# Patient Record
Sex: FEMALE | Race: WHITE | NOT HISPANIC OR LATINO | URBAN - METROPOLITAN AREA
[De-identification: names, ages, dates, MRNs, and addresses within clinical notes are randomized per-mention and may not be internally consistent; named-entity substitution may affect disease eponyms.]

---

## 2017-07-03 ENCOUNTER — OUTPATIENT (OUTPATIENT)
Dept: OUTPATIENT SERVICES | Facility: HOSPITAL | Age: 82
LOS: 1 days | End: 2017-07-03
Payer: COMMERCIAL

## 2017-07-03 DIAGNOSIS — Z98.89 OTHER SPECIFIED POSTPROCEDURAL STATES: Chronic | ICD-10-CM

## 2017-07-03 DIAGNOSIS — S91.309A UNSPECIFIED OPEN WOUND, UNSPECIFIED FOOT, INITIAL ENCOUNTER: ICD-10-CM

## 2017-07-03 PROCEDURE — G0463: CPT

## 2017-07-06 DIAGNOSIS — S91.001D UNSPECIFIED OPEN WOUND, RIGHT ANKLE, SUBSEQUENT ENCOUNTER: ICD-10-CM

## 2017-07-06 DIAGNOSIS — Z87.442 PERSONAL HISTORY OF URINARY CALCULI: ICD-10-CM

## 2017-07-06 DIAGNOSIS — Z82.49 FAMILY HISTORY OF ISCHEMIC HEART DISEASE AND OTHER DISEASES OF THE CIRCULATORY SYSTEM: ICD-10-CM

## 2017-07-06 DIAGNOSIS — H40.9 UNSPECIFIED GLAUCOMA: ICD-10-CM

## 2017-07-06 DIAGNOSIS — Z87.81 PERSONAL HISTORY OF (HEALED) TRAUMATIC FRACTURE: ICD-10-CM

## 2017-07-06 DIAGNOSIS — Z88.7 ALLERGY STATUS TO SERUM AND VACCINE: ICD-10-CM

## 2017-07-06 DIAGNOSIS — H91.90 UNSPECIFIED HEARING LOSS, UNSPECIFIED EAR: ICD-10-CM

## 2017-07-06 DIAGNOSIS — M19.90 UNSPECIFIED OSTEOARTHRITIS, UNSPECIFIED SITE: ICD-10-CM

## 2017-07-06 DIAGNOSIS — Z90.710 ACQUIRED ABSENCE OF BOTH CERVIX AND UTERUS: ICD-10-CM
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
## 2017-07-06 DIAGNOSIS — Z98.41 CATARACT EXTRACTION STATUS, RIGHT EYE: ICD-10-CM

## 2017-07-06 DIAGNOSIS — E66.9 OBESITY, UNSPECIFIED: ICD-10-CM

## 2017-07-06 DIAGNOSIS — Z79.899 OTHER LONG TERM (CURRENT) DRUG THERAPY: ICD-10-CM

## 2017-07-06 DIAGNOSIS — S91.301D UNSPECIFIED OPEN WOUND, RIGHT FOOT, SUBSEQUENT ENCOUNTER: ICD-10-CM

## 2017-07-06 DIAGNOSIS — Z98.42 CATARACT EXTRACTION STATUS, LEFT EYE: ICD-10-CM

## 2017-07-06 DIAGNOSIS — Z85.3 PERSONAL HISTORY OF MALIGNANT NEOPLASM OF BREAST: ICD-10-CM

## 2017-07-06 DIAGNOSIS — Z90.49 ACQUIRED ABSENCE OF OTHER SPECIFIED PARTS OF DIGESTIVE TRACT: ICD-10-CM
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
## 2017-07-06 DIAGNOSIS — Z87.891 PERSONAL HISTORY OF NICOTINE DEPENDENCE: ICD-10-CM

## 2017-07-06 DIAGNOSIS — I48.91 UNSPECIFIED ATRIAL FIBRILLATION: ICD-10-CM
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
## 2017-07-06 DIAGNOSIS — K21.9 GASTRO-ESOPHAGEAL REFLUX DISEASE WITHOUT ESOPHAGITIS: ICD-10-CM

## 2017-07-06 DIAGNOSIS — Z92.3 PERSONAL HISTORY OF IRRADIATION: ICD-10-CM

## 2017-07-06 DIAGNOSIS — Z90.11 ACQUIRED ABSENCE OF RIGHT BREAST AND NIPPLE: ICD-10-CM

## 2017-07-08 ENCOUNTER — INPATIENT (INPATIENT)
Facility: HOSPITAL | Age: 82
LOS: 3 days | Discharge: HOME HEALTH | DRG: 603 | End: 2017-07-12
Attending: FAMILY MEDICINE | Admitting: STUDENT IN AN ORGANIZED HEALTH CARE EDUCATION/TRAINING PROGRAM
Payer: COMMERCIAL

## 2017-07-08 VITALS
DIASTOLIC BLOOD PRESSURE: 91 MMHG | OXYGEN SATURATION: 96 % | SYSTOLIC BLOOD PRESSURE: 134 MMHG | RESPIRATION RATE: 16 BRPM | HEART RATE: 108 BPM | WEIGHT: 199.96 LBS | TEMPERATURE: 98 F

## 2017-07-08 DIAGNOSIS — Z96.7 PRESENCE OF OTHER BONE AND TENDON IMPLANTS: Chronic | ICD-10-CM

## 2017-07-08 DIAGNOSIS — Z98.89 OTHER SPECIFIED POSTPROCEDURAL STATES: Chronic | ICD-10-CM

## 2017-07-08 DIAGNOSIS — Z98.890 OTHER SPECIFIED POSTPROCEDURAL STATES: Chronic | ICD-10-CM

## 2017-07-08 DIAGNOSIS — Z71.89 OTHER SPECIFIED COUNSELING: ICD-10-CM

## 2017-07-08 DIAGNOSIS — Z29.9 ENCOUNTER FOR PROPHYLACTIC MEASURES, UNSPECIFIED: ICD-10-CM

## 2017-07-08 DIAGNOSIS — E66.9 OBESITY, UNSPECIFIED: ICD-10-CM

## 2017-07-08 DIAGNOSIS — L97.519 NON-PRESSURE CHRONIC ULCER OF OTHER PART OF RIGHT FOOT WITH UNSPECIFIED SEVERITY: ICD-10-CM

## 2017-07-08 DIAGNOSIS — Z90.710 ACQUIRED ABSENCE OF BOTH CERVIX AND UTERUS: Chronic | ICD-10-CM

## 2017-07-08 DIAGNOSIS — A04.7 ENTEROCOLITIS DUE TO CLOSTRIDIUM DIFFICILE: ICD-10-CM

## 2017-07-08 DIAGNOSIS — L03.115 CELLULITIS OF RIGHT LOWER LIMB: ICD-10-CM

## 2017-07-08 DIAGNOSIS — T14.90 INJURY, UNSPECIFIED: ICD-10-CM

## 2017-07-08 DIAGNOSIS — Z90.13 ACQUIRED ABSENCE OF BILATERAL BREASTS AND NIPPLES: Chronic | ICD-10-CM

## 2017-07-08 DIAGNOSIS — H40.9 UNSPECIFIED GLAUCOMA: ICD-10-CM

## 2017-07-08 DIAGNOSIS — I48.91 UNSPECIFIED ATRIAL FIBRILLATION: ICD-10-CM

## 2017-07-08 LAB
ALBUMIN SERPL ELPH-MCNC: 3.5 G/DL — SIGNIFICANT CHANGE UP (ref 3.3–5)
ALP SERPL-CCNC: 62 U/L — SIGNIFICANT CHANGE UP (ref 40–120)
ALT FLD-CCNC: 19 U/L — SIGNIFICANT CHANGE UP (ref 12–78)
ANION GAP SERPL CALC-SCNC: 6 MMOL/L — SIGNIFICANT CHANGE UP (ref 5–17)
APTT BLD: 34.1 SEC — SIGNIFICANT CHANGE UP (ref 27.5–37.4)
AST SERPL-CCNC: 17 U/L — SIGNIFICANT CHANGE UP (ref 15–37)
BASOPHILS # BLD AUTO: 0.1 K/UL — SIGNIFICANT CHANGE UP (ref 0–0.2)
BASOPHILS NFR BLD AUTO: 0.6 % — SIGNIFICANT CHANGE UP (ref 0–2)
BILIRUB SERPL-MCNC: 0.4 MG/DL — SIGNIFICANT CHANGE UP (ref 0.2–1.2)
BUN SERPL-MCNC: 21 MG/DL — SIGNIFICANT CHANGE UP (ref 7–23)
CALCIUM SERPL-MCNC: 8.9 MG/DL — SIGNIFICANT CHANGE UP (ref 8.5–10.1)
CHLORIDE SERPL-SCNC: 105 MMOL/L — SIGNIFICANT CHANGE UP (ref 96–108)
CO2 SERPL-SCNC: 29 MMOL/L — SIGNIFICANT CHANGE UP (ref 22–31)
CREAT SERPL-MCNC: 0.98 MG/DL — SIGNIFICANT CHANGE UP (ref 0.5–1.3)
EOSINOPHIL # BLD AUTO: 0.3 K/UL — SIGNIFICANT CHANGE UP (ref 0–0.5)
EOSINOPHIL NFR BLD AUTO: 3.1 % — SIGNIFICANT CHANGE UP (ref 0–6)
GLUCOSE SERPL-MCNC: 90 MG/DL — SIGNIFICANT CHANGE UP (ref 70–99)
HCT VFR BLD CALC: 36.3 % — SIGNIFICANT CHANGE UP (ref 34.5–45)
HGB BLD-MCNC: 11.9 G/DL — SIGNIFICANT CHANGE UP (ref 11.5–15.5)
INR BLD: 1.14 RATIO — SIGNIFICANT CHANGE UP (ref 0.88–1.16)
LACTATE SERPL-SCNC: 1.7 MMOL/L — SIGNIFICANT CHANGE UP (ref 0.7–2)
LYMPHOCYTES # BLD AUTO: 1.5 K/UL — SIGNIFICANT CHANGE UP (ref 1–3.3)
LYMPHOCYTES # BLD AUTO: 17.4 % — SIGNIFICANT CHANGE UP (ref 13–44)
MCHC RBC-ENTMCNC: 32.8 GM/DL — SIGNIFICANT CHANGE UP (ref 32–36)
MCHC RBC-ENTMCNC: 33.4 PG — SIGNIFICANT CHANGE UP (ref 27–34)
MCV RBC AUTO: 101.9 FL — HIGH (ref 80–100)
MONOCYTES # BLD AUTO: 0.8 K/UL — SIGNIFICANT CHANGE UP (ref 0–0.9)
MONOCYTES NFR BLD AUTO: 8.7 % — SIGNIFICANT CHANGE UP (ref 1–9)
NEUTROPHILS # BLD AUTO: 6.2 K/UL — SIGNIFICANT CHANGE UP (ref 1.8–7.4)
NEUTROPHILS NFR BLD AUTO: 70.2 % — SIGNIFICANT CHANGE UP (ref 43–77)
PLATELET # BLD AUTO: 327 K/UL — SIGNIFICANT CHANGE UP (ref 150–400)
POTASSIUM SERPL-MCNC: 4.3 MMOL/L — SIGNIFICANT CHANGE UP (ref 3.5–5.3)
POTASSIUM SERPL-SCNC: 4.3 MMOL/L — SIGNIFICANT CHANGE UP (ref 3.5–5.3)
PROT SERPL-MCNC: 7.3 G/DL — SIGNIFICANT CHANGE UP (ref 6–8.3)
PROTHROM AB SERPL-ACNC: 12.5 SEC — SIGNIFICANT CHANGE UP (ref 9.8–12.7)
RBC # BLD: 3.56 M/UL — LOW (ref 3.8–5.2)
RBC # FLD: 12.4 % — SIGNIFICANT CHANGE UP (ref 10.3–14.5)
SODIUM SERPL-SCNC: 140 MMOL/L — SIGNIFICANT CHANGE UP (ref 135–145)
WBC # BLD: 8.9 K/UL — SIGNIFICANT CHANGE UP (ref 3.8–10.5)
WBC # FLD AUTO: 8.9 K/UL — SIGNIFICANT CHANGE UP (ref 3.8–10.5)

## 2017-07-08 PROCEDURE — 73630 X-RAY EXAM OF FOOT: CPT | Mod: 26,RT

## 2017-07-08 PROCEDURE — 99285 EMERGENCY DEPT VISIT HI MDM: CPT

## 2017-07-08 PROCEDURE — 99223 1ST HOSP IP/OBS HIGH 75: CPT | Mod: AI,GC

## 2017-07-08 PROCEDURE — 73610 X-RAY EXAM OF ANKLE: CPT | Mod: 26,RT

## 2017-07-08 PROCEDURE — 93010 ELECTROCARDIOGRAM REPORT: CPT

## 2017-07-08 PROCEDURE — 71010: CPT | Mod: 26

## 2017-07-08 RX ORDER — LACTOBACILLUS ACIDOPHILUS 100MM CELL
1 CAPSULE ORAL
Qty: 0 | Refills: 0 | Status: DISCONTINUED | OUTPATIENT
Start: 2017-07-08 | End: 2017-07-12

## 2017-07-08 RX ORDER — ACETAMINOPHEN 500 MG
650 TABLET ORAL EVERY 6 HOURS
Qty: 0 | Refills: 0 | Status: DISCONTINUED | OUTPATIENT
Start: 2017-07-08 | End: 2017-07-12

## 2017-07-08 RX ORDER — LATANOPROST 0.05 MG/ML
1 SOLUTION/ DROPS OPHTHALMIC; TOPICAL AT BEDTIME
Qty: 0 | Refills: 0 | Status: DISCONTINUED | OUTPATIENT
Start: 2017-07-08 | End: 2017-07-12

## 2017-07-08 RX ORDER — ENOXAPARIN SODIUM 100 MG/ML
40 INJECTION SUBCUTANEOUS EVERY 24 HOURS
Qty: 0 | Refills: 0 | Status: DISCONTINUED | OUTPATIENT
Start: 2017-07-08 | End: 2017-07-12

## 2017-07-08 RX ORDER — VANCOMYCIN HCL 1 G
1000 VIAL (EA) INTRAVENOUS EVERY 12 HOURS
Qty: 0 | Refills: 0 | Status: DISCONTINUED | OUTPATIENT
Start: 2017-07-09 | End: 2017-07-11

## 2017-07-08 RX ORDER — VANCOMYCIN HCL 1 G
1000 VIAL (EA) INTRAVENOUS EVERY 24 HOURS
Qty: 0 | Refills: 0 | Status: DISCONTINUED | OUTPATIENT
Start: 2017-07-08 | End: 2017-07-08

## 2017-07-08 RX ORDER — BRIMONIDINE TARTRATE 2 MG/MG
1 SOLUTION/ DROPS OPHTHALMIC
Qty: 0 | Refills: 0 | Status: DISCONTINUED | OUTPATIENT
Start: 2017-07-08 | End: 2017-07-12

## 2017-07-08 RX ORDER — VANCOMYCIN HCL 1 G
1000 VIAL (EA) INTRAVENOUS EVERY 12 HOURS
Qty: 0 | Refills: 0 | Status: DISCONTINUED | OUTPATIENT
Start: 2017-07-09 | End: 2017-07-08

## 2017-07-08 RX ORDER — OXYCODONE AND ACETAMINOPHEN 5; 325 MG/1; MG/1
1 TABLET ORAL ONCE
Qty: 0 | Refills: 0 | Status: DISCONTINUED | OUTPATIENT
Start: 2017-07-08 | End: 2017-07-08

## 2017-07-08 RX ORDER — DORZOLAMIDE HYDROCHLORIDE TIMOLOL MALEATE 20; 5 MG/ML; MG/ML
1 SOLUTION/ DROPS OPHTHALMIC
Qty: 0 | Refills: 0 | Status: DISCONTINUED | OUTPATIENT
Start: 2017-07-08 | End: 2017-07-12

## 2017-07-08 RX ORDER — VANCOMYCIN HCL 1 G
1000 VIAL (EA) INTRAVENOUS ONCE
Qty: 0 | Refills: 0 | Status: COMPLETED | OUTPATIENT
Start: 2017-07-08 | End: 2017-07-08

## 2017-07-08 RX ORDER — OXYCODONE AND ACETAMINOPHEN 5; 325 MG/1; MG/1
1 TABLET ORAL EVERY 4 HOURS
Qty: 0 | Refills: 0 | Status: DISCONTINUED | OUTPATIENT
Start: 2017-07-08 | End: 2017-07-12

## 2017-07-08 RX ADMIN — OXYCODONE AND ACETAMINOPHEN 1 TABLET(S): 5; 325 TABLET ORAL at 23:00

## 2017-07-08 RX ADMIN — OXYCODONE AND ACETAMINOPHEN 1 TABLET(S): 5; 325 TABLET ORAL at 15:26

## 2017-07-08 RX ADMIN — DORZOLAMIDE HYDROCHLORIDE TIMOLOL MALEATE 1 DROP(S): 20; 5 SOLUTION/ DROPS OPHTHALMIC at 18:23

## 2017-07-08 RX ADMIN — OXYCODONE AND ACETAMINOPHEN 1 TABLET(S): 5; 325 TABLET ORAL at 22:32

## 2017-07-08 RX ADMIN — OXYCODONE AND ACETAMINOPHEN 1 TABLET(S): 5; 325 TABLET ORAL at 19:46

## 2017-07-08 RX ADMIN — ENOXAPARIN SODIUM 40 MILLIGRAM(S): 100 INJECTION SUBCUTANEOUS at 18:23

## 2017-07-08 RX ADMIN — OXYCODONE AND ACETAMINOPHEN 1 TABLET(S): 5; 325 TABLET ORAL at 21:00

## 2017-07-08 RX ADMIN — LATANOPROST 1 DROP(S): 0.05 SOLUTION/ DROPS OPHTHALMIC; TOPICAL at 21:19

## 2017-07-08 RX ADMIN — Medication 1 TABLET(S): at 18:23

## 2017-07-08 RX ADMIN — BRIMONIDINE TARTRATE 1 DROP(S): 2 SOLUTION/ DROPS OPHTHALMIC at 18:23

## 2017-07-08 RX ADMIN — Medication 250 MILLIGRAM(S): at 13:06
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
## 2017-07-08 NOTE — H&P ADULT - NSHPPHYSICALEXAM_GEN_ALL_CORE
Physical Exam:  General: Well developed, well nourished, No Acute Distress  HEENT: Normocephallic Atraumatic, PERRLA, EOMI bl, moist mucous membranes   Neck: Supple, nontender, no mass  Neurology: AA&Ox3, CN II-XII grossly intact, sensation intact  Respiratory: Clear To Auscultation B/L, No Wheezes, rhonchi or rales  CV: Regular Rate and Rhythm, +S1/S2, no murmurs, rubs or gallops  Abdominal: Soft, Non-Tender, Non-Distended +Bowel Soundsx4  Extremities: No Clubbing, cyanosis or edema, + peripheral pulses  Musculoskeletal: Normal Range of motion, no joint erythema or warmth, no joint swelling   Skin: warm, dry, normal color, no rash or abnormal lesions Physical Exam:  General: Well developed, obese, No Acute Distress  HEENT: Normocephallic Atraumatic, PERRLA, EOMI bl, moist mucous membranes   Neck: Supple, nontender, no mass  Neurology: AA&Ox3, CN II-XII grossly intact, sensation intact  Respiratory: Clear To Auscultation B/L, No Wheezes, rhonchi or rales  CV: Regular Rate and Rhythm, +S1/S2, no murmurs, rubs or gallops  Abdominal: + Anne's sign on deep palpation Soft, Non-Distended +Bowel Soundsx4  Extremities: No Clubbing, cyanosis or edema, + peripheral pulses  Musculoskeletal: limited ROM in right ankle, redness, edema in right leg,    Skin: warm, dry, red R leg, purulent, wet, red wound on plantar surface of right foot with granulation tissue present Physical Exam:  General: Well developed, obese, No Acute Distress  HEENT: Normocephallic Atraumatic, PERRLA, EOMI bl, moist mucous membranes   Neck: Supple, nontender, no mass  Neurology: AA&Ox3, CN II-XII grossly intact, sensation intact  Respiratory: Clear To Auscultation B/L, No Wheezes, rhonchi or rales  CV: Regular Rate and Rhythm, +S1/S2, no murmurs, rubs or gallops  Abdominal: + Anne's sign on deep palpation Soft, Non-Distended +Bowel Soundsx4  Extremities: No Clubbing, cyanosis or edema, + peripheral pulses  Musculoskeletal: limited ROM in right ankle, redness, edema in right leg,  RLE tender to palpation, cap refill less than 3 seconds, dorsalis pedis pulses present bilaterally, full range of motion of toes bilaterally  Skin: warm, dry, red R leg, purulent, wet, red wound on plantar surface of right foot with granulation tissue present

## 2017-07-08 NOTE — H&P ADULT - PSH
S/P colon resection  march 11 2014  Status post appendectomy H/O hernia repair    H/O mastectomy, bilateral    S/P colon resection  march 11 2014  S/P ORIF (open reduction internal fixation) fracture  L lower extremity  Status post appendectomy H/O hernia repair    H/O mastectomy, bilateral    H/O: hysterectomy    S/P colon resection  march 11 2014  S/P ORIF (open reduction internal fixation) fracture  L lower extremity  Status post appendectomy

## 2017-07-08 NOTE — H&P ADULT - PMH
Diverticulitis    Glaucoma    PSVT (paroxysmal supraventricular tachycardia)    S/P colon resection Atrial fibrillation    Breast cancer    C. difficile diarrhea    Diverticulitis    Gall stones    Glaucoma    PSVT (paroxysmal supraventricular tachycardia)    S/P colon resection

## 2017-07-08 NOTE — ED ADULT NURSE NOTE - AREA
dorsal/large wound with granulation tissue, some eschar surrounded by erythema wound dressed by Dr. Vanegas

## 2017-07-08 NOTE — H&P ADULT - FAMILY HISTORY
No pertinent family history in first degree relatives Mother  Still living? No  Family history of heart valve abnormality, Age at diagnosis: Age Unknown

## 2017-07-08 NOTE — H&P ADULT - NSHPREVIEWOFSYSTEMS_GEN_ALL_CORE
Constitutional: Denies fever, chills, general malaise, weight loss, weight gain, diaphoresis   HEENT: Denies sore throat, runny nose, photophobia, blurry vision, double vision, eye pain, difficulty hearing, dizziness, dysphagia, epistaxis  Respiratory: Denies shortness of breath, dyspnea on exertion, cough, sputum production, wheezing, hemoptysis  Cardiovascular: Denies chest pain, palpitations, edema  Gastrointestinal: Denies nausea, vomiting, diarrhea, constipation, abdominal pain, melena, hematochezia   Genitourinary: Denies dysuria, hematuria, frequency, urgency, incontinence  Skin/Breast: Denies rash, hives, itching  Musculoskeletal: Denies muscle pains, muscle weakness, joint pain or swelling  Neurologic: Denies syncope, loss of consciousness, headache, weakness, dizziness, paresthesias, numbness, tingling, confusion, dementia   Psychiatric: Denies feeling anxious, depressed, suicidal, or homicidal thoughts  Endocrine: Denies cold or heat intolerance, polydipsia, polyphagia   Hematology/Oncology: Denies abnormal bruising, tender or enlarged lymph nodes   ROS negative except as noted above Constitutional: Denies fever, chills, general malaise, weight loss, weight gain, diaphoresis   HEENT: Denies sore throat, runny nose, photophobia, blurry vision, double vision, eye pain, difficulty hearing, dizziness, dysphagia, epistaxis  Respiratory: Denies shortness of breath, dyspnea on exertion, cough, sputum production, wheezing, hemoptysis  Cardiovascular: Denies chest pain, palpitations, edema  Gastrointestinal: Admits nausea. Denies vomiting, diarrhea, constipation, abdominal pain, melena, hematochezia   Genitourinary: Denies dysuria, hematuria, frequency, urgency, incontinence  Skin/Breast: Denies rash, hives, itching  Musculoskeletal: Admits right foot, ankle pain and swelling.   Neurologic: Denies syncope, loss of consciousness, headache, weakness, dizziness, paresthesias, numbness, tingling, confusion, dementia   Psychiatric: Denies feeling anxious, depressed, suicidal, or homicidal thoughts  Endocrine: Denies cold or heat intolerance, polydipsia, polyphagia   Hematology/Oncology: Denies abnormal bruising, tender or enlarged lymph nodes   ROS negative except as noted above Constitutional: Denies fever, chills, general malaise, weight loss, weight gain, diaphoresis   HEENT: Denies photophobia, double vision, eye pain, difficulty hearing, dizziness, dysphagia, epistaxis  Respiratory: Denies shortness of breath, dyspnea on exertion, cough, sputum production, wheezing, hemoptysis  Cardiovascular: Denies chest pain, palpitations but admits chronic edema in RLE  Gastrointestinal: Admits nausea intermittently. Denies vomiting, diarrhea, constipation, abdominal pain, melena, hematochezia   Genitourinary: Denies dysuria, hematuria, frequency, urgency, incontinence  Skin/Breast: Denies rash, hives, itching but admits lesion on RLE as described above  Musculoskeletal: Admits right foot, ankle pain and swelling  Neurologic: Denies syncope, loss of consciousness, headache, weakness, dizziness, paresthesias, numbness, tingling, confusion, dementia   Psychiatric: Denies feeling anxious, depressed  Endocrine: Denies cold or heat intolerance, polydipsia, polyphagia   Hematology/Oncology: Denies abnormal bruising, tender or enlarged lymph nodes   ROS negative except as noted above

## 2017-07-08 NOTE — H&P ADULT - PROBLEM SELECTOR PLAN 2
Patient non compliant with A/C or BB.    patient on importance of rate control and A/C in the setting of acute infection  Monitor HR Patient has been prescribed metoprolol but does not take it, and refuses anticoagulation.   Counseled patient on importance of rate control and A/C in the setting of acute infection including risk of death, patient refusing anticoagulation.  Monitor HR Patient has been prescribed metoprolol but does not take it, and refuses anticoagulation.   Counseled patient on importance of rate control and A/C in the setting of acute infection including risk of death, patient refusing anticoagulation.  Patient is currently at a normal rate (HR95 bpm) and is not a candidate for rate control at this time.  Monitor HR, if patient becomes tachycardic, consider metoprolol Patient has been prescribed metoprolol but does not take it, and refuses anticoagulation.   Counseled patient on importance of rate control and A/C in the setting of acute infection including risk of CVA, MI, death, patient refusing anticoagulation at this time and demonstrated adequate understanding of the counseling provided.  Patient is currently at a normal rate (HR90 bpm) and is not a candidate for rate control at this time.  Monitor HR, if patient becomes tachycardic, consider metoprolol
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
## 2017-07-08 NOTE — CONSULT NOTE ADULT - ASSESSMENT
Assessment:    Grade 2 ulceration, right dorsal-lateral foot  Cellulitis of right lower extremity    Plan:    Pt seen and eval  Chart reviewed  Right foot xrays ordered and reviewed  Right foot wound culture taken and submitted, f/u results  Dry sterile dressing applied to right foot    Pt to be admitted to the hospital, podiatry/wound care will cont to follow Pt while in house

## 2017-07-08 NOTE — H&P ADULT - ATTENDING COMMENTS
I personally conducted a history and physical examination and agree with the above listed history and physical which was documented by the resident. I personally discussed the plan of care with the patient, residents and family members at the bedside. All of the patient's questions were answered to her satisfaction and she is currently in agreement w/ the treatment plan. She demonstarted adequate understanding of the plan and the counseling which was provided.
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
## 2017-07-09 LAB
ANION GAP SERPL CALC-SCNC: 6 MMOL/L — SIGNIFICANT CHANGE UP (ref 5–17)
BASOPHILS # BLD AUTO: 0.1 K/UL — SIGNIFICANT CHANGE UP (ref 0–0.2)
BASOPHILS NFR BLD AUTO: 1.1 % — SIGNIFICANT CHANGE UP (ref 0–2)
BUN SERPL-MCNC: 19 MG/DL — SIGNIFICANT CHANGE UP (ref 7–23)
CALCIUM SERPL-MCNC: 8.9 MG/DL — SIGNIFICANT CHANGE UP (ref 8.5–10.1)
CHLORIDE SERPL-SCNC: 107 MMOL/L — SIGNIFICANT CHANGE UP (ref 96–108)
CO2 SERPL-SCNC: 28 MMOL/L — SIGNIFICANT CHANGE UP (ref 22–31)
CREAT SERPL-MCNC: 1 MG/DL — SIGNIFICANT CHANGE UP (ref 0.5–1.3)
EOSINOPHIL # BLD AUTO: 0.3 K/UL — SIGNIFICANT CHANGE UP (ref 0–0.5)
EOSINOPHIL NFR BLD AUTO: 5.2 % — SIGNIFICANT CHANGE UP (ref 0–6)
GLUCOSE SERPL-MCNC: 84 MG/DL — SIGNIFICANT CHANGE UP (ref 70–99)
HBA1C BLD-MCNC: 5.5 % — SIGNIFICANT CHANGE UP (ref 4–5.6)
HCT VFR BLD CALC: 35.1 % — SIGNIFICANT CHANGE UP (ref 34.5–45)
HGB BLD-MCNC: 11.3 G/DL — LOW (ref 11.5–15.5)
LYMPHOCYTES # BLD AUTO: 1.7 K/UL — SIGNIFICANT CHANGE UP (ref 1–3.3)
LYMPHOCYTES # BLD AUTO: 28 % — SIGNIFICANT CHANGE UP (ref 13–44)
MCHC RBC-ENTMCNC: 32 GM/DL — SIGNIFICANT CHANGE UP (ref 32–36)
MCHC RBC-ENTMCNC: 32.8 PG — SIGNIFICANT CHANGE UP (ref 27–34)
MCV RBC AUTO: 102.2 FL — HIGH (ref 80–100)
MONOCYTES # BLD AUTO: 0.7 K/UL — SIGNIFICANT CHANGE UP (ref 0–0.9)
MONOCYTES NFR BLD AUTO: 11.7 % — HIGH (ref 1–9)
NEUTROPHILS # BLD AUTO: 3.3 K/UL — SIGNIFICANT CHANGE UP (ref 1.8–7.4)
NEUTROPHILS NFR BLD AUTO: 54.1 % — SIGNIFICANT CHANGE UP (ref 43–77)
PLATELET # BLD AUTO: 305 K/UL — SIGNIFICANT CHANGE UP (ref 150–400)
POTASSIUM SERPL-MCNC: 4.1 MMOL/L — SIGNIFICANT CHANGE UP (ref 3.5–5.3)
POTASSIUM SERPL-SCNC: 4.1 MMOL/L — SIGNIFICANT CHANGE UP (ref 3.5–5.3)
RBC # BLD: 3.44 M/UL — LOW (ref 3.8–5.2)
RBC # FLD: 12.3 % — SIGNIFICANT CHANGE UP (ref 10.3–14.5)
SODIUM SERPL-SCNC: 141 MMOL/L — SIGNIFICANT CHANGE UP (ref 135–145)
WBC # BLD: 6.1 K/UL — SIGNIFICANT CHANGE UP (ref 3.8–10.5)
WBC # FLD AUTO: 6.1 K/UL — SIGNIFICANT CHANGE UP (ref 3.8–10.5)

## 2017-07-09 PROCEDURE — 99233 SBSQ HOSP IP/OBS HIGH 50: CPT

## 2017-07-09 RX ORDER — ONDANSETRON 8 MG/1
4 TABLET, FILM COATED ORAL ONCE
Qty: 0 | Refills: 0 | Status: COMPLETED | OUTPATIENT
Start: 2017-07-09 | End: 2017-07-09

## 2017-07-09 RX ORDER — HYDROMORPHONE HYDROCHLORIDE 2 MG/ML
0.5 INJECTION INTRAMUSCULAR; INTRAVENOUS; SUBCUTANEOUS ONCE
Qty: 0 | Refills: 0 | Status: DISCONTINUED | OUTPATIENT
Start: 2017-07-09 | End: 2017-07-09

## 2017-07-09 RX ADMIN — Medication 1 TABLET(S): at 17:06

## 2017-07-09 RX ADMIN — OXYCODONE AND ACETAMINOPHEN 1 TABLET(S): 5; 325 TABLET ORAL at 12:05

## 2017-07-09 RX ADMIN — Medication 1 TABLET(S): at 12:07

## 2017-07-09 RX ADMIN — ONDANSETRON 4 MILLIGRAM(S): 8 TABLET, FILM COATED ORAL at 15:01

## 2017-07-09 RX ADMIN — OXYCODONE AND ACETAMINOPHEN 1 TABLET(S): 5; 325 TABLET ORAL at 04:19

## 2017-07-09 RX ADMIN — BRIMONIDINE TARTRATE 1 DROP(S): 2 SOLUTION/ DROPS OPHTHALMIC at 17:06

## 2017-07-09 RX ADMIN — HYDROMORPHONE HYDROCHLORIDE 0.5 MILLIGRAM(S): 2 INJECTION INTRAMUSCULAR; INTRAVENOUS; SUBCUTANEOUS at 09:00

## 2017-07-09 RX ADMIN — OXYCODONE AND ACETAMINOPHEN 1 TABLET(S): 5; 325 TABLET ORAL at 16:31

## 2017-07-09 RX ADMIN — Medication 250 MILLIGRAM(S): at 12:13

## 2017-07-09 RX ADMIN — Medication 250 MILLIGRAM(S): at 00:18

## 2017-07-09 RX ADMIN — ENOXAPARIN SODIUM 40 MILLIGRAM(S): 100 INJECTION SUBCUTANEOUS at 17:26

## 2017-07-09 RX ADMIN — OXYCODONE AND ACETAMINOPHEN 1 TABLET(S): 5; 325 TABLET ORAL at 12:20

## 2017-07-09 RX ADMIN — OXYCODONE AND ACETAMINOPHEN 1 TABLET(S): 5; 325 TABLET ORAL at 17:30

## 2017-07-09 RX ADMIN — DORZOLAMIDE HYDROCHLORIDE TIMOLOL MALEATE 1 DROP(S): 20; 5 SOLUTION/ DROPS OPHTHALMIC at 05:22

## 2017-07-09 RX ADMIN — OXYCODONE AND ACETAMINOPHEN 1 TABLET(S): 5; 325 TABLET ORAL at 05:00

## 2017-07-09 RX ADMIN — DORZOLAMIDE HYDROCHLORIDE TIMOLOL MALEATE 1 DROP(S): 20; 5 SOLUTION/ DROPS OPHTHALMIC at 17:07

## 2017-07-09 RX ADMIN — Medication 1 TABLET(S): at 08:45

## 2017-07-09 RX ADMIN — BRIMONIDINE TARTRATE 1 DROP(S): 2 SOLUTION/ DROPS OPHTHALMIC at 05:22

## 2017-07-09 RX ADMIN — OXYCODONE AND ACETAMINOPHEN 1 TABLET(S): 5; 325 TABLET ORAL at 21:51

## 2017-07-09 RX ADMIN — LATANOPROST 1 DROP(S): 0.05 SOLUTION/ DROPS OPHTHALMIC; TOPICAL at 21:53

## 2017-07-09 RX ADMIN — OXYCODONE AND ACETAMINOPHEN 1 TABLET(S): 5; 325 TABLET ORAL at 23:00

## 2017-07-09 RX ADMIN — HYDROMORPHONE HYDROCHLORIDE 0.5 MILLIGRAM(S): 2 INJECTION INTRAMUSCULAR; INTRAVENOUS; SUBCUTANEOUS at 08:45
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
## 2017-07-10 LAB
ANION GAP SERPL CALC-SCNC: 5 MMOL/L — SIGNIFICANT CHANGE UP (ref 5–17)
BASOPHILS # BLD AUTO: 0.1 K/UL — SIGNIFICANT CHANGE UP (ref 0–0.2)
BASOPHILS NFR BLD AUTO: 1.1 % — SIGNIFICANT CHANGE UP (ref 0–2)
BUN SERPL-MCNC: 20 MG/DL — SIGNIFICANT CHANGE UP (ref 7–23)
CALCIUM SERPL-MCNC: 8.9 MG/DL — SIGNIFICANT CHANGE UP (ref 8.5–10.1)
CHLORIDE SERPL-SCNC: 105 MMOL/L — SIGNIFICANT CHANGE UP (ref 96–108)
CO2 SERPL-SCNC: 31 MMOL/L — SIGNIFICANT CHANGE UP (ref 22–31)
CREAT SERPL-MCNC: 1.1 MG/DL — SIGNIFICANT CHANGE UP (ref 0.5–1.3)
CULTURE RESULTS: SIGNIFICANT CHANGE UP
EOSINOPHIL # BLD AUTO: 0.3 K/UL — SIGNIFICANT CHANGE UP (ref 0–0.5)
EOSINOPHIL NFR BLD AUTO: 5.5 % — SIGNIFICANT CHANGE UP (ref 0–6)
GLUCOSE SERPL-MCNC: 85 MG/DL — SIGNIFICANT CHANGE UP (ref 70–99)
HCT VFR BLD CALC: 34.9 % — SIGNIFICANT CHANGE UP (ref 34.5–45)
HGB BLD-MCNC: 11.1 G/DL — LOW (ref 11.5–15.5)
LYMPHOCYTES # BLD AUTO: 1.9 K/UL — SIGNIFICANT CHANGE UP (ref 1–3.3)
LYMPHOCYTES # BLD AUTO: 34.8 % — SIGNIFICANT CHANGE UP (ref 13–44)
MCHC RBC-ENTMCNC: 32 GM/DL — SIGNIFICANT CHANGE UP (ref 32–36)
MCHC RBC-ENTMCNC: 32.9 PG — SIGNIFICANT CHANGE UP (ref 27–34)
MCV RBC AUTO: 103 FL — HIGH (ref 80–100)
MONOCYTES # BLD AUTO: 0.5 K/UL — SIGNIFICANT CHANGE UP (ref 0–0.9)
MONOCYTES NFR BLD AUTO: 9.6 % — HIGH (ref 1–9)
NEUTROPHILS # BLD AUTO: 2.7 K/UL — SIGNIFICANT CHANGE UP (ref 1.8–7.4)
NEUTROPHILS NFR BLD AUTO: 49 % — SIGNIFICANT CHANGE UP (ref 43–77)
PLATELET # BLD AUTO: 303 K/UL — SIGNIFICANT CHANGE UP (ref 150–400)
POTASSIUM SERPL-MCNC: 4.9 MMOL/L — SIGNIFICANT CHANGE UP (ref 3.5–5.3)
POTASSIUM SERPL-SCNC: 4.9 MMOL/L — SIGNIFICANT CHANGE UP (ref 3.5–5.3)
RBC # BLD: 3.38 M/UL — LOW (ref 3.8–5.2)
RBC # FLD: 12.4 % — SIGNIFICANT CHANGE UP (ref 10.3–14.5)
SODIUM SERPL-SCNC: 141 MMOL/L — SIGNIFICANT CHANGE UP (ref 135–145)
SPECIMEN SOURCE: SIGNIFICANT CHANGE UP
VANCOMYCIN TROUGH SERPL-MCNC: 11.9 UG/ML — SIGNIFICANT CHANGE UP (ref 10–20)
WBC # BLD: 5.4 K/UL — SIGNIFICANT CHANGE UP (ref 3.8–10.5)
WBC # FLD AUTO: 5.4 K/UL — SIGNIFICANT CHANGE UP (ref 3.8–10.5)

## 2017-07-10 PROCEDURE — 93970 EXTREMITY STUDY: CPT | Mod: 26

## 2017-07-10 PROCEDURE — 99233 SBSQ HOSP IP/OBS HIGH 50: CPT | Mod: GC

## 2017-07-10 RX ORDER — DIPHENHYDRAMINE HCL 50 MG
25 CAPSULE ORAL EVERY 4 HOURS
Qty: 0 | Refills: 0 | Status: DISCONTINUED | OUTPATIENT
Start: 2017-07-10 | End: 2017-07-12

## 2017-07-10 RX ORDER — COLLAGENASE CLOSTRIDIUM HIST. 250 UNIT/G
1 OINTMENT (GRAM) TOPICAL DAILY
Qty: 0 | Refills: 0 | Status: DISCONTINUED | OUTPATIENT
Start: 2017-07-10 | End: 2017-07-12

## 2017-07-10 RX ADMIN — Medication 1 TABLET(S): at 17:52

## 2017-07-10 RX ADMIN — Medication 25 MILLIGRAM(S): at 18:28

## 2017-07-10 RX ADMIN — OXYCODONE AND ACETAMINOPHEN 1 TABLET(S): 5; 325 TABLET ORAL at 04:15

## 2017-07-10 RX ADMIN — Medication 250 MILLIGRAM(S): at 15:59

## 2017-07-10 RX ADMIN — OXYCODONE AND ACETAMINOPHEN 1 TABLET(S): 5; 325 TABLET ORAL at 22:30

## 2017-07-10 RX ADMIN — OXYCODONE AND ACETAMINOPHEN 1 TABLET(S): 5; 325 TABLET ORAL at 07:31

## 2017-07-10 RX ADMIN — Medication 250 MILLIGRAM(S): at 00:47

## 2017-07-10 RX ADMIN — DORZOLAMIDE HYDROCHLORIDE TIMOLOL MALEATE 1 DROP(S): 20; 5 SOLUTION/ DROPS OPHTHALMIC at 06:51

## 2017-07-10 RX ADMIN — ENOXAPARIN SODIUM 40 MILLIGRAM(S): 100 INJECTION SUBCUTANEOUS at 17:52

## 2017-07-10 RX ADMIN — OXYCODONE AND ACETAMINOPHEN 1 TABLET(S): 5; 325 TABLET ORAL at 21:49

## 2017-07-10 RX ADMIN — DORZOLAMIDE HYDROCHLORIDE TIMOLOL MALEATE 1 DROP(S): 20; 5 SOLUTION/ DROPS OPHTHALMIC at 21:46

## 2017-07-10 RX ADMIN — BRIMONIDINE TARTRATE 1 DROP(S): 2 SOLUTION/ DROPS OPHTHALMIC at 06:50

## 2017-07-10 RX ADMIN — OXYCODONE AND ACETAMINOPHEN 1 TABLET(S): 5; 325 TABLET ORAL at 12:39

## 2017-07-10 RX ADMIN — OXYCODONE AND ACETAMINOPHEN 1 TABLET(S): 5; 325 TABLET ORAL at 03:08

## 2017-07-10 RX ADMIN — Medication 1 TABLET(S): at 12:41

## 2017-07-10 RX ADMIN — BRIMONIDINE TARTRATE 1 DROP(S): 2 SOLUTION/ DROPS OPHTHALMIC at 21:45

## 2017-07-10 RX ADMIN — Medication 1 APPLICATION(S): at 12:42

## 2017-07-10 RX ADMIN — LATANOPROST 1 DROP(S): 0.05 SOLUTION/ DROPS OPHTHALMIC; TOPICAL at 21:46

## 2017-07-10 RX ADMIN — Medication 1 TABLET(S): at 09:08

## 2017-07-10 RX ADMIN — OXYCODONE AND ACETAMINOPHEN 1 TABLET(S): 5; 325 TABLET ORAL at 13:00

## 2017-07-10 NOTE — PROGRESS NOTE ADULT - SUBJECTIVE AND OBJECTIVE BOX
Patient is a 81y old  Female who presents with a chief complaint of right leg wound (08 Jul 2017 13:58)      INTERVAL HPI/OVERNIGHT EVENTS: Pt seen and examined at bedside. No acute complaints at this time. Denies fever/chills, CP, SOB, N/V/D. States her pain in R foot improving.    MEDICATIONS  (STANDING):  brimonidine 0.2% Ophthalmic Solution 1 Drop(s) Both EYES two times a day  dorzolamide 2%/timolol 0.5% Ophthalmic Solution 1 Drop(s) Both EYES two times a day  latanoprost 0.005% Ophthalmic Solution 1 Drop(s) Both EYES at bedtime  enoxaparin Injectable 40 milliGRAM(s) SubCutaneous every 24 hours  lactobacillus acidophilus 1 Tablet(s) Oral three times a day with meals  vancomycin  IVPB 1000 milliGRAM(s) IV Intermittent every 12 hours  collagenase Ointment 1 Application(s) Topical daily    MEDICATIONS  (PRN):  acetaminophen    Suspension. 650 milliGRAM(s) Oral every 6 hours PRN Mild Pain (1 - 3)  oxyCODONE    5 mG/acetaminophen 325 mG 1 Tablet(s) Oral every 4 hours PRN Moderate Pain (4 - 6)      Allergies    No Known Allergies    Intolerances        REVIEW OF SYSTEMS:  CONSTITUTIONAL: No fever or chills  HEENT:  No headache, no sore throat  RESPIRATORY: No cough, wheezing, or shortness of breath  CARDIOVASCULAR: No chest pain, palpitations, or leg swelling  GASTROINTESTINAL: No nausea, vomiting, or diarrhea  GENITOURINARY: No dysuria, frequency, or hematuria  NEUROLOGICAL: no focal weakness or dizziness  SKIN:  No rashes or lesions   MUSCULOSKELETAL: no myalgias   PSYCHIATRIC: No depression or anxiety  ROS Unable to obtain due to - [ ] Dementia  [ ] Lethargy  REST OF REVIEW Of SYSTEM - [ ] Normal     Vital Signs Last 24 Hrs  T(C): 36.8 (10 Jul 2017 05:04), Max: 37.1 (09 Jul 2017 19:33)  T(F): 98.3 (10 Jul 2017 05:04), Max: 98.7 (09 Jul 2017 19:33)  HR: 78 (10 Jul 2017 05:04) (78 - 105)  BP: 95/68 (10 Jul 2017 05:04) (95/68 - 105/64)  BP(mean): --  RR: 16 (10 Jul 2017 05:04) (16 - 17)  SpO2: 96% (10 Jul 2017 05:04) (91% - 96%)    PHYSICAL EXAM:  GENERAL: No Acute Distress  HEENT:  EOMI, mucous membranes moist  CHEST/LUNG:  Clear To Auscultation b/l, no rales, wheezes, or rhonchi  HEART:  Regular Rate Rhythm, S1, S2, []murmur  ABDOMEN:  Bowel Sounds+, soft, Non-Tender, Non-Distended  EXTREMITIES: no edema or calf tenderness  SKIN:  no rash  NERVOUS SYSTEM: AA&Ox3    DIET:     LABS:                        11.1   5.4   )-----------( 303      ( 10 Jul 2017 07:25 )             34.9     CBC Full  -  ( 10 Jul 2017 07:25 )  WBC Count : 5.4 K/uL  Hemoglobin : 11.1 g/dL  Hematocrit : 34.9 %  Platelet Count - Automated : 303 K/uL  Mean Cell Volume : 103.0 fl  Mean Cell Hemoglobin : 32.9 pg  Mean Cell Hemoglobin Concentration : 32.0 gm/dL  Auto Neutrophil # : 2.7 K/uL  Auto Lymphocyte # : 1.9 K/uL  Auto Monocyte # : 0.5 K/uL  Auto Eosinophil # : 0.3 K/uL  Auto Basophil # : 0.1 K/uL  Auto Neutrophil % : 49.0 %  Auto Lymphocyte % : 34.8 %  Auto Monocyte % : 9.6 %  Auto Eosinophil % : 5.5 %  Auto Basophil % : 1.1 %    10 Jul 2017 07:25    141    |  105    |  20     ----------------------------<  85     4.9     |  31     |  1.10     Ca    8.9        10 Jul 2017 07:25      PT/INR - ( 08 Jul 2017 13:10 )   PT: 12.5 sec;   INR: 1.14 ratio         PTT - ( 08 Jul 2017 13:10 )  PTT:34.1 sec    CAPILLARY BLOOD GLUCOSE          RECENT CULTURES:    Foot wound Cx: Coag. neg staph    Blood Cx: NGTD          RADIOLOGY & ADDITIONAL TESTS:    XR R Foot/Ankle: No evidence of fracture/dislocation    Personally reviewed.     Consultant(s) Notes Reviewed:  [x] YES  [ ] NO    Care Discussed with [ ] Consultants  [x] Patient  [ ] Family  [x]      [ ] Other; RN  DVT ppx  Advanced directive Patient is a 81y old  Female who presents with a chief complaint of right leg wound (08 Jul 2017 13:58)      INTERVAL HPI/OVERNIGHT EVENTS: Pt seen and examined at bedside. No acute complaints at this time. Denies fever/chills, CP, SOB, N/V/D. States her pain in R foot improving.    MEDICATIONS  (STANDING):  brimonidine 0.2% Ophthalmic Solution 1 Drop(s) Both EYES two times a day  dorzolamide 2%/timolol 0.5% Ophthalmic Solution 1 Drop(s) Both EYES two times a day  latanoprost 0.005% Ophthalmic Solution 1 Drop(s) Both EYES at bedtime  enoxaparin Injectable 40 milliGRAM(s) SubCutaneous every 24 hours  lactobacillus acidophilus 1 Tablet(s) Oral three times a day with meals  vancomycin  IVPB 1000 milliGRAM(s) IV Intermittent every 12 hours  collagenase Ointment 1 Application(s) Topical daily    MEDICATIONS  (PRN):  acetaminophen    Suspension. 650 milliGRAM(s) Oral every 6 hours PRN Mild Pain (1 - 3)  oxyCODONE    5 mG/acetaminophen 325 mG 1 Tablet(s) Oral every 4 hours PRN Moderate Pain (4 - 6)      Allergies    No Known Allergies    Intolerances        REVIEW OF SYSTEMS:  CONSTITUTIONAL: No fever or chills  HEENT:  No headache, no sore throat  RESPIRATORY: No cough, wheezing, or shortness of breath  CARDIOVASCULAR: No chest pain, palpitations, or leg swelling  GASTROINTESTINAL: No nausea, vomiting, or diarrhea  GENITOURINARY: No dysuria, frequency, or hematuria  NEUROLOGICAL: no focal weakness or dizziness  SKIN:  No rashes or lesions   MUSCULOSKELETAL: no myalgias   PSYCHIATRIC: No depression or anxiety  ROS Unable to obtain due to - [ ] Dementia  [ ] Lethargy  REST OF REVIEW Of SYSTEM - [ ] Normal     Vital Signs Last 24 Hrs  T(C): 36.8 (10 Jul 2017 05:04), Max: 37.1 (09 Jul 2017 19:33)  T(F): 98.3 (10 Jul 2017 05:04), Max: 98.7 (09 Jul 2017 19:33)  HR: 78 (10 Jul 2017 05:04) (78 - 105)  BP: 95/68 (10 Jul 2017 05:04) (95/68 - 105/64)  BP(mean): --  RR: 16 (10 Jul 2017 05:04) (16 - 17)  SpO2: 96% (10 Jul 2017 05:04) (91% - 96%)    PHYSICAL EXAM:  GENERAL: No Acute Distress  HEENT:  EOMI, mucous membranes moist  CHEST/LUNG:  Clear To Auscultation b/l, no rales, wheezes, or rhonchi  HEART:  Regular Rate Rhythm, S1, S2, [-]murmur  ABDOMEN:  Bowel Sounds+, soft, Non-Tender, Non-Distended  EXTREMITIES: no edema or calf tenderness  SKIN:  grade 2 ulceration on R dorsal foot  NERVOUS SYSTEM: AA&Ox3    DIET:     LABS:                        11.1   5.4   )-----------( 303      ( 10 Jul 2017 07:25 )             34.9     CBC Full  -  ( 10 Jul 2017 07:25 )  WBC Count : 5.4 K/uL  Hemoglobin : 11.1 g/dL  Hematocrit : 34.9 %  Platelet Count - Automated : 303 K/uL  Mean Cell Volume : 103.0 fl  Mean Cell Hemoglobin : 32.9 pg  Mean Cell Hemoglobin Concentration : 32.0 gm/dL  Auto Neutrophil # : 2.7 K/uL  Auto Lymphocyte # : 1.9 K/uL  Auto Monocyte # : 0.5 K/uL  Auto Eosinophil # : 0.3 K/uL  Auto Basophil # : 0.1 K/uL  Auto Neutrophil % : 49.0 %  Auto Lymphocyte % : 34.8 %  Auto Monocyte % : 9.6 %  Auto Eosinophil % : 5.5 %  Auto Basophil % : 1.1 %    10 Jul 2017 07:25    141    |  105    |  20     ----------------------------<  85     4.9     |  31     |  1.10     Ca    8.9        10 Jul 2017 07:25      PT/INR - ( 08 Jul 2017 13:10 )   PT: 12.5 sec;   INR: 1.14 ratio         PTT - ( 08 Jul 2017 13:10 )  PTT:34.1 sec    CAPILLARY BLOOD GLUCOSE          RECENT CULTURES:    Foot wound Cx: Coag. neg staph    Blood Cx: NGTD          RADIOLOGY & ADDITIONAL TESTS:    XR R Foot/Ankle: No evidence of fracture/dislocation    Personally reviewed.     Consultant(s) Notes Reviewed:  [x] YES  [ ] NO    Care Discussed with [ ] Consultants  [x] Patient  [ ] Family  [x]      [ ] Other; RN  DVT ppx  Advanced directive Patient is a 81y old  Female who presents with a chief complaint of right leg wound (08 Jul 2017 13:58)      INTERVAL HPI/OVERNIGHT EVENTS: Pt seen and examined at bedside. No acute complaints at this time. Denies fever/chills, CP, SOB, N/V/D. States her pain in R foot improving, as well as swelling has improved however still eryhtematous    MEDICATIONS  (STANDING):  brimonidine 0.2% Ophthalmic Solution 1 Drop(s) Both EYES two times a day  dorzolamide 2%/timolol 0.5% Ophthalmic Solution 1 Drop(s) Both EYES two times a day  latanoprost 0.005% Ophthalmic Solution 1 Drop(s) Both EYES at bedtime  enoxaparin Injectable 40 milliGRAM(s) SubCutaneous every 24 hours  lactobacillus acidophilus 1 Tablet(s) Oral three times a day with meals  vancomycin  IVPB 1000 milliGRAM(s) IV Intermittent every 12 hours  collagenase Ointment 1 Application(s) Topical daily    MEDICATIONS  (PRN):  acetaminophen    Suspension. 650 milliGRAM(s) Oral every 6 hours PRN Mild Pain (1 - 3)  oxyCODONE    5 mG/acetaminophen 325 mG 1 Tablet(s) Oral every 4 hours PRN Moderate Pain (4 - 6)      Allergies    No Known Allergies    Intolerances        REVIEW OF SYSTEMS:  CONSTITUTIONAL: No fever or chills  HEENT:  No headache, no sore throat  RESPIRATORY: No cough, wheezing, or shortness of breath  CARDIOVASCULAR: No chest pain, palpitations, or leg swelling  GASTROINTESTINAL: No nausea, vomiting, or diarrhea  GENITOURINARY: No dysuria, frequency, or hematuria  NEUROLOGICAL: no focal weakness or dizziness  SKIN:  No rashes or lesions   MUSCULOSKELETAL: no myalgias   PSYCHIATRIC: No depression or anxiety  ROS Unable to obtain due to - [ ] Dementia  [ ] Lethargy  REST OF REVIEW Of SYSTEM - [ ] Normal     Vital Signs Last 24 Hrs  T(C): 36.8 (10 Jul 2017 05:04), Max: 37.1 (09 Jul 2017 19:33)  T(F): 98.3 (10 Jul 2017 05:04), Max: 98.7 (09 Jul 2017 19:33)  HR: 78 (10 Jul 2017 05:04) (78 - 105)  BP: 95/68 (10 Jul 2017 05:04) (95/68 - 105/64)  BP(mean): --  RR: 16 (10 Jul 2017 05:04) (16 - 17)  SpO2: 96% (10 Jul 2017 05:04) (91% - 96%)    PHYSICAL EXAM:  GENERAL: No Acute Distress  HEENT:  EOMI, mucous membranes moist  CHEST/LUNG:  Clear To Auscultation b/l, no rales, wheezes, or rhonchi  HEART:  Regular Rate Rhythm, S1, S2, [-]murmur  ABDOMEN:  Bowel Sounds+, soft, Non-Tender, Non-Distended  EXTREMITIES: no edema or calf tenderness  SKIN:  grade 2 ulceration on R dorsal foot, erythematous, swollen, edematous but nonpitting  NERVOUS SYSTEM: AA&Ox3    DIET:     LABS:                        11.1   5.4   )-----------( 303      ( 10 Jul 2017 07:25 )             34.9     CBC Full  -  ( 10 Jul 2017 07:25 )  WBC Count : 5.4 K/uL  Hemoglobin : 11.1 g/dL  Hematocrit : 34.9 %  Platelet Count - Automated : 303 K/uL  Mean Cell Volume : 103.0 fl  Mean Cell Hemoglobin : 32.9 pg  Mean Cell Hemoglobin Concentration : 32.0 gm/dL  Auto Neutrophil # : 2.7 K/uL  Auto Lymphocyte # : 1.9 K/uL  Auto Monocyte # : 0.5 K/uL  Auto Eosinophil # : 0.3 K/uL  Auto Basophil # : 0.1 K/uL  Auto Neutrophil % : 49.0 %  Auto Lymphocyte % : 34.8 %  Auto Monocyte % : 9.6 %  Auto Eosinophil % : 5.5 %  Auto Basophil % : 1.1 %    10 Jul 2017 07:25    141    |  105    |  20     ----------------------------<  85     4.9     |  31     |  1.10     Ca    8.9        10 Jul 2017 07:25      PT/INR - ( 08 Jul 2017 13:10 )   PT: 12.5 sec;   INR: 1.14 ratio         PTT - ( 08 Jul 2017 13:10 )  PTT:34.1 sec    CAPILLARY BLOOD GLUCOSE          RECENT CULTURES:    Foot wound Cx: Coag. neg staph    Blood Cx: NGTD          RADIOLOGY & ADDITIONAL TESTS:    XR R Foot/Ankle: No evidence of fracture/dislocation    Personally reviewed.     Consultant(s) Notes Reviewed:  [x] YES  [ ] NO    Care Discussed with [ ] Consultants  [x] Patient  [ ] Family  [x]      [ ] Other; RN  DVT ppx  Advanced directive

## 2017-07-10 NOTE — PROGRESS NOTE ADULT - ASSESSMENT
Assessment:    Grade 2 ulceration, right dorsal-lateral foot  Cellulitis of right lower extremity    Plan:    Pt seen and eval  Chart reviewed  Right foot dressings removed and wound further examined  Santyl.dry sterile dressing applied to right foot  f/u right foot wound culture results    Podiatry to cont following Pt while in-house Assessment:    Grade 2 ulceration, right dorsal-lateral foot  Cellulitis of right lower extremity    Plan:    Pt seen and eval  Chart reviewed  Right foot dressings removed and wound further examined  Santyl.dry sterile dressing applied to right foot  f/u right foot wound culture results    Podiatry to cont following Pt while in-house    WOUND CARE INSTRUCTIONS:    1. Flush right foot wound with normal saline, as tolerated.  2. Dab dry with sterile 4x4 gauze, as tolerated.  3. Apply Santyl with nickel-thickness, directly to wound bed (do not apply to periwound area).  4. Apply 4x4 gauze and then ABD pad, over Santyl ointment.  5. Wrap dressings with 4" emily and secure with medical tape.  6. Monitor for infections.  7. Repeat steps 1-6 daily. Assessment:    Grade 2 ulceration, right dorsal-lateral foot  Cellulitis of right lower extremity    Plan:    Pt seen and eval  Chart reviewed  Right foot dressings removed and wound further examined  Santyl.dry sterile dressing applied to right foot  f/u right foot wound culture results    Full-weightbearing as tolerated to the right lower extremity    Podiatry to cont following Pt while in-house    WOUND CARE INSTRUCTIONS:    1. Flush right foot wound with normal saline, as tolerated.  2. Dab dry with sterile 4x4 gauze, as tolerated.  3. Apply Santyl with nickel-thickness, directly to wound bed (do not apply to periwound area).  4. Apply 4x4 gauze and then ABD pad, over Santyl ointment.  5. Wrap dressings with 4" emily and secure with medical tape.  6. Monitor for infections.  7. Repeat steps 1-6 daily.
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
## 2017-07-11 DIAGNOSIS — T14.8 OTHER INJURY OF UNSPECIFIED BODY REGION: ICD-10-CM

## 2017-07-11 PROCEDURE — 99233 SBSQ HOSP IP/OBS HIGH 50: CPT

## 2017-07-11 RX ORDER — SENNA PLUS 8.6 MG/1
1 TABLET ORAL AT BEDTIME
Qty: 0 | Refills: 0 | Status: DISCONTINUED | OUTPATIENT
Start: 2017-07-11 | End: 2017-07-12

## 2017-07-11 RX ORDER — CEPHALEXIN 500 MG
2 CAPSULE ORAL
Qty: 28 | Refills: 0 | OUTPATIENT
Start: 2017-07-11 | End: 2017-07-18

## 2017-07-11 RX ORDER — LACTOBACILLUS ACIDOPHILUS 100MM CELL
1 CAPSULE ORAL
Qty: 7 | Refills: 0 | OUTPATIENT
Start: 2017-07-11 | End: 2017-07-18

## 2017-07-11 RX ORDER — DOCUSATE SODIUM 100 MG
100 CAPSULE ORAL DAILY
Qty: 0 | Refills: 0 | Status: DISCONTINUED | OUTPATIENT
Start: 2017-07-11 | End: 2017-07-12

## 2017-07-11 RX ORDER — CEPHALEXIN 500 MG
1000 CAPSULE ORAL EVERY 12 HOURS
Qty: 0 | Refills: 0 | Status: DISCONTINUED | OUTPATIENT
Start: 2017-07-11 | End: 2017-07-11

## 2017-07-11 RX ORDER — LACTOBACILLUS ACIDOPHILUS 100MM CELL
1 CAPSULE ORAL
Qty: 7 | Refills: 0
Start: 2017-07-11 | End: 2017-07-18

## 2017-07-11 RX ADMIN — BRIMONIDINE TARTRATE 1 DROP(S): 2 SOLUTION/ DROPS OPHTHALMIC at 06:23

## 2017-07-11 RX ADMIN — Medication 1 TABLET(S): at 12:16

## 2017-07-11 RX ADMIN — OXYCODONE AND ACETAMINOPHEN 1 TABLET(S): 5; 325 TABLET ORAL at 11:00

## 2017-07-11 RX ADMIN — OXYCODONE AND ACETAMINOPHEN 1 TABLET(S): 5; 325 TABLET ORAL at 22:17

## 2017-07-11 RX ADMIN — OXYCODONE AND ACETAMINOPHEN 1 TABLET(S): 5; 325 TABLET ORAL at 03:30

## 2017-07-11 RX ADMIN — ENOXAPARIN SODIUM 40 MILLIGRAM(S): 100 INJECTION SUBCUTANEOUS at 17:04

## 2017-07-11 RX ADMIN — Medication 1 APPLICATION(S): at 10:08

## 2017-07-11 RX ADMIN — DORZOLAMIDE HYDROCHLORIDE TIMOLOL MALEATE 1 DROP(S): 20; 5 SOLUTION/ DROPS OPHTHALMIC at 06:23

## 2017-07-11 RX ADMIN — Medication 1 TABLET(S): at 17:04

## 2017-07-11 RX ADMIN — SENNA PLUS 1 TABLET(S): 8.6 TABLET ORAL at 22:17

## 2017-07-11 RX ADMIN — LATANOPROST 1 DROP(S): 0.05 SOLUTION/ DROPS OPHTHALMIC; TOPICAL at 21:31

## 2017-07-11 RX ADMIN — OXYCODONE AND ACETAMINOPHEN 1 TABLET(S): 5; 325 TABLET ORAL at 22:18

## 2017-07-11 RX ADMIN — OXYCODONE AND ACETAMINOPHEN 1 TABLET(S): 5; 325 TABLET ORAL at 17:55

## 2017-07-11 RX ADMIN — Medication 1 TABLET(S): at 08:36

## 2017-07-11 RX ADMIN — OXYCODONE AND ACETAMINOPHEN 1 TABLET(S): 5; 325 TABLET ORAL at 12:18

## 2017-07-11 RX ADMIN — DORZOLAMIDE HYDROCHLORIDE TIMOLOL MALEATE 1 DROP(S): 20; 5 SOLUTION/ DROPS OPHTHALMIC at 21:31

## 2017-07-11 RX ADMIN — OXYCODONE AND ACETAMINOPHEN 1 TABLET(S): 5; 325 TABLET ORAL at 17:08

## 2017-07-11 RX ADMIN — Medication 100 MILLIGRAM(S): at 22:17

## 2017-07-11 RX ADMIN — OXYCODONE AND ACETAMINOPHEN 1 TABLET(S): 5; 325 TABLET ORAL at 02:46

## 2017-07-11 RX ADMIN — BRIMONIDINE TARTRATE 1 DROP(S): 2 SOLUTION/ DROPS OPHTHALMIC at 21:31
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
## 2017-07-11 NOTE — DISCHARGE NOTE ADULT - CARE PROVIDER_API CALL
Moe Vanegas (DPM), Podiatric Medicine and Surgery  [address] [place] [address]  Phone: [phone]  Fax: [phone]    Josiane Noel (DO), Family Medicine  [address] [place] [address] [address] [place] [address]  Phone: [phone]  Fax: [phone] Moe Vanegas (DPM), Podiatric Medicine and Surgery  [address] [place] [address]  Phone: [phone]  Fax: [phone]    Josiane Noel (DO), Family Medicine  [address] [place] [address] [address] [place] [address]  Phone: [phone]  Fax: [phone]

## 2017-07-11 NOTE — PROGRESS NOTE ADULT - PROBLEM SELECTOR PLAN 6
Advanced care planning was discussed with patient.  Patient is full code
Controlled  Pt has hx of C. diff diarrhea, start bacid while on antibx
Controlled  Pt has hx of C. diff diarrhea, start bacid while on antibx

## 2017-07-11 NOTE — PROGRESS NOTE ADULT - ASSESSMENT
Assessment:    Grade 2 ulceration, right dorsal-lateral foot  Cellulitis of right lower extremity    Plan:    -Pt seen and eval  -Chart reviewed  -Right foot dressings removed and wound further examined, flushed with NS  -Santyl,dry sterile dressing applied to right foot  -f/u right foot wound culture results    Full-weightbearing as tolerated to the right lower extremity    Podiatry to cont following Pt while in-house    WOUND CARE INSTRUCTIONS:    1. Flush right foot wound with normal saline, as tolerated.  2. Dab dry with sterile 4x4 gauze, as tolerated.  3. Apply Santyl with nickel-thickness, directly to wound bed (do not apply to periwound area).  4. Apply 4x4 gauze and then ABD pad, over Santyl ointment.  5. Wrap dressings with 4" emily and secure with medical tape.  6. Monitor for infections.  7. Repeat steps 1-6 daily.
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
## 2017-07-11 NOTE — DISCHARGE NOTE ADULT - ADDITIONAL INSTRUCTIONS
WOUND CARE INSTRUCTIONS:    1. Flush right foot wound with normal saline, as tolerated.  2. Dab dry with sterile 4x4 gauze, as tolerated.  3. Apply Santyl with nickel-thickness, directly to wound bed (do not apply to periwound area).  4. Apply 4x4 gauze and then ABD pad, over Santyl ointment.  5. Wrap dressings with 4" emily and secure with medical tape.  6. Monitor for infections.  7. Repeat steps 1-6 daily. WOUND CARE INSTRUCTIONS:    1. Flush right foot wound with normal saline, as tolerated.  2. Dab dry with sterile 4x4 gauze, as tolerated.  3. Apply Santyl with nickel-thickness, directly to wound bed (do not apply to periwound area).  4. Apply 4x4 gauze and then ABD pad, over Santyl ointment.  5. Wrap dressings with 4" emily and secure with medical tape.  6. Monitor for infections.  7. Repeat steps 1-6 daily.    Continue keflex for 7 days after discharge from hospital  Follow up with wound care in 7-10 days for outpatient management of wound WOUND CARE INSTRUCTIONS:    1. Flush right foot wound with normal saline, as tolerated.  2. Dab dry with sterile 4x4 gauze, as tolerated.  3. Apply Santyl with nickel-thickness, directly to wound bed (do not apply to periwound area).  4. Apply 4x4 gauze and then ABD pad, over Santyl ointment.  5. Wrap dressings with 4" emliy and secure with medical tape.  6. Monitor for infections.  7. Repeat steps 1-6 daily.    Continue antibiotics for 7 days after discharge from hospital  Follow up with wound care in 7-10 days for outpatient management of wound follow up with your PMD within 1 week of discharge, pt to setup alll follow up appts  WOUND CARE INSTRUCTIONS:    1. Flush right foot wound with normal saline, as tolerated.  2. Dab dry with sterile 4x4 gauze, as tolerated.  3. Apply Santyl with nickel-thickness, directly to wound bed (do not apply to periwound area).  4. Apply 4x4 gauze and then ABD pad, over Santyl ointment.  5. Wrap dressings with 4" emily and secure with medical tape.  6. Monitor for infections.  7. Repeat steps 1-6 daily.    Continue antibiotics for 7 days after discharge from hospital, follow up with wound care and PMD  Follow up with wound care in 7-10 days for outpatient management of wound  DO NOT drive

## 2017-07-11 NOTE — PROGRESS NOTE ADULT - PROBLEM SELECTOR PLAN 4
Cont. brimonidine and latanoprost
controlled  Patient has history of C. diff diarrhea, start Bacid while on antibiotics
Cont. brimonidine and latanoprost

## 2017-07-11 NOTE — PROGRESS NOTE ADULT - PROBLEM SELECTOR PROBLEM 1
Cellulitis of right lower extremity
Ulcer of right foot, with unspecified severity
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
## 2017-07-11 NOTE — PROGRESS NOTE ADULT - PROBLEM SELECTOR PLAN 5
BMI 36  Counseled Pt on importance of diet and exercise  HbA1C: 5.5
bmi 36  Counseled patient on importance of diet and exercise.  F/u HbA1c
BMI 36  Counseled Pt on importance of diet and exercise  HbA1C: 5.5

## 2017-07-11 NOTE — DISCHARGE NOTE ADULT - MEDICATION SUMMARY - MEDICATIONS TO TAKE
I will START or STAY ON the medications listed below when I get home from the hospital:    red yeast rice 600 mg oral capsule  -- 2 cap(s) by mouth once a day  -- Indication: For per pmd    Coenzyme Q10 60 mg oral tablet  -- 1 tab(s) by mouth 2 times a day  -- Indication: For per pmd    brimonidine 0.2% ophthalmic solution  -- 1 drop(s) to each affected eye 2 times a day  -- Indication: For per pmd    dorzolamide-timolol 2%-0.5% preservative-free ophthalmic solution  -- 1 drop(s) to each affected eye 2 times a day  -- Indication: For per pmd    latanoprost 0.005% ophthalmic solution  -- 1 drop(s) to each affected eye once a day (at bedtime)  -- Indication: For per pmd I will START or STAY ON the medications listed below when I get home from the hospital:    doxycycline hyclate 100 mg oral delayed release tablet  -- 1 tab(s) by mouth 2 times a day  -- Avoid prolonged or excessive exposure to direct and/or artificial sunlight while taking this medication.  Do not chew, break, or crush.  Do not take dairy products, antacids, or iron preparations within one hour of this medication.  Do not take this drug if you are pregnant.  Finish all this medication unless otherwise directed by prescriber.  Medication should be taken with plenty of water.    -- Indication: For Cellulitis of right lower extremity    red yeast rice 600 mg oral capsule  -- 2 cap(s) by mouth once a day  -- Indication: For Obese    Coenzyme Q10 60 mg oral tablet  -- 1 tab(s) by mouth 2 times a day  -- Indication: For Obese    brimonidine 0.2% ophthalmic solution  -- 1 drop(s) to each affected eye 2 times a day  -- Indication: For Glaucoma    dorzolamide-timolol 2%-0.5% preservative-free ophthalmic solution  -- 1 drop(s) to each affected eye 2 times a day  -- Indication: For Glaucoma    latanoprost 0.005% ophthalmic solution  -- 1 drop(s) to each affected eye once a day (at bedtime)  -- Indication: For Glaucoma    lactobacillus acidophilus oral tablet  -- 1 tab(s) by mouth once a day  -- Indication: For Cellulitis of right lower extremity I will START or STAY ON the medications listed below when I get home from the hospital:    acetaminophen-oxycodone 325 mg-5 mg oral tablet  -- 1 tab(s) by mouth every 6 hours, As Needed -for severe pain MDD:4 tabs  -- Caution federal law prohibits the transfer of this drug to any person other  than the person for whom it was prescribed.  May cause drowsiness.  Alcohol may intensify this effect.  Use care when operating dangerous machinery.  This prescription cannot be refilled.  This product contains acetaminophen.  Do not use  with any other product containing acetaminophen to prevent possible liver damage.  Using more of this medication than prescribed may cause serious breathing problems.    -- Indication: For Cellulitis of right lower extremity    doxycycline hyclate 100 mg oral delayed release tablet  -- 1 tab(s) by mouth 2 times a day  -- Avoid prolonged or excessive exposure to direct and/or artificial sunlight while taking this medication.  Do not chew, break, or crush.  Do not take dairy products, antacids, or iron preparations within one hour of this medication.  Do not take this drug if you are pregnant.  Finish all this medication unless otherwise directed by prescriber.  Medication should be taken with plenty of water.    -- Indication: For Cellulitis of right lower extremity    red yeast rice 600 mg oral capsule  -- 2 cap(s) by mouth once a day  -- Indication: For Obese    Coenzyme Q10 60 mg oral tablet  -- 1 tab(s) by mouth 2 times a day  -- Indication: For Obese    brimonidine 0.2% ophthalmic solution  -- 1 drop(s) to each affected eye 2 times a day  -- Indication: For Glaucoma    dorzolamide-timolol 2%-0.5% preservative-free ophthalmic solution  -- 1 drop(s) to each affected eye 2 times a day  -- Indication: For Glaucoma    latanoprost 0.005% ophthalmic solution  -- 1 drop(s) to each affected eye once a day (at bedtime)  -- Indication: For Glaucoma    lactobacillus acidophilus oral tablet  -- 1 tab(s) by mouth once a day  -- Indication: For Cellulitis of right lower extremity

## 2017-07-11 NOTE — DISCHARGE NOTE ADULT - NS AS ACTIVITY OBS
Driving allowed/Showering allowed/Walking-Outdoors allowed/Walking-Indoors allowed/No Heavy lifting/straining/Stairs allowed Stairs allowed/Do not drive or operate machinery/No Heavy lifting/straining/Walking-Outdoors allowed/Showering allowed/Walking-Indoors allowed

## 2017-07-11 NOTE — DISCHARGE NOTE ADULT - PLAN OF CARE
Return to Baseline ADLs Full weight bearing as tolerated on Right lower extremity    WOUND CARE INSTRUCTIONS:    1. Flush right foot wound with normal saline, as tolerated.  2. Dab dry with sterile 4x4 gauze, as tolerated.  3. Apply Santyl with nickel-thickness, directly to wound bed (do not apply to periwound area).  4. Apply 4x4 gauze and then ABD pad, over Santyl ointment.  5. Wrap dressings with 4" emily and secure with medical tape.  6. Monitor for infections.  7. Repeat steps 1-6 daily.    Follow up with PMD in one week after discharge Chronic, stable during this admission  Patient was counseled on importance of anticoagulation and taking her prescribed metoprolol, however she refuses anticoagulation and does not take metoprolol at home Continue wound care treatment as mentioned above Continue brimonidine and latanoprost Patient refuses home anticoagulation as mentioned above History of C. diff diarrhea.  Currently controlled Full weight bearing as tolerated on Right lower extremity    WOUND CARE INSTRUCTIONS:    1. Flush right foot wound with normal saline, as tolerated.  2. Dab dry with sterile 4x4 gauze, as tolerated.  3. Apply Santyl with nickel-thickness, directly to wound bed (do not apply to periwound area).  4. Apply 4x4 gauze and then ABD pad, over Santyl ointment.  5. Wrap dressings with 4" emily and secure with medical tape.  6. Monitor for infections.  7. Repeat steps 1-6 daily.    Cont, Keflex for 7 days after discharge  Follow up with PMD and wound care in one week after discharge Continue wound care treatment as mentioned above  Follow up with wound care as outpatient in one week Full weight bearing as tolerated on Right lower extremity    WOUND CARE INSTRUCTIONS:    1. Flush right foot wound with normal saline, as tolerated.  2. Dab dry with sterile 4x4 gauze, as tolerated.  3. Apply Santyl with nickel-thickness, directly to wound bed (do not apply to periwound area).  4. Apply 4x4 gauze and then ABD pad, over Santyl ointment.  5. Wrap dressings with 4" emily and secure with medical tape.  6. Monitor for infections.  7. Repeat steps 1-6 daily.    Continue antibiotics for 7 days after discharge  Follow up with wound care in one week after discharge, call office for appointment  Follow up with PMD in one week after discharge, call office for appointment Full weight bearing as tolerated on Right lower extremity  cont oral antibiotics as prescribed and recommended by ID    WOUND CARE INSTRUCTIONS:    1. Flush right foot wound with normal saline, as tolerated.  2. Dab dry with sterile 4x4 gauze, as tolerated.  3. Apply Santyl with nickel-thickness, directly to wound bed (do not apply to periwound area).  4. Apply 4x4 gauze and then ABD pad, over Santyl ointment.  5. Wrap dressings with 4" emily and secure with medical tape.  6. Monitor for infections.  7. Repeat steps 1-6 daily.    Continue antibiotics for 7 days after discharge  Follow up with wound care in one week after discharge, call office for appointment  Follow up with PMD in one week after discharge, call office for appointment

## 2017-07-11 NOTE — PROGRESS NOTE ADULT - PROBLEM SELECTOR PLAN 2
chronic  Patient has been prescribed metoprolol but does not take it, and refuses anticoagulation.   Counseled patient on importance of rate control and A/C in the setting of acute infection including risk of CVA, MI, death, patient refusing anticoagulation at this time and demonstrated adequate understanding of the counseling provided.  Patient is currently at a normal rate (HR90 bpm) and is not a candidate for rate control at this time.  Monitor HR, if patient becomes tachycardic, consider metoprolol if ammendable by patient
chronic  Patient has been prescribed metoprolol but does not take it, and refuses anticoagulation.   Counseled patient on importance of rate control and A/C in the setting of acute infection including risk of CVA, MI, death, patient refusing anticoagulation at this time and demonstrated adequate understanding of the counseling provided.  Patient is currently at a normal rate (HR90 bpm) and is not a candidate for rate control at this time.  Monitor HR, if patient becomes tachycardic, consider metoprolol if ammendable by patient.
chronic  Patient has been prescribed metoprolol but does not take it, and refuses anticoagulation.   Counseled patient on importance of rate control and A/C in the setting of acute infection including risk of CVA, MI, death, patient refusing anticoagulation at this time and demonstrated adequate understanding of the counseling provided.  Patient is currently at a normal rate (HR90 bpm) and is not a candidate for rate control at this time.  Monitor HR, if patient becomes tachycardic, consider metoprolol if ammendable by patient.
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
## 2017-07-12 VITALS
TEMPERATURE: 98 F | OXYGEN SATURATION: 95 % | DIASTOLIC BLOOD PRESSURE: 82 MMHG | SYSTOLIC BLOOD PRESSURE: 137 MMHG | RESPIRATION RATE: 17 BRPM | HEART RATE: 104 BPM

## 2017-07-12 PROCEDURE — 80202 ASSAY OF VANCOMYCIN: CPT

## 2017-07-12 PROCEDURE — 96365 THER/PROPH/DIAG IV INF INIT: CPT

## 2017-07-12 PROCEDURE — 93970 EXTREMITY STUDY: CPT

## 2017-07-12 PROCEDURE — 83036 HEMOGLOBIN GLYCOSYLATED A1C: CPT

## 2017-07-12 PROCEDURE — 87070 CULTURE OTHR SPECIMN AEROBIC: CPT

## 2017-07-12 PROCEDURE — 73610 X-RAY EXAM OF ANKLE: CPT

## 2017-07-12 PROCEDURE — 85027 COMPLETE CBC AUTOMATED: CPT

## 2017-07-12 PROCEDURE — 96372 THER/PROPH/DIAG INJ SC/IM: CPT | Mod: XU

## 2017-07-12 PROCEDURE — 85610 PROTHROMBIN TIME: CPT

## 2017-07-12 PROCEDURE — 73630 X-RAY EXAM OF FOOT: CPT

## 2017-07-12 PROCEDURE — 93005 ELECTROCARDIOGRAM TRACING: CPT

## 2017-07-12 PROCEDURE — 99285 EMERGENCY DEPT VISIT HI MDM: CPT | Mod: 25

## 2017-07-12 PROCEDURE — 71045 X-RAY EXAM CHEST 1 VIEW: CPT

## 2017-07-12 PROCEDURE — 80048 BASIC METABOLIC PNL TOTAL CA: CPT

## 2017-07-12 PROCEDURE — 80053 COMPREHEN METABOLIC PANEL: CPT

## 2017-07-12 PROCEDURE — 99239 HOSP IP/OBS DSCHRG MGMT >30: CPT

## 2017-07-12 PROCEDURE — 85730 THROMBOPLASTIN TIME PARTIAL: CPT

## 2017-07-12 PROCEDURE — 87040 BLOOD CULTURE FOR BACTERIA: CPT

## 2017-07-12 PROCEDURE — 83605 ASSAY OF LACTIC ACID: CPT

## 2017-07-12 RX ORDER — OXYCODONE HYDROCHLORIDE 5 MG/1
1 TABLET ORAL
Qty: 16 | Refills: 0 | OUTPATIENT
Start: 2017-07-12 | End: 2017-07-16

## 2017-07-12 RX ADMIN — BRIMONIDINE TARTRATE 1 DROP(S): 2 SOLUTION/ DROPS OPHTHALMIC at 05:31

## 2017-07-12 RX ADMIN — OXYCODONE AND ACETAMINOPHEN 1 TABLET(S): 5; 325 TABLET ORAL at 12:10

## 2017-07-12 RX ADMIN — Medication 1 TABLET(S): at 08:41

## 2017-07-12 RX ADMIN — DORZOLAMIDE HYDROCHLORIDE TIMOLOL MALEATE 1 DROP(S): 20; 5 SOLUTION/ DROPS OPHTHALMIC at 05:30

## 2017-07-12 RX ADMIN — OXYCODONE AND ACETAMINOPHEN 1 TABLET(S): 5; 325 TABLET ORAL at 05:38

## 2017-07-12 RX ADMIN — Medication 650 MILLIGRAM(S): at 02:04

## 2017-07-12 RX ADMIN — Medication 1 APPLICATION(S): at 11:27

## 2017-07-12 RX ADMIN — Medication 650 MILLIGRAM(S): at 02:06

## 2017-07-12 RX ADMIN — OXYCODONE AND ACETAMINOPHEN 1 TABLET(S): 5; 325 TABLET ORAL at 11:22

## 2017-07-12 RX ADMIN — Medication 100 MILLIGRAM(S): at 11:26

## 2017-07-12 RX ADMIN — Medication 1 TABLET(S): at 11:28
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
## 2017-07-12 NOTE — PROGRESS NOTE ADULT - ASSESSMENT
Assessment:    Grade 2 ulceration, right dorsal-lateral foot  Cellulitis of right lower extremity    Plan:    -Pt seen and eval  -Chart reviewed  -Right foot dressings removed and wound further examined  -Xeroform, dry sterile dressing applied to right foot    Full-weightbearing as tolerated to the right lower extremity    Podiatry to cont following Pt while in-house    WOUND CARE INSTRUCTIONS:    1. Flush right foot wound with normal saline, as tolerated.  2. Dab dry with sterile 4x4 gauze, as tolerated.  3. Xeroform directly to wound bed.  4. Apply 4x4 gauze and then ABD pad, over Xeroform.  5. Wrap dressings with 4" emily and secure with medical tape.  6. Monitor for infections.  7. Repeat steps 1-6 daily.

## 2017-07-13 ENCOUNTER — FORM ENCOUNTER (OUTPATIENT)
Age: 82
End: 2017-07-13

## 2017-07-13 LAB
CULTURE RESULTS: SIGNIFICANT CHANGE UP
CULTURE RESULTS: SIGNIFICANT CHANGE UP
SPECIMEN SOURCE: SIGNIFICANT CHANGE UP
SPECIMEN SOURCE: SIGNIFICANT CHANGE UP

## 2017-07-14 ENCOUNTER — OUTPATIENT (OUTPATIENT)
Dept: OUTPATIENT SERVICES | Facility: HOSPITAL | Age: 82
LOS: 1 days | End: 2017-07-14
Payer: COMMERCIAL

## 2017-07-14 DIAGNOSIS — T14.90 INJURY, UNSPECIFIED: ICD-10-CM

## 2017-07-14 DIAGNOSIS — Z90.710 ACQUIRED ABSENCE OF BOTH CERVIX AND UTERUS: ICD-10-CM

## 2017-07-14 DIAGNOSIS — Z90.13 ACQUIRED ABSENCE OF BILATERAL BREASTS AND NIPPLES: Chronic | ICD-10-CM

## 2017-07-14 DIAGNOSIS — Z98.89 OTHER SPECIFIED POSTPROCEDURAL STATES: Chronic | ICD-10-CM

## 2017-07-14 DIAGNOSIS — Z90.710 ACQUIRED ABSENCE OF BOTH CERVIX AND UTERUS: Chronic | ICD-10-CM

## 2017-07-14 DIAGNOSIS — Z85.3 PERSONAL HISTORY OF MALIGNANT NEOPLASM OF BREAST: ICD-10-CM

## 2017-07-14 DIAGNOSIS — A04.7 ENTEROCOLITIS DUE TO CLOSTRIDIUM DIFFICILE: ICD-10-CM

## 2017-07-14 DIAGNOSIS — Z96.7 PRESENCE OF OTHER BONE AND TENDON IMPLANTS: Chronic | ICD-10-CM

## 2017-07-14 DIAGNOSIS — Z90.13 ACQUIRED ABSENCE OF BILATERAL BREASTS AND NIPPLES: ICD-10-CM
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
## 2017-07-14 DIAGNOSIS — S91.001D UNSPECIFIED OPEN WOUND, RIGHT ANKLE, SUBSEQUENT ENCOUNTER: ICD-10-CM

## 2017-07-14 DIAGNOSIS — I48.91 UNSPECIFIED ATRIAL FIBRILLATION: ICD-10-CM

## 2017-07-14 DIAGNOSIS — L03.115 CELLULITIS OF RIGHT LOWER LIMB: ICD-10-CM

## 2017-07-14 DIAGNOSIS — Z91.128 PATIENT'S INTENTIONAL UNDERDOSING OF MEDICATION REGIMEN FOR OTHER REASON: ICD-10-CM

## 2017-07-14 DIAGNOSIS — Z79.01 LONG TERM (CURRENT) USE OF ANTICOAGULANTS: ICD-10-CM

## 2017-07-14 DIAGNOSIS — Z98.890 OTHER SPECIFIED POSTPROCEDURAL STATES: Chronic | ICD-10-CM
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
## 2017-07-14 DIAGNOSIS — E66.9 OBESITY, UNSPECIFIED: ICD-10-CM

## 2017-07-14 PROCEDURE — 71020: CPT | Mod: 26

## 2017-07-15 ENCOUNTER — OUTPATIENT (OUTPATIENT)
Dept: OUTPATIENT SERVICES | Facility: HOSPITAL | Age: 82
LOS: 1 days | End: 2017-07-15
Payer: COMMERCIAL

## 2017-07-15 DIAGNOSIS — Z90.13 ACQUIRED ABSENCE OF BILATERAL BREASTS AND NIPPLES: Chronic | ICD-10-CM

## 2017-07-15 DIAGNOSIS — Z90.710 ACQUIRED ABSENCE OF BOTH CERVIX AND UTERUS: Chronic | ICD-10-CM
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
## 2017-07-15 DIAGNOSIS — S97.01XD CRUSHING INJURY OF RIGHT ANKLE, SUBSEQUENT ENCOUNTER: ICD-10-CM
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
## 2017-07-15 DIAGNOSIS — Z96.7 PRESENCE OF OTHER BONE AND TENDON IMPLANTS: Chronic | ICD-10-CM

## 2017-07-15 DIAGNOSIS — Z98.89 OTHER SPECIFIED POSTPROCEDURAL STATES: Chronic | ICD-10-CM

## 2017-07-15 DIAGNOSIS — S91.001D UNSPECIFIED OPEN WOUND, RIGHT ANKLE, SUBSEQUENT ENCOUNTER: ICD-10-CM
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
## 2017-07-15 DIAGNOSIS — Z98.890 OTHER SPECIFIED POSTPROCEDURAL STATES: Chronic | ICD-10-CM
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
## 2017-07-15 DIAGNOSIS — S97.81XD CRUSHING INJURY OF RIGHT FOOT, SUBSEQUENT ENCOUNTER: ICD-10-CM

## 2017-07-15 PROCEDURE — G0277: CPT

## 2017-07-17 ENCOUNTER — OUTPATIENT (OUTPATIENT)
Dept: OUTPATIENT SERVICES | Facility: HOSPITAL | Age: 82
LOS: 1 days | End: 2017-07-17
Payer: COMMERCIAL

## 2017-07-17 DIAGNOSIS — Z98.89 OTHER SPECIFIED POSTPROCEDURAL STATES: Chronic | ICD-10-CM

## 2017-07-17 DIAGNOSIS — S91.001D UNSPECIFIED OPEN WOUND, RIGHT ANKLE, SUBSEQUENT ENCOUNTER: ICD-10-CM

## 2017-07-17 DIAGNOSIS — Z90.710 ACQUIRED ABSENCE OF BOTH CERVIX AND UTERUS: Chronic | ICD-10-CM

## 2017-07-17 DIAGNOSIS — Z98.890 OTHER SPECIFIED POSTPROCEDURAL STATES: Chronic | ICD-10-CM

## 2017-07-17 DIAGNOSIS — Z96.7 PRESENCE OF OTHER BONE AND TENDON IMPLANTS: Chronic | ICD-10-CM

## 2017-07-17 DIAGNOSIS — Z90.13 ACQUIRED ABSENCE OF BILATERAL BREASTS AND NIPPLES: Chronic | ICD-10-CM

## 2017-07-17 PROCEDURE — G0463: CPT

## 2017-07-18 ENCOUNTER — OUTPATIENT (OUTPATIENT)
Dept: OUTPATIENT SERVICES | Facility: HOSPITAL | Age: 82
LOS: 1 days | End: 2017-07-18
Payer: COMMERCIAL

## 2017-07-18 DIAGNOSIS — Z96.7 PRESENCE OF OTHER BONE AND TENDON IMPLANTS: Chronic | ICD-10-CM

## 2017-07-18 DIAGNOSIS — Z90.13 ACQUIRED ABSENCE OF BILATERAL BREASTS AND NIPPLES: Chronic | ICD-10-CM

## 2017-07-18 DIAGNOSIS — Z98.89 OTHER SPECIFIED POSTPROCEDURAL STATES: Chronic | ICD-10-CM

## 2017-07-18 DIAGNOSIS — Z90.710 ACQUIRED ABSENCE OF BOTH CERVIX AND UTERUS: Chronic | ICD-10-CM

## 2017-07-18 DIAGNOSIS — Z98.890 OTHER SPECIFIED POSTPROCEDURAL STATES: Chronic | ICD-10-CM

## 2017-07-18 DIAGNOSIS — S91.001D UNSPECIFIED OPEN WOUND, RIGHT ANKLE, SUBSEQUENT ENCOUNTER: ICD-10-CM

## 2017-07-18 PROCEDURE — G0277: CPT

## 2017-07-19 ENCOUNTER — OUTPATIENT (OUTPATIENT)
Dept: OUTPATIENT SERVICES | Facility: HOSPITAL | Age: 82
LOS: 1 days | End: 2017-07-19
Payer: COMMERCIAL

## 2017-07-19 DIAGNOSIS — Z98.42 CATARACT EXTRACTION STATUS, LEFT EYE: ICD-10-CM

## 2017-07-19 DIAGNOSIS — Z85.3 PERSONAL HISTORY OF MALIGNANT NEOPLASM OF BREAST: ICD-10-CM

## 2017-07-19 DIAGNOSIS — Z90.710 ACQUIRED ABSENCE OF BOTH CERVIX AND UTERUS: ICD-10-CM

## 2017-07-19 DIAGNOSIS — S91.001D UNSPECIFIED OPEN WOUND, RIGHT ANKLE, SUBSEQUENT ENCOUNTER: ICD-10-CM

## 2017-07-19 DIAGNOSIS — Z96.7 PRESENCE OF OTHER BONE AND TENDON IMPLANTS: Chronic | ICD-10-CM

## 2017-07-19 DIAGNOSIS — Z82.49 FAMILY HISTORY OF ISCHEMIC HEART DISEASE AND OTHER DISEASES OF THE CIRCULATORY SYSTEM: ICD-10-CM

## 2017-07-19 DIAGNOSIS — Z90.710 ACQUIRED ABSENCE OF BOTH CERVIX AND UTERUS: Chronic | ICD-10-CM

## 2017-07-19 DIAGNOSIS — Z79.899 OTHER LONG TERM (CURRENT) DRUG THERAPY: ICD-10-CM

## 2017-07-19 DIAGNOSIS — K21.9 GASTRO-ESOPHAGEAL REFLUX DISEASE WITHOUT ESOPHAGITIS: ICD-10-CM

## 2017-07-19 DIAGNOSIS — Z98.41 CATARACT EXTRACTION STATUS, RIGHT EYE: ICD-10-CM
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
## 2017-07-19 DIAGNOSIS — Z87.891 PERSONAL HISTORY OF NICOTINE DEPENDENCE: ICD-10-CM

## 2017-07-19 DIAGNOSIS — E66.9 OBESITY, UNSPECIFIED: ICD-10-CM

## 2017-07-19 DIAGNOSIS — Z98.890 OTHER SPECIFIED POSTPROCEDURAL STATES: Chronic | ICD-10-CM
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
## 2017-07-19 DIAGNOSIS — Z87.442 PERSONAL HISTORY OF URINARY CALCULI: ICD-10-CM

## 2017-07-19 DIAGNOSIS — Z92.3 PERSONAL HISTORY OF IRRADIATION: ICD-10-CM

## 2017-07-19 DIAGNOSIS — Z90.11 ACQUIRED ABSENCE OF RIGHT BREAST AND NIPPLE: ICD-10-CM

## 2017-07-19 DIAGNOSIS — S97.01XD CRUSHING INJURY OF RIGHT ANKLE, SUBSEQUENT ENCOUNTER: ICD-10-CM

## 2017-07-19 DIAGNOSIS — H40.9 UNSPECIFIED GLAUCOMA: ICD-10-CM

## 2017-07-19 DIAGNOSIS — Z87.81 PERSONAL HISTORY OF (HEALED) TRAUMATIC FRACTURE: ICD-10-CM

## 2017-07-19 DIAGNOSIS — S97.81XD CRUSHING INJURY OF RIGHT FOOT, SUBSEQUENT ENCOUNTER: ICD-10-CM

## 2017-07-19 DIAGNOSIS — M19.90 UNSPECIFIED OSTEOARTHRITIS, UNSPECIFIED SITE: ICD-10-CM

## 2017-07-19 DIAGNOSIS — Z88.7 ALLERGY STATUS TO SERUM AND VACCINE: ICD-10-CM
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
## 2017-07-19 DIAGNOSIS — Z98.89 OTHER SPECIFIED POSTPROCEDURAL STATES: Chronic | ICD-10-CM

## 2017-07-19 DIAGNOSIS — I48.91 UNSPECIFIED ATRIAL FIBRILLATION: ICD-10-CM

## 2017-07-19 DIAGNOSIS — Z90.13 ACQUIRED ABSENCE OF BILATERAL BREASTS AND NIPPLES: Chronic | ICD-10-CM

## 2017-07-19 DIAGNOSIS — Z90.49 ACQUIRED ABSENCE OF OTHER SPECIFIED PARTS OF DIGESTIVE TRACT: ICD-10-CM

## 2017-07-19 DIAGNOSIS — H91.90 UNSPECIFIED HEARING LOSS, UNSPECIFIED EAR: ICD-10-CM
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
## 2017-07-19 PROCEDURE — G0463: CPT

## 2017-07-19 PROCEDURE — G0277: CPT

## 2017-07-20 ENCOUNTER — OUTPATIENT (OUTPATIENT)
Dept: OUTPATIENT SERVICES | Facility: HOSPITAL | Age: 82
LOS: 1 days | End: 2017-07-20
Payer: COMMERCIAL

## 2017-07-20 DIAGNOSIS — Z96.7 PRESENCE OF OTHER BONE AND TENDON IMPLANTS: Chronic | ICD-10-CM
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
## 2017-07-20 DIAGNOSIS — S97.81XD CRUSHING INJURY OF RIGHT FOOT, SUBSEQUENT ENCOUNTER: ICD-10-CM

## 2017-07-20 DIAGNOSIS — M25.571 PAIN IN RIGHT ANKLE AND JOINTS OF RIGHT FOOT: ICD-10-CM

## 2017-07-20 DIAGNOSIS — S91.001D UNSPECIFIED OPEN WOUND, RIGHT ANKLE, SUBSEQUENT ENCOUNTER: ICD-10-CM

## 2017-07-20 DIAGNOSIS — S97.01XD CRUSHING INJURY OF RIGHT ANKLE, SUBSEQUENT ENCOUNTER: ICD-10-CM
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
## 2017-07-20 DIAGNOSIS — Z98.89 OTHER SPECIFIED POSTPROCEDURAL STATES: Chronic | ICD-10-CM

## 2017-07-20 DIAGNOSIS — M25.572 PAIN IN LEFT ANKLE AND JOINTS OF LEFT FOOT: ICD-10-CM

## 2017-07-20 DIAGNOSIS — Z90.13 ACQUIRED ABSENCE OF BILATERAL BREASTS AND NIPPLES: Chronic | ICD-10-CM

## 2017-07-20 DIAGNOSIS — Z90.710 ACQUIRED ABSENCE OF BOTH CERVIX AND UTERUS: Chronic | ICD-10-CM

## 2017-07-20 DIAGNOSIS — Z98.890 OTHER SPECIFIED POSTPROCEDURAL STATES: Chronic | ICD-10-CM

## 2017-07-20 PROCEDURE — G0277: CPT

## 2017-07-21 ENCOUNTER — OUTPATIENT (OUTPATIENT)
Dept: OUTPATIENT SERVICES | Facility: HOSPITAL | Age: 82
LOS: 1 days | End: 2017-07-21
Payer: COMMERCIAL

## 2017-07-21 DIAGNOSIS — S97.81XD CRUSHING INJURY OF RIGHT FOOT, SUBSEQUENT ENCOUNTER: ICD-10-CM

## 2017-07-21 DIAGNOSIS — Z96.7 PRESENCE OF OTHER BONE AND TENDON IMPLANTS: Chronic | ICD-10-CM
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
## 2017-07-21 DIAGNOSIS — Z98.890 OTHER SPECIFIED POSTPROCEDURAL STATES: Chronic | ICD-10-CM

## 2017-07-21 DIAGNOSIS — S97.01XD CRUSHING INJURY OF RIGHT ANKLE, SUBSEQUENT ENCOUNTER: ICD-10-CM
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
## 2017-07-21 DIAGNOSIS — S91.001D UNSPECIFIED OPEN WOUND, RIGHT ANKLE, SUBSEQUENT ENCOUNTER: ICD-10-CM
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
## 2017-07-21 DIAGNOSIS — Z90.13 ACQUIRED ABSENCE OF BILATERAL BREASTS AND NIPPLES: Chronic | ICD-10-CM

## 2017-07-21 DIAGNOSIS — Z98.89 OTHER SPECIFIED POSTPROCEDURAL STATES: Chronic | ICD-10-CM

## 2017-07-21 DIAGNOSIS — Z90.710 ACQUIRED ABSENCE OF BOTH CERVIX AND UTERUS: Chronic | ICD-10-CM
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
## 2017-07-21 PROCEDURE — G0277: CPT

## 2017-07-22 ENCOUNTER — OUTPATIENT (OUTPATIENT)
Dept: OUTPATIENT SERVICES | Facility: HOSPITAL | Age: 82
LOS: 1 days | End: 2017-07-22
Payer: COMMERCIAL
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
## 2017-07-22 DIAGNOSIS — Z98.890 OTHER SPECIFIED POSTPROCEDURAL STATES: Chronic | ICD-10-CM

## 2017-07-22 DIAGNOSIS — S97.81XD CRUSHING INJURY OF RIGHT FOOT, SUBSEQUENT ENCOUNTER: ICD-10-CM
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
## 2017-07-22 DIAGNOSIS — S97.01XD CRUSHING INJURY OF RIGHT ANKLE, SUBSEQUENT ENCOUNTER: ICD-10-CM

## 2017-07-22 DIAGNOSIS — Z98.89 OTHER SPECIFIED POSTPROCEDURAL STATES: Chronic | ICD-10-CM

## 2017-07-22 DIAGNOSIS — Z90.710 ACQUIRED ABSENCE OF BOTH CERVIX AND UTERUS: Chronic | ICD-10-CM
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
## 2017-07-22 DIAGNOSIS — Z90.13 ACQUIRED ABSENCE OF BILATERAL BREASTS AND NIPPLES: Chronic | ICD-10-CM
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
## 2017-07-22 DIAGNOSIS — Z96.7 PRESENCE OF OTHER BONE AND TENDON IMPLANTS: Chronic | ICD-10-CM

## 2017-07-22 DIAGNOSIS — S91.001D UNSPECIFIED OPEN WOUND, RIGHT ANKLE, SUBSEQUENT ENCOUNTER: ICD-10-CM

## 2017-07-22 PROCEDURE — G0277: CPT

## 2017-07-24 ENCOUNTER — OUTPATIENT (OUTPATIENT)
Dept: OUTPATIENT SERVICES | Facility: HOSPITAL | Age: 82
LOS: 1 days | End: 2017-07-24
Payer: COMMERCIAL

## 2017-07-24 DIAGNOSIS — Z96.7 PRESENCE OF OTHER BONE AND TENDON IMPLANTS: Chronic | ICD-10-CM

## 2017-07-24 DIAGNOSIS — Z98.890 OTHER SPECIFIED POSTPROCEDURAL STATES: Chronic | ICD-10-CM

## 2017-07-24 DIAGNOSIS — S91.001D UNSPECIFIED OPEN WOUND, RIGHT ANKLE, SUBSEQUENT ENCOUNTER: ICD-10-CM

## 2017-07-24 DIAGNOSIS — Z98.89 OTHER SPECIFIED POSTPROCEDURAL STATES: Chronic | ICD-10-CM

## 2017-07-24 DIAGNOSIS — Z90.13 ACQUIRED ABSENCE OF BILATERAL BREASTS AND NIPPLES: Chronic | ICD-10-CM

## 2017-07-24 DIAGNOSIS — Z90.710 ACQUIRED ABSENCE OF BOTH CERVIX AND UTERUS: Chronic | ICD-10-CM

## 2017-07-24 PROCEDURE — G0277: CPT

## 2017-07-25 ENCOUNTER — OUTPATIENT (OUTPATIENT)
Dept: OUTPATIENT SERVICES | Facility: HOSPITAL | Age: 82
LOS: 1 days | End: 2017-07-25
Payer: COMMERCIAL

## 2017-07-25 DIAGNOSIS — S97.81XD CRUSHING INJURY OF RIGHT FOOT, SUBSEQUENT ENCOUNTER: ICD-10-CM

## 2017-07-25 DIAGNOSIS — Z96.7 PRESENCE OF OTHER BONE AND TENDON IMPLANTS: Chronic | ICD-10-CM

## 2017-07-25 DIAGNOSIS — Z98.89 OTHER SPECIFIED POSTPROCEDURAL STATES: Chronic | ICD-10-CM
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
## 2017-07-25 DIAGNOSIS — S97.01XD CRUSHING INJURY OF RIGHT ANKLE, SUBSEQUENT ENCOUNTER: ICD-10-CM
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
## 2017-07-25 DIAGNOSIS — Z98.890 OTHER SPECIFIED POSTPROCEDURAL STATES: Chronic | ICD-10-CM

## 2017-07-25 DIAGNOSIS — S91.001D UNSPECIFIED OPEN WOUND, RIGHT ANKLE, SUBSEQUENT ENCOUNTER: ICD-10-CM

## 2017-07-25 DIAGNOSIS — Z90.710 ACQUIRED ABSENCE OF BOTH CERVIX AND UTERUS: Chronic | ICD-10-CM

## 2017-07-25 DIAGNOSIS — Z90.13 ACQUIRED ABSENCE OF BILATERAL BREASTS AND NIPPLES: Chronic | ICD-10-CM

## 2017-07-25 PROCEDURE — G0277: CPT

## 2017-07-26 ENCOUNTER — OUTPATIENT (OUTPATIENT)
Dept: OUTPATIENT SERVICES | Facility: HOSPITAL | Age: 82
LOS: 1 days | End: 2017-07-26
Payer: COMMERCIAL

## 2017-07-26 ENCOUNTER — INPATIENT (INPATIENT)
Facility: HOSPITAL | Age: 82
LOS: 13 days | Discharge: NURSING, REHAB OR LONG-TERM CARE | DRG: 571 | End: 2017-08-09
Attending: INTERNAL MEDICINE | Admitting: FAMILY MEDICINE
Payer: COMMERCIAL

## 2017-07-26 VITALS
DIASTOLIC BLOOD PRESSURE: 56 MMHG | OXYGEN SATURATION: 100 % | TEMPERATURE: 98 F | HEART RATE: 92 BPM | RESPIRATION RATE: 16 BRPM | SYSTOLIC BLOOD PRESSURE: 94 MMHG

## 2017-07-26 DIAGNOSIS — E78.5 HYPERLIPIDEMIA, UNSPECIFIED: ICD-10-CM

## 2017-07-26 DIAGNOSIS — Z98.890 OTHER SPECIFIED POSTPROCEDURAL STATES: Chronic | ICD-10-CM

## 2017-07-26 DIAGNOSIS — R11.0 NAUSEA: ICD-10-CM

## 2017-07-26 DIAGNOSIS — L03.115 CELLULITIS OF RIGHT LOWER LIMB: ICD-10-CM
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
## 2017-07-26 DIAGNOSIS — H40.9 UNSPECIFIED GLAUCOMA: ICD-10-CM

## 2017-07-26 DIAGNOSIS — Z98.89 OTHER SPECIFIED POSTPROCEDURAL STATES: Chronic | ICD-10-CM

## 2017-07-26 DIAGNOSIS — S97.01XD CRUSHING INJURY OF RIGHT ANKLE, SUBSEQUENT ENCOUNTER: ICD-10-CM

## 2017-07-26 DIAGNOSIS — Z29.9 ENCOUNTER FOR PROPHYLACTIC MEASURES, UNSPECIFIED: ICD-10-CM

## 2017-07-26 DIAGNOSIS — I48.2 CHRONIC ATRIAL FIBRILLATION: ICD-10-CM

## 2017-07-26 DIAGNOSIS — Z90.13 ACQUIRED ABSENCE OF BILATERAL BREASTS AND NIPPLES: Chronic | ICD-10-CM

## 2017-07-26 DIAGNOSIS — N18.2 CHRONIC KIDNEY DISEASE, STAGE 2 (MILD): ICD-10-CM

## 2017-07-26 DIAGNOSIS — Z96.7 PRESENCE OF OTHER BONE AND TENDON IMPLANTS: Chronic | ICD-10-CM

## 2017-07-26 DIAGNOSIS — S91.011D LACERATION WITHOUT FOREIGN BODY, RIGHT ANKLE, SUBSEQUENT ENCOUNTER: ICD-10-CM
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
## 2017-07-26 DIAGNOSIS — L97.311 NON-PRESSURE CHRONIC ULCER OF RIGHT ANKLE LIMITED TO BREAKDOWN OF SKIN: ICD-10-CM

## 2017-07-26 DIAGNOSIS — S97.81XD CRUSHING INJURY OF RIGHT FOOT, SUBSEQUENT ENCOUNTER: ICD-10-CM
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
## 2017-07-26 DIAGNOSIS — Z90.710 ACQUIRED ABSENCE OF BOTH CERVIX AND UTERUS: Chronic | ICD-10-CM

## 2017-07-26 LAB
ALBUMIN SERPL ELPH-MCNC: 3.2 G/DL — LOW (ref 3.3–5)
ALP SERPL-CCNC: 54 U/L — SIGNIFICANT CHANGE UP (ref 40–120)
ALT FLD-CCNC: 16 U/L — SIGNIFICANT CHANGE UP (ref 12–78)
ANION GAP SERPL CALC-SCNC: 5 MMOL/L — SIGNIFICANT CHANGE UP (ref 5–17)
APPEARANCE UR: CLEAR — SIGNIFICANT CHANGE UP
AST SERPL-CCNC: 17 U/L — SIGNIFICANT CHANGE UP (ref 15–37)
BASOPHILS # BLD AUTO: 0.1 K/UL — SIGNIFICANT CHANGE UP (ref 0–0.2)
BASOPHILS NFR BLD AUTO: 0.9 % — SIGNIFICANT CHANGE UP (ref 0–2)
BILIRUB SERPL-MCNC: 0.4 MG/DL — SIGNIFICANT CHANGE UP (ref 0.2–1.2)
BILIRUB UR-MCNC: NEGATIVE — SIGNIFICANT CHANGE UP
BUN SERPL-MCNC: 28 MG/DL — HIGH (ref 7–23)
CALCIUM SERPL-MCNC: 8.8 MG/DL — SIGNIFICANT CHANGE UP (ref 8.5–10.1)
CHLORIDE SERPL-SCNC: 103 MMOL/L — SIGNIFICANT CHANGE UP (ref 96–108)
CO2 SERPL-SCNC: 31 MMOL/L — SIGNIFICANT CHANGE UP (ref 22–31)
COLOR SPEC: YELLOW — SIGNIFICANT CHANGE UP
CREAT SERPL-MCNC: 1.1 MG/DL — SIGNIFICANT CHANGE UP (ref 0.5–1.3)
CRP SERPL-MCNC: 0.3 MG/DL — SIGNIFICANT CHANGE UP (ref 0–0.4)
DIFF PNL FLD: NEGATIVE — SIGNIFICANT CHANGE UP
EOSINOPHIL # BLD AUTO: 0.3 K/UL — SIGNIFICANT CHANGE UP (ref 0–0.5)
EOSINOPHIL NFR BLD AUTO: 3.8 % — SIGNIFICANT CHANGE UP (ref 0–6)
GLUCOSE SERPL-MCNC: 97 MG/DL — SIGNIFICANT CHANGE UP (ref 70–99)
GLUCOSE UR QL: NEGATIVE — SIGNIFICANT CHANGE UP
HCT VFR BLD CALC: 36 % — SIGNIFICANT CHANGE UP (ref 34.5–45)
HGB BLD-MCNC: 11.9 G/DL — SIGNIFICANT CHANGE UP (ref 11.5–15.5)
KETONES UR-MCNC: NEGATIVE — SIGNIFICANT CHANGE UP
LACTATE SERPL-SCNC: 1.6 MMOL/L — SIGNIFICANT CHANGE UP (ref 0.7–2)
LEUKOCYTE ESTERASE UR-ACNC: NEGATIVE — SIGNIFICANT CHANGE UP
LYMPHOCYTES # BLD AUTO: 1.7 K/UL — SIGNIFICANT CHANGE UP (ref 1–3.3)
LYMPHOCYTES # BLD AUTO: 23.2 % — SIGNIFICANT CHANGE UP (ref 13–44)
MCHC RBC-ENTMCNC: 33 GM/DL — SIGNIFICANT CHANGE UP (ref 32–36)
MCHC RBC-ENTMCNC: 33.8 PG — SIGNIFICANT CHANGE UP (ref 27–34)
MCV RBC AUTO: 102.6 FL — HIGH (ref 80–100)
MONOCYTES # BLD AUTO: 0.6 K/UL — SIGNIFICANT CHANGE UP (ref 0–0.9)
MONOCYTES NFR BLD AUTO: 8.4 % — SIGNIFICANT CHANGE UP (ref 1–9)
NEUTROPHILS # BLD AUTO: 4.7 K/UL — SIGNIFICANT CHANGE UP (ref 1.8–7.4)
NEUTROPHILS NFR BLD AUTO: 63.8 % — SIGNIFICANT CHANGE UP (ref 43–77)
NITRITE UR-MCNC: NEGATIVE — SIGNIFICANT CHANGE UP
PH UR: 5 — SIGNIFICANT CHANGE UP (ref 5–8)
PLATELET # BLD AUTO: 293 K/UL — SIGNIFICANT CHANGE UP (ref 150–400)
POTASSIUM SERPL-MCNC: 4.6 MMOL/L — SIGNIFICANT CHANGE UP (ref 3.5–5.3)
POTASSIUM SERPL-SCNC: 4.6 MMOL/L — SIGNIFICANT CHANGE UP (ref 3.5–5.3)
PROT SERPL-MCNC: 6.8 G/DL — SIGNIFICANT CHANGE UP (ref 6–8.3)
PROT UR-MCNC: NEGATIVE — SIGNIFICANT CHANGE UP
RBC # BLD: 3.51 M/UL — LOW (ref 3.8–5.2)
RBC # FLD: 12.4 % — SIGNIFICANT CHANGE UP (ref 10.3–14.5)
SODIUM SERPL-SCNC: 139 MMOL/L — SIGNIFICANT CHANGE UP (ref 135–145)
SP GR SPEC: 1.01 — SIGNIFICANT CHANGE UP (ref 1.01–1.02)
UROBILINOGEN FLD QL: NEGATIVE — SIGNIFICANT CHANGE UP
WBC # BLD: 7.4 K/UL — SIGNIFICANT CHANGE UP (ref 3.8–10.5)
WBC # FLD AUTO: 7.4 K/UL — SIGNIFICANT CHANGE UP (ref 3.8–10.5)

## 2017-07-26 PROCEDURE — 93010 ELECTROCARDIOGRAM REPORT: CPT

## 2017-07-26 PROCEDURE — 71046 X-RAY EXAM CHEST 2 VIEWS: CPT

## 2017-07-26 PROCEDURE — 71010: CPT | Mod: 26

## 2017-07-26 PROCEDURE — 93971 EXTREMITY STUDY: CPT | Mod: 26,RT

## 2017-07-26 PROCEDURE — 87070 CULTURE OTHR SPECIMN AEROBIC: CPT

## 2017-07-26 PROCEDURE — 99285 EMERGENCY DEPT VISIT HI MDM: CPT

## 2017-07-26 PROCEDURE — 99223 1ST HOSP IP/OBS HIGH 75: CPT | Mod: AI,GC

## 2017-07-26 PROCEDURE — G0277: CPT

## 2017-07-26 PROCEDURE — 73630 X-RAY EXAM OF FOOT: CPT | Mod: 26,RT

## 2017-07-26 PROCEDURE — 73610 X-RAY EXAM OF ANKLE: CPT | Mod: 26,RT

## 2017-07-26 PROCEDURE — G0463: CPT

## 2017-07-26 RX ORDER — DORZOLAMIDE HYDROCHLORIDE TIMOLOL MALEATE 20; 5 MG/ML; MG/ML
1 SOLUTION/ DROPS OPHTHALMIC
Qty: 0 | Refills: 0 | Status: DISCONTINUED | OUTPATIENT
Start: 2017-07-26 | End: 2017-08-09

## 2017-07-26 RX ORDER — BRIMONIDINE TARTRATE 2 MG/MG
1 SOLUTION/ DROPS OPHTHALMIC
Qty: 0 | Refills: 0 | Status: DISCONTINUED | OUTPATIENT
Start: 2017-07-26 | End: 2017-08-09

## 2017-07-26 RX ORDER — PIPERACILLIN AND TAZOBACTAM 4; .5 G/20ML; G/20ML
3.38 INJECTION, POWDER, LYOPHILIZED, FOR SOLUTION INTRAVENOUS EVERY 8 HOURS
Qty: 0 | Refills: 0 | Status: DISCONTINUED | OUTPATIENT
Start: 2017-07-26 | End: 2017-07-26

## 2017-07-26 RX ORDER — SODIUM CHLORIDE 9 MG/ML
1000 INJECTION INTRAMUSCULAR; INTRAVENOUS; SUBCUTANEOUS ONCE
Qty: 0 | Refills: 0 | Status: COMPLETED | OUTPATIENT
Start: 2017-07-26 | End: 2017-07-26

## 2017-07-26 RX ORDER — ONDANSETRON 8 MG/1
4 TABLET, FILM COATED ORAL ONCE
Qty: 0 | Refills: 0 | Status: COMPLETED | OUTPATIENT
Start: 2017-07-26 | End: 2017-07-27

## 2017-07-26 RX ORDER — OXYCODONE AND ACETAMINOPHEN 5; 325 MG/1; MG/1
1 TABLET ORAL ONCE
Qty: 0 | Refills: 0 | Status: DISCONTINUED | OUTPATIENT
Start: 2017-07-26 | End: 2017-07-26

## 2017-07-26 RX ORDER — VANCOMYCIN HCL 1 G
1000 VIAL (EA) INTRAVENOUS ONCE
Qty: 0 | Refills: 0 | Status: COMPLETED | OUTPATIENT
Start: 2017-07-26 | End: 2017-07-26

## 2017-07-26 RX ORDER — OXYCODONE AND ACETAMINOPHEN 5; 325 MG/1; MG/1
1 TABLET ORAL EVERY 6 HOURS
Qty: 0 | Refills: 0 | Status: DISCONTINUED | OUTPATIENT
Start: 2017-07-26 | End: 2017-07-29

## 2017-07-26 RX ORDER — AMPICILLIN SODIUM AND SULBACTAM SODIUM 250; 125 MG/ML; MG/ML
INJECTION, POWDER, FOR SUSPENSION INTRAMUSCULAR; INTRAVENOUS
Qty: 0 | Refills: 0 | Status: DISCONTINUED | OUTPATIENT
Start: 2017-07-26 | End: 2017-07-26

## 2017-07-26 RX ORDER — AMPICILLIN SODIUM AND SULBACTAM SODIUM 250; 125 MG/ML; MG/ML
3 INJECTION, POWDER, FOR SUSPENSION INTRAMUSCULAR; INTRAVENOUS EVERY 6 HOURS
Qty: 0 | Refills: 0 | Status: DISCONTINUED | OUTPATIENT
Start: 2017-07-26 | End: 2017-07-30

## 2017-07-26 RX ORDER — LACTOBACILLUS ACIDOPHILUS 100MM CELL
1 CAPSULE ORAL DAILY
Qty: 0 | Refills: 0 | Status: DISCONTINUED | OUTPATIENT
Start: 2017-07-26 | End: 2017-08-09

## 2017-07-26 RX ORDER — VANCOMYCIN HCL 1 G
1000 VIAL (EA) INTRAVENOUS EVERY 12 HOURS
Qty: 0 | Refills: 0 | Status: DISCONTINUED | OUTPATIENT
Start: 2017-07-26 | End: 2017-07-26

## 2017-07-26 RX ORDER — PIPERACILLIN AND TAZOBACTAM 4; .5 G/20ML; G/20ML
3.38 INJECTION, POWDER, LYOPHILIZED, FOR SOLUTION INTRAVENOUS ONCE
Qty: 0 | Refills: 0 | Status: COMPLETED | OUTPATIENT
Start: 2017-07-26 | End: 2017-07-26

## 2017-07-26 RX ORDER — SODIUM CHLORIDE 9 MG/ML
1000 INJECTION INTRAMUSCULAR; INTRAVENOUS; SUBCUTANEOUS
Qty: 0 | Refills: 0 | Status: DISCONTINUED | OUTPATIENT
Start: 2017-07-26 | End: 2017-07-27

## 2017-07-26 RX ORDER — SODIUM CHLORIDE 9 MG/ML
3 INJECTION INTRAMUSCULAR; INTRAVENOUS; SUBCUTANEOUS ONCE
Qty: 0 | Refills: 0 | Status: COMPLETED | OUTPATIENT
Start: 2017-07-26 | End: 2017-07-26

## 2017-07-26 RX ORDER — LATANOPROST 0.05 MG/ML
1 SOLUTION/ DROPS OPHTHALMIC; TOPICAL AT BEDTIME
Qty: 0 | Refills: 0 | Status: DISCONTINUED | OUTPATIENT
Start: 2017-07-26 | End: 2017-08-09

## 2017-07-26 RX ADMIN — OXYCODONE AND ACETAMINOPHEN 1 TABLET(S): 5; 325 TABLET ORAL at 15:33

## 2017-07-26 RX ADMIN — SODIUM CHLORIDE 3 MILLILITER(S): 9 INJECTION INTRAMUSCULAR; INTRAVENOUS; SUBCUTANEOUS at 13:05

## 2017-07-26 RX ADMIN — BRIMONIDINE TARTRATE 1 DROP(S): 2 SOLUTION/ DROPS OPHTHALMIC at 19:26

## 2017-07-26 RX ADMIN — SODIUM CHLORIDE 50 MILLILITER(S): 9 INJECTION INTRAMUSCULAR; INTRAVENOUS; SUBCUTANEOUS at 19:25

## 2017-07-26 RX ADMIN — PIPERACILLIN AND TAZOBACTAM 200 GRAM(S): 4; .5 INJECTION, POWDER, LYOPHILIZED, FOR SOLUTION INTRAVENOUS at 13:06

## 2017-07-26 RX ADMIN — OXYCODONE AND ACETAMINOPHEN 1 TABLET(S): 5; 325 TABLET ORAL at 19:41

## 2017-07-26 RX ADMIN — SODIUM CHLORIDE 2000 MILLILITER(S): 9 INJECTION INTRAMUSCULAR; INTRAVENOUS; SUBCUTANEOUS at 13:06

## 2017-07-26 RX ADMIN — OXYCODONE AND ACETAMINOPHEN 1 TABLET(S): 5; 325 TABLET ORAL at 15:15

## 2017-07-26 RX ADMIN — DORZOLAMIDE HYDROCHLORIDE TIMOLOL MALEATE 1 DROP(S): 20; 5 SOLUTION/ DROPS OPHTHALMIC at 19:27

## 2017-07-26 RX ADMIN — LATANOPROST 1 DROP(S): 0.05 SOLUTION/ DROPS OPHTHALMIC; TOPICAL at 21:59

## 2017-07-26 RX ADMIN — Medication 250 MILLIGRAM(S): at 13:29

## 2017-07-26 RX ADMIN — AMPICILLIN SODIUM AND SULBACTAM SODIUM 200 GRAM(S): 250; 125 INJECTION, POWDER, FOR SUSPENSION INTRAMUSCULAR; INTRAVENOUS at 19:25

## 2017-07-26 RX ADMIN — OXYCODONE AND ACETAMINOPHEN 1 TABLET(S): 5; 325 TABLET ORAL at 20:10

## 2017-07-26 NOTE — H&P ADULT - PROBLEM SELECTOR PLAN 3
2 episodes of vomitting prior to admission  C/w Zofran  C/w Bronwyn 2 episodes of vomiting prior to admission  C/w Zofran  C/w Acidophillus 2 episodes of vomiting prior to admission  C/w Zofran

## 2017-07-26 NOTE — H&P ADULT - ASSESSMENT
81 yo F PMHx A-fib not on A/C due to pt refusal, diverticulosis s/p colon resection, history of C diff, breast Ca s/p B/L masectomy, admitted with RLE cellulitis 2/2 failed outpatient treatment with doxycycline. 83 yo F PMHx A-fib not on A/C due to pt refusal, diverticulosis s/p colon resection, history of C diff, breast CA s/p B/L masectomy, admitted with RLE cellulitis 2/2 failed treatment with doxycycline.

## 2017-07-26 NOTE — H&P ADULT - PSH
H/O hernia repair    H/O mastectomy, bilateral    H/O: hysterectomy    S/P colon resection  march 11 2014  S/P ORIF (open reduction internal fixation) fracture  L lower extremity  Status post appendectomy

## 2017-07-26 NOTE — H&P ADULT - ATTENDING COMMENTS
81 yo F PMHx A-fib not on A/C due to pt refusal, diverticulosis s/p colon resection, history of C diff, breast CA s/p B/L masectomy, sent from wound care center with worsening cellulitis despite treatment with Doxy. Seen by ID, now on Unasyn. Cultures ordered. Podiatry to follow. Repeat xrays of ankle/foot ordered and CRP, ESR. Has CKD, and BUN slightly increased from baseline, likely 2/2 poor po intake because of nausea. Will give gentle IV hydration and monitor. Dopplers ordered to r/o DVT
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
## 2017-07-26 NOTE — PATIENT PROFILE ADULT. - NS TRANSFER PATIENT BELONGINGS
Clothing/two yellow metal finger rings with pt/Jewelry/Money (specify)/Cell Phone/PDA (specify)/Wrist Watch
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
## 2017-07-26 NOTE — H&P ADULT - NSHPPHYSICALEXAM_GEN_ALL_CORE
Physical Exam:  General: Well developed, well nourished, NAD  HEENT: NCAT, PERRL, EOMI bl, moist mucous membranes   Neck: Supple, nontender, no mass  Neurology: A&Ox3, nonfocal, CN II-XII grossly intact  Respiratory: CTA B/L, No W/R/R  CV: RRR, +S1/S2, no murmurs, rubs or gallops  Abdominal: Soft, NT, ND +BSx4  Extremities: No C/C/E, + peripheral pulses  MSK: Normal ROM, no joint erythema or warmth, no joint swelling   Skin: warm, dry, normal color, no rash or abnormal lesions Physical Exam:  General: Well developed, well nourished, NAD  HEENT: NCAT, PERRL, EOMI bl, moist mucous membranes   Neck: Supple, nontender, no mass  Neurology: A&Ox3, nonfocal, CN II-XII grossly intact  Respiratory: CTA B/L, No W/R/R  CV: RRR, +S1/S2, no murmurs, rubs or gallops  Abdominal: Soft, NT, ND +BSx4  Extremities:  +peripheral pulses, no C/C/E  MSK: Normal ROM, no joint erythema or warmth, no joint swelling   Skin: right ankle, lateral border ulcer with overlying eschar and slough, no drainage. +erythema, warmth, and tenderness to touch in surrounding tissues.

## 2017-07-26 NOTE — ED ADULT NURSE NOTE - PMH
Atrial fibrillation    Breast cancer    C. difficile diarrhea    Diverticulitis    Gall stones    Glaucoma    PSVT (paroxysmal supraventricular tachycardia)    S/P colon resection

## 2017-07-26 NOTE — H&P ADULT - PROBLEM SELECTOR PLAN 2
Likely 2/2 dehydration, GFR 47, BUN elevated 28 (19-20 baseline), Cr 1.1  C/w gentle hydration NS 50cc/hr BUN elevated 28 (19-20 baseline), possible mild dehydration in setting of poor po intake 2/2 nausea.   Cr 1.1  C/w gentle hydration NS 50cc/hr

## 2017-07-26 NOTE — H&P ADULT - NSHPSOCIALHISTORY_GEN_ALL_CORE
Social History:    Marital Status:  (   )    (   ) Single    (X   )    (  )   Occupation: Retired Salesperson  Lives with: (  ) alone  (  ) children   (  ) spouse   (  ) parents  (X  ) other (grandson)    Substance Use (street drugs): (  X) never used  (  ) other:  Tobacco Usage:  (  X ) never smoked   (   ) former smoker   (   ) current smoker  (     ) pack year  (        ) last cigarette date  Alcohol Usage: Denies      Health Management        Immunization Hx:   ( X ) flu shot                               (  2016   ) date   (  ) pneumonia shot               (     ) date  (  ) tetanus                               (     ) date     (     ) Advanced Directives: (     ) None    (      ) DNR    (     ) DNI    (     ) Health Care Proxy: Social History:    Marital Status:  (   )    (   ) Single    (X   )    (  )   Occupation: Retired Salesperson  Lives with: (  ) alone  (  ) children   (  ) spouse   (  ) parents  (X  ) other (grandson)    Substance Use (street drugs): (  X) never used  (  ) other:  Tobacco Usage:  (  X ) never smoked   (   ) former smoker   (   ) current smoker  (     ) pack year  (        ) last cigarette date  Alcohol Usage: Denies    Immunization Hx:   ( X ) flu shot                               (  2016   ) date       (     ) Advanced Directives: (     ) None    (      ) DNR    (     ) DNI    (     ) Health Care Proxy:
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
## 2017-07-26 NOTE — ED ADULT NURSE NOTE - OBJECTIVE STATEMENT
C/O  pain in rt foot vomiting x 1mos.  crush wound rt foot being treated at [facility] C/O  pain in rt foot vomiting x 1mos.  crush wound rt foot being treated at [facility]   rt lateral foot black/red area no drainage present

## 2017-07-26 NOTE — ED PROVIDER NOTE - CONSTITUTIONAL, MLM
normal... Well appearing, white female, well nourished, awake, alert, oriented to person, place, time/situation and in no apparent distress.

## 2017-07-27 DIAGNOSIS — L97.511 NON-PRESSURE CHRONIC ULCER OF OTHER PART OF RIGHT FOOT LIMITED TO BREAKDOWN OF SKIN: ICD-10-CM

## 2017-07-27 LAB
ANION GAP SERPL CALC-SCNC: 6 MMOL/L — SIGNIFICANT CHANGE UP (ref 5–17)
BASOPHILS # BLD AUTO: 0.1 K/UL — SIGNIFICANT CHANGE UP (ref 0–0.2)
BASOPHILS NFR BLD AUTO: 1.3 % — SIGNIFICANT CHANGE UP (ref 0–2)
BUN SERPL-MCNC: 19 MG/DL — SIGNIFICANT CHANGE UP (ref 7–23)
CALCIUM SERPL-MCNC: 8.4 MG/DL — LOW (ref 8.5–10.1)
CHLORIDE SERPL-SCNC: 107 MMOL/L — SIGNIFICANT CHANGE UP (ref 96–108)
CO2 SERPL-SCNC: 29 MMOL/L — SIGNIFICANT CHANGE UP (ref 22–31)
CREAT SERPL-MCNC: 0.83 MG/DL — SIGNIFICANT CHANGE UP (ref 0.5–1.3)
EOSINOPHIL # BLD AUTO: 0.3 K/UL — SIGNIFICANT CHANGE UP (ref 0–0.5)
EOSINOPHIL NFR BLD AUTO: 5.1 % — SIGNIFICANT CHANGE UP (ref 0–6)
GLUCOSE SERPL-MCNC: 88 MG/DL — SIGNIFICANT CHANGE UP (ref 70–99)
HCT VFR BLD CALC: 34.2 % — LOW (ref 34.5–45)
HGB BLD-MCNC: 11.1 G/DL — LOW (ref 11.5–15.5)
LYMPHOCYTES # BLD AUTO: 1.6 K/UL — SIGNIFICANT CHANGE UP (ref 1–3.3)
LYMPHOCYTES # BLD AUTO: 28.2 % — SIGNIFICANT CHANGE UP (ref 13–44)
MCHC RBC-ENTMCNC: 32.3 GM/DL — SIGNIFICANT CHANGE UP (ref 32–36)
MCHC RBC-ENTMCNC: 33.2 PG — SIGNIFICANT CHANGE UP (ref 27–34)
MCV RBC AUTO: 102.7 FL — HIGH (ref 80–100)
MONOCYTES # BLD AUTO: 0.6 K/UL — SIGNIFICANT CHANGE UP (ref 0–0.9)
MONOCYTES NFR BLD AUTO: 10.2 % — HIGH (ref 1–9)
MRSA PCR RESULT.: SIGNIFICANT CHANGE UP
NEUTROPHILS # BLD AUTO: 3.1 K/UL — SIGNIFICANT CHANGE UP (ref 1.8–7.4)
NEUTROPHILS NFR BLD AUTO: 55.2 % — SIGNIFICANT CHANGE UP (ref 43–77)
PLATELET # BLD AUTO: 240 K/UL — SIGNIFICANT CHANGE UP (ref 150–400)
POTASSIUM SERPL-MCNC: 4.2 MMOL/L — SIGNIFICANT CHANGE UP (ref 3.5–5.3)
POTASSIUM SERPL-SCNC: 4.2 MMOL/L — SIGNIFICANT CHANGE UP (ref 3.5–5.3)
RBC # BLD: 3.33 M/UL — LOW (ref 3.8–5.2)
RBC # FLD: 12.3 % — SIGNIFICANT CHANGE UP (ref 10.3–14.5)
S AUREUS DNA NOSE QL NAA+PROBE: SIGNIFICANT CHANGE UP
SODIUM SERPL-SCNC: 142 MMOL/L — SIGNIFICANT CHANGE UP (ref 135–145)
WBC # BLD: 5.6 K/UL — SIGNIFICANT CHANGE UP (ref 3.8–10.5)
WBC # FLD AUTO: 5.6 K/UL — SIGNIFICANT CHANGE UP (ref 3.8–10.5)

## 2017-07-27 PROCEDURE — 73720 MRI LWR EXTREMITY W/O&W/DYE: CPT | Mod: 26,RT

## 2017-07-27 PROCEDURE — 93926 LOWER EXTREMITY STUDY: CPT | Mod: 26,RT

## 2017-07-27 PROCEDURE — 99255 IP/OBS CONSLTJ NEW/EST HI 80: CPT

## 2017-07-27 PROCEDURE — 93010 ELECTROCARDIOGRAM REPORT: CPT

## 2017-07-27 PROCEDURE — 99233 SBSQ HOSP IP/OBS HIGH 50: CPT | Mod: GC

## 2017-07-27 RX ORDER — VANCOMYCIN HCL 1 G
1000 VIAL (EA) INTRAVENOUS ONCE
Qty: 0 | Refills: 0 | Status: COMPLETED | OUTPATIENT
Start: 2017-07-27 | End: 2017-07-27

## 2017-07-27 RX ORDER — DOCUSATE SODIUM 100 MG
100 CAPSULE ORAL DAILY
Qty: 0 | Refills: 0 | Status: DISCONTINUED | OUTPATIENT
Start: 2017-07-27 | End: 2017-07-29

## 2017-07-27 RX ORDER — VANCOMYCIN HCL 1 G
1000 VIAL (EA) INTRAVENOUS EVERY 12 HOURS
Qty: 0 | Refills: 0 | Status: DISCONTINUED | OUTPATIENT
Start: 2017-07-27 | End: 2017-08-04

## 2017-07-27 RX ORDER — VANCOMYCIN HCL 1 G
VIAL (EA) INTRAVENOUS
Qty: 0 | Refills: 0 | Status: DISCONTINUED | OUTPATIENT
Start: 2017-07-27 | End: 2017-08-04

## 2017-07-27 RX ORDER — ACETAMINOPHEN 500 MG
650 TABLET ORAL EVERY 6 HOURS
Qty: 0 | Refills: 0 | Status: DISCONTINUED | OUTPATIENT
Start: 2017-07-27 | End: 2017-08-09

## 2017-07-27 RX ORDER — ENOXAPARIN SODIUM 100 MG/ML
40 INJECTION SUBCUTANEOUS EVERY 12 HOURS
Qty: 0 | Refills: 0 | Status: DISCONTINUED | OUTPATIENT
Start: 2017-07-27 | End: 2017-07-27

## 2017-07-27 RX ORDER — ENOXAPARIN SODIUM 100 MG/ML
40 INJECTION SUBCUTANEOUS EVERY 24 HOURS
Qty: 0 | Refills: 0 | Status: DISCONTINUED | OUTPATIENT
Start: 2017-07-27 | End: 2017-07-30

## 2017-07-27 RX ORDER — ONDANSETRON 8 MG/1
4 TABLET, FILM COATED ORAL EVERY 6 HOURS
Qty: 0 | Refills: 0 | Status: DISCONTINUED | OUTPATIENT
Start: 2017-07-27 | End: 2017-08-09

## 2017-07-27 RX ADMIN — Medication 100 MILLIGRAM(S): at 18:17

## 2017-07-27 RX ADMIN — OXYCODONE AND ACETAMINOPHEN 1 TABLET(S): 5; 325 TABLET ORAL at 01:00

## 2017-07-27 RX ADMIN — OXYCODONE AND ACETAMINOPHEN 1 TABLET(S): 5; 325 TABLET ORAL at 11:41

## 2017-07-27 RX ADMIN — OXYCODONE AND ACETAMINOPHEN 1 TABLET(S): 5; 325 TABLET ORAL at 00:28

## 2017-07-27 RX ADMIN — Medication 1 TABLET(S): at 11:45

## 2017-07-27 RX ADMIN — OXYCODONE AND ACETAMINOPHEN 1 TABLET(S): 5; 325 TABLET ORAL at 11:35

## 2017-07-27 RX ADMIN — BRIMONIDINE TARTRATE 1 DROP(S): 2 SOLUTION/ DROPS OPHTHALMIC at 06:36

## 2017-07-27 RX ADMIN — AMPICILLIN SODIUM AND SULBACTAM SODIUM 200 GRAM(S): 250; 125 INJECTION, POWDER, FOR SUSPENSION INTRAMUSCULAR; INTRAVENOUS at 06:35

## 2017-07-27 RX ADMIN — AMPICILLIN SODIUM AND SULBACTAM SODIUM 200 GRAM(S): 250; 125 INJECTION, POWDER, FOR SUSPENSION INTRAMUSCULAR; INTRAVENOUS at 18:18

## 2017-07-27 RX ADMIN — LATANOPROST 1 DROP(S): 0.05 SOLUTION/ DROPS OPHTHALMIC; TOPICAL at 22:00

## 2017-07-27 RX ADMIN — DORZOLAMIDE HYDROCHLORIDE TIMOLOL MALEATE 1 DROP(S): 20; 5 SOLUTION/ DROPS OPHTHALMIC at 22:01

## 2017-07-27 RX ADMIN — OXYCODONE AND ACETAMINOPHEN 1 TABLET(S): 5; 325 TABLET ORAL at 20:45

## 2017-07-27 RX ADMIN — BRIMONIDINE TARTRATE 1 DROP(S): 2 SOLUTION/ DROPS OPHTHALMIC at 22:00

## 2017-07-27 RX ADMIN — AMPICILLIN SODIUM AND SULBACTAM SODIUM 200 GRAM(S): 250; 125 INJECTION, POWDER, FOR SUSPENSION INTRAMUSCULAR; INTRAVENOUS at 00:28

## 2017-07-27 RX ADMIN — OXYCODONE AND ACETAMINOPHEN 1 TABLET(S): 5; 325 TABLET ORAL at 19:37

## 2017-07-27 RX ADMIN — OXYCODONE AND ACETAMINOPHEN 1 TABLET(S): 5; 325 TABLET ORAL at 04:33

## 2017-07-27 RX ADMIN — ENOXAPARIN SODIUM 40 MILLIGRAM(S): 100 INJECTION SUBCUTANEOUS at 17:15

## 2017-07-27 RX ADMIN — AMPICILLIN SODIUM AND SULBACTAM SODIUM 200 GRAM(S): 250; 125 INJECTION, POWDER, FOR SUSPENSION INTRAMUSCULAR; INTRAVENOUS at 11:45

## 2017-07-27 RX ADMIN — Medication 250 MILLIGRAM(S): at 17:14

## 2017-07-27 RX ADMIN — Medication 250 MILLIGRAM(S): at 08:34

## 2017-07-27 RX ADMIN — ONDANSETRON 4 MILLIGRAM(S): 8 TABLET, FILM COATED ORAL at 12:34

## 2017-07-27 RX ADMIN — OXYCODONE AND ACETAMINOPHEN 1 TABLET(S): 5; 325 TABLET ORAL at 05:00

## 2017-07-27 RX ADMIN — ONDANSETRON 4 MILLIGRAM(S): 8 TABLET, FILM COATED ORAL at 08:52

## 2017-07-27 RX ADMIN — DORZOLAMIDE HYDROCHLORIDE TIMOLOL MALEATE 1 DROP(S): 20; 5 SOLUTION/ DROPS OPHTHALMIC at 06:35

## 2017-07-27 NOTE — PROGRESS NOTE ADULT - PROBLEM SELECTOR PLAN 1
-Dr. Sanon started Unasyn yesterday, Dr. Esparza today added Vancomycin for MRSA coverage  -Xrays foot negative for fractures  -F/u MRI foot for r/o osteomyelitis per Dr. Esparza  -F/u arterial doppler RLE per Dr. Esparza  -F/u wound care recs, may need debridement per Dr. Esparza  -F/u BCx, wound cultures  -F/u ESR, MRSA/MSSA pcr  -RLE venous doppler negative for DVT
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
## 2017-07-27 NOTE — CONSULT NOTE ADULT - ASSESSMENT
Assessment:    Grade 2 ulcer, dorsal right foot  Cellulitis, dorsal right foot    Plan:    -Pt seen and eval  -Chart reviewed  -Dressings removed and wound further examined, cleansed with normal saline  -Xeroform, dry sterile dressings applied today  -MRI ordered, f/u results  -Xrays demonstrate no gas gangrene or osteomyelitis  -Follow infectious disease recommendations  -Planning for debridement of wound, likely bedside procedure  -Pt Tx plan discussed w/Dr. Vanegas, agreed upon    Podiatry to cont following Pt while in-house
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
## 2017-07-27 NOTE — PROGRESS NOTE ADULT - SUBJECTIVE AND OBJECTIVE BOX
Patient is a 82y old  Female who presents with a chief complaint of right foot infection (2017 20:43)      HPI:  83 yo F PMHx A-fib not on A/C due to pt refusal, diverticulosis s/p colon resection, history of C diff, breast Ca s/p B/L masectomy, presenting with RLE wound a/w RLE cellulitis.     INTERVAL HPI/OVERNIGHT EVENTS: No acute events overnight. Pt seen and examined at bedside.     T(C): 36.8 (17 @ 04:55), Max: 36.8 (17 @ 12:09)  HR: 97 (17 @ 06:49) (82 - 102)  BP: 124/79 (17 @ 06:49) (94/56 - 124/79)  RR: 16 (17 @ 04:55) (15 - 16)  SpO2: 95% (17 @ 04:55) (95% - 100%)  Wt(kg): --  I&O's Summary    2017 07:01  -  2017 07:00  --------------------------------------------------------  IN: 1290 mL / OUT: 0 mL / NET: 1290 mL        REVIEW OF SYSTEMS:  CONSTITUTIONAL: No fever, weight loss, or fatigue  EYES: No eye pain, visual disturbances, or discharge  ENMT:  No difficulty hearing, tinnitus, vertigo; No sinus or throat pain  NECK: No pain or stiffness  BREASTS: No pain, no masses,   RESPIRATORY: No cough, wheezing, chills or hemoptysis; No shortness of breath  CARDIOVASCULAR: No chest pain, palpitations, dizziness, or leg swelling  GASTROINTESTINAL: No abdominal or epigastric pain. No nausea, vomiting, or hematemesis; No diarrhea or constipation. No melena or hematochezia.  GENITOURINARY: No dysuria, frequency, hematuria, or incontinence  NEUROLOGICAL: No headaches, memory loss, loss of strength, numbness, or tremors  SKIN: No itching, burning, rashes, or lesions   LYMPH NODES: No enlarged glands  MUSCULOSKELETAL: No joint pain or swelling; No muscle, back, or extremity pain  PSYCHIATRIC: No depression, anxiety, mood swings, or difficulty sleeping  ALLERY No hives or eczema    PHYSICAL EXAM:  GENERAL: NAD, well-groomed, well-developed  HEAD:  Atraumatic, Normocephalic  EYES: EOMI, PERRLA, conjunctiva and sclera clear  ENMT: No tonsillar erythema, exudates, or enlargement; Moist mucous membranes, Good dentition, No lesions  NECK: Supple, No JVD, Normal thyroid  NERVOUS SYSTEM:  Alert & Oriented X3, Good concentration; Motor Strength 5/5 B/L upper and lower extremities; DTRs 2+ intact and symmetric  CHEST/LUNG: Clear to percussion bilaterally; No rales, rhonchi, wheezing, or rubs  HEART: Regular rate and rhythm; No murmurs, rubs, or gallops  ABDOMEN: Soft, Nontender, Nondistended; Bowel sounds present  EXTREMITIES:  2+ Peripheral Pulses, No clubbing, cyanosis, or edema  LYMPH: No lymphadenopathy noted  SKIN: No rashes or lesions    MEDICATIONS  (STANDING):  brimonidine 0.2% Ophthalmic Solution 1 Drop(s) Both EYES two times a day  dorzolamide 2%/timolol 0.5% Ophthalmic Solution 1 Drop(s) Both EYES two times a day  latanoprost 0.005% Ophthalmic Solution 1 Drop(s) Both EYES at bedtime  lactobacillus acidophilus 1 Tablet(s) Oral daily  sodium chloride 0.9%. 1000 milliLiter(s) (50 mL/Hr) IV Continuous <Continuous>  ampicillin/sulbactam  IVPB 3 Gram(s) IV Intermittent every 6 hours  vancomycin  IVPB   IV Intermittent   vancomycin  IVPB 1000 milliGRAM(s) IV Intermittent once  vancomycin  IVPB 1000 milliGRAM(s) IV Intermittent every 12 hours    MEDICATIONS  (PRN):  oxyCODONE    5 mG/acetaminophen 325 mG 1 Tablet(s) Oral every 6 hours PRN Severe Pain (7 - 10)  ondansetron Injectable 4 milliGRAM(s) IV Push once PRN Nausea and/or Vomiting      LABS:                        11.1   5.6   )-----------( 240      ( 2017 06:45 )             34.2     07-27    142  |  107  |  19  ----------------------------<  88  4.2   |  29  |  0.83    Ca    8.4<L>      2017 06:45    TPro  6.8  /  Alb  3.2<L>  /  TBili  0.4  /  DBili  x   /  AST  17  /  ALT  16  /  AlkPhos  54  07-26      Urinalysis Basic - ( 2017 16:45 )    Color: Yellow / Appearance: Clear / S.015 / pH: x  Gluc: x / Ketone: Negative  / Bili: Negative / Urobili: Negative   Blood: x / Protein: Negative / Nitrite: Negative   Leuk Esterase: Negative / RBC: x / WBC x   Sq Epi: x / Non Sq Epi: x / Bacteria: x      CAPILLARY BLOOD GLUCOSE                  RADIOLOGY & ADDITIONAL TESTS:    Imaging Personally Reviewed:       Advance Directives:      Palliative Care: Patient is a 82y old  Female who presents with a chief complaint of right foot infection (2017 20:43)      HPI:  83 yo F PMHx A-fib not on A/C due to pt refusal, diverticulosis s/p colon resection, history of C diff, breast Ca s/p B/L masectomy, presenting with RLE wound a/w RLE cellulitis.     INTERVAL HPI/OVERNIGHT EVENTS: No acute events overnight. Pt seen and examined at bedside. Pt reports that pain is well controlled at this time. Pt c/o nausea, fatigue and anorexia that she has had x6 months. Was seen by Dr. Yi GARCIA and diagnosed with hiatal hernia. Pt not on antacids. Intermittent episodes of vomiting, last episode 2d ago. Denies fever, chills, night sweats, weight loss.    T(C): 36.8 (17 @ 04:55), Max: 36.8 (17 @ 12:09)  HR: 97 (17 @ 06:49) (82 - 102)  BP: 124/79 (17 @ 06:49) (94/56 - 124/79)  RR: 16 (17 @ 04:55) (15 - 16)  SpO2: 95% (17 @ 04:55) (95% - 100%)  Wt(kg): --  I&O's Summary    2017 07:01  -  2017 07:00  --------------------------------------------------------  IN: 1290 mL / OUT: 0 mL / NET: 1290 mL      REVIEW OF SYSTEMS:  CONSTITUTIONAL: No fever, weight loss, +fatigue  RESPIRATORY: No cough, wheezing, chills or hemoptysis; No shortness of breath  CARDIOVASCULAR: No chest pain, palpitations, dizziness  GASTROINTESTINAL: No abdominal or epigastric pain. No nausea, vomiting, or hematemesis; No diarrhea or constipation.  GENITOURINARY: No dysuria, frequency, hematuria  NEUROLOGICAL: No headaches, memory loss, loss of strength, numbness, or tremors  SKIN: +pain, redness and itching RLE  MUSCULOSKELETAL: No joint pain or swelling; No muscle, back, or extremity pain      PHYSICAL EXAM:  GENERAL: NAD, well-groomed, well-developed  HEAD:  Atraumatic, Normocephalic  EYES: EOMI, PERRLA, conjunctiva and sclera clear  NERVOUS SYSTEM:  Alert & Oriented X3, Good concentration  CHEST/LUNG: Clear to percussion bilaterally; No rales, rhonchi, wheezing, or rubs  HEART: Regular rate and rhythm; No murmurs, rubs, or gallops  ABDOMEN: Soft, Nontender, Nondistended; Bowel sounds present  EXTREMITIES:  2+ Peripheral Pulses, No clubbing, cyanosis, or edema  SKIN: clearly demarcated erythema and swelling lateral ankle, 4cm diameter wound with dark, dry center and surrounding granulation tissue    MEDICATIONS  (STANDING):  brimonidine 0.2% Ophthalmic Solution 1 Drop(s) Both EYES two times a day  dorzolamide 2%/timolol 0.5% Ophthalmic Solution 1 Drop(s) Both EYES two times a day  latanoprost 0.005% Ophthalmic Solution 1 Drop(s) Both EYES at bedtime  lactobacillus acidophilus 1 Tablet(s) Oral daily  sodium chloride 0.9%. 1000 milliLiter(s) (50 mL/Hr) IV Continuous <Continuous>  ampicillin/sulbactam  IVPB 3 Gram(s) IV Intermittent every 6 hours  vancomycin  IVPB   IV Intermittent   vancomycin  IVPB 1000 milliGRAM(s) IV Intermittent once  vancomycin  IVPB 1000 milliGRAM(s) IV Intermittent every 12 hours    MEDICATIONS  (PRN):  oxyCODONE    5 mG/acetaminophen 325 mG 1 Tablet(s) Oral every 6 hours PRN Severe Pain (7 - 10)  ondansetron Injectable 4 milliGRAM(s) IV Push once PRN Nausea and/or Vomiting      LABS:                        11.1   5.6   )-----------( 240      ( 2017 06:45 )             34.2     07-    142  |  107  |  19  ----------------------------<  88  4.2   |  29  |  0.83    Ca    8.4<L>      2017 06:45    TPro  6.8  /  Alb  3.2<L>  /  TBili  0.4  /  DBili  x   /  AST  17  /  ALT  16  /  AlkPhos  54  07-      Urinalysis Basic - ( 2017 16:45 )    Color: Yellow / Appearance: Clear / S.015 / pH: x  Gluc: x / Ketone: Negative  / Bili: Negative / Urobili: Negative   Blood: x / Protein: Negative / Nitrite: Negative   Leuk Esterase: Negative / RBC: x / WBC x   Sq Epi: x / Non Sq Epi: x / Bacteria: x      CAPILLARY BLOOD GLUCOSE      RADIOLOGY & ADDITIONAL TESTS:    Imaging Personally Reviewed:       Advance Directives:      Palliative Care: Patient is a 82y old  Female who presents with a chief complaint of right foot infection (2017 20:43)      HPI:  81 yo F PMHx A-fib not on A/C due to pt refusal, diverticulosis s/p colon resection, history of C diff, breast Ca s/p B/L masectomy, presenting with RLE wound a/w RLE cellulitis.     INTERVAL HPI/OVERNIGHT EVENTS: No acute events overnight. Pt seen and examined at bedside. Pt reports that pain is well controlled at this time. Pt c/o nausea, fatigue and anorexia that she has had x6 months. Was seen by Dr. Yi GARCIA and diagnosed with hiatal hernia. Pt not on antacids. Intermittent episodes of vomiting, last episode 2d ago. Denies fever, chills, night sweats, weight loss.    T(C): 36.8 (17 @ 04:55), Max: 36.8 (17 @ 12:09)  HR: 97 (17 @ 06:49) (82 - 102)  BP: 124/79 (17 @ 06:49) (94/56 - 124/79)  RR: 16 (17 @ 04:55) (15 - 16)  SpO2: 95% (17 @ 04:55) (95% - 100%)  Wt(kg): --  I&O's Summary    2017 07:01  -  2017 07:00  --------------------------------------------------------  IN: 1290 mL / OUT: 0 mL / NET: 1290 mL      REVIEW OF SYSTEMS:  CONSTITUTIONAL: No fever, weight loss, +fatigue  RESPIRATORY: No cough, wheezing, chills or hemoptysis; No shortness of breath  CARDIOVASCULAR: No chest pain, palpitations, dizziness  GASTROINTESTINAL: No abdominal or epigastric pain. No nausea, vomiting, or hematemesis; No diarrhea or constipation.  GENITOURINARY: No dysuria, frequency, hematuria  NEUROLOGICAL: No headaches, memory loss, loss of strength, numbness, or tremors  SKIN: +pain, redness and itching RLE ankle wound   MUSCULOSKELETAL: No joint pain or swelling; No muscle, back, or extremity pain      PHYSICAL EXAM:  GENERAL: NAD, well-groomed, well-developed  HEAD:  Atraumatic, Normocephalic  EYES: EOMI, PERRLA, conjunctiva and sclera clear  NERVOUS SYSTEM:  Alert & Oriented X3, Good concentration  CHEST/LUNG: Clear to percussion bilaterally; No rales, rhonchi, wheezing, or rubs  HEART: iregular rate and rhythm no tachy ; No murmurs, rubs, or gallops  ABDOMEN: Soft, Nontender, Nondistended; Bowel sounds present  EXTREMITIES:  2+ Peripheral Pulses, No clubbing, cyanosis, or edema  SKIN: clearly demarcated erythema and swelling lateral ankle, 4cm diameter wound with dark, dry center and surrounding granulation tissue    MEDICATIONS  (STANDING):  brimonidine 0.2% Ophthalmic Solution 1 Drop(s) Both EYES two times a day  dorzolamide 2%/timolol 0.5% Ophthalmic Solution 1 Drop(s) Both EYES two times a day  latanoprost 0.005% Ophthalmic Solution 1 Drop(s) Both EYES at bedtime  lactobacillus acidophilus 1 Tablet(s) Oral daily  sodium chloride 0.9%. 1000 milliLiter(s) (50 mL/Hr) IV Continuous <Continuous>  ampicillin/sulbactam  IVPB 3 Gram(s) IV Intermittent every 6 hours  vancomycin  IVPB   IV Intermittent   vancomycin  IVPB 1000 milliGRAM(s) IV Intermittent once  vancomycin  IVPB 1000 milliGRAM(s) IV Intermittent every 12 hours    MEDICATIONS  (PRN):  oxyCODONE    5 mG/acetaminophen 325 mG 1 Tablet(s) Oral every 6 hours PRN Severe Pain (7 - 10)  ondansetron Injectable 4 milliGRAM(s) IV Push once PRN Nausea and/or Vomiting      LABS:                        11.1   5.6   )-----------( 240      ( 2017 06:45 )             34.2     07-    142  |  107  |  19  ----------------------------<  88  4.2   |  29  |  0.83    Ca    8.4<L>      2017 06:45    TPro  6.8  /  Alb  3.2<L>  /  TBili  0.4  /  DBili  x   /  AST  17  /  ALT  16  /  AlkPhos  54  07-26      Urinalysis Basic - ( 2017 16:45 )    Color: Yellow / Appearance: Clear / S.015 / pH: x  Gluc: x / Ketone: Negative  / Bili: Negative / Urobili: Negative   Blood: x / Protein: Negative / Nitrite: Negative   Leuk Esterase: Negative / RBC: x / WBC x   Sq Epi: x / Non Sq Epi: x / Bacteria: x      CAPILLARY BLOOD GLUCOSE      RADIOLOGY & ADDITIONAL TESTS:    Imaging Personally Reviewed:   no new test     Advance Directives:    full code

## 2017-07-27 NOTE — PROGRESS NOTE ADULT - ATTENDING COMMENTS
pt seen and examine agree with above plan and management - rt leg cellulitis  post traumatic wound with eschar - need debridement  mri today r/o om , on iv vancomycin , fu trough , blood cult , wound cult neg. hx chr afib not on ac not on rate controlled meds / ekg today , fu electrolyte. pt seen and examine agree with above plan and management - rt leg cellulitis  post traumatic wound with eschar - need debridement  mri today r/o om , on iv vancomycin , fu trough , blood cult , wound cult neg. hx chr afib not on ac not on rate controlled meds / ekg today , fu electrolyte. obestity diet exercise.

## 2017-07-27 NOTE — PROGRESS NOTE ADULT - ASSESSMENT
83 yo F PMHx A-fib not on A/C due to pt refusal, diverticulosis s/p colon resection, history of C diff, breast CA s/p B/L masectomy, admitted with RLE cellulitis 2/2 failed treatment with doxycycline. 81 yo F PMHx A-fib not on A/C due to pt refusal, diverticulosis s/p colon resection, history of C diff, breast CA s/p B/L masectomy, admitted with RLE cellulitis 2/2 failed treatment with  po doxycycline.

## 2017-07-27 NOTE — CONSULT NOTE ADULT - ASSESSMENT
Ms. Hdz has permanent atrial fibrillation with no evidence of cardiac decompensation such as ischemia, heart failure, or additional dysrhythmias. She is clinically stable from cardiovascular standpoint. She has no contraindications to any planned procedure if needed. She is in optimal cardiovascular condition and I would proceed using routine hemodynamic monitoring.    Recommend    Proceed with any procedure using routine hemodynamic monitoring  Telemetry  Antibiotics per medicine  DVT prophylaxis  Echocardiography to evaluate for any underlying structural heart disease  Further management can be dependent on her clinical course

## 2017-07-28 ENCOUNTER — TRANSCRIPTION ENCOUNTER (OUTPATIENT)
Age: 82
End: 2017-07-28

## 2017-07-28 DIAGNOSIS — Z98.42 CATARACT EXTRACTION STATUS, LEFT EYE: ICD-10-CM

## 2017-07-28 DIAGNOSIS — Z90.710 ACQUIRED ABSENCE OF BOTH CERVIX AND UTERUS: ICD-10-CM

## 2017-07-28 DIAGNOSIS — Z87.891 PERSONAL HISTORY OF NICOTINE DEPENDENCE: ICD-10-CM

## 2017-07-28 DIAGNOSIS — L03.115 CELLULITIS OF RIGHT LOWER LIMB: ICD-10-CM

## 2017-07-28 DIAGNOSIS — H91.90 UNSPECIFIED HEARING LOSS, UNSPECIFIED EAR: ICD-10-CM

## 2017-07-28 DIAGNOSIS — Z98.41 CATARACT EXTRACTION STATUS, RIGHT EYE: ICD-10-CM

## 2017-07-28 DIAGNOSIS — S97.81XD CRUSHING INJURY OF RIGHT FOOT, SUBSEQUENT ENCOUNTER: ICD-10-CM

## 2017-07-28 DIAGNOSIS — I48.91 UNSPECIFIED ATRIAL FIBRILLATION: ICD-10-CM
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
## 2017-07-28 DIAGNOSIS — Z88.7 ALLERGY STATUS TO SERUM AND VACCINE: ICD-10-CM

## 2017-07-28 DIAGNOSIS — Z79.899 OTHER LONG TERM (CURRENT) DRUG THERAPY: ICD-10-CM

## 2017-07-28 DIAGNOSIS — K21.9 GASTRO-ESOPHAGEAL REFLUX DISEASE WITHOUT ESOPHAGITIS: ICD-10-CM

## 2017-07-28 DIAGNOSIS — Z92.3 PERSONAL HISTORY OF IRRADIATION: ICD-10-CM

## 2017-07-28 DIAGNOSIS — Z82.49 FAMILY HISTORY OF ISCHEMIC HEART DISEASE AND OTHER DISEASES OF THE CIRCULATORY SYSTEM: ICD-10-CM

## 2017-07-28 DIAGNOSIS — Z87.81 PERSONAL HISTORY OF (HEALED) TRAUMATIC FRACTURE: ICD-10-CM

## 2017-07-28 DIAGNOSIS — Z90.11 ACQUIRED ABSENCE OF RIGHT BREAST AND NIPPLE: ICD-10-CM

## 2017-07-28 DIAGNOSIS — M19.90 UNSPECIFIED OSTEOARTHRITIS, UNSPECIFIED SITE: ICD-10-CM

## 2017-07-28 DIAGNOSIS — S97.01XD CRUSHING INJURY OF RIGHT ANKLE, SUBSEQUENT ENCOUNTER: ICD-10-CM
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
## 2017-07-28 DIAGNOSIS — L02.619 CUTANEOUS ABSCESS OF UNSPECIFIED FOOT: ICD-10-CM

## 2017-07-28 DIAGNOSIS — H40.9 UNSPECIFIED GLAUCOMA: ICD-10-CM

## 2017-07-28 DIAGNOSIS — Z90.49 ACQUIRED ABSENCE OF OTHER SPECIFIED PARTS OF DIGESTIVE TRACT: ICD-10-CM
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
## 2017-07-28 DIAGNOSIS — Z87.442 PERSONAL HISTORY OF URINARY CALCULI: ICD-10-CM

## 2017-07-28 DIAGNOSIS — E66.9 OBESITY, UNSPECIFIED: ICD-10-CM

## 2017-07-28 DIAGNOSIS — Z85.3 PERSONAL HISTORY OF MALIGNANT NEOPLASM OF BREAST: ICD-10-CM
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
## 2017-07-28 LAB
ANION GAP SERPL CALC-SCNC: 5 MMOL/L — SIGNIFICANT CHANGE UP (ref 5–17)
BASOPHILS # BLD AUTO: 0.1 K/UL — SIGNIFICANT CHANGE UP (ref 0–0.2)
BASOPHILS NFR BLD AUTO: 1.8 % — SIGNIFICANT CHANGE UP (ref 0–2)
BUN SERPL-MCNC: 17 MG/DL — SIGNIFICANT CHANGE UP (ref 7–23)
CALCIUM SERPL-MCNC: 8.5 MG/DL — SIGNIFICANT CHANGE UP (ref 8.5–10.1)
CHLORIDE SERPL-SCNC: 109 MMOL/L — HIGH (ref 96–108)
CO2 SERPL-SCNC: 29 MMOL/L — SIGNIFICANT CHANGE UP (ref 22–31)
CREAT SERPL-MCNC: 0.96 MG/DL — SIGNIFICANT CHANGE UP (ref 0.5–1.3)
EOSINOPHIL # BLD AUTO: 0.2 K/UL — SIGNIFICANT CHANGE UP (ref 0–0.5)
EOSINOPHIL NFR BLD AUTO: 5.4 % — SIGNIFICANT CHANGE UP (ref 0–6)
GLUCOSE SERPL-MCNC: 84 MG/DL — SIGNIFICANT CHANGE UP (ref 70–99)
HCT VFR BLD CALC: 33.6 % — LOW (ref 34.5–45)
HGB BLD-MCNC: 11 G/DL — LOW (ref 11.5–15.5)
LYMPHOCYTES # BLD AUTO: 1.7 K/UL — SIGNIFICANT CHANGE UP (ref 1–3.3)
LYMPHOCYTES # BLD AUTO: 37.7 % — SIGNIFICANT CHANGE UP (ref 13–44)
MCHC RBC-ENTMCNC: 32.7 GM/DL — SIGNIFICANT CHANGE UP (ref 32–36)
MCHC RBC-ENTMCNC: 33.6 PG — SIGNIFICANT CHANGE UP (ref 27–34)
MCV RBC AUTO: 102.5 FL — HIGH (ref 80–100)
MONOCYTES # BLD AUTO: 0.5 K/UL — SIGNIFICANT CHANGE UP (ref 0–0.9)
MONOCYTES NFR BLD AUTO: 10.7 % — HIGH (ref 1–9)
NEUTROPHILS # BLD AUTO: 2 K/UL — SIGNIFICANT CHANGE UP (ref 1.8–7.4)
NEUTROPHILS NFR BLD AUTO: 44.4 % — SIGNIFICANT CHANGE UP (ref 43–77)
PLATELET # BLD AUTO: 244 K/UL — SIGNIFICANT CHANGE UP (ref 150–400)
POTASSIUM SERPL-MCNC: 4.3 MMOL/L — SIGNIFICANT CHANGE UP (ref 3.5–5.3)
POTASSIUM SERPL-SCNC: 4.3 MMOL/L — SIGNIFICANT CHANGE UP (ref 3.5–5.3)
RBC # BLD: 3.28 M/UL — LOW (ref 3.8–5.2)
RBC # FLD: 12.6 % — SIGNIFICANT CHANGE UP (ref 10.3–14.5)
SODIUM SERPL-SCNC: 143 MMOL/L — SIGNIFICANT CHANGE UP (ref 135–145)
VANCOMYCIN TROUGH SERPL-MCNC: 8.7 UG/ML — LOW (ref 10–20)
WBC # BLD: 4.4 K/UL — SIGNIFICANT CHANGE UP (ref 3.8–10.5)
WBC # FLD AUTO: 4.4 K/UL — SIGNIFICANT CHANGE UP (ref 3.8–10.5)

## 2017-07-28 PROCEDURE — 93010 ELECTROCARDIOGRAM REPORT: CPT

## 2017-07-28 PROCEDURE — 93306 TTE W/DOPPLER COMPLETE: CPT | Mod: 26

## 2017-07-28 PROCEDURE — 99233 SBSQ HOSP IP/OBS HIGH 50: CPT | Mod: 25

## 2017-07-28 PROCEDURE — 99233 SBSQ HOSP IP/OBS HIGH 50: CPT | Mod: GC

## 2017-07-28 RX ORDER — METOPROLOL TARTRATE 50 MG
25 TABLET ORAL DAILY
Qty: 0 | Refills: 0 | Status: DISCONTINUED | OUTPATIENT
Start: 2017-07-28 | End: 2017-07-29

## 2017-07-28 RX ORDER — POLYETHYLENE GLYCOL 3350 17 G/17G
17 POWDER, FOR SOLUTION ORAL ONCE
Qty: 0 | Refills: 0 | Status: COMPLETED | OUTPATIENT
Start: 2017-07-28 | End: 2017-07-28

## 2017-07-28 RX ORDER — MORPHINE SULFATE 50 MG/1
2 CAPSULE, EXTENDED RELEASE ORAL ONCE
Qty: 0 | Refills: 0 | Status: DISCONTINUED | OUTPATIENT
Start: 2017-07-28 | End: 2017-07-31

## 2017-07-28 RX ORDER — OXYCODONE AND ACETAMINOPHEN 5; 325 MG/1; MG/1
1 TABLET ORAL ONCE
Qty: 0 | Refills: 0 | Status: DISCONTINUED | OUTPATIENT
Start: 2017-07-28 | End: 2017-07-28

## 2017-07-28 RX ORDER — ASPIRIN/CALCIUM CARB/MAGNESIUM 324 MG
81 TABLET ORAL DAILY
Qty: 0 | Refills: 0 | Status: DISCONTINUED | OUTPATIENT
Start: 2017-07-28 | End: 2017-07-30

## 2017-07-28 RX ADMIN — ENOXAPARIN SODIUM 40 MILLIGRAM(S): 100 INJECTION SUBCUTANEOUS at 16:46

## 2017-07-28 RX ADMIN — OXYCODONE AND ACETAMINOPHEN 1 TABLET(S): 5; 325 TABLET ORAL at 16:51

## 2017-07-28 RX ADMIN — OXYCODONE AND ACETAMINOPHEN 1 TABLET(S): 5; 325 TABLET ORAL at 23:50

## 2017-07-28 RX ADMIN — AMPICILLIN SODIUM AND SULBACTAM SODIUM 200 GRAM(S): 250; 125 INJECTION, POWDER, FOR SUSPENSION INTRAMUSCULAR; INTRAVENOUS at 00:09

## 2017-07-28 RX ADMIN — OXYCODONE AND ACETAMINOPHEN 1 TABLET(S): 5; 325 TABLET ORAL at 22:50

## 2017-07-28 RX ADMIN — DORZOLAMIDE HYDROCHLORIDE TIMOLOL MALEATE 1 DROP(S): 20; 5 SOLUTION/ DROPS OPHTHALMIC at 21:17

## 2017-07-28 RX ADMIN — OXYCODONE AND ACETAMINOPHEN 1 TABLET(S): 5; 325 TABLET ORAL at 10:14

## 2017-07-28 RX ADMIN — OXYCODONE AND ACETAMINOPHEN 1 TABLET(S): 5; 325 TABLET ORAL at 10:44

## 2017-07-28 RX ADMIN — BRIMONIDINE TARTRATE 1 DROP(S): 2 SOLUTION/ DROPS OPHTHALMIC at 21:17

## 2017-07-28 RX ADMIN — LATANOPROST 1 DROP(S): 0.05 SOLUTION/ DROPS OPHTHALMIC; TOPICAL at 21:16

## 2017-07-28 RX ADMIN — Medication 650 MILLIGRAM(S): at 01:07

## 2017-07-28 RX ADMIN — OXYCODONE AND ACETAMINOPHEN 1 TABLET(S): 5; 325 TABLET ORAL at 02:27

## 2017-07-28 RX ADMIN — Medication 250 MILLIGRAM(S): at 06:21

## 2017-07-28 RX ADMIN — Medication 650 MILLIGRAM(S): at 01:45

## 2017-07-28 RX ADMIN — Medication 81 MILLIGRAM(S): at 12:52

## 2017-07-28 RX ADMIN — Medication 100 MILLIGRAM(S): at 01:42

## 2017-07-28 RX ADMIN — AMPICILLIN SODIUM AND SULBACTAM SODIUM 200 GRAM(S): 250; 125 INJECTION, POWDER, FOR SUSPENSION INTRAMUSCULAR; INTRAVENOUS at 11:41

## 2017-07-28 RX ADMIN — BRIMONIDINE TARTRATE 1 DROP(S): 2 SOLUTION/ DROPS OPHTHALMIC at 06:21

## 2017-07-28 RX ADMIN — DORZOLAMIDE HYDROCHLORIDE TIMOLOL MALEATE 1 DROP(S): 20; 5 SOLUTION/ DROPS OPHTHALMIC at 06:21

## 2017-07-28 RX ADMIN — AMPICILLIN SODIUM AND SULBACTAM SODIUM 200 GRAM(S): 250; 125 INJECTION, POWDER, FOR SUSPENSION INTRAMUSCULAR; INTRAVENOUS at 06:37

## 2017-07-28 RX ADMIN — Medication 1 TABLET(S): at 11:40

## 2017-07-28 RX ADMIN — OXYCODONE AND ACETAMINOPHEN 1 TABLET(S): 5; 325 TABLET ORAL at 13:14

## 2017-07-28 RX ADMIN — OXYCODONE AND ACETAMINOPHEN 1 TABLET(S): 5; 325 TABLET ORAL at 01:43

## 2017-07-28 RX ADMIN — OXYCODONE AND ACETAMINOPHEN 1 TABLET(S): 5; 325 TABLET ORAL at 13:44

## 2017-07-28 RX ADMIN — AMPICILLIN SODIUM AND SULBACTAM SODIUM 200 GRAM(S): 250; 125 INJECTION, POWDER, FOR SUSPENSION INTRAMUSCULAR; INTRAVENOUS at 18:31

## 2017-07-28 RX ADMIN — Medication 250 MILLIGRAM(S): at 16:45

## 2017-07-28 RX ADMIN — Medication 25 MILLIGRAM(S): at 12:52

## 2017-07-28 RX ADMIN — POLYETHYLENE GLYCOL 3350 17 GRAM(S): 17 POWDER, FOR SOLUTION ORAL at 01:58

## 2017-07-28 NOTE — PROGRESS NOTE ADULT - ATTENDING COMMENTS
pt seen and examine agree with above plan and management - rt leg cellulitis  post traumatic wound with eschar -  debridement today / Monday or as  mri  with acute  om  osteomyelitis of the   imaged distal fibula with mild subcortical osteitis along the lateral   tibial plafond at the level of the syndesmosis.  , on iv vancomycin , fu trough , blood cult  are wound cult neg. hx chr afib not on ac not on rate controlled  no evidence of cardiac decompensation such as ischemia, heart failure, or additional dysrhythmias. She is clinically stable from cardiovascular standpoint. She has no contraindications to any planned procedure if needed as per cardio dr Howell .   meds obesity diet exercise.

## 2017-07-28 NOTE — PROGRESS NOTE ADULT - PROBLEM SELECTOR PLAN 1
-MRI showed osteomyelitis, will need debridement per Dr. Vanegas- appreciate further recs  -Continue IV antibiotics  -MRSA/MSSA negative  -Arterial doppler negative for PVD  -BCx, wound cultures NGTD

## 2017-07-28 NOTE — DISCHARGE NOTE ADULT - INSTRUCTIONS
Please follow low salt, low fat diet. WOUND CARE ORDER AND INSTRUCTIONS:    Wound dressings to be re-applied every day, after discharge from [facility]    Patient to return to [facility], for continued evaluation, within 2-3 days upon discharge from [facility]    1. Remove dressing to right foot and cleanse right foot wound with sterile saline.  2. Pad dry with sterile 4x4 gauze.  3. Apply silver alginate directly to wound and cover with sterile 4x4 gauze, ABD pads, secure with 4" emily and medical tape.  4. Monitor for infections. WOUND CARE ORDER AND INSTRUCTIONS:    Wound dressings to be re-applied every day, after discharge from [facility]    Patient to return to [facility], for continued evaluation, within 2-3 days upon discharge from [facility]    1. Remove dressing to right foot and cleanse right foot wound with sterile saline.  2. Pad dry with sterile 4x4 gauze.  3. Apply hydrogel directly to wound and cover with sterile 4x4 gauze, ABD pads, secure with 4" emily and medical tape.  4. Monitor for infections.

## 2017-07-28 NOTE — DISCHARGE NOTE ADULT - MEDICATION SUMMARY - MEDICATIONS TO TAKE
I will START or STAY ON the medications listed below when I get home from the hospital:    aspirin 81 mg oral tablet, chewable  -- 1 tab(s) by mouth once a day  -- Indication: For Chronic atrial fibrillation    acetaminophen-oxycodone 325 mg-5 mg oral tablet  -- 1 tab(s) by mouth every 8 hours, As needed, Moderate Pain (4 - 6)  -- Indication: For Osteomyelitis of foot    acetaminophen 325 mg oral tablet  -- 2 tab(s) by mouth every 6 hours, As needed, Moderate Pain (4 - 6)  -- Indication: For Pain    fluconazole 200 mg oral tablet  -- 1 tab(s) by mouth once a day  -- Indication: For Fungal infection    metoprolol succinate 50 mg oral tablet, extended release  -- 1 tab(s) by mouth once a day  -- Indication: For HTN    collagenase 250 units/g topical ointment  -- 1 application on skin once a day  -- Indication: For Other osteomyelitis of right foot    vancomycin 750 mg intravenous injection  -- 1500 milligram(s) intravenous every 24 hours  -- Indication: For Osteomyelitis of foot    docusate sodium 100 mg oral capsule  -- 1 cap(s) by mouth 3 times a day  -- Indication: For Constipation    polyethylene glycol 3350 oral powder for reconstitution  -- 17 gram(s) by mouth once a day, As needed, Constipation  -- Indication: For Constipation    fluticasone 50 mcg/inh nasal spray  -- 1 spray(s) into nose 2 times a day  -- Indication: For Allergies    brimonidine 0.2% ophthalmic solution  -- 1 drop(s) to each affected eye 2 times a day  -- Indication: For Glaucoma    dorzolamide-timolol 2%-0.5% preservative-free ophthalmic solution  -- 1 drop(s) to each affected eye 2 times a day  -- Indication: For Glaucoma    latanoprost 0.005% ophthalmic solution  -- 1 drop(s) to each affected eye once a day (at bedtime)  -- Indication: For Glaucoma    lactobacillus acidophilus oral tablet  -- 1 tab(s) by mouth once a day  -- Indication: For Need for prophylactic measure

## 2017-07-28 NOTE — PROGRESS NOTE ADULT - ASSESSMENT
Assessment:    Grade 2 ulcer, dorsal right foot  Cellulitis, dorsal right foot    Plan:    -Pt seen and eval  -Chart reviewed  -Dressings removed and wound further examined, cleansed with normal saline  -Xeroform, dry sterile dressings applied today  -MRI results: abscess in area of EBD muscle, and osteomyelitis of distal fibula and tibial plafond  -Follow infectious disease recommendations  -Planning for debridement of wound, and incision/drainage of abscess on Monday 7/28/18 in Operating Room @ 9AM  -Requires cardiac and medical clearances before surgery on Monday  -Pt Tx plan discussed w/Dr. Vanegas, agreed upon    Podiatry to cont following Pt while in-house
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
## 2017-07-28 NOTE — DISCHARGE NOTE ADULT - MEDICATION SUMMARY - MEDICATIONS TO STOP TAKING
I will STOP taking the medications listed below when I get home from the hospital:    red yeast rice 600 mg oral capsule  -- 2 cap(s) by mouth once a day    Coenzyme Q10 60 mg oral tablet  -- 1 tab(s) by mouth 2 times a day    Metamucil 3.4 g/5.2 g oral powder for reconstitution  -- 2 tab(s) by mouth once a day

## 2017-07-28 NOTE — DISCHARGE NOTE ADULT - PATIENT PORTAL LINK FT
“You can access the [facility] Patient Portal, offered by [facility], by registering with the following website: [email or web address]”

## 2017-07-28 NOTE — DISCHARGE NOTE ADULT - CARE PLAN
Principal Discharge DX:	Osteomyelitis of foot  Goal:	Resolution  Instructions for follow-up, activity and diet:	Please continue meds as above. Please f/u with woundcare within 1-2 days.  Secondary Diagnosis:	Atrial fibrillation  Goal:	Stable  Instructions for follow-up, activity and diet:	Please f/u with cardiologist within 1 week.  Secondary Diagnosis:	Hyperlipidemia  Goal:	Stable  Instructions for follow-up, activity and diet:	Please continue meds as above.  Secondary Diagnosis:	CKD (chronic kidney disease), stage 2 (mild)  Goal:	Stable  Instructions for follow-up, activity and diet:	Please f/u with PMD within 1-2 weeks. Principal Discharge DX:	Osteomyelitis of foot  Goal:	Resolution  Instructions for follow-up, activity and diet:	-Continue antibiotics vancomycin (6 weeks until 9/6/17) and diflucan for next 2 weeks  -please follow-up with wound care within 1-2 days  -Please return to intervention radiology to remove PermCath once the antibiotics are done  -you will be discharged to subacute rehab facility   -please walk with assistance, weight bearing as tolerated  Secondary Diagnosis:	Atrial fibrillation  Goal:	Stable  Instructions for follow-up, activity and diet:	-Please f/u with cardiologist within 1 week.  -you will need to be on anticoagulation (blood thinner) medication for this abnormal hearth rhythm and please continue you aspirin  -also continue your Toprol XL to decrease you heart rate to normal level  - you will have to follow up with cardiology for procedures cardiac catheterization and trans-esophageal echocardiography  Secondary Diagnosis:	Hyperlipidemia  Goal:	Stable  Instructions for follow-up, activity and diet:	Please continue meds as above.  Secondary Diagnosis:	CKD (chronic kidney disease), stage 2 (mild)  Goal:	Stable  Instructions for follow-up, activity and diet:	Please f/u with PMD within 1-2 weeks. Principal Discharge DX:	Osteomyelitis of foot  Goal:	Resolution  Instructions for follow-up, activity and diet:	-Continue antibiotics vancomycin (6 weeks until 9/6/17) and diflucan for next 2 weeks  -please follow-up with wound care within 1-2 days  -Please return to intervention radiology to remove PermCath once the antibiotics are done  -you will be discharged to subacute rehab facility   -please walk with assistance, weight bearing as tolerated  Foot care:  1. Remove dressing to right foot and cleanse right foot wound with sterile saline.  2. Pad dry with sterile 4x4 gauze.  3. Apply hydrogel directly to wound and cover with sterile 4x4 gauze, ABD pads, secure with 4" emily and medical tape.  4. Monitor for infections.  Secondary Diagnosis:	Atrial fibrillation  Goal:	Stable  Instructions for follow-up, activity and diet:	-Please f/u with cardiologist within 1 week.  -you will need to be on anticoagulation (blood thinner) medication for this abnormal hearth rhythm and please continue you aspirin  -also continue your Toprol XL to decrease you heart rate to normal level  - you will have to follow up with cardiology for procedures cardiac catheterization and trans-esophageal echocardiography  Secondary Diagnosis:	Hyperlipidemia  Goal:	Stable  Instructions for follow-up, activity and diet:	Please continue meds as above.  Secondary Diagnosis:	CKD (chronic kidney disease), stage 2 (mild)  Goal:	Stable  Instructions for follow-up, activity and diet:	Please f/u with PMD within 1-2 weeks. Principal Discharge DX:	Osteomyelitis of foot  Goal:	Resolution  Instructions for follow-up, activity and diet:	-Continue antibiotics 1500mg vancomycin (6 weeks until 9/6/17) and diflucan for next 2 weeks  -please follow-up with wound care within 1-2 days  -Please return to intervention radiology to remove PermCath once the antibiotics are done  -you will be discharged to subacute rehab facility   -please walk with assistance, weight bearing as tolerated  Foot care:  1. Remove dressing to right foot and cleanse right foot wound with sterile saline.  2. Pad dry with sterile 4x4 gauze.  3. Apply hydrogel directly to wound and cover with sterile 4x4 gauze, ABD pads, secure with 4" emily and medical tape.  4. Monitor for infections.  Secondary Diagnosis:	Atrial fibrillation  Goal:	Stable  Instructions for follow-up, activity and diet:	-Please f/u with cardiologist within 1 week.  -you will need to be on anticoagulation (blood thinner) medication for this abnormal hearth rhythm and please continue you aspirin  -also continue your Toprol XL to decrease you heart rate to normal level  - you will have to follow up with cardiology for procedures cardiac catheterization and trans-esophageal echocardiography and your anti-coagulation medication  Secondary Diagnosis:	Hyperlipidemia  Goal:	Stable  Instructions for follow-up, activity and diet:	Please continue meds as above.  Please f/u with PMD within 1-2 weeks.  Secondary Diagnosis:	CKD (chronic kidney disease), stage 2 (mild)  Goal:	Stable  Instructions for follow-up, activity and diet:	Stable, Please f/u with PMD within 1-2 weeks. Principal Discharge DX:	Osteomyelitis of foot  Goal:	Resolution  Instructions for follow-up, activity and diet:	-Continue antibiotics 1500mg vancomycin (6 weeks until 9/6/17) and diflucan for  2 weeks  -please follow-up with wound care within 1-2 days  -Please return to intervention radiology to remove PermCath once the antibiotics are done  -you will be discharged to subacute rehab facility   -please walk with assistance, weight bearing as tolerated  Foot care:  1. Remove dressing to right foot and cleanse right foot wound with sterile saline.  2. Pad dry with sterile 4x4 gauze.  3. Apply hydrogel directly to wound and cover with sterile 4x4 gauze, ABD pads, secure with 4" emily and medical tape.  4. Monitor for infections.  Secondary Diagnosis:	Atrial fibrillation  Goal:	Stable  Instructions for follow-up, activity and diet:	You have not been on anticoagulation for A-fib as you refused on prev occasions.   -you will need to be on anticoagulation (blood thinner) medication for this abnormal hearth rhythm and please continue you aspirin  -also continue your Toprol XL to decrease you heart rate to normal level  - you will have to follow up with cardiology for procedures cardiac catheterization and trans-esophageal echocardiography and your anti-coagulation medication  Secondary Diagnosis:	Hyperlipidemia  Goal:	Stable  Instructions for follow-up, activity and diet:	Please continue meds as above.  Please f/u with PMD within 1-2 weeks.  Secondary Diagnosis:	CKD (chronic kidney disease), stage 2 (mild)  Goal:	Stable  Instructions for follow-up, activity and diet:	Stable, Please f/u with PMD within 1-2 weeks.

## 2017-07-28 NOTE — PROGRESS NOTE ADULT - ASSESSMENT
Ms. Hdz has permanent atrial fibrillation with no evidence of cardiac decompensation such as ischemia, heart failure, or additional dysrhythmias. She is clinically stable from cardiovascular standpoint. She has no contraindications to any planned procedure if needed. She is in optimal cardiovascular condition and I would proceed using routine hemodynamic monitoring.    Proceed with any procedure using routine hemodynamic monitoring. No sign of decompensated CHF, significant valvular disease, arrhthymias or active ischemia.  Telemetry  If AF rates are elevated, recommend starting BB Keep HR <110.  She aware of her risk of CVA off A/C; I would restart ASA 81 after her procedure.  Antibiotics per medicine  DVT prophylaxis  Echocardiography to evaluate for any underlying structural heart disease; I have reviewed her TTE, there appears to be moderate segmental LV dysfunction; the inferolateral, inferoseptal walls appear hypokinetic, there is also at least Moderate MR and AI; will need to clarify prior cardiac evaluation; the LV dysfunction may be present in the setting of chronic uncontrolled AF. I would start her on a BB (preferably Toprol XL 25 daily) for now.  Further management can be dependent on her clinical course Ms. Hdz has permanent atrial fibrillation with no evidence of cardiac decompensation such as ischemia, heart failure, or additional dysrhythmias. She is clinically stable from cardiovascular standpoint. She has no contraindications to any planned OR procedure if needed. She is in optimal cardiovascular condition and I would proceed using routine hemodynamic monitoring.    Proceed with any OR procedure using routine hemodynamic monitoring. No sign of decompensated CHF, significant valvular disease, arrhthymias or active ischemia. Watch volume status given LV dysfunction.  Telemetry  If AF rates are elevated, recommend starting BB Keep HR <110.  She aware of her risk of CVA off A/C; I would restart ASA 81 after her procedure.  Antibiotics per medicine  DVT prophylaxis  Echocardiography to evaluate for any underlying structural heart disease; I have reviewed her TTE, there appears to be moderate segmental LV dysfunction; the inferolateral, inferoseptal walls appear hypokinetic, there is also at least Moderate MR and AI; will need to clarify prior cardiac evaluation; the LV dysfunction may be present in the setting of chronic uncontrolled AF. I would start her on a BB (preferably Toprol XL 25 daily) for now.  Further management can be dependent on her clinical course

## 2017-07-28 NOTE — PROGRESS NOTE ADULT - SUBJECTIVE AND OBJECTIVE BOX
JACEK HERNDON is a 82yFemale , patient examined and chart reviewed. Patient seen and examined today being followed for infected ulcer rught ankles with increased pain       INTERVAL HPI/ OVERNIGHT EVENTS: Events noted, she feels better, still with significant pain and swelling of the ankle. MRI results noted. She is scheduled for I & D on Monday by Podiatry .      PAST MEDICAL & SURGICAL HISTORY:  C. difficile diarrhea  Gall stones  Breast cancer  Atrial fibrillation  PSVT (paroxysmal supraventricular tachycardia)  Glaucoma  Diverticulitis  S/P colon resection  H/O: hysterectomy  S/P ORIF (open reduction internal fixation) fracture: L lower extremity  H/O hernia repair  H/O mastectomy, bilateral  Status post appendectomy  S/P colon resection: march 11 2014      For details regarding the patient's social history, family history, and other miscellaneous elements, please refer the initial infectious diseases consultation and/or the admitting history and physical examination for this admission.    ROS:  CONSTITUTIONAL:  Negative fever or chills, feels well, good appetite  EYES:  Negative  blurry vision or double vision  CARDIOVASCULAR:  Negative for chest pain or palpitations  RESPIRATORY:  Negative for cough, wheezing, or SOB   GASTROINTESTINAL:  Negative for nausea, vomiting, diarrhea, constipation, or abdominal pain  GENITOURINARY:  Negative frequency, urgency or dysuria  NEUROLOGIC:  No headache, confusion, dizziness, lightheadedness  All other systems were reviewed and are negative         Current inpatient medications :    ANTIBIOTICS/RELEVANT:  lactobacillus acidophilus 1 Tablet(s) Oral daily  ampicillin/sulbactam  IVPB 3 Gram(s) IV Intermittent every 6 hours  vancomycin  IVPB   IV Intermittent   vancomycin  IVPB 1000 milliGRAM(s) IV Intermittent every 12 hours      oxyCODONE    5 mG/acetaminophen 325 mG 1 Tablet(s) Oral every 6 hours PRN  brimonidine 0.2% Ophthalmic Solution 1 Drop(s) Both EYES two times a day  dorzolamide 2%/timolol 0.5% Ophthalmic Solution 1 Drop(s) Both EYES two times a day  latanoprost 0.005% Ophthalmic Solution 1 Drop(s) Both EYES at bedtime  ondansetron    Tablet 4 milliGRAM(s) Oral every 6 hours PRN  enoxaparin Injectable 40 milliGRAM(s) SubCutaneous every 24 hours  docusate sodium 100 milliGRAM(s) Oral daily PRN  acetaminophen   Tablet. 650 milliGRAM(s) Oral every 6 hours PRN  metoprolol succinate ER 25 milliGRAM(s) Oral daily  aspirin  chewable 81 milliGRAM(s) Oral daily      Objective:    07-27 @ 07:01  -  07-28 @ 07:00  --------------------------------------------------------  IN: 1970 mL / OUT: 2 mL / NET: 1968 mL    07-28 @ 07:01  -  07-28 @ 17:13  --------------------------------------------------------  IN: 520 mL / OUT: 0 mL / NET: 520 mL      T(C): 37.2 (07-28-17 @ 15:37), Max: 37.2 (07-28-17 @ 12:29)  HR: 90 (07-28-17 @ 15:37) (79 - 111)  BP: 127/76 (07-28-17 @ 15:37) (100/64 - 132/88)  RR: 18 (07-28-17 @ 15:37) (18 - 20)  SpO2: 95% (07-28-17 @ 15:37) (94% - 95%)  Wt(kg): --      Physical Exam:  General: well developed well nourished, in no acute distress  Eyes: sclera anicteric, pupils equal and reactive to light  ENMT: buccal mucosa moist, pharynx not injected  Neck: supple, trachea midline  Lungs: clear, no wheeze/rhonchi  Cardiovascular: regular rate and rhythm, S1 S2  Abdomen: soft, nontender, no organomegaly present, bowel sounds normal  Neurological: alert and oriented x3, Cranial Nerves II-XII grossly intact  Skin: no increased ecchymosis/petechiae/purpura  Lymph Nodes: no palpable cervical/supraclavicular lymph nodes enlargements  Extremities: no cyanosis/clubbing/edema        LABS:                          11.0   4.4   )-----------( 244      ( 28 Jul 2017 07:23 )             33.6       07-28    143  |  109<H>  |  17  ----------------------------<  84  4.3   |  29  |  0.96    Ca    8.5      28 Jul 2017 07:06          RECENT CULTURES:  Culture - Other (07.27.17 @ 01:34)    Specimen Source: .Other right foot wound - swab    Culture Results:   No growth to date    Culture - Blood (07.26.17 @ 18:20)    Specimen Source: .Blood Blood-Peripheral    Culture Results:   No growth to date.    RADIOLOGY & ADDITIONAL STUDIES:  MRI Foot w/wo Cont, Right (07.27.17 @ 13:34) >  Impression:    Broad wound along the lateral malleolus. Acute osteomyelitis of the   imaged distal fibula with mild subcortical osteitis along the lateral   tibial plafond at the level of the syndesmosis. Fluid tracks from the   level of the wound at the lateral fibula along the dorsal subcutaneous   fat overlying the extensor digitorum brevis muscle consistent with a   small abscess. Prominent surrounding cellulitis is noted along the   lateral aspect of the ankle.    Nonspecific subcortical edema and enhancement along the lateral wall the   calcaneus just posterior to the lateral malleolus and along the anterior   lip of the tibial plafond with grossly preserved T1 marrow signal. This   may be related to reactive osteitis.    Small tibiotalar joint effusion with synovial enhancement consistent with   synovitis.     US Duplex Arterial Lower Ext Ltd, Right (07.27.17 @ 12:00) >    Impression:   1. Limited exam as discussed above.  2. No hemodynamically significant stenotic changes seen in the visualized   right leg arteries.        Assessment and Plan:   Assessment:  · Assessment		  81 yo F PMHx A-fib not on A/C due to pt refusal, diverticulosis s/p colon resection, history of C diff, breast CA s/p B/L masectomy, admitted with RLE cellulitis 2/2 failed treatment with doxycycline. She had a traumatic injury with skin necrosis on right ankle , due tot the proximity of the wound to lateral malleolus, possibility of OM cannot be ruled out .    MRI shows acute OM of lateral malleolus with small abscess adjacent to it      Plan:  - change to Zosyn and  Vancomycin due to OM and abscess and she has been on IV  and PO antibiotics as out patient and recent hospital stay at [facility]   - For I & D on monday   - monitor Vancomycin level        I have discussed the above plan of care with patient and  family in detail. They expressed understanding of the  treatment plan . Risks, benefits and alternatives discussed in detail. I have asked if they have any questions or concerns and appropriately addressed them to the best of my ability .    > 35 minutes were spent in direct patient care reviewing notes, medications ,labs data/ imaging , discussion with multidisciplinary team.    I will be away from 7/29 thru 7/30/17  , Dr. Grigsby and his group covering me .      Thank you for allowing me to participate in care of your patient .    Neftali Esparza MD  [phone]

## 2017-07-28 NOTE — PROGRESS NOTE ADULT - SUBJECTIVE AND OBJECTIVE BOX
[facility] - Steffany Bowen, Lynda, [place], Debra, Tana Parra  Office Number:  [phone]    Patient resting comfortably in bed in NAD.  Laying flat with no respiratory distress.  No complaints of chest pain, dyspnea, palpitations, PND, or orthopnea. + right foot pain    ROS: negative unless otherwise mentioned.    Telemetry:  AF up to 130 bpm, no ectopy, now in 90's    MEDICATIONS  (STANDING):  brimonidine 0.2% Ophthalmic Solution 1 Drop(s) Both EYES two times a day  dorzolamide 2%/timolol 0.5% Ophthalmic Solution 1 Drop(s) Both EYES two times a day  latanoprost 0.005% Ophthalmic Solution 1 Drop(s) Both EYES at bedtime  lactobacillus acidophilus 1 Tablet(s) Oral daily  ampicillin/sulbactam  IVPB 3 Gram(s) IV Intermittent every 6 hours  vancomycin  IVPB   IV Intermittent   vancomycin  IVPB 1000 milliGRAM(s) IV Intermittent every 12 hours  enoxaparin Injectable 40 milliGRAM(s) SubCutaneous every 24 hours    MEDICATIONS  (PRN):  oxyCODONE    5 mG/acetaminophen 325 mG 1 Tablet(s) Oral every 6 hours PRN Severe Pain (7 - 10)  ondansetron    Tablet 4 milliGRAM(s) Oral every 6 hours PRN Nausea and/or Vomiting  docusate sodium 100 milliGRAM(s) Oral daily PRN Constipation  acetaminophen   Tablet. 650 milliGRAM(s) Oral every 6 hours PRN Mild Pain (1 - 3)      Allergies    No Known Allergies    Intolerances        Vital Signs Last 24 Hrs  T(C): 36.8 (28 Jul 2017 07:39), Max: 37.1 (28 Jul 2017 05:10)  T(F): 98.2 (28 Jul 2017 07:39), Max: 98.8 (28 Jul 2017 05:10)  HR: 79 (28 Jul 2017 07:39) (79 - 105)  BP: 118/80 (28 Jul 2017 07:39) (100/64 - 133/90)  BP(mean): --  RR: 20 (28 Jul 2017 07:39) (16 - 20)  SpO2: 95% (28 Jul 2017 07:39) (94% - 95%)    I&O's Summary    27 Jul 2017 07:01  -  28 Jul 2017 07:00  --------------------------------------------------------  IN: 1970 mL / OUT: 2 mL / NET: 1968 mL    28 Jul 2017 07:01  -  28 Jul 2017 09:56  --------------------------------------------------------  IN: 240 mL / OUT: 0 mL / NET: 240 mL        ON EXAM:    General: NAD, awake and alert, oriented x 3  HEENT: Mucous membranes are moist, anicteric  Lungs: Non-labored, breath sounds are clear bilaterally, No wheezing, rales or rhonchi  Cardiovascular: Regular, S1 and S2, no murmurs, rubs, or gallops  Gastrointestinal: Bowel Sounds present, soft, nontender.   Lymph: right foot wrapped, pain, trace edema. No lymphadenopathy.  Skin: No rashes or ulcers  Psych:  Mood & affect appropriate    LABS: All Labs Reviewed:                        11.0   4.4   )-----------( 244      ( 28 Jul 2017 07:23 )             33.6                         11.1   5.6   )-----------( 240      ( 27 Jul 2017 06:45 )             34.2                         11.9   7.4   )-----------( 293      ( 26 Jul 2017 13:09 )             36.0     28 Jul 2017 07:06    143    |  109    |  17     ----------------------------<  84     4.3     |  29     |  0.96   27 Jul 2017 06:45    142    |  107    |  19     ----------------------------<  88     4.2     |  29     |  0.83   26 Jul 2017 13:09    139    |  103    |  28     ----------------------------<  97     4.6     |  31     |  1.10     Ca    8.5        28 Jul 2017 07:06  Ca    8.4        27 Jul 2017 06:45  Ca    8.8        26 Jul 2017 13:09    TPro  6.8    /  Alb  3.2    /  TBili  0.4    /  DBili  x      /  AST  17     /  ALT  16     /  AlkPhos  54     26 Jul 2017 13:09          Blood Culture:

## 2017-07-28 NOTE — DISCHARGE NOTE ADULT - CARE PROVIDER_API CALL
Lamin Noel (DO), Family Medicine  [address] [facility] [address]nd floor  [place], [place] [id number]  Phone: [phone]  Fax: [phone] Lamin Noel (DO), Family Medicine  [address] [facility]  2nd floor  [place], [place] [id number]  Phone: [phone]  Fax: [phone]    Malik King (DO), Internal Medicine  [address] [place] [address]  Phone: [phone]  Fax: [phone]

## 2017-07-28 NOTE — PROGRESS NOTE ADULT - ASSESSMENT
81 yo F PMHx A-fib not on A/C due to pt refusal, diverticulosis s/p colon resection, history of C diff, breast CA s/p B/L masectomy, admitted with RLE cellulitis 2/2 failed treatment with  po doxycycline.

## 2017-07-28 NOTE — DISCHARGE NOTE ADULT - PLAN OF CARE
Resolution Please continue meds as above. Please f/u with woundcare within 1-2 days. Stable Please f/u with cardiologist within 1 week. Please continue meds as above. Please f/u with PMD within 1-2 weeks. -Continue antibiotics vancomycin (6 weeks until 9/6/17) and diflucan for next 2 weeks  -please follow-up with wound care within 1-2 days  -Please return to intervention radiology to remove PermCath once the antibiotics are done  -you will be discharged to subacute rehab facility   -please walk with assistance, weight bearing as tolerated -Please f/u with cardiologist within 1 week.  -you will need to be on anticoagulation (blood thinner) medication for this abnormal hearth rhythm and please continue you aspirin  -also continue your Toprol XL to decrease you heart rate to normal level  - you will have to follow up with cardiology for procedures cardiac catheterization and trans-esophageal echocardiography -Continue antibiotics vancomycin (6 weeks until 9/6/17) and diflucan for next 2 weeks  -please follow-up with wound care within 1-2 days  -Please return to intervention radiology to remove PermCath once the antibiotics are done  -you will be discharged to subacute rehab facility   -please walk with assistance, weight bearing as tolerated  Foot care:  1. Remove dressing to right foot and cleanse right foot wound with sterile saline.  2. Pad dry with sterile 4x4 gauze.  3. Apply hydrogel directly to wound and cover with sterile 4x4 gauze, ABD pads, secure with 4" emily and medical tape.  4. Monitor for infections. Please continue meds as above.  Please f/u with PMD within 1-2 weeks. Stable, Please f/u with PMD within 1-2 weeks. -Please f/u with cardiologist within 1 week.  -you will need to be on anticoagulation (blood thinner) medication for this abnormal hearth rhythm and please continue you aspirin  -also continue your Toprol XL to decrease you heart rate to normal level  - you will have to follow up with cardiology for procedures cardiac catheterization and trans-esophageal echocardiography and your anti-coagulation medication -Continue antibiotics 1500mg vancomycin (6 weeks until 9/6/17) and diflucan for next 2 weeks  -please follow-up with wound care within 1-2 days  -Please return to intervention radiology to remove PermCath once the antibiotics are done  -you will be discharged to subacute rehab facility   -please walk with assistance, weight bearing as tolerated  Foot care:  1. Remove dressing to right foot and cleanse right foot wound with sterile saline.  2. Pad dry with sterile 4x4 gauze.  3. Apply hydrogel directly to wound and cover with sterile 4x4 gauze, ABD pads, secure with 4" emily and medical tape.  4. Monitor for infections. You have not been on anticoagulation for A-fib as you refused on prev occasions.   -you will need to be on anticoagulation (blood thinner) medication for this abnormal hearth rhythm and please continue you aspirin  -also continue your Toprol XL to decrease you heart rate to normal level  - you will have to follow up with cardiology for procedures cardiac catheterization and trans-esophageal echocardiography and your anti-coagulation medication -Continue antibiotics 1500mg vancomycin (6 weeks until 9/6/17) and diflucan for  2 weeks  -please follow-up with wound care within 1-2 days  -Please return to intervention radiology to remove PermCath once the antibiotics are done  -you will be discharged to subacute rehab facility   -please walk with assistance, weight bearing as tolerated  Foot care:  1. Remove dressing to right foot and cleanse right foot wound with sterile saline.  2. Pad dry with sterile 4x4 gauze.  3. Apply hydrogel directly to wound and cover with sterile 4x4 gauze, ABD pads, secure with 4" emily and medical tape.  4. Monitor for infections.

## 2017-07-28 NOTE — PROGRESS NOTE ADULT - SUBJECTIVE AND OBJECTIVE BOX
Patient is a 82y old  Female who presents with a chief complaint of right foot infection (2017 20:43)      HPI:  81 yo F PMHx A-fib not on A/C due to pt refusal, diverticulosis s/p colon resection, history of C diff, breast Ca s/p B/L masectomy, presenting with RLE wound a/w cellulitis.    INTERVAL HPI/OVERNIGHT EVENTS: No acute events overnight. Pt seen and examined at bedside. Pt c/o R. eye irritation yesterday that is now improved after warm compresses. Pt reports RLE pain 6/10 which she states is well-controlled. She states she is nervous to have debridement of foot. Pt denies CP, SOB, palpitations.    T(C): 36.8 (17 @ 07:39), Max: 37.1 (17 @ 05:10)  HR: 79 (17 @ 07:39) (79 - 105)  BP: 118/80 (17 @ 07:39) (100/64 - 133/90)  RR: 20 (17 @ 07:39) (16 - 20)  SpO2: 95% (17 @ 07:39) (94% - 95%)  Wt(kg): --  I&O's Summary    2017 07:01  -  2017 07:00  --------------------------------------------------------  IN: 1970 mL / OUT: 2 mL / NET: 1968 mL      REVIEW OF SYSTEMS:  CONSTITUTIONAL: No fever, weight loss, fatigue  HEENT: eye irritation improved  RESPIRATORY: No cough, wheezing, chills or hemoptysis; No shortness of breath  CARDIOVASCULAR: No chest pain, palpitations, dizziness  GASTROINTESTINAL: No abdominal or epigastric pain. No nausea, vomiting, or hematemesis; No diarrhea or constipation.  GENITOURINARY: No dysuria, frequency, hematuria  NEUROLOGICAL: No headaches, memory loss, loss of strength, numbness, or tremors  SKIN: +pain, redness and itching RLE ankle wound   MUSCULOSKELETAL: No joint pain or swelling; No muscle, back, or extremity pain      PHYSICAL EXAM:  GENERAL: NAD, well-groomed, well-developed  HEAD:  Atraumatic, Normocephalic  EYES: EOMI, PERRLA, conjunctiva and sclera clear  NERVOUS SYSTEM:  Alert & Oriented X3, Good concentration  CHEST/LUNG: Clear to percussion bilaterally; No rales, rhonchi, wheezing, or rubs  HEART: iregular rate and rhythm no tachy ; No murmurs, rubs, or gallops  ABDOMEN: Soft, Nontender, Nondistended; Bowel sounds present  EXTREMITIES:  2+ Peripheral Pulses, No clubbing, cyanosis, or edema  SKIN: clearly demarcated erythema and swelling lateral ankle, 4cm diameter wound with dark, dry center and surrounding granulation tissue      MEDICATIONS  (STANDING):  brimonidine 0.2% Ophthalmic Solution 1 Drop(s) Both EYES two times a day  dorzolamide 2%/timolol 0.5% Ophthalmic Solution 1 Drop(s) Both EYES two times a day  latanoprost 0.005% Ophthalmic Solution 1 Drop(s) Both EYES at bedtime  lactobacillus acidophilus 1 Tablet(s) Oral daily  ampicillin/sulbactam  IVPB 3 Gram(s) IV Intermittent every 6 hours  vancomycin  IVPB   IV Intermittent   vancomycin  IVPB 1000 milliGRAM(s) IV Intermittent every 12 hours  enoxaparin Injectable 40 milliGRAM(s) SubCutaneous every 24 hours    MEDICATIONS  (PRN):  oxyCODONE    5 mG/acetaminophen 325 mG 1 Tablet(s) Oral every 6 hours PRN Severe Pain (7 - 10)  ondansetron    Tablet 4 milliGRAM(s) Oral every 6 hours PRN Nausea and/or Vomiting  docusate sodium 100 milliGRAM(s) Oral daily PRN Constipation  acetaminophen   Tablet. 650 milliGRAM(s) Oral every 6 hours PRN Mild Pain (1 - 3)      LABS:                        11.0   4.4   )-----------( 244      ( 2017 07:23 )             33.6     07-28    143  |  109<H>  |  17  ----------------------------<  84  4.3   |  29  |  0.96    Ca    8.5      2017 07:06    TPro  6.8  /  Alb  3.2<L>  /  TBili  0.4  /  DBili  x   /  AST  17  /  ALT  16  /  AlkPhos  54  07-26      Urinalysis Basic - ( 2017 16:45 )    Color: Yellow / Appearance: Clear / S.015 / pH: x  Gluc: x / Ketone: Negative  / Bili: Negative / Urobili: Negative   Blood: x / Protein: Negative / Nitrite: Negative   Leuk Esterase: Negative / RBC: x / WBC x   Sq Epi: x / Non Sq Epi: x / Bacteria: x      CAPILLARY BLOOD GLUCOSE      RADIOLOGY & ADDITIONAL TESTS:    Imaging Personally Reviewed: yes      Advance Directives:      Palliative Care: Patient is a 82y old  Female who presents with a chief complaint of right foot infection (2017 20:43)      HPI:  81 yo F PMHx A-fib not on A/C due to pt refusal, diverticulosis s/p colon resection, history of C diff, breast Ca s/p B/L masectomy, presenting with RLE wound a/w cellulitis.    INTERVAL HPI/OVERNIGHT EVENTS: No acute events overnight. Pt seen and examined at bedside. Pt c/o R. eye irritation yesterday that is now improved after warm compresses. Pt reports RLE pain 6/10 which she states is well-controlled. She states she is nervous to have debridement of foot. Pt denies CP, SOB, palpitations.    T(C): 36.8 (17 @ 07:39), Max: 37.1 (17 @ 05:10)  HR: 79 (17 @ 07:39) (79 - 105)  BP: 118/80 (17 @ 07:39) (100/64 - 133/90)  RR: 20 (17 @ 07:39) (16 - 20)  SpO2: 95% (17 @ 07:39) (94% - 95%)  Wt(kg): --  I&O's Summary    2017 07:01  -  2017 07:00  --------------------------------------------------------  IN: 1970 mL / OUT: 2 mL / NET: 1968 mL      REVIEW OF SYSTEMS:  CONSTITUTIONAL: No fever, weight loss, fatigue  HEENT: eye irritation improved  RESPIRATORY: No cough, wheezing, chills or hemoptysis; No shortness of breath  CARDIOVASCULAR: No chest pain, palpitations, dizziness  GASTROINTESTINAL: No abdominal or epigastric pain. No nausea, vomiting, or hematemesis; No diarrhea or constipation.  GENITOURINARY: No dysuria, frequency, hematuria  NEUROLOGICAL: No headaches, memory loss, loss of strength, numbness, or tremors  SKIN: +pain, redness and itching RLE ankle wound   MUSCULOSKELETAL: No joint pain or swelling; No muscle, back, or extremity pain      PHYSICAL EXAM:  GENERAL: NAD, well-groomed, well-developed  HEAD:  Atraumatic, Normocephalic  EYES: EOMI, PERRLA, conjunctiva and sclera clear  NERVOUS SYSTEM:  Alert & Oriented X3, Good concentration  CHEST/LUNG: Clear to percussion bilaterally; No rales, rhonchi, wheezing,   HEART: iregular rate and rhythm no tachy ; No murmurs, rubs, or gallops  ABDOMEN: Soft, Nontender, Nondistended; Bowel sounds present  EXTREMITIES:  2+ Peripheral Pulses, No clubbing, cyanosis, or edema  SKIN: clearly demarcated erythema and swelling lateral ankle, 4cm diameter wound with dark, dry center and surrounding granulation tissue      MEDICATIONS  (STANDING):  brimonidine 0.2% Ophthalmic Solution 1 Drop(s) Both EYES two times a day  dorzolamide 2%/timolol 0.5% Ophthalmic Solution 1 Drop(s) Both EYES two times a day  latanoprost 0.005% Ophthalmic Solution 1 Drop(s) Both EYES at bedtime  lactobacillus acidophilus 1 Tablet(s) Oral daily  ampicillin/sulbactam  IVPB 3 Gram(s) IV Intermittent every 6 hours  vancomycin  IVPB   IV Intermittent   vancomycin  IVPB 1000 milliGRAM(s) IV Intermittent every 12 hours  enoxaparin Injectable 40 milliGRAM(s) SubCutaneous every 24 hours    MEDICATIONS  (PRN):  oxyCODONE    5 mG/acetaminophen 325 mG 1 Tablet(s) Oral every 6 hours PRN Severe Pain (7 - 10)  ondansetron    Tablet 4 milliGRAM(s) Oral every 6 hours PRN Nausea and/or Vomiting  docusate sodium 100 milliGRAM(s) Oral daily PRN Constipation  acetaminophen   Tablet. 650 milliGRAM(s) Oral every 6 hours PRN Mild Pain (1 - 3)      LABS:                        11.0   4.4   )-----------( 244      ( 2017 07:23 )             33.6     07-28    143  |  109<H>  |  17  ----------------------------<  84  4.3   |  29  |  0.96    Ca    8.5      2017 07:06    TPro  6.8  /  Alb  3.2<L>  /  TBili  0.4  /  DBili  x   /  AST  17  /  ALT  16  /  AlkPhos  54  -      Urinalysis Basic - ( 2017 16:45 )    Color: Yellow / Appearance: Clear / S.015 / pH: x  Gluc: x / Ketone: Negative  / Bili: Negative / Urobili: Negative   Blood: x / Protein: Negative / Nitrite: Negative   Leuk Esterase: Negative / RBC: x / WBC x   Sq Epi: x / Non Sq Epi: x / Bacteria: x      CAPILLARY BLOOD GLUCOSE      RADIOLOGY & ADDITIONAL TESTS:    Imaging Personally Reviewed: yes  Broad wound along the lateral malleolus. Acute osteomyelitis of the   imaged distal fibula with mild subcortical osteitis along the lateral   tibial plafond at the level of the syndesmosis. Fluid tracks from the   level of the wound at the lateral fibula along the dorsal subcutaneous   fat overlying the extensor digitorum brevis muscle consistent with a   small abscess. Prominent surrounding cellulitis is noted along the   lateral aspect of the ankle.    Nonspecific subcortical edema and enhancement along the lateral wall the   calcaneus just posterior to the lateral malleolus and along the anterior   lip of the tibial plafond with grossly preserved T1 marrow signal. This   may be related to reactive osteitis.    Small tibiotalar joint effusion with synovial enhancement consistent with   synovitis.      Advance Directives:  full code

## 2017-07-29 LAB
ANION GAP SERPL CALC-SCNC: 8 MMOL/L — SIGNIFICANT CHANGE UP (ref 5–17)
BASOPHILS # BLD AUTO: 0.1 K/UL — SIGNIFICANT CHANGE UP (ref 0–0.2)
BASOPHILS NFR BLD AUTO: 1.3 % — SIGNIFICANT CHANGE UP (ref 0–2)
BUN SERPL-MCNC: 15 MG/DL — SIGNIFICANT CHANGE UP (ref 7–23)
CALCIUM SERPL-MCNC: 8.1 MG/DL — LOW (ref 8.5–10.1)
CHLORIDE SERPL-SCNC: 108 MMOL/L — SIGNIFICANT CHANGE UP (ref 96–108)
CO2 SERPL-SCNC: 26 MMOL/L — SIGNIFICANT CHANGE UP (ref 22–31)
CREAT SERPL-MCNC: 0.98 MG/DL — SIGNIFICANT CHANGE UP (ref 0.5–1.3)
CULTURE RESULTS: SIGNIFICANT CHANGE UP
EOSINOPHIL # BLD AUTO: 0.2 K/UL — SIGNIFICANT CHANGE UP (ref 0–0.5)
EOSINOPHIL NFR BLD AUTO: 4.4 % — SIGNIFICANT CHANGE UP (ref 0–6)
GLUCOSE SERPL-MCNC: 91 MG/DL — SIGNIFICANT CHANGE UP (ref 70–99)
GRAM STN FLD: SIGNIFICANT CHANGE UP
HCT VFR BLD CALC: 32.9 % — LOW (ref 34.5–45)
HGB BLD-MCNC: 10.7 G/DL — LOW (ref 11.5–15.5)
LYMPHOCYTES # BLD AUTO: 1.8 K/UL — SIGNIFICANT CHANGE UP (ref 1–3.3)
LYMPHOCYTES # BLD AUTO: 32.6 % — SIGNIFICANT CHANGE UP (ref 13–44)
MCHC RBC-ENTMCNC: 32.6 GM/DL — SIGNIFICANT CHANGE UP (ref 32–36)
MCHC RBC-ENTMCNC: 33.5 PG — SIGNIFICANT CHANGE UP (ref 27–34)
MCV RBC AUTO: 102.8 FL — HIGH (ref 80–100)
MONOCYTES # BLD AUTO: 0.7 K/UL — SIGNIFICANT CHANGE UP (ref 0–0.9)
MONOCYTES NFR BLD AUTO: 13.5 % — HIGH (ref 1–9)
NEUTROPHILS # BLD AUTO: 2.7 K/UL — SIGNIFICANT CHANGE UP (ref 1.8–7.4)
NEUTROPHILS NFR BLD AUTO: 48.2 % — SIGNIFICANT CHANGE UP (ref 43–77)
PLATELET # BLD AUTO: 220 K/UL — SIGNIFICANT CHANGE UP (ref 150–400)
POTASSIUM SERPL-MCNC: 4.5 MMOL/L — SIGNIFICANT CHANGE UP (ref 3.5–5.3)
POTASSIUM SERPL-SCNC: 4.5 MMOL/L — SIGNIFICANT CHANGE UP (ref 3.5–5.3)
RBC # BLD: 3.2 M/UL — LOW (ref 3.8–5.2)
RBC # FLD: 12.4 % — SIGNIFICANT CHANGE UP (ref 10.3–14.5)
SODIUM SERPL-SCNC: 142 MMOL/L — SIGNIFICANT CHANGE UP (ref 135–145)
SPECIMEN SOURCE: SIGNIFICANT CHANGE UP
VANCOMYCIN TROUGH SERPL-MCNC: 13.6 UG/ML — SIGNIFICANT CHANGE UP (ref 10–20)
WBC # BLD: 5.5 K/UL — SIGNIFICANT CHANGE UP (ref 3.8–10.5)
WBC # FLD AUTO: 5.5 K/UL — SIGNIFICANT CHANGE UP (ref 3.8–10.5)

## 2017-07-29 PROCEDURE — 99233 SBSQ HOSP IP/OBS HIGH 50: CPT

## 2017-07-29 RX ORDER — OXYCODONE AND ACETAMINOPHEN 5; 325 MG/1; MG/1
1 TABLET ORAL ONCE
Qty: 0 | Refills: 0 | Status: DISCONTINUED | OUTPATIENT
Start: 2017-07-29 | End: 2017-07-29

## 2017-07-29 RX ORDER — METOPROLOL TARTRATE 50 MG
25 TABLET ORAL ONCE
Qty: 0 | Refills: 0 | Status: COMPLETED | OUTPATIENT
Start: 2017-07-29 | End: 2017-07-29

## 2017-07-29 RX ORDER — METOPROLOL TARTRATE 50 MG
50 TABLET ORAL DAILY
Qty: 0 | Refills: 0 | Status: DISCONTINUED | OUTPATIENT
Start: 2017-07-29 | End: 2017-08-02

## 2017-07-29 RX ORDER — DOCUSATE SODIUM 100 MG
100 CAPSULE ORAL THREE TIMES A DAY
Qty: 0 | Refills: 0 | Status: DISCONTINUED | OUTPATIENT
Start: 2017-07-29 | End: 2017-08-09

## 2017-07-29 RX ORDER — OXYCODONE AND ACETAMINOPHEN 5; 325 MG/1; MG/1
1 TABLET ORAL EVERY 4 HOURS
Qty: 0 | Refills: 0 | Status: DISCONTINUED | OUTPATIENT
Start: 2017-07-29 | End: 2017-08-05

## 2017-07-29 RX ORDER — FLUTICASONE PROPIONATE 50 MCG
1 SPRAY, SUSPENSION NASAL
Qty: 0 | Refills: 0 | Status: DISCONTINUED | OUTPATIENT
Start: 2017-07-29 | End: 2017-08-09

## 2017-07-29 RX ADMIN — ONDANSETRON 4 MILLIGRAM(S): 8 TABLET, FILM COATED ORAL at 20:11

## 2017-07-29 RX ADMIN — OXYCODONE AND ACETAMINOPHEN 1 TABLET(S): 5; 325 TABLET ORAL at 21:25

## 2017-07-29 RX ADMIN — Medication 1 TABLET(S): at 11:14

## 2017-07-29 RX ADMIN — Medication 250 MILLIGRAM(S): at 06:06

## 2017-07-29 RX ADMIN — AMPICILLIN SODIUM AND SULBACTAM SODIUM 200 GRAM(S): 250; 125 INJECTION, POWDER, FOR SUSPENSION INTRAMUSCULAR; INTRAVENOUS at 18:29

## 2017-07-29 RX ADMIN — OXYCODONE AND ACETAMINOPHEN 1 TABLET(S): 5; 325 TABLET ORAL at 11:46

## 2017-07-29 RX ADMIN — Medication 100 MILLIGRAM(S): at 21:38

## 2017-07-29 RX ADMIN — DORZOLAMIDE HYDROCHLORIDE TIMOLOL MALEATE 1 DROP(S): 20; 5 SOLUTION/ DROPS OPHTHALMIC at 06:07

## 2017-07-29 RX ADMIN — Medication 81 MILLIGRAM(S): at 11:15

## 2017-07-29 RX ADMIN — OXYCODONE AND ACETAMINOPHEN 1 TABLET(S): 5; 325 TABLET ORAL at 15:50

## 2017-07-29 RX ADMIN — Medication 25 MILLIGRAM(S): at 06:12

## 2017-07-29 RX ADMIN — OXYCODONE AND ACETAMINOPHEN 1 TABLET(S): 5; 325 TABLET ORAL at 20:12

## 2017-07-29 RX ADMIN — OXYCODONE AND ACETAMINOPHEN 1 TABLET(S): 5; 325 TABLET ORAL at 04:15

## 2017-07-29 RX ADMIN — AMPICILLIN SODIUM AND SULBACTAM SODIUM 200 GRAM(S): 250; 125 INJECTION, POWDER, FOR SUSPENSION INTRAMUSCULAR; INTRAVENOUS at 00:01

## 2017-07-29 RX ADMIN — BRIMONIDINE TARTRATE 1 DROP(S): 2 SOLUTION/ DROPS OPHTHALMIC at 06:07

## 2017-07-29 RX ADMIN — BRIMONIDINE TARTRATE 1 DROP(S): 2 SOLUTION/ DROPS OPHTHALMIC at 18:30

## 2017-07-29 RX ADMIN — Medication 250 MILLIGRAM(S): at 16:36

## 2017-07-29 RX ADMIN — ENOXAPARIN SODIUM 40 MILLIGRAM(S): 100 INJECTION SUBCUTANEOUS at 15:55

## 2017-07-29 RX ADMIN — AMPICILLIN SODIUM AND SULBACTAM SODIUM 200 GRAM(S): 250; 125 INJECTION, POWDER, FOR SUSPENSION INTRAMUSCULAR; INTRAVENOUS at 06:06

## 2017-07-29 RX ADMIN — DORZOLAMIDE HYDROCHLORIDE TIMOLOL MALEATE 1 DROP(S): 20; 5 SOLUTION/ DROPS OPHTHALMIC at 18:30

## 2017-07-29 RX ADMIN — OXYCODONE AND ACETAMINOPHEN 1 TABLET(S): 5; 325 TABLET ORAL at 16:20

## 2017-07-29 RX ADMIN — OXYCODONE AND ACETAMINOPHEN 1 TABLET(S): 5; 325 TABLET ORAL at 11:15

## 2017-07-29 RX ADMIN — Medication 25 MILLIGRAM(S): at 13:40

## 2017-07-29 RX ADMIN — AMPICILLIN SODIUM AND SULBACTAM SODIUM 200 GRAM(S): 250; 125 INJECTION, POWDER, FOR SUSPENSION INTRAMUSCULAR; INTRAVENOUS at 11:18

## 2017-07-29 RX ADMIN — LATANOPROST 1 DROP(S): 0.05 SOLUTION/ DROPS OPHTHALMIC; TOPICAL at 21:38

## 2017-07-29 RX ADMIN — AMPICILLIN SODIUM AND SULBACTAM SODIUM 200 GRAM(S): 250; 125 INJECTION, POWDER, FOR SUSPENSION INTRAMUSCULAR; INTRAVENOUS at 23:37

## 2017-07-29 RX ADMIN — Medication 1 SPRAY(S): at 18:29

## 2017-07-29 RX ADMIN — OXYCODONE AND ACETAMINOPHEN 1 TABLET(S): 5; 325 TABLET ORAL at 03:29

## 2017-07-29 NOTE — PROGRESS NOTE ADULT - SUBJECTIVE AND OBJECTIVE BOX
81 yo F PMHx A-fib not on A/C due to pt refusal, diverticulosis s/p colon resection, history of C diff, breast Ca s/p B/L mastectomy, presenting with RLE wound and cellulitis. Patient seen and examined at bed. Complains of feeling tired as she did not sleep well overnight. Complains of some pain in her leg, but pain medications have been helping with this. No fevers or chills.     REVIEW OF SYSTEMS:    CONSTITUTIONAL: No weakness, fevers or chills  EYES/ENT: No visual changes, no throat pain   RESPIRATORY: No cough, wheezing, hemoptysis; No shortness of breath  CARDIOVASCULAR: No chest pain or palpitations  GASTROINTESTINAL: No abdominal pain, nausea, vomiting, or hematemesis; No diarrhea or constipation. No melena or hematochezia.  GENITOURINARY: No dysuria, frequency or hematuria  NEUROLOGICAL: No dizziness, numbness, or weakness  SKIN: No itching, burning, rashes, or lesions   All other review of systems is negative unless indicated above.    VITAL SIGNS:  Vital Signs Last 24 Hrs  T(C): 36.9 (29 Jul 2017 15:48), Max: 37.1 (29 Jul 2017 07:34)  T(F): 98.4 (29 Jul 2017 15:48), Max: 98.7 (29 Jul 2017 07:34)  HR: 87 (29 Jul 2017 15:48) (85 - 104)  BP: 126/86 (29 Jul 2017 15:48) (98/60 - 137/85)  BP(mean): --  RR: 18 (29 Jul 2017 15:48) (17 - 20)  SpO2: 93% (29 Jul 2017 15:48) (92% - 96%)      PHYSICAL EXAM:     GENERAL: no acute distress  HEENT: NC/AT, EOMI, neck supple, MMM  RESPIRATORY: LCTAB/L, no rhonchi, rales, or wheezing  CARDIOVASCULAR: RRR, no murmurs, gallops, rubs  ABDOMINAL: soft, non-tender, non-distended, positive bowel sounds   EXTREMITIES: right ankle dressing C/D/I. no edema  NEUROLOGICAL: alert and oriented x 3, non-focal  SKIN: no rashes or lesions   MUSCULOSKELETAL: no gross joint deformity                          10.7   5.5   )-----------( 220      ( 29 Jul 2017 06:38 )             32.9     07-29    142  |  108  |  15  ----------------------------<  91  4.5   |  26  |  0.98    Ca    8.1<L>      29 Jul 2017 06:38        MEDICATIONS  (STANDING):  brimonidine 0.2% Ophthalmic Solution 1 Drop(s) Both EYES two times a day  dorzolamide 2%/timolol 0.5% Ophthalmic Solution 1 Drop(s) Both EYES two times a day  latanoprost 0.005% Ophthalmic Solution 1 Drop(s) Both EYES at bedtime  lactobacillus acidophilus 1 Tablet(s) Oral daily  ampicillin/sulbactam  IVPB 3 Gram(s) IV Intermittent every 6 hours  vancomycin  IVPB   IV Intermittent   vancomycin  IVPB 1000 milliGRAM(s) IV Intermittent every 12 hours  enoxaparin Injectable 40 milliGRAM(s) SubCutaneous every 24 hours  aspirin  chewable 81 milliGRAM(s) Oral daily  metoprolol succinate ER 50 milliGRAM(s) Oral daily  docusate sodium 100 milliGRAM(s) Oral three times a day

## 2017-07-29 NOTE — PROGRESS NOTE ADULT - SUBJECTIVE AND OBJECTIVE BOX
[facility] - Steffany Bowen, Lynda, [place], Debra, Tana Parra  Office Number:  [phone]    Patient resting comfortably in bed in NAD.  Laying flat with no respiratory distress.  No complaints of chest pain, dyspnea, palpitations, PND, or orthopnea. c/o leg pain    ROS: negative unless otherwise mentioned.    Telemetry: AF 90s, to 120     MEDICATIONS  (STANDING):  brimonidine 0.2% Ophthalmic Solution 1 Drop(s) Both EYES two times a day  dorzolamide 2%/timolol 0.5% Ophthalmic Solution 1 Drop(s) Both EYES two times a day  latanoprost 0.005% Ophthalmic Solution 1 Drop(s) Both EYES at bedtime  lactobacillus acidophilus 1 Tablet(s) Oral daily  ampicillin/sulbactam  IVPB 3 Gram(s) IV Intermittent every 6 hours  vancomycin  IVPB   IV Intermittent   vancomycin  IVPB 1000 milliGRAM(s) IV Intermittent every 12 hours  enoxaparin Injectable 40 milliGRAM(s) SubCutaneous every 24 hours  metoprolol succinate ER 25 milliGRAM(s) Oral daily  aspirin  chewable 81 milliGRAM(s) Oral daily    MEDICATIONS  (PRN):  oxyCODONE    5 mG/acetaminophen 325 mG 1 Tablet(s) Oral every 6 hours PRN Severe Pain (7 - 10)  ondansetron    Tablet 4 milliGRAM(s) Oral every 6 hours PRN Nausea and/or Vomiting  docusate sodium 100 milliGRAM(s) Oral daily PRN Constipation  acetaminophen   Tablet. 650 milliGRAM(s) Oral every 6 hours PRN Mild Pain (1 - 3)  morphine  - Injectable 2 milliGRAM(s) IV Push once PRN Severe Pain (7 - 10)      Allergies    No Known Allergies    Intolerances        Vital Signs Last 24 Hrs  T(C): 37.1 (29 Jul 2017 07:34), Max: 37.2 (28 Jul 2017 12:29)  T(F): 98.7 (29 Jul 2017 07:34), Max: 99 (28 Jul 2017 12:29)  HR: 91 (29 Jul 2017 07:34) (85 - 111)  BP: 114/78 (29 Jul 2017 07:34) (98/60 - 137/85)  BP(mean): --  RR: 19 (29 Jul 2017 07:34) (18 - 20)  SpO2: 96% (29 Jul 2017 07:34) (92% - 96%)    I&O's Summary    28 Jul 2017 07:01  -  29 Jul 2017 07:00  --------------------------------------------------------  IN: 1070 mL / OUT: 0 mL / NET: 1070 mL        ON EXAM:    General: NAD, awake and alert, oriented x 3  HEENT: Mucous membranes are moist, anicteric  Lungs: Non-labored, breath sounds are clear bilaterally, No wheezing, rales or rhonchi  Cardiovascular: Regular, S1 and S2, + SM at LUSB, rubs, or gallops  Gastrointestinal: Bowel Sounds present, soft, nontender.   Lymph: Trace peripheral edema. L foot is wrapped. Pain to palpation of right lower extremity. No lymphadenopathy.  Skin: No rashes or ulcers  Psych:  Mood & affect appropriate    LABS: All Labs Reviewed:                        10.7   5.5   )-----------( 220      ( 29 Jul 2017 06:38 )             32.9                         11.0   4.4   )-----------( 244      ( 28 Jul 2017 07:23 )             33.6                         11.1   5.6   )-----------( 240      ( 27 Jul 2017 06:45 )             34.2     29 Jul 2017 06:38    142    |  108    |  15     ----------------------------<  91     4.5     |  26     |  0.98   28 Jul 2017 07:06    143    |  109    |  17     ----------------------------<  84     4.3     |  29     |  0.96   27 Jul 2017 06:45    142    |  107    |  19     ----------------------------<  88     4.2     |  29     |  0.83     Ca    8.1        29 Jul 2017 06:38  Ca    8.5        28 Jul 2017 07:06  Ca    8.4        27 Jul 2017 06:45    TPro  6.8    /  Alb  3.2    /  TBili  0.4    /  DBili  x      /  AST  17     /  ALT  16     /  AlkPhos  54     26 Jul 2017 13:09          Blood Culture:

## 2017-07-29 NOTE — PROGRESS NOTE ADULT - PROBLEM SELECTOR PLAN 1
-MRI showed osteomyelitis, will need debridement per Dr. Vanegas- appreciate further recs  -Continue IV antibiotics  -MRSA/MSSA negative  -Arterial doppler negative for PVD  -To OR Monday for surgical debridement.  -BCx, wound cultures NGTD  Patient is medically optimized for procedure, has been evaluated by cardiology as well.

## 2017-07-29 NOTE — PROGRESS NOTE ADULT - ASSESSMENT
83 yo F PMHx A-fib not on A/C due to pt refusal, diverticulosis s/p colon resection, history of C diff, breast CA s/p B/L masectomy, admitted with RLE cellulitis 2/2 failed treatment with po doxycycline.

## 2017-07-29 NOTE — PROGRESS NOTE ADULT - ASSESSMENT
Assessment:    Grade 2 ulcer, dorsal right foot  Abscess formation, dorsal right foot  Cellulitis, dorsal right foot    Plan:    -Pt seen and eval  -Chart reviewed  -Dressings removed and wound further examined, cleansed with normal saline  -Xeroform, dry sterile dressings applied today  -MRI results: abscess in area of EBD muscle, and osteomyelitis of distal fibula and tibial plafond  -Follow infectious disease recommendations  -Planning for debridement of wound, and incision/drainage of abscess on Monday 7/28/18 in Operating Room @ 9AM  -Requires medical clearance before surgery on Monday  -Pt Tx plan discussed w/Dr. Vanegas, agreed upon    Podiatry to cont following Pt while in-house

## 2017-07-29 NOTE — PROGRESS NOTE ADULT - ASSESSMENT
Ms. Hdz has permanent atrial fibrillation with no evidence of cardiac decompensation such as ischemia, heart failure, or additional dysrhythmias. EKG AF with no sign of ischemia.    Proceed with OR procedure using routine hemodynamic monitoring. No sign of decompensated CHF, significant valvular disease, arrhthymias or active ischemia. Watch volume status given "new" LV dysfunction.  Will need to obtain old reports; patient states she had a normal TTE and stress at some point recently, but they are not available  Telemetry  Rates seem better controlled, continue with Toprol XL 25  She aware of her risk of CVA off A/C; I would restart ASA 81 after her procedure and reconsider anticoagulation  Antibiotics per medicine  DVT prophylaxis  I have reviewed her TTE, there appears to be moderate LV dysfunction; the inferolateral, inferoseptal walls appear hypokinetic, there is also at least Moderate MR and AI; the LV dysfunction may be present in the setting of chronic uncontrolled AF. I would start her on a BB (preferably Toprol XL 25 daily) for now. She may need a OLEKSANDR as an outpatient, to evaluate her MV.  Even with her LV dysfunction, she is currently clinically stable from cardiovascular standpoint. She has no contraindications to any planned OR procedure if needed.   Further management can be dependent on her clinical course
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
## 2017-07-30 DIAGNOSIS — H92.09 OTALGIA, UNSPECIFIED EAR: ICD-10-CM

## 2017-07-30 LAB
-  CANDIDA ALBICANS: SIGNIFICANT CHANGE UP
-  CANDIDA GLABRATA: SIGNIFICANT CHANGE UP
-  CANDIDA KRUSEI: SIGNIFICANT CHANGE UP
-  CANDIDA PARAPSILOSIS: SIGNIFICANT CHANGE UP
-  CANDIDA TROPICALIS: SIGNIFICANT CHANGE UP
-  COAGULASE NEGATIVE STAPHYLOCOCCUS: SIGNIFICANT CHANGE UP
-  K. PNEUMONIAE GROUP: SIGNIFICANT CHANGE UP
-  KPC RESISTANCE GENE: SIGNIFICANT CHANGE UP
-  STREPTOCOCCUS SP. (NOT GRP A, B OR S PNEUMONIAE): SIGNIFICANT CHANGE UP
A BAUMANNII DNA SPEC QL NAA+PROBE: SIGNIFICANT CHANGE UP
ANION GAP SERPL CALC-SCNC: 9 MMOL/L — SIGNIFICANT CHANGE UP (ref 5–17)
BUN SERPL-MCNC: 13 MG/DL — SIGNIFICANT CHANGE UP (ref 7–23)
CALCIUM SERPL-MCNC: 8.1 MG/DL — LOW (ref 8.5–10.1)
CHLORIDE SERPL-SCNC: 106 MMOL/L — SIGNIFICANT CHANGE UP (ref 96–108)
CO2 SERPL-SCNC: 25 MMOL/L — SIGNIFICANT CHANGE UP (ref 22–31)
CREAT SERPL-MCNC: 0.95 MG/DL — SIGNIFICANT CHANGE UP (ref 0.5–1.3)
E CLOAC COMP DNA BLD POS QL NAA+PROBE: SIGNIFICANT CHANGE UP
E COLI DNA BLD POS QL NAA+NON-PROBE: SIGNIFICANT CHANGE UP
ENTEROCOC DNA BLD POS QL NAA+NON-PROBE: SIGNIFICANT CHANGE UP
ENTEROCOC DNA BLD POS QL NAA+NON-PROBE: SIGNIFICANT CHANGE UP
GLUCOSE SERPL-MCNC: 119 MG/DL — HIGH (ref 70–99)
GP B STREP DNA BLD POS QL NAA+NON-PROBE: SIGNIFICANT CHANGE UP
HAEM INFLU DNA BLD POS QL NAA+NON-PROBE: SIGNIFICANT CHANGE UP
HCT VFR BLD CALC: 31.9 % — LOW (ref 34.5–45)
HGB BLD-MCNC: 10.5 G/DL — LOW (ref 11.5–15.5)
K OXYTOCA DNA BLD POS QL NAA+NON-PROBE: SIGNIFICANT CHANGE UP
L MONOCYTOG DNA BLD POS QL NAA+NON-PROBE: SIGNIFICANT CHANGE UP
MCHC RBC-ENTMCNC: 33 GM/DL — SIGNIFICANT CHANGE UP (ref 32–36)
MCHC RBC-ENTMCNC: 33.7 PG — SIGNIFICANT CHANGE UP (ref 27–34)
MCV RBC AUTO: 102.2 FL — HIGH (ref 80–100)
METHOD TYPE: SIGNIFICANT CHANGE UP
MRSA SPEC QL CULT: SIGNIFICANT CHANGE UP
MSSA DNA SPEC QL NAA+PROBE: SIGNIFICANT CHANGE UP
N MEN ISLT CULT: SIGNIFICANT CHANGE UP
P AERUGINOSA DNA BLD POS NAA+NON-PROBE: SIGNIFICANT CHANGE UP
PLATELET # BLD AUTO: 207 K/UL — SIGNIFICANT CHANGE UP (ref 150–400)
POTASSIUM SERPL-MCNC: 4.1 MMOL/L — SIGNIFICANT CHANGE UP (ref 3.5–5.3)
POTASSIUM SERPL-SCNC: 4.1 MMOL/L — SIGNIFICANT CHANGE UP (ref 3.5–5.3)
PROTEUS SP DNA BLD POS QL NAA+NON-PROBE: SIGNIFICANT CHANGE UP
RBC # BLD: 3.12 M/UL — LOW (ref 3.8–5.2)
RBC # FLD: 12.5 % — SIGNIFICANT CHANGE UP (ref 10.3–14.5)
S MARCESCENS DNA BLD POS NAA+NON-PROBE: SIGNIFICANT CHANGE UP
S PNEUM DNA BLD POS QL NAA+NON-PROBE: SIGNIFICANT CHANGE UP
S PYO DNA BLD POS QL NAA+NON-PROBE: SIGNIFICANT CHANGE UP
SODIUM SERPL-SCNC: 140 MMOL/L — SIGNIFICANT CHANGE UP (ref 135–145)
WBC # BLD: 5.3 K/UL — SIGNIFICANT CHANGE UP (ref 3.8–10.5)
WBC # FLD AUTO: 5.3 K/UL — SIGNIFICANT CHANGE UP (ref 3.8–10.5)

## 2017-07-30 PROCEDURE — 99233 SBSQ HOSP IP/OBS HIGH 50: CPT

## 2017-07-30 RX ORDER — PIPERACILLIN AND TAZOBACTAM 4; .5 G/20ML; G/20ML
3.38 INJECTION, POWDER, LYOPHILIZED, FOR SOLUTION INTRAVENOUS EVERY 8 HOURS
Qty: 0 | Refills: 0 | Status: DISCONTINUED | OUTPATIENT
Start: 2017-07-30 | End: 2017-08-02

## 2017-07-30 RX ADMIN — Medication 100 MILLIGRAM(S): at 05:37

## 2017-07-30 RX ADMIN — OXYCODONE AND ACETAMINOPHEN 1 TABLET(S): 5; 325 TABLET ORAL at 18:56

## 2017-07-30 RX ADMIN — PIPERACILLIN AND TAZOBACTAM 25 GRAM(S): 4; .5 INJECTION, POWDER, LYOPHILIZED, FOR SOLUTION INTRAVENOUS at 21:19

## 2017-07-30 RX ADMIN — Medication 50 MILLIGRAM(S): at 05:37

## 2017-07-30 RX ADMIN — Medication 100 MILLIGRAM(S): at 14:24

## 2017-07-30 RX ADMIN — AMPICILLIN SODIUM AND SULBACTAM SODIUM 200 GRAM(S): 250; 125 INJECTION, POWDER, FOR SUSPENSION INTRAMUSCULAR; INTRAVENOUS at 05:37

## 2017-07-30 RX ADMIN — DORZOLAMIDE HYDROCHLORIDE TIMOLOL MALEATE 1 DROP(S): 20; 5 SOLUTION/ DROPS OPHTHALMIC at 05:38

## 2017-07-30 RX ADMIN — OXYCODONE AND ACETAMINOPHEN 1 TABLET(S): 5; 325 TABLET ORAL at 10:20

## 2017-07-30 RX ADMIN — ENOXAPARIN SODIUM 40 MILLIGRAM(S): 100 INJECTION SUBCUTANEOUS at 16:27

## 2017-07-30 RX ADMIN — OXYCODONE AND ACETAMINOPHEN 1 TABLET(S): 5; 325 TABLET ORAL at 22:01

## 2017-07-30 RX ADMIN — Medication 250 MILLIGRAM(S): at 16:27

## 2017-07-30 RX ADMIN — AMPICILLIN SODIUM AND SULBACTAM SODIUM 200 GRAM(S): 250; 125 INJECTION, POWDER, FOR SUSPENSION INTRAMUSCULAR; INTRAVENOUS at 11:52

## 2017-07-30 RX ADMIN — OXYCODONE AND ACETAMINOPHEN 1 TABLET(S): 5; 325 TABLET ORAL at 21:18

## 2017-07-30 RX ADMIN — OXYCODONE AND ACETAMINOPHEN 1 TABLET(S): 5; 325 TABLET ORAL at 04:42

## 2017-07-30 RX ADMIN — Medication 1 SPRAY(S): at 16:36

## 2017-07-30 RX ADMIN — Medication 650 MILLIGRAM(S): at 12:52

## 2017-07-30 RX ADMIN — Medication 100 MILLIGRAM(S): at 21:19

## 2017-07-30 RX ADMIN — Medication 1 SPRAY(S): at 05:37

## 2017-07-30 RX ADMIN — OXYCODONE AND ACETAMINOPHEN 1 TABLET(S): 5; 325 TABLET ORAL at 17:56

## 2017-07-30 RX ADMIN — OXYCODONE AND ACETAMINOPHEN 1 TABLET(S): 5; 325 TABLET ORAL at 11:21

## 2017-07-30 RX ADMIN — DORZOLAMIDE HYDROCHLORIDE TIMOLOL MALEATE 1 DROP(S): 20; 5 SOLUTION/ DROPS OPHTHALMIC at 16:36

## 2017-07-30 RX ADMIN — Medication 250 MILLIGRAM(S): at 04:45

## 2017-07-30 RX ADMIN — Medication 650 MILLIGRAM(S): at 11:52

## 2017-07-30 RX ADMIN — Medication 81 MILLIGRAM(S): at 11:54

## 2017-07-30 RX ADMIN — BRIMONIDINE TARTRATE 1 DROP(S): 2 SOLUTION/ DROPS OPHTHALMIC at 05:38

## 2017-07-30 RX ADMIN — Medication 1 TABLET(S): at 11:52

## 2017-07-30 RX ADMIN — OXYCODONE AND ACETAMINOPHEN 1 TABLET(S): 5; 325 TABLET ORAL at 05:42

## 2017-07-30 RX ADMIN — Medication 650 MILLIGRAM(S): at 21:28

## 2017-07-30 RX ADMIN — BRIMONIDINE TARTRATE 1 DROP(S): 2 SOLUTION/ DROPS OPHTHALMIC at 16:36

## 2017-07-30 RX ADMIN — LATANOPROST 1 DROP(S): 0.05 SOLUTION/ DROPS OPHTHALMIC; TOPICAL at 21:19

## 2017-07-30 RX ADMIN — Medication 650 MILLIGRAM(S): at 22:00

## 2017-07-30 NOTE — PROGRESS NOTE ADULT - ASSESSMENT
Ms. Hdz has permanent atrial fibrillation with no evidence of cardiac decompensation such as ischemia, heart failure, or additional dysrhythmias. EKG AF with no sign of ischemia.    Proceed with OR procedure using routine hemodynamic monitoring. No sign of decompensated CHF, significant valvular disease, arrhthymias or active ischemia. Watch volume status given new LV dysfunction.  Will need to obtain old reports; patient states she had a normal TTE and stress at some point recently, but they are not available.  Continue to watch on telemetry  Rates seem better controlled, given her NSVT, I have increased her Toprol XL to 50  She aware of her risk of CVA off A/C; I would restart ASA 81 after her procedure and she will reconsider anticoagulation  Antibiotics per medicine  DVT prophylaxis  I have reviewed her TTE, there appears to be moderate LV dysfunction; the inferolateral, inferoseptal walls appear hypokinetic, there is also at least Moderate MR and AI; the LV dysfunction may be present in the setting of chronic uncontrolled AF. She may need a OLEKSANDR as an outpatient, to evaluate her MV.  Even with her LV dysfunction, she is currently clinically stable from cardiovascular standpoint. She has no contraindications to any planned OR procedure if needed.   Further management can be dependent on her clinical course. Watch volume status closely.

## 2017-07-30 NOTE — PROGRESS NOTE ADULT - PROBLEM SELECTOR PLAN 1
-MRI showed osteomyelitis, will need debridement per Dr. Vanegas- appreciate further recs  -Continue IV antibiotics  -MRSA/MSSA negative  -Arterial doppler negative for PVD  -To OR Monday for surgical debridement.  -BCx, wound cultures NGTD  Patient is medically optimized for procedure, has been evaluated by cardiology as well. -MRI showed osteomyelitis, will need debridement per Dr. Vanegas  -Continue IV antibiotics  -MRSA/MSSA negative  -Arterial doppler negative for PVD  -To OR Monday for surgical debridement.  -BCx, wound cultures NGTD  Patient is medically optimized for procedure, has been evaluated by cardiology as well.  NPO after midnight  Anticoagulation held.

## 2017-07-30 NOTE — PROGRESS NOTE ADULT - SUBJECTIVE AND OBJECTIVE BOX
81 yo F PMHx A-fib not on A/C due to pt refusal, diverticulosis s/p colon resection, history of C diff, breast Ca s/p B/L mastectomy, presenting with RLE wound and cellulitis. Patient seen and examined at bed. Complains of ear pain again today. Reports that she took tylenol and placed a hot pack over her ear and it feels better now. She also complains of leg pain, but pain meds are helping.     REVIEW OF SYSTEMS:    CONSTITUTIONAL: No weakness, fevers or chills  EYES/ENT: No visual changes, no throat pain, +ear pain  RESPIRATORY: No cough, wheezing, hemoptysis; No shortness of breath  CARDIOVASCULAR: No chest pain or palpitations  GASTROINTESTINAL: No abdominal pain, nausea, vomiting, or hematemesis; No diarrhea or constipation. No melena or hematochezia.  GENITOURINARY: No dysuria, frequency or hematuria  NEUROLOGICAL: No dizziness, numbness, or weakness  SKIN: No itching, burning, rashes, or lesions   MSK: bilateral leg pain. Right>Left  All other review of systems is negative unless indicated above.    VITAL SIGNS:  Vital Signs Last 24 Hrs  T(C): 37.2 (30 Jul 2017 15:45), Max: 37.2 (30 Jul 2017 15:45)  T(F): 99 (30 Jul 2017 15:45), Max: 99 (30 Jul 2017 15:45)  HR: 94 (30 Jul 2017 15:45) (78 - 96)  BP: 105/74 (30 Jul 2017 15:45) (103/60 - 128/77)  BP(mean): --  RR: 18 (30 Jul 2017 15:45) (18 - 20)  SpO2: 92% (30 Jul 2017 15:45) (91% - 93%)    PHYSICAL EXAM:     GENERAL: no acute distress  HEENT: NC/AT, EOMI, neck supple, MMM  RESPIRATORY: LCTAB/L, no rhonchi, rales, or wheezing  CARDIOVASCULAR: RRR, no murmurs, gallops, rubs  ABDOMINAL: soft, non-tender, non-distended, positive bowel sounds   EXTREMITIES: right ankle dressing C/D/I. no edema  NEUROLOGICAL: alert and oriented x 3, non-focal  SKIN: no rashes or lesions   MUSCULOSKELETAL: no gross joint deformity      MEDICATIONS  (STANDING):  brimonidine 0.2% Ophthalmic Solution 1 Drop(s) Both EYES two times a day  dorzolamide 2%/timolol 0.5% Ophthalmic Solution 1 Drop(s) Both EYES two times a day  latanoprost 0.005% Ophthalmic Solution 1 Drop(s) Both EYES at bedtime  lactobacillus acidophilus 1 Tablet(s) Oral daily  ampicillin/sulbactam  IVPB 3 Gram(s) IV Intermittent every 6 hours  vancomycin  IVPB   IV Intermittent   vancomycin  IVPB 1000 milliGRAM(s) IV Intermittent every 12 hours  metoprolol succinate ER 50 milliGRAM(s) Oral daily  docusate sodium 100 milliGRAM(s) Oral three times a day  fluticasone propionate 50 MICROgram(s)/spray Nasal Spray 1 Spray(s) Both Nostrils two times a day    MEDICATIONS  (PRN):  ondansetron    Tablet 4 milliGRAM(s) Oral every 6 hours PRN Nausea and/or Vomiting  acetaminophen   Tablet. 650 milliGRAM(s) Oral every 6 hours PRN Mild Pain (1 - 3)  morphine  - Injectable 2 milliGRAM(s) IV Push once PRN Severe Pain (7 - 10)  oxyCODONE    5 mG/acetaminophen 325 mG 1 Tablet(s) Oral every 4 hours PRN Severe Pain (7 - 10)

## 2017-07-30 NOTE — PROGRESS NOTE ADULT - PROBLEM SELECTOR PLAN 5
Unclear etiology, no signs of infection or perforation on otoscopic exam. Started flonase for possible Eustachian tube dysfunction. Continue warm packs and Tylenol as needed.
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
## 2017-07-30 NOTE — PROGRESS NOTE ADULT - SUBJECTIVE AND OBJECTIVE BOX
[facility] - Steffany Bowen, Lynda, [place]ool, Debra, Tana Parra  Office Number:  [phone]    Patient resting comfortably in bed in NAD.  Laying flat with no respiratory distress.  No complaints of chest pain, dyspnea, palpitations, PND, or orthopnea. Feeling well, awaiting her procedure.    ROS: negative unless otherwise mentioned.    Telemetry: AF 80's (less than 120's), 6 beats NSVT yesterday     MEDICATIONS  (STANDING):  brimonidine 0.2% Ophthalmic Solution 1 Drop(s) Both EYES two times a day  dorzolamide 2%/timolol 0.5% Ophthalmic Solution 1 Drop(s) Both EYES two times a day  latanoprost 0.005% Ophthalmic Solution 1 Drop(s) Both EYES at bedtime  lactobacillus acidophilus 1 Tablet(s) Oral daily  ampicillin/sulbactam  IVPB 3 Gram(s) IV Intermittent every 6 hours  vancomycin  IVPB   IV Intermittent   vancomycin  IVPB 1000 milliGRAM(s) IV Intermittent every 12 hours  enoxaparin Injectable 40 milliGRAM(s) SubCutaneous every 24 hours  aspirin  chewable 81 milliGRAM(s) Oral daily  metoprolol succinate ER 50 milliGRAM(s) Oral daily  docusate sodium 100 milliGRAM(s) Oral three times a day  fluticasone propionate 50 MICROgram(s)/spray Nasal Spray 1 Spray(s) Both Nostrils two times a day    MEDICATIONS  (PRN):  ondansetron    Tablet 4 milliGRAM(s) Oral every 6 hours PRN Nausea and/or Vomiting  acetaminophen   Tablet. 650 milliGRAM(s) Oral every 6 hours PRN Mild Pain (1 - 3)  morphine  - Injectable 2 milliGRAM(s) IV Push once PRN Severe Pain (7 - 10)  oxyCODONE    5 mG/acetaminophen 325 mG 1 Tablet(s) Oral every 4 hours PRN Severe Pain (7 - 10)      Allergies    No Known Allergies    Intolerances        Vital Signs Last 24 Hrs  T(C): 2.6 (30 Jul 2017 08:02), Max: 37.1 (29 Jul 2017 19:53)  T(F): 36.7 (30 Jul 2017 08:02), Max: 98.7 (29 Jul 2017 19:53)  HR: 78 (30 Jul 2017 08:02) (78 - 101)  BP: 103/60 (30 Jul 2017 08:02) (103/60 - 128/77)  BP(mean): --  RR: 20 (30 Jul 2017 08:02) (17 - 20)  SpO2: 92% (30 Jul 2017 08:02) (92% - 94%)    I&O's Summary    29 Jul 2017 07:01  -  30 Jul 2017 07:00  --------------------------------------------------------  IN: 350 mL / OUT: 0 mL / NET: 350 mL        ON EXAM:    General: NAD, awake and alert, oriented x 3, obese  HEENT: Mucous membranes are moist, anicteric  Lungs: Non-labored, breath sounds are clear bilaterally, No wheezing, rales or rhonchi  Cardiovascular: Regular, S1 and S2, no murmurs, rubs, or gallops  Gastrointestinal: Bowel Sounds present, soft, nontender.   Lymph: right foot wrapped, pain to palpation. Trace edema. No lymphadenopathy.  Skin: No rashes or ulcers  Psych:  Mood & affect appropriate    LABS: All Labs Reviewed:                        10.5   5.3   )-----------( 207      ( 30 Jul 2017 06:07 )             31.9                         10.7   5.5   )-----------( 220      ( 29 Jul 2017 06:38 )             32.9                         11.0   4.4   )-----------( 244      ( 28 Jul 2017 07:23 )             33.6     30 Jul 2017 06:07    140    |  106    |  13     ----------------------------<  119    4.1     |  25     |  0.95   29 Jul 2017 06:38    142    |  108    |  15     ----------------------------<  91     4.5     |  26     |  0.98   28 Jul 2017 07:06    143    |  109    |  17     ----------------------------<  84     4.3     |  29     |  0.96     Ca    8.1        30 Jul 2017 06:07  Ca    8.1        29 Jul 2017 06:38  Ca    8.5        28 Jul 2017 07:06            Blood Culture:

## 2017-07-31 ENCOUNTER — TRANSCRIPTION ENCOUNTER (OUTPATIENT)
Age: 82
End: 2017-07-31

## 2017-07-31 ENCOUNTER — RESULT REVIEW (OUTPATIENT)
Age: 82
End: 2017-07-31

## 2017-07-31 DIAGNOSIS — I51.9 HEART DISEASE, UNSPECIFIED: ICD-10-CM

## 2017-07-31 LAB
ANION GAP SERPL CALC-SCNC: 8 MMOL/L — SIGNIFICANT CHANGE UP (ref 5–17)
BASOPHILS # BLD AUTO: 0.1 K/UL — SIGNIFICANT CHANGE UP (ref 0–0.2)
BASOPHILS NFR BLD AUTO: 1.6 % — SIGNIFICANT CHANGE UP (ref 0–2)
BUN SERPL-MCNC: 12 MG/DL — SIGNIFICANT CHANGE UP (ref 7–23)
CALCIUM SERPL-MCNC: 8.4 MG/DL — LOW (ref 8.5–10.1)
CHLORIDE SERPL-SCNC: 105 MMOL/L — SIGNIFICANT CHANGE UP (ref 96–108)
CO2 SERPL-SCNC: 27 MMOL/L — SIGNIFICANT CHANGE UP (ref 22–31)
CREAT SERPL-MCNC: 0.86 MG/DL — SIGNIFICANT CHANGE UP (ref 0.5–1.3)
CULTURE RESULTS: SIGNIFICANT CHANGE UP
EOSINOPHIL # BLD AUTO: 0.3 K/UL — SIGNIFICANT CHANGE UP (ref 0–0.5)
EOSINOPHIL NFR BLD AUTO: 5.2 % — SIGNIFICANT CHANGE UP (ref 0–6)
GLUCOSE SERPL-MCNC: 93 MG/DL — SIGNIFICANT CHANGE UP (ref 70–99)
GRAM STN FLD: SIGNIFICANT CHANGE UP
HCT VFR BLD CALC: 32.2 % — LOW (ref 34.5–45)
HGB BLD-MCNC: 10.7 G/DL — LOW (ref 11.5–15.5)
LYMPHOCYTES # BLD AUTO: 1.6 K/UL — SIGNIFICANT CHANGE UP (ref 1–3.3)
LYMPHOCYTES # BLD AUTO: 28.6 % — SIGNIFICANT CHANGE UP (ref 13–44)
MCHC RBC-ENTMCNC: 33.2 GM/DL — SIGNIFICANT CHANGE UP (ref 32–36)
MCHC RBC-ENTMCNC: 33.6 PG — SIGNIFICANT CHANGE UP (ref 27–34)
MCV RBC AUTO: 101.3 FL — HIGH (ref 80–100)
MONOCYTES # BLD AUTO: 0.5 K/UL — SIGNIFICANT CHANGE UP (ref 0–0.9)
MONOCYTES NFR BLD AUTO: 9.5 % — HIGH (ref 1–9)
NEUTROPHILS # BLD AUTO: 3.1 K/UL — SIGNIFICANT CHANGE UP (ref 1.8–7.4)
NEUTROPHILS NFR BLD AUTO: 54.9 % — SIGNIFICANT CHANGE UP (ref 43–77)
PLATELET # BLD AUTO: 221 K/UL — SIGNIFICANT CHANGE UP (ref 150–400)
POTASSIUM SERPL-MCNC: 3.9 MMOL/L — SIGNIFICANT CHANGE UP (ref 3.5–5.3)
POTASSIUM SERPL-SCNC: 3.9 MMOL/L — SIGNIFICANT CHANGE UP (ref 3.5–5.3)
RBC # BLD: 3.18 M/UL — LOW (ref 3.8–5.2)
RBC # FLD: 12.6 % — SIGNIFICANT CHANGE UP (ref 10.3–14.5)
SODIUM SERPL-SCNC: 140 MMOL/L — SIGNIFICANT CHANGE UP (ref 135–145)
SPECIMEN SOURCE: SIGNIFICANT CHANGE UP
SPECIMEN SOURCE: SIGNIFICANT CHANGE UP
WBC # BLD: 5.7 K/UL — SIGNIFICANT CHANGE UP (ref 3.8–10.5)
WBC # FLD AUTO: 5.7 K/UL — SIGNIFICANT CHANGE UP (ref 3.8–10.5)

## 2017-07-31 PROCEDURE — 88304 TISSUE EXAM BY PATHOLOGIST: CPT | Mod: 26

## 2017-07-31 PROCEDURE — 99233 SBSQ HOSP IP/OBS HIGH 50: CPT

## 2017-07-31 RX ORDER — ENOXAPARIN SODIUM 100 MG/ML
40 INJECTION SUBCUTANEOUS DAILY
Qty: 0 | Refills: 0 | Status: DISCONTINUED | OUTPATIENT
Start: 2017-08-01 | End: 2017-08-09

## 2017-07-31 RX ORDER — AMPICILLIN SODIUM AND SULBACTAM SODIUM 250; 125 MG/ML; MG/ML
3 INJECTION, POWDER, FOR SUSPENSION INTRAMUSCULAR; INTRAVENOUS EVERY 6 HOURS
Qty: 0 | Refills: 0 | Status: DISCONTINUED | OUTPATIENT
Start: 2017-07-31 | End: 2017-07-31

## 2017-07-31 RX ORDER — SODIUM CHLORIDE 9 MG/ML
1000 INJECTION, SOLUTION INTRAVENOUS
Qty: 0 | Refills: 0 | Status: DISCONTINUED | OUTPATIENT
Start: 2017-07-31 | End: 2017-07-31

## 2017-07-31 RX ORDER — ASPIRIN/CALCIUM CARB/MAGNESIUM 324 MG
81 TABLET ORAL DAILY
Qty: 0 | Refills: 0 | Status: DISCONTINUED | OUTPATIENT
Start: 2017-08-01 | End: 2017-08-09

## 2017-07-31 RX ORDER — OXYCODONE HYDROCHLORIDE 5 MG/1
10 TABLET ORAL EVERY 6 HOURS
Qty: 0 | Refills: 0 | Status: DISCONTINUED | OUTPATIENT
Start: 2017-07-31 | End: 2017-07-31

## 2017-07-31 RX ORDER — HYDROMORPHONE HYDROCHLORIDE 2 MG/ML
0.5 INJECTION INTRAMUSCULAR; INTRAVENOUS; SUBCUTANEOUS EVERY 4 HOURS
Qty: 0 | Refills: 0 | Status: DISCONTINUED | OUTPATIENT
Start: 2017-07-31 | End: 2017-08-05

## 2017-07-31 RX ORDER — HYDROMORPHONE HYDROCHLORIDE 2 MG/ML
0.5 INJECTION INTRAMUSCULAR; INTRAVENOUS; SUBCUTANEOUS
Qty: 0 | Refills: 0 | Status: DISCONTINUED | OUTPATIENT
Start: 2017-07-31 | End: 2017-07-31

## 2017-07-31 RX ORDER — OXYCODONE HYDROCHLORIDE 5 MG/1
5 TABLET ORAL EVERY 4 HOURS
Qty: 0 | Refills: 0 | Status: DISCONTINUED | OUTPATIENT
Start: 2017-07-31 | End: 2017-07-31

## 2017-07-31 RX ORDER — ASPIRIN/CALCIUM CARB/MAGNESIUM 324 MG
81 TABLET ORAL DAILY
Qty: 0 | Refills: 0 | Status: DISCONTINUED | OUTPATIENT
Start: 2017-07-31 | End: 2017-07-31

## 2017-07-31 RX ORDER — ENOXAPARIN SODIUM 100 MG/ML
40 INJECTION SUBCUTANEOUS EVERY 24 HOURS
Qty: 0 | Refills: 0 | Status: DISCONTINUED | OUTPATIENT
Start: 2017-07-31 | End: 2017-07-31

## 2017-07-31 RX ADMIN — HYDROMORPHONE HYDROCHLORIDE 0.5 MILLIGRAM(S): 2 INJECTION INTRAMUSCULAR; INTRAVENOUS; SUBCUTANEOUS at 16:59

## 2017-07-31 RX ADMIN — OXYCODONE AND ACETAMINOPHEN 1 TABLET(S): 5; 325 TABLET ORAL at 06:00

## 2017-07-31 RX ADMIN — DORZOLAMIDE HYDROCHLORIDE TIMOLOL MALEATE 1 DROP(S): 20; 5 SOLUTION/ DROPS OPHTHALMIC at 21:11

## 2017-07-31 RX ADMIN — OXYCODONE AND ACETAMINOPHEN 1 TABLET(S): 5; 325 TABLET ORAL at 20:30

## 2017-07-31 RX ADMIN — Medication 100 MILLIGRAM(S): at 14:23

## 2017-07-31 RX ADMIN — Medication 50 MILLIGRAM(S): at 05:30

## 2017-07-31 RX ADMIN — OXYCODONE AND ACETAMINOPHEN 1 TABLET(S): 5; 325 TABLET ORAL at 13:24

## 2017-07-31 RX ADMIN — OXYCODONE AND ACETAMINOPHEN 1 TABLET(S): 5; 325 TABLET ORAL at 12:00

## 2017-07-31 RX ADMIN — OXYCODONE AND ACETAMINOPHEN 1 TABLET(S): 5; 325 TABLET ORAL at 05:30

## 2017-07-31 RX ADMIN — OXYCODONE AND ACETAMINOPHEN 1 TABLET(S): 5; 325 TABLET ORAL at 16:31

## 2017-07-31 RX ADMIN — Medication 100 MILLIGRAM(S): at 05:30

## 2017-07-31 RX ADMIN — OXYCODONE AND ACETAMINOPHEN 1 TABLET(S): 5; 325 TABLET ORAL at 15:54

## 2017-07-31 RX ADMIN — Medication 1 TABLET(S): at 12:00

## 2017-07-31 RX ADMIN — OXYCODONE AND ACETAMINOPHEN 1 TABLET(S): 5; 325 TABLET ORAL at 01:20

## 2017-07-31 RX ADMIN — Medication 250 MILLIGRAM(S): at 18:29

## 2017-07-31 RX ADMIN — SODIUM CHLORIDE 100 MILLILITER(S): 9 INJECTION, SOLUTION INTRAVENOUS at 08:57

## 2017-07-31 RX ADMIN — DORZOLAMIDE HYDROCHLORIDE TIMOLOL MALEATE 1 DROP(S): 20; 5 SOLUTION/ DROPS OPHTHALMIC at 05:31

## 2017-07-31 RX ADMIN — Medication 250 MILLIGRAM(S): at 05:30

## 2017-07-31 RX ADMIN — HYDROMORPHONE HYDROCHLORIDE 0.5 MILLIGRAM(S): 2 INJECTION INTRAMUSCULAR; INTRAVENOUS; SUBCUTANEOUS at 21:10

## 2017-07-31 RX ADMIN — PIPERACILLIN AND TAZOBACTAM 25 GRAM(S): 4; .5 INJECTION, POWDER, LYOPHILIZED, FOR SOLUTION INTRAVENOUS at 21:10

## 2017-07-31 RX ADMIN — LATANOPROST 1 DROP(S): 0.05 SOLUTION/ DROPS OPHTHALMIC; TOPICAL at 21:11

## 2017-07-31 RX ADMIN — BRIMONIDINE TARTRATE 1 DROP(S): 2 SOLUTION/ DROPS OPHTHALMIC at 05:31

## 2017-07-31 RX ADMIN — HYDROMORPHONE HYDROCHLORIDE 0.5 MILLIGRAM(S): 2 INJECTION INTRAMUSCULAR; INTRAVENOUS; SUBCUTANEOUS at 22:00

## 2017-07-31 RX ADMIN — Medication 100 MILLIGRAM(S): at 21:10

## 2017-07-31 RX ADMIN — BRIMONIDINE TARTRATE 1 DROP(S): 2 SOLUTION/ DROPS OPHTHALMIC at 21:12

## 2017-07-31 RX ADMIN — OXYCODONE AND ACETAMINOPHEN 1 TABLET(S): 5; 325 TABLET ORAL at 19:52

## 2017-07-31 RX ADMIN — PIPERACILLIN AND TAZOBACTAM 25 GRAM(S): 4; .5 INJECTION, POWDER, LYOPHILIZED, FOR SOLUTION INTRAVENOUS at 14:23

## 2017-07-31 RX ADMIN — HYDROMORPHONE HYDROCHLORIDE 0.5 MILLIGRAM(S): 2 INJECTION INTRAMUSCULAR; INTRAVENOUS; SUBCUTANEOUS at 18:31

## 2017-07-31 RX ADMIN — Medication 1 SPRAY(S): at 18:29

## 2017-07-31 RX ADMIN — PIPERACILLIN AND TAZOBACTAM 25 GRAM(S): 4; .5 INJECTION, POWDER, LYOPHILIZED, FOR SOLUTION INTRAVENOUS at 05:30

## 2017-07-31 RX ADMIN — Medication 1 SPRAY(S): at 05:30

## 2017-07-31 RX ADMIN — OXYCODONE AND ACETAMINOPHEN 1 TABLET(S): 5; 325 TABLET ORAL at 02:19

## 2017-07-31 NOTE — PROGRESS NOTE ADULT - ASSESSMENT
Ms. Hdz has permanent atrial fibrillation with no evidence of cardiac decompensation such as ischemia, decompensated heart failure, or additional dysrhythmias. EKG AF with no sign of ischemia.  An echocardiogram showed new LV dysfunction.  The patient tolerated the OR with no cardiac complications.   Continue aspirin for stroke risk reduction.  Has refused Coumadin in the past.  Continue Toprol.  VR well controlled.  Patient is now following with [facility]. They will assume care tomorrow and discuss a work up for newly found LV dysfunction with the patient.

## 2017-07-31 NOTE — PROGRESS NOTE ADULT - SUBJECTIVE AND OBJECTIVE BOX
JACEK HERNDON is a 82yFemale , patient examined and chart reviewed. Patient seen and examined today being followed for infected ulcer rught ankles with increased pain       INTERVAL HPI/ OVERNIGHT EVENTS: Events noted, she underwent  I & D of right ankle and debridement of bone today  by Podiatry . She tolerated procedure well       PAST MEDICAL & SURGICAL HISTORY:  C. difficile diarrhea  Gall stones  Breast cancer  Atrial fibrillation  PSVT (paroxysmal supraventricular tachycardia)  Glaucoma  Diverticulitis  S/P colon resection  H/O: hysterectomy  S/P ORIF (open reduction internal fixation) fracture: L lower extremity  H/O hernia repair  H/O mastectomy, bilateral  Status post appendectomy  S/P colon resection: march 11 2014      For details regarding the patient's social history, family history, and other miscellaneous elements, please refer the initial infectious diseases consultation and/or the admitting history and physical examination for this admission.    ROS:  CONSTITUTIONAL:  Negative fever or chills, feels well, good appetite  EYES:  Negative  blurry vision or double vision  CARDIOVASCULAR:  Negative for chest pain or palpitations  RESPIRATORY:  Negative for cough, wheezing, or SOB   GASTROINTESTINAL:  Negative for nausea, vomiting, diarrhea, constipation, or abdominal pain  GENITOURINARY:  Negative frequency, urgency or dysuria  NEUROLOGIC:  No headache, confusion, dizziness, lightheadedness  All other systems were reviewed and are negative         Current inpatient medications :    MEDICATIONS  (STANDING):  brimonidine 0.2% Ophthalmic Solution 1 Drop(s) Both EYES two times a day  dorzolamide 2%/timolol 0.5% Ophthalmic Solution 1 Drop(s) Both EYES two times a day  latanoprost 0.005% Ophthalmic Solution 1 Drop(s) Both EYES at bedtime  lactobacillus acidophilus 1 Tablet(s) Oral daily  vancomycin  IVPB   IV Intermittent   vancomycin  IVPB 1000 milliGRAM(s) IV Intermittent every 12 hours  metoprolol succinate ER 50 milliGRAM(s) Oral daily  docusate sodium 100 milliGRAM(s) Oral three times a day  fluticasone propionate 50 MICROgram(s)/spray Nasal Spray 1 Spray(s) Both Nostrils two times a day  piperacillin/tazobactam IVPB. 3.375 Gram(s) IV Intermittent every 8 hours    MEDICATIONS  (PRN):  ondansetron    Tablet 4 milliGRAM(s) Oral every 6 hours PRN Nausea and/or Vomiting  acetaminophen   Tablet. 650 milliGRAM(s) Oral every 6 hours PRN Mild Pain (1 - 3)  morphine  - Injectable 2 milliGRAM(s) IV Push once PRN Severe Pain (7 - 10)  oxyCODONE    5 mG/acetaminophen 325 mG 1 Tablet(s) Oral every 4 hours PRN Severe Pain (7 - 10)      Objective:    Vital Signs Last 24 Hrs  T(C): 37.1 (31 Jul 2017 12:00), Max: 37.2 (30 Jul 2017 15:45)  T(F): 98.7 (31 Jul 2017 12:00), Max: 99 (30 Jul 2017 15:45)  HR: 97 (31 Jul 2017 12:00) (72 - 104)  BP: 131/84 (31 Jul 2017 12:00) (102/69 - 131/84)  BP(mean): --  RR: 18 (31 Jul 2017 12:00) (14 - 18)  SpO2: 91% (31 Jul 2017 12:00) (91% - 100%) --      Physical Exam:  General: well developed well nourished, in no acute distress  Eyes: sclera anicteric, pupils equal and reactive to light  ENMT: buccal mucosa moist, pharynx not injected  Neck: supple, trachea midline  Lungs: clear, no wheeze/rhonchi  Cardiovascular: regular rate and rhythm, S1 S2  Abdomen: soft, nontender, no organomegaly present, bowel sounds normal  Neurological: alert and oriented x3, Cranial Nerves II-XII grossly intact  Skin: no increased ecchymosis/petechiae/purpura  Lymph Nodes: no palpable cervical/supraclavicular lymph nodes enlargements  Extremities: no cyanosis/clubbing/edema, right ankle post op dressing in place . wound as per podiatry         LABS:                           10.7   5.7   )-----------( 221      ( 31 Jul 2017 06:22 )             32.2     31 Jul 2017 06:22    140    |  105    |  12     ----------------------------<  93     3.9     |  27     |  0.86     Ca    8.4        31 Jul 2017 06:22    Vancomycin Level, Trough (07.29.17 @ 16:38)    Vancomycin Level, Trough: 13.6:     RECENT CULTURES:  Culture - Other (07.27.17 @ 01:34)    Specimen Source: .Other right foot wound - swab    Culture Results:   No growth to date    Culture - Blood (07.26.17 @ 18:20)    Specimen Source: .Blood Blood-Peripheral    Culture Results:   No growth to date.    RADIOLOGY & ADDITIONAL STUDIES:  MRI Foot w/wo Cont, Right (07.27.17 @ 13:34) >  Impression:    Broad wound along the lateral malleolus. Acute osteomyelitis of the   imaged distal fibula with mild subcortical osteitis along the lateral   tibial plafond at the level of the syndesmosis. Fluid tracks from the   level of the wound at the lateral fibula along the dorsal subcutaneous   fat overlying the extensor digitorum brevis muscle consistent with a   small abscess. Prominent surrounding cellulitis is noted along the   lateral aspect of the ankle.    Nonspecific subcortical edema and enhancement along the lateral wall the   calcaneus just posterior to the lateral malleolus and along the anterior   lip of the tibial plafond with grossly preserved T1 marrow signal. This   may be related to reactive osteitis.    Small tibiotalar joint effusion with synovial enhancement consistent with   synovitis.     US Duplex Arterial Lower Ext Ltd, Right (07.27.17 @ 12:00) >    Impression:   1. Limited exam as discussed above.  2. No hemodynamically significant stenotic changes seen in the visualized   right leg arteries.        Assessment and Plan:   Assessment:  · Assessment		  83 yo F PMHx A-fib not on A/C due to pt refusal, diverticulosis s/p colon resection, history of C diff, breast CA s/p B/L mastectomy admitted with RLE cellulitis 2/2 failed treatment with doxycycline. She had a traumatic injury with skin necrosis on right ankle , due tot the proximity of the wound to lateral malleolus, possibility of OM cannot be ruled out .    MRI shows acute OM of lateral malleolus with small abscess adjacent to it , She is S/P I & D of right ankle      Plan:  - Continue with  Zosyn and  Vancomycin pending cs and pathology results   - will discuss with podiatry intra op findings   - pain control    I have discussed the above plan of care with patient in detail. She expressed understanding of the  treatment plan . Risks, benefits and alternatives discussed in detail. I have asked if she has any questions or concerns and appropriately addressed them to the best of my ability .    > 35 minutes were spent in direct patient care reviewing notes, medications ,labs data/ imaging , discussion with multidisciplinary team.    Thank you for allowing me to participate in care of your patient .    Neftali Esparza MD  [phone]

## 2017-07-31 NOTE — BRIEF OPERATIVE NOTE - PRE-OP DX
Abscess of foot without toes, right  07/31/2017    Active  Fofana, Ahmad  Right foot infection  07/31/2017    Active  Fofana, Ahmad  Right foot ulcer  07/31/2017    Active  Fofana, Ahmad

## 2017-07-31 NOTE — PROGRESS NOTE ADULT - SUBJECTIVE AND OBJECTIVE BOX
HPI:  83 yo F PMHx A-fib not on A/C due to pt refusal, diverticulosis s/p colon resection, history of C diff, breast Ca s/p B/L masectomy, presenting with RLE wound with surrounding cellulitis. Failure to heal outpatient on Doxy.  S/p debridement of wound in the OR today. Patient denies any pain but complaining of bleeding through bandage. Podiatry notified and dressing was reapplied.      REVIEW OF SYSTEMS:    CONSTITUTIONAL: No weakness, fevers or chills  EYES/ENT: No visual changes, no throat pain   RESPIRATORY: No cough, wheezing, hemoptysis; No shortness of breath  CARDIOVASCULAR: No chest pain or palpitations  GASTROINTESTINAL: No abdominal pain, nausea, vomiting, or hematemesis; No diarrhea or constipation. No melena or hematochezia.  GENITOURINARY: No dysuria, frequency or hematuria  NEUROLOGICAL: No dizziness, numbness, or weakness  SKIN: No itching, burning, rashes, or lesions   All other review of systems is negative unless indicated above.    VITAL SIGNS:  Vital Signs Last 24 Hrs  T(C): 37.3 (31 Jul 2017 16:05), Max: 37.3 (31 Jul 2017 16:05)  T(F): 99.1 (31 Jul 2017 16:05), Max: 99.1 (31 Jul 2017 16:05)  HR: 87 (31 Jul 2017 16:05) (72 - 104)  BP: 122/81 (31 Jul 2017 16:43) (102/69 - 131/84)  BP(mean): --  RR: 19 (31 Jul 2017 16:05) (14 - 19)  SpO2: 91% (31 Jul 2017 16:05) (91% - 100%)    PHYSICAL EXAM:     GENERAL: no acute distress  HEENT: NC/AT, EOMI, neck supple, MMM  RESPIRATORY: LCTAB/L, no rhonchi, rales, or wheezing  CARDIOVASCULAR: RRR, no murmurs, gallops, rubs  ABDOMINAL: soft, non-tender, non-distended, positive bowel sounds   EXTREMITIES: bulky dressing on right foot +strikethrough  NEUROLOGICAL: alert and oriented x 3, non-focal  SKIN: no rashes or lesions   MUSCULOSKELETAL: no gross joint deformity                          10.7   5.7   )-----------( 221      ( 31 Jul 2017 06:22 )             32.2     07-31    140  |  105  |  12  ----------------------------<  93  3.9   |  27  |  0.86    Ca    8.4<L>      31 Jul 2017 06:22                  MEDICATIONS  (STANDING):  brimonidine 0.2% Ophthalmic Solution 1 Drop(s) Both EYES two times a day  dorzolamide 2%/timolol 0.5% Ophthalmic Solution 1 Drop(s) Both EYES two times a day  latanoprost 0.005% Ophthalmic Solution 1 Drop(s) Both EYES at bedtime  lactobacillus acidophilus 1 Tablet(s) Oral daily  vancomycin  IVPB   IV Intermittent   vancomycin  IVPB 1000 milliGRAM(s) IV Intermittent every 12 hours  metoprolol succinate ER 50 milliGRAM(s) Oral daily  docusate sodium 100 milliGRAM(s) Oral three times a day  fluticasone propionate 50 MICROgram(s)/spray Nasal Spray 1 Spray(s) Both Nostrils two times a day  piperacillin/tazobactam IVPB. 3.375 Gram(s) IV Intermittent every 8 hours

## 2017-07-31 NOTE — BRIEF OPERATIVE NOTE - PROCEDURE
Wound debridement done in the OR  07/31/2017  right foot wound debridement, with drainage of abscess  Active  [id number]

## 2017-07-31 NOTE — PROGRESS NOTE ADULT - PROBLEM SELECTOR PLAN 1
-MRI showed osteomyelitis.  S/p debridement in OR today.   Continue abx. Will discuss long term antibiotics with ID.

## 2017-07-31 NOTE — PROGRESS NOTE ADULT - SUBJECTIVE AND OBJECTIVE BOX
[facility] - Steffany Bowen, Lynda, Batool, Debra, Tana Parra  Office Number:  [phone]    Patient resting comfortably in bed in NAD.  s/p OR for foot debridement.  No complaints of chest pain, dyspnea, palpitations, PND, or orthopnea.    Telemetry:  Rate controlled atrial fibrillation    MEDICATIONS  (STANDING):  brimonidine 0.2% Ophthalmic Solution 1 Drop(s) Both EYES two times a day  dorzolamide 2%/timolol 0.5% Ophthalmic Solution 1 Drop(s) Both EYES two times a day  latanoprost 0.005% Ophthalmic Solution 1 Drop(s) Both EYES at bedtime  lactobacillus acidophilus 1 Tablet(s) Oral daily  vancomycin  IVPB   IV Intermittent   vancomycin  IVPB 1000 milliGRAM(s) IV Intermittent every 12 hours  metoprolol succinate ER 50 milliGRAM(s) Oral daily  docusate sodium 100 milliGRAM(s) Oral three times a day  fluticasone propionate 50 MICROgram(s)/spray Nasal Spray 1 Spray(s) Both Nostrils two times a day  piperacillin/tazobactam IVPB. 3.375 Gram(s) IV Intermittent every 8 hours    MEDICATIONS  (PRN):  ondansetron    Tablet 4 milliGRAM(s) Oral every 6 hours PRN Nausea and/or Vomiting  acetaminophen   Tablet. 650 milliGRAM(s) Oral every 6 hours PRN Mild Pain (1 - 3)  morphine  - Injectable 2 milliGRAM(s) IV Push once PRN Severe Pain (7 - 10)  oxyCODONE    5 mG/acetaminophen 325 mG 1 Tablet(s) Oral every 4 hours PRN Severe Pain (7 - 10)      Allergies    No Known Allergies        Vital Signs Last 24 Hrs  T(C): 37.1 (31 Jul 2017 12:00), Max: 37.2 (30 Jul 2017 15:45)  T(F): 98.7 (31 Jul 2017 12:00), Max: 99 (30 Jul 2017 15:45)  HR: 97 (31 Jul 2017 12:00) (72 - 104)  BP: 131/84 (31 Jul 2017 12:00) (102/69 - 131/84)  BP(mean): --  RR: 18 (31 Jul 2017 12:00) (14 - 18)  SpO2: 91% (31 Jul 2017 12:00) (91% - 100%)    I&O's Summary    30 Jul 2017 07:01  -  31 Jul 2017 07:00  --------------------------------------------------------  IN: 550 mL / OUT: 0 mL / NET: 550 mL        ON EXAM:    General: NAD, awake and alert, oriented x 3  HEENT: Mucous membranes are dry, anicteric  Lungs: Non-labored, breath sounds are clear bilaterally, No wheezing, rales or rhonchi  Cardiovascular: Irregular, S1 and S2  Gastrointestinal: Bowel Sounds present, soft, nontender.   Lymph: No peripheral edema. No lymphadenopathy.  Skin: No rashes or ulcers  Psych:  Mood & affect appropriate    LABS: All Labs Reviewed:                        10.7   5.7   )-----------( 221      ( 31 Jul 2017 06:22 )             32.2                         10.5   5.3   )-----------( 207      ( 30 Jul 2017 06:07 )             31.9                         10.7   5.5   )-----------( 220      ( 29 Jul 2017 06:38 )             32.9     31 Jul 2017 06:22    140    |  105    |  12     ----------------------------<  93     3.9     |  27     |  0.86   30 Jul 2017 06:07    140    |  106    |  13     ----------------------------<  119    4.1     |  25     |  0.95   29 Jul 2017 06:38    142    |  108    |  15     ----------------------------<  91     4.5     |  26     |  0.98     Ca    8.4        31 Jul 2017 06:22  Ca    8.1        30 Jul 2017 06:07  Ca    8.1        29 Jul 2017 06:38

## 2017-08-01 DIAGNOSIS — I48.2 CHRONIC ATRIAL FIBRILLATION: ICD-10-CM

## 2017-08-01 DIAGNOSIS — I34.0 NONRHEUMATIC MITRAL (VALVE) INSUFFICIENCY: ICD-10-CM

## 2017-08-01 DIAGNOSIS — M86.8X7 OTHER OSTEOMYELITIS, ANKLE AND FOOT: ICD-10-CM

## 2017-08-01 LAB
ANION GAP SERPL CALC-SCNC: 7 MMOL/L — SIGNIFICANT CHANGE UP (ref 5–17)
BUN SERPL-MCNC: 13 MG/DL — SIGNIFICANT CHANGE UP (ref 7–23)
CALCIUM SERPL-MCNC: 8.4 MG/DL — LOW (ref 8.5–10.1)
CHLORIDE SERPL-SCNC: 102 MMOL/L — SIGNIFICANT CHANGE UP (ref 96–108)
CO2 SERPL-SCNC: 28 MMOL/L — SIGNIFICANT CHANGE UP (ref 22–31)
CREAT SERPL-MCNC: 0.99 MG/DL — SIGNIFICANT CHANGE UP (ref 0.5–1.3)
GLUCOSE SERPL-MCNC: 103 MG/DL — HIGH (ref 70–99)
HCT VFR BLD CALC: 31.2 % — LOW (ref 34.5–45)
HGB BLD-MCNC: 10.5 G/DL — LOW (ref 11.5–15.5)
MCHC RBC-ENTMCNC: 33.6 GM/DL — SIGNIFICANT CHANGE UP (ref 32–36)
MCHC RBC-ENTMCNC: 33.8 PG — SIGNIFICANT CHANGE UP (ref 27–34)
MCV RBC AUTO: 100.6 FL — HIGH (ref 80–100)
PLATELET # BLD AUTO: 218 K/UL — SIGNIFICANT CHANGE UP (ref 150–400)
POTASSIUM SERPL-MCNC: 4 MMOL/L — SIGNIFICANT CHANGE UP (ref 3.5–5.3)
POTASSIUM SERPL-SCNC: 4 MMOL/L — SIGNIFICANT CHANGE UP (ref 3.5–5.3)
RBC # BLD: 3.1 M/UL — LOW (ref 3.8–5.2)
RBC # FLD: 12.2 % — SIGNIFICANT CHANGE UP (ref 10.3–14.5)
SODIUM SERPL-SCNC: 137 MMOL/L — SIGNIFICANT CHANGE UP (ref 135–145)
SURGICAL PATHOLOGY FINAL REPORT - CH: SIGNIFICANT CHANGE UP
WBC # BLD: 9 K/UL — SIGNIFICANT CHANGE UP (ref 3.8–10.5)
WBC # FLD AUTO: 9 K/UL — SIGNIFICANT CHANGE UP (ref 3.8–10.5)

## 2017-08-01 PROCEDURE — 99233 SBSQ HOSP IP/OBS HIGH 50: CPT

## 2017-08-01 RX ADMIN — PIPERACILLIN AND TAZOBACTAM 25 GRAM(S): 4; .5 INJECTION, POWDER, LYOPHILIZED, FOR SOLUTION INTRAVENOUS at 14:21

## 2017-08-01 RX ADMIN — OXYCODONE AND ACETAMINOPHEN 1 TABLET(S): 5; 325 TABLET ORAL at 14:21

## 2017-08-01 RX ADMIN — DORZOLAMIDE HYDROCHLORIDE TIMOLOL MALEATE 1 DROP(S): 20; 5 SOLUTION/ DROPS OPHTHALMIC at 21:18

## 2017-08-01 RX ADMIN — DORZOLAMIDE HYDROCHLORIDE TIMOLOL MALEATE 1 DROP(S): 20; 5 SOLUTION/ DROPS OPHTHALMIC at 05:18

## 2017-08-01 RX ADMIN — Medication 1 SPRAY(S): at 05:17

## 2017-08-01 RX ADMIN — LATANOPROST 1 DROP(S): 0.05 SOLUTION/ DROPS OPHTHALMIC; TOPICAL at 21:17

## 2017-08-01 RX ADMIN — PIPERACILLIN AND TAZOBACTAM 25 GRAM(S): 4; .5 INJECTION, POWDER, LYOPHILIZED, FOR SOLUTION INTRAVENOUS at 05:17

## 2017-08-01 RX ADMIN — Medication 1 TABLET(S): at 12:17

## 2017-08-01 RX ADMIN — ENOXAPARIN SODIUM 40 MILLIGRAM(S): 100 INJECTION SUBCUTANEOUS at 12:28

## 2017-08-01 RX ADMIN — PIPERACILLIN AND TAZOBACTAM 25 GRAM(S): 4; .5 INJECTION, POWDER, LYOPHILIZED, FOR SOLUTION INTRAVENOUS at 21:18

## 2017-08-01 RX ADMIN — Medication 250 MILLIGRAM(S): at 18:31

## 2017-08-01 RX ADMIN — Medication 250 MILLIGRAM(S): at 04:50

## 2017-08-01 RX ADMIN — OXYCODONE AND ACETAMINOPHEN 1 TABLET(S): 5; 325 TABLET ORAL at 01:21

## 2017-08-01 RX ADMIN — BRIMONIDINE TARTRATE 1 DROP(S): 2 SOLUTION/ DROPS OPHTHALMIC at 05:18

## 2017-08-01 RX ADMIN — Medication 100 MILLIGRAM(S): at 14:21

## 2017-08-01 RX ADMIN — OXYCODONE AND ACETAMINOPHEN 1 TABLET(S): 5; 325 TABLET ORAL at 00:25

## 2017-08-01 RX ADMIN — HYDROMORPHONE HYDROCHLORIDE 0.5 MILLIGRAM(S): 2 INJECTION INTRAMUSCULAR; INTRAVENOUS; SUBCUTANEOUS at 04:49

## 2017-08-01 RX ADMIN — Medication 1 SPRAY(S): at 18:31

## 2017-08-01 RX ADMIN — Medication 81 MILLIGRAM(S): at 12:28

## 2017-08-01 RX ADMIN — Medication 50 MILLIGRAM(S): at 05:17

## 2017-08-01 RX ADMIN — OXYCODONE AND ACETAMINOPHEN 1 TABLET(S): 5; 325 TABLET ORAL at 07:35

## 2017-08-01 RX ADMIN — BRIMONIDINE TARTRATE 1 DROP(S): 2 SOLUTION/ DROPS OPHTHALMIC at 21:18

## 2017-08-01 RX ADMIN — Medication 100 MILLIGRAM(S): at 05:17

## 2017-08-01 RX ADMIN — HYDROMORPHONE HYDROCHLORIDE 0.5 MILLIGRAM(S): 2 INJECTION INTRAMUSCULAR; INTRAVENOUS; SUBCUTANEOUS at 03:39

## 2017-08-01 RX ADMIN — OXYCODONE AND ACETAMINOPHEN 1 TABLET(S): 5; 325 TABLET ORAL at 22:35

## 2017-08-01 RX ADMIN — OXYCODONE AND ACETAMINOPHEN 1 TABLET(S): 5; 325 TABLET ORAL at 07:01

## 2017-08-01 NOTE — PROGRESS NOTE ADULT - PROBLEM SELECTOR PLAN 1
-MRI showed osteomyelitis.  S/p debridement in OR yesterday  Continue abx. Will discuss long term antibiotics with ID once cultures/path back.

## 2017-08-01 NOTE — PROGRESS NOTE ADULT - SUBJECTIVE AND OBJECTIVE BOX
The patient was interviewed and evaluated  82y Female status post debridement of right foot with GA    T(C): 37.3 (08-01-17 @ 07:15), Max: 37.3 (07-31-17 @ 16:05)  HR: 111 (08-01-17 @ 07:15) (78 - 111)  BP: 124/82 (08-01-17 @ 07:15) (102/69 - 131/84)  RR: 18 (08-01-17 @ 07:15) (16 - 19)  SpO2: 90% (08-01-17 @ 07:15) (90% - 95%)  Wt(kg): --    Pt seen, doing well, no anesthesia complications or complaints noted or reported.   No Nausea    No additional recommendations.     Pain well controlled

## 2017-08-01 NOTE — PROGRESS NOTE ADULT - SUBJECTIVE AND OBJECTIVE BOX
HPI:  83 yo F PMHx A-fib not on A/C due to pt refusal, diverticulosis s/p colon resection, history of C diff, breast Ca s/p B/L masectomy, presenting with RLE wound with surrounding cellulitis. Failure to heal outpatient on Doxy.  S/p debridement of wound in OR yesterday. Feels tired today but pain is well controlled. Bleeding hs resolved.     REVIEW OF SYSTEMS:    CONSTITUTIONAL: No weakness, fevers or chills  EYES/ENT: No visual changes, no throat pain   RESPIRATORY: No cough, wheezing, hemoptysis; No shortness of breath  CARDIOVASCULAR: No chest pain or palpitations  GASTROINTESTINAL: No abdominal pain, nausea, vomiting, or hematemesis; No diarrhea or constipation. No melena or hematochezia.  GENITOURINARY: No dysuria, frequency or hematuria  NEUROLOGICAL: No dizziness, numbness, or weakness  SKIN: No itching, burning, rashes, or lesions   All other review of systems is negative unless indicated above.    VITAL SIGNS:  Vital Signs Last 24 Hrs  T(C): 37.3 (01 Aug 2017 15:49), Max: 37.3 (01 Aug 2017 07:15)  T(F): 99.2 (01 Aug 2017 15:49), Max: 99.2 (01 Aug 2017 15:49)  HR: 105 (01 Aug 2017 15:49) (83 - 111)  BP: 120/71 (01 Aug 2017 15:49) (111/74 - 126/74)  BP(mean): --  RR: 18 (01 Aug 2017 15:49) (18 - 18)  SpO2: 91% (01 Aug 2017 15:49) (90% - 93%)  PHYSICAL EXAM:     GENERAL: no acute distress  HEENT: NC/AT, EOMI, neck supple, MMM  RESPIRATORY: LCTAB/L, no rhonchi, rales, or wheezing  CARDIOVASCULAR: RRR, no murmurs, gallops, rubs  ABDOMINAL: soft, non-tender, non-distended, positive bowel sounds   EXTREMITIES: bulky dressing on right foot C/D/I  NEUROLOGICAL: alert and oriented x 3, non-focal  SKIN: no rashes or lesions   MUSCULOSKELETAL: no gross joint deformity                            10.5   9.0   )-----------( 218      ( 01 Aug 2017 06:21 )             31.2   08-01    137  |  102  |  13  ----------------------------<  103<H>  4.0   |  28  |  0.99    Ca    8.4<L>      01 Aug 2017 06:21      Blood culture: 1 bottle positive GPC, repeat NGTD.              MEDICATIONS  (STANDING):  brimonidine 0.2% Ophthalmic Solution 1 Drop(s) Both EYES two times a day  dorzolamide 2%/timolol 0.5% Ophthalmic Solution 1 Drop(s) Both EYES two times a day  latanoprost 0.005% Ophthalmic Solution 1 Drop(s) Both EYES at bedtime  lactobacillus acidophilus 1 Tablet(s) Oral daily  vancomycin  IVPB   IV Intermittent   vancomycin  IVPB 1000 milliGRAM(s) IV Intermittent every 12 hours  metoprolol succinate ER 50 milliGRAM(s) Oral daily  docusate sodium 100 milliGRAM(s) Oral three times a day  fluticasone propionate 50 MICROgram(s)/spray Nasal Spray 1 Spray(s) Both Nostrils two times a day  piperacillin/tazobactam IVPB. 3.375 Gram(s) IV Intermittent every 8 hours  aspirin  chewable 81 milliGRAM(s) Oral daily  enoxaparin Injectable 40 milliGRAM(s) SubCutaneous daily    MEDICATIONS  (PRN):  ondansetron    Tablet 4 milliGRAM(s) Oral every 6 hours PRN Nausea and/or Vomiting  acetaminophen   Tablet. 650 milliGRAM(s) Oral every 6 hours PRN Mild Pain (1 - 3)  oxyCODONE    5 mG/acetaminophen 325 mG 1 Tablet(s) Oral every 4 hours PRN Severe Pain (7 - 10)  HYDROmorphone  Injectable 0.5 milliGRAM(s) IV Push every 4 hours PRN Moderate Pain (4 - 6)

## 2017-08-01 NOTE — CONSULT NOTE ADULT - PROBLEM SELECTOR RECOMMENDATION 4
on transthoracic echocardiogram it demonstrates moderate to severe mitral regurgutation. The mechanism is unclear. I explained to the patient that she may need a transesophageal echocardiogram in the future to better elicit the severity and mechanism of the mitral regurgitation. From a congestive standpoint, the patient is asymptomatic. Will obtain records from our office to determine findings of the echocardiogram about 6 months ago.
per primary team    Thank you for consulting us and involving us in the management of this most interesting and challenging case.     We will follow along in the care of this patient.

## 2017-08-01 NOTE — PROGRESS NOTE ADULT - ASSESSMENT
Assessment:    Grade 2 ulcer, dorsal right foot  Cellulitis, dorsal right foot  s/p right foot wound debridement, POD#1, 7/31/17    Plan:    -Pt seen and eval  -Chart reviewed  -Dressings removed and wound further examined, cleansed with normal saline  -Xeroform, dry sterile dressings applied today  -Follow infectious disease recommendations  -Pt to be nonweightbearing until active bleeding subsides  -Pt Tx plan discussed w/Dr. Vanegas, agreed upon    Podiatry to cont following Pt while in-house
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
## 2017-08-01 NOTE — PROGRESS NOTE ADULT - SUBJECTIVE AND OBJECTIVE BOX
JACEK HERNDON is a 82yFemale , patient examined and chart reviewed. Patient seen and examined today being followed for infected ulcer rught ankles with increased pain       INTERVAL HPI/ OVERNIGHT EVENTS: Events noted, she is s/p   I & D of right ankle POD # 1. Podiatry follow up noted, no evidence of exposed bone seen intra operatively, all necrotic tissue and small abscess drained . She complaints of pain       PAST MEDICAL & SURGICAL HISTORY:  C. difficile diarrhea  Gall stones  Breast cancer  Atrial fibrillation  PSVT (paroxysmal supraventricular tachycardia)  Glaucoma  Diverticulitis  S/P colon resection  H/O: hysterectomy  S/P ORIF (open reduction internal fixation) fracture: L lower extremity  H/O hernia repair  H/O mastectomy, bilateral  Status post appendectomy  S/P colon resection: march 11 2014      For details regarding the patient's social history, family history, and other miscellaneous elements, please refer the initial infectious diseases consultation and/or the admitting history and physical examination for this admission.    ROS:  CONSTITUTIONAL:  Negative fever or chills, feels well, good appetite  EYES:  Negative  blurry vision or double vision  CARDIOVASCULAR:  Negative for chest pain or palpitations  RESPIRATORY:  Negative for cough, wheezing, or SOB   GASTROINTESTINAL:  Negative for nausea, vomiting, diarrhea, constipation, or abdominal pain  GENITOURINARY:  Negative frequency, urgency or dysuria  NEUROLOGIC:  No headache, confusion, dizziness, lightheadedness  All other systems were reviewed and are negative         Current inpatient medications :  MEDICATIONS  (STANDING):  brimonidine 0.2% Ophthalmic Solution 1 Drop(s) Both EYES two times a day  dorzolamide 2%/timolol 0.5% Ophthalmic Solution 1 Drop(s) Both EYES two times a day  latanoprost 0.005% Ophthalmic Solution 1 Drop(s) Both EYES at bedtime  lactobacillus acidophilus 1 Tablet(s) Oral daily  vancomycin  IVPB   IV Intermittent   vancomycin  IVPB 1000 milliGRAM(s) IV Intermittent every 12 hours  metoprolol succinate ER 50 milliGRAM(s) Oral daily  docusate sodium 100 milliGRAM(s) Oral three times a day  fluticasone propionate 50 MICROgram(s)/spray Nasal Spray 1 Spray(s) Both Nostrils two times a day  piperacillin/tazobactam IVPB. 3.375 Gram(s) IV Intermittent every 8 hours  aspirin  chewable 81 milliGRAM(s) Oral daily  enoxaparin Injectable 40 milliGRAM(s) SubCutaneous daily    MEDICATIONS  (PRN):  ondansetron    Tablet 4 milliGRAM(s) Oral every 6 hours PRN Nausea and/or Vomiting  acetaminophen   Tablet. 650 milliGRAM(s) Oral every 6 hours PRN Mild Pain (1 - 3)  oxyCODONE    5 mG/acetaminophen 325 mG 1 Tablet(s) Oral every 4 hours PRN Severe Pain (7 - 10)  HYDROmorphone  Injectable 0.5 milliGRAM(s) IV Push every 4 hours PRN Moderate Pain (4 - 6)        Objective:    Vital Signs Last 24 Hrs  T(C): 36.8 (01 Aug 2017 11:07), Max: 37.3 (31 Jul 2017 16:05)  T(F): 98.3 (01 Aug 2017 11:07), Max: 99.1 (31 Jul 2017 16:05)  HR: 91 (01 Aug 2017 11:07) (83 - 111)  BP: 120/75 (01 Aug 2017 11:07) (111/74 - 126/74)  BP(mean): --  RR: 18 (01 Aug 2017 11:07) (18 - 19)  SpO2: 92% (01 Aug 2017 11:07) (90% - 93%)    Physical Exam:  General: well developed well nourished, in no acute distress  Eyes: sclera anicteric, pupils equal and reactive to light  ENMT: buccal mucosa moist, pharynx not injected  Neck: supple, trachea midline  Lungs: clear, no wheeze/rhonchi  Cardiovascular: regular rate and rhythm, S1 S2  Abdomen: soft, nontender, no organomegaly present, bowel sounds normal  Neurological: alert and oriented x3, Cranial Nerves II-XII grossly intact  Skin: no increased ecchymosis/petechiae/purpura  Lymph Nodes: no palpable cervical/supraclavicular lymph nodes enlargements  Extremities: no cyanosis/clubbing/edema, right ankle post op dressing in place . wound as per podiatry -Open skin wound noted to dorsal-lateral aspect of right foot, with moderate to severe active sanguinous drainage, no malodor, and moderate periwound erythema, negative probe to bone test, and granular wound bed. Musculoskeletal 5/5 muscle strength in all 4 quadrants.        LABS:                               10.5   9.0   )-----------( 218      ( 01 Aug 2017 06:21 )             31.2     01 Aug 2017 06:21    137    |  102    |  13     ----------------------------<  103    4.0     |  28     |  0.99     Ca    8.4        01 Aug 2017 06:21      Vancomycin Level, Trough (07.29.17 @ 16:38)    Vancomycin Level, Trough: 13.6:     RECENT CULTURES:      Culture - Tissue with Gram Stain (07.31.17 @ 12:20)    Gram Stain:   No polymorphonuclear leukocytes per low power field  Moderate Yeast like cells per oil power field    Specimen Source: .Tissue Other, right foot    Culture Results:   Moderate Yeast      Culture - Other (07.27.17 @ 01:34)    Specimen Source: .Other right foot wound - swab    Culture Results:   No growth to date    Culture - Blood (07.26.17 @ 18:20)    Specimen Source: .Blood Blood-Peripheral    Culture Results:   No growth to date.    RADIOLOGY & ADDITIONAL STUDIES:  MRI Foot w/wo Cont, Right (07.27.17 @ 13:34) >  Impression:    Broad wound along the lateral malleolus. Acute osteomyelitis of the   imaged distal fibula with mild subcortical osteitis along the lateral   tibial plafond at the level of the syndesmosis. Fluid tracks from the   level of the wound at the lateral fibula along the dorsal subcutaneous   fat overlying the extensor digitorum brevis muscle consistent with a   small abscess. Prominent surrounding cellulitis is noted along the   lateral aspect of the ankle.    Nonspecific subcortical edema and enhancement along the lateral wall the   calcaneus just posterior to the lateral malleolus and along the anterior   lip of the tibial plafond with grossly preserved T1 marrow signal. This   may be related to reactive osteitis.    Small tibiotalar joint effusion with synovial enhancement consistent with   synovitis.     US Duplex Arterial Lower Ext Ltd, Right (07.27.17 @ 12:00) >    Impression:   1. Limited exam as discussed above.  2. No hemodynamically significant stenotic changes seen in the visualized   right leg arteries.        Assessment and Plan:   Assessment:  · Assessment		  83 yo F PMHx A-fib not on A/C due to pt refusal, diverticulosis s/p colon resection, history of C diff, breast CA s/p B/L mastectomy admitted with RLE cellulitis 2/2 failed treatment with doxycycline. She had a traumatic injury with skin necrosis on right ankle , due tot the proximity of the wound to lateral malleolus, possibility of OM cannot be ruled out .    MRI shows acute OM of lateral malleolus with small abscess adjacent to it , She is S/P I & D of right ankle POD # 1     Plan:  - Continue with  Zosyn and  Vancomycin pending cs and pathology results , growth of yeast may reflect colonization due to recent antibiotics use   - wound care as per podiatry   - pain control      I have discussed the above plan of care with patient in detail. She expressed understanding of the  treatment plan . Risks, benefits and alternatives discussed in detail. I have asked if she has any questions or concerns and appropriately addressed them to the best of my ability .    > 35 minutes were spent in direct patient care reviewing notes, medications ,labs data/ imaging , discussion with multidisciplinary team.    Thank you for allowing me to participate in care of your patient .    Neftali Esparza MD  [phone]
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
## 2017-08-01 NOTE — CONSULT NOTE ADULT - PROBLEM SELECTOR RECOMMENDATION 9
patient rate controlled. Continue Toprol XL 50mg daily. In the past, patient refused anticoagulation despite knowing the risks of stroke. Given her [id number] of 3, I would recommend oral anticoagulation and I explained this to her. She said she will think about it. In the meantime, continue Aspirin 81mg daily.
Clear area or cellulitis surrounding this ulcer which seems to be the source of pain. Noted recent micro not growing pseudomonas or MRSA. Will send repeat cultures and MRSA screen but acutely will adjust abx to treat with Unasyn and follow response.

## 2017-08-01 NOTE — CONSULT NOTE ADULT - PROBLEM SELECTOR RECOMMENDATION 2
Echocardiogram revealed EF 45%, which is likely an overestimation given the fact that patient has moderate-severe MR. In addition, she has moderate aortic valve insufficiency. The patient is currently asymptomatic from congestive symptom standpoint. She has been complaining of increased fatigue over the past 6 months which could be do to her cardiomyopathy. I will obtain records from our office to compare echocardiograms and to determine if she has ever had a cardiac catheterization to evaluate the coronary artery anatomy.
wound care to open ulcer

## 2017-08-01 NOTE — PROGRESS NOTE ADULT - PROBLEM SELECTOR PLAN 5
Unclear etiology, improved. no signs of infection or perforation on otoscopic exam. Started flonase for possible Eustachian tube dysfunction. Continue warm packs and Tylenol as needed.

## 2017-08-01 NOTE — CONSULT NOTE ADULT - PROBLEM SELECTOR PROBLEM 1
Permanent atrial fibrillation
Cellulitis of right lower extremity
Foot ulcer, right, limited to breakdown of skin

## 2017-08-01 NOTE — CONSULT NOTE ADULT - PROBLEM SELECTOR PROBLEM 2
Left ventricular dysfunction
Cellulitis of right lower extremity
Skin ulcer of right ankle, limited to breakdown of skin

## 2017-08-01 NOTE — CONSULT NOTE ADULT - PROBLEM SELECTOR RECOMMENDATION 3
on antibiotics. patient s/p debridement of the right foot POD #1. Continue supportive care and adequate pain control.
will adjust medications as required for reduced renal function

## 2017-08-02 LAB
ANION GAP SERPL CALC-SCNC: 10 MMOL/L — SIGNIFICANT CHANGE UP (ref 5–17)
BUN SERPL-MCNC: 10 MG/DL — SIGNIFICANT CHANGE UP (ref 7–23)
CALCIUM SERPL-MCNC: 7.7 MG/DL — LOW (ref 8.5–10.1)
CHLORIDE SERPL-SCNC: 96 MMOL/L — SIGNIFICANT CHANGE UP (ref 96–108)
CO2 SERPL-SCNC: 24 MMOL/L — SIGNIFICANT CHANGE UP (ref 22–31)
CREAT SERPL-MCNC: 0.98 MG/DL — SIGNIFICANT CHANGE UP (ref 0.5–1.3)
GLUCOSE SERPL-MCNC: 435 MG/DL — HIGH (ref 70–99)
HCT VFR BLD CALC: 27.1 % — LOW (ref 34.5–45)
HGB BLD-MCNC: 9 G/DL — LOW (ref 11.5–15.5)
MCHC RBC-ENTMCNC: 33.4 GM/DL — SIGNIFICANT CHANGE UP (ref 32–36)
MCHC RBC-ENTMCNC: 33.9 PG — SIGNIFICANT CHANGE UP (ref 27–34)
MCV RBC AUTO: 101.7 FL — HIGH (ref 80–100)
PLATELET # BLD AUTO: 197 K/UL — SIGNIFICANT CHANGE UP (ref 150–400)
POTASSIUM SERPL-MCNC: 3.4 MMOL/L — LOW (ref 3.5–5.3)
POTASSIUM SERPL-SCNC: 3.4 MMOL/L — LOW (ref 3.5–5.3)
RBC # BLD: 2.66 M/UL — LOW (ref 3.8–5.2)
RBC # FLD: 12.3 % — SIGNIFICANT CHANGE UP (ref 10.3–14.5)
SODIUM SERPL-SCNC: 130 MMOL/L — LOW (ref 135–145)
VANCOMYCIN TROUGH SERPL-MCNC: 18.5 UG/ML — SIGNIFICANT CHANGE UP (ref 10–20)
WBC # BLD: 7.2 K/UL — SIGNIFICANT CHANGE UP (ref 3.8–10.5)
WBC # FLD AUTO: 7.2 K/UL — SIGNIFICANT CHANGE UP (ref 3.8–10.5)

## 2017-08-02 PROCEDURE — 99233 SBSQ HOSP IP/OBS HIGH 50: CPT

## 2017-08-02 RX ORDER — METOPROLOL TARTRATE 50 MG
100 TABLET ORAL DAILY
Qty: 0 | Refills: 0 | Status: DISCONTINUED | OUTPATIENT
Start: 2017-08-03 | End: 2017-08-05

## 2017-08-02 RX ORDER — FLUCONAZOLE 150 MG/1
200 TABLET ORAL DAILY
Qty: 0 | Refills: 0 | Status: DISCONTINUED | OUTPATIENT
Start: 2017-08-02 | End: 2017-08-09

## 2017-08-02 RX ADMIN — OXYCODONE AND ACETAMINOPHEN 1 TABLET(S): 5; 325 TABLET ORAL at 05:05

## 2017-08-02 RX ADMIN — BRIMONIDINE TARTRATE 1 DROP(S): 2 SOLUTION/ DROPS OPHTHALMIC at 05:06

## 2017-08-02 RX ADMIN — OXYCODONE AND ACETAMINOPHEN 1 TABLET(S): 5; 325 TABLET ORAL at 15:20

## 2017-08-02 RX ADMIN — OXYCODONE AND ACETAMINOPHEN 1 TABLET(S): 5; 325 TABLET ORAL at 11:53

## 2017-08-02 RX ADMIN — Medication 1 SPRAY(S): at 05:08

## 2017-08-02 RX ADMIN — OXYCODONE AND ACETAMINOPHEN 1 TABLET(S): 5; 325 TABLET ORAL at 10:22

## 2017-08-02 RX ADMIN — Medication 1 SPRAY(S): at 17:29

## 2017-08-02 RX ADMIN — Medication 50 MILLIGRAM(S): at 05:08

## 2017-08-02 RX ADMIN — OXYCODONE AND ACETAMINOPHEN 1 TABLET(S): 5; 325 TABLET ORAL at 03:17

## 2017-08-02 RX ADMIN — DORZOLAMIDE HYDROCHLORIDE TIMOLOL MALEATE 1 DROP(S): 20; 5 SOLUTION/ DROPS OPHTHALMIC at 05:06

## 2017-08-02 RX ADMIN — LATANOPROST 1 DROP(S): 0.05 SOLUTION/ DROPS OPHTHALMIC; TOPICAL at 22:58

## 2017-08-02 RX ADMIN — Medication 250 MILLIGRAM(S): at 17:29

## 2017-08-02 RX ADMIN — BRIMONIDINE TARTRATE 1 DROP(S): 2 SOLUTION/ DROPS OPHTHALMIC at 17:30

## 2017-08-02 RX ADMIN — Medication 250 MILLIGRAM(S): at 05:04

## 2017-08-02 RX ADMIN — Medication 81 MILLIGRAM(S): at 11:04

## 2017-08-02 RX ADMIN — DORZOLAMIDE HYDROCHLORIDE TIMOLOL MALEATE 1 DROP(S): 20; 5 SOLUTION/ DROPS OPHTHALMIC at 17:30

## 2017-08-02 RX ADMIN — PIPERACILLIN AND TAZOBACTAM 25 GRAM(S): 4; .5 INJECTION, POWDER, LYOPHILIZED, FOR SOLUTION INTRAVENOUS at 06:38

## 2017-08-02 RX ADMIN — OXYCODONE AND ACETAMINOPHEN 1 TABLET(S): 5; 325 TABLET ORAL at 22:57

## 2017-08-02 RX ADMIN — Medication 100 MILLIGRAM(S): at 14:03

## 2017-08-02 RX ADMIN — OXYCODONE AND ACETAMINOPHEN 1 TABLET(S): 5; 325 TABLET ORAL at 03:07

## 2017-08-02 RX ADMIN — ENOXAPARIN SODIUM 40 MILLIGRAM(S): 100 INJECTION SUBCUTANEOUS at 11:04

## 2017-08-02 RX ADMIN — PIPERACILLIN AND TAZOBACTAM 25 GRAM(S): 4; .5 INJECTION, POWDER, LYOPHILIZED, FOR SOLUTION INTRAVENOUS at 14:03

## 2017-08-02 RX ADMIN — Medication 1 TABLET(S): at 11:04

## 2017-08-02 RX ADMIN — OXYCODONE AND ACETAMINOPHEN 1 TABLET(S): 5; 325 TABLET ORAL at 14:03

## 2017-08-02 NOTE — PROGRESS NOTE ADULT - ASSESSMENT
Assessment:    Grade 2 ulcer, dorsal right foot  Cellulitis, dorsal right foot  s/p right foot wound debridement, POD#2, 7/31/17    Plan:    -Pt seen and Dr. Isaias metcalf infectious disease present  -Chart reviewed  -Dressings removed and wound further examined, cleansed with normal saline  -Thrombin pad, dry sterile dressings applied today  -Follow infectious disease recommendations  -Pt to be nonweightbearing until active bleeding subsides  -Pt Tx plan discussed w/Dr. Vanegas, agreed upon    Podiatry to cont following Pt while in-house
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
## 2017-08-02 NOTE — PROGRESS NOTE ADULT - SUBJECTIVE AND OBJECTIVE BOX
HPI:  81 yo F PMHx A-fib not on A/C due to pt refusal, diverticulosis s/p colon resection, history of C diff, breast Ca s/p B/L masectomy, presenting with RLE wound with surrounding cellulitis. Failure to heal outpatient on Doxy.  S/p debridement of wound in OR on 7/31. Feels much better today. Pain well controlled. No SOB, CP, palpitations.     REVIEW OF SYSTEMS:    CONSTITUTIONAL: No weakness, fevers or chills  EYES/ENT: No visual changes, no throat pain   RESPIRATORY: No cough, wheezing, hemoptysis; No shortness of breath  CARDIOVASCULAR: No chest pain or palpitations  GASTROINTESTINAL: No abdominal pain, nausea, vomiting, or hematemesis; No diarrhea or constipation. No melena or hematochezia.  GENITOURINARY: No dysuria, frequency or hematuria  NEUROLOGICAL: No dizziness, numbness, or weakness  SKIN: No itching, burning, rashes, or lesions   All other review of systems is negative unless indicated above.    Vital Signs Last 24 Hrs  T(C): 37 (02 Aug 2017 08:19), Max: 37.3 (01 Aug 2017 15:49)  T(F): 98.6 (02 Aug 2017 08:19), Max: 99.2 (01 Aug 2017 15:49)  HR: 101 (02 Aug 2017 08:19) (91 - 105)  BP: 126/83 (02 Aug 2017 08:19) (113/67 - 126/83)  BP(mean): --  RR: 19 (02 Aug 2017 08:19) (18 - 19)  SpO2: 95% (02 Aug 2017 08:19) (91% - 95%)    PHYSICAL EXAM:     GENERAL: no acute distress  HEENT: NC/AT, EOMI, neck supple, MMM  RESPIRATORY: LCTAB/L, no rhonchi, rales, or wheezing  CARDIOVASCULAR: irregular, no murmurs, gallops, rubs  ABDOMINAL: soft, non-tender, non-distended, positive bowel sounds   EXTREMITIES: bulky dressing on right foot C/D/I  NEUROLOGICAL: alert and oriented x 3, non-focal  SKIN: no rashes or lesions   MUSCULOSKELETAL: no gross joint deformity                           9.0    7.2   )-----------( 197      ( 02 Aug 2017 06:38 )             27.1   08-02    130<L>  |  96  |  10  ----------------------------<  435<H>  3.4<L>   |  24  |  0.98    Ca    7.7<L>      02 Aug 2017 06:38        Blood culture: 1 bottle positive gram positive coccobacilli, repeat NGTD.      MEDICATIONS  (STANDING):  brimonidine 0.2% Ophthalmic Solution 1 Drop(s) Both EYES two times a day  dorzolamide 2%/timolol 0.5% Ophthalmic Solution 1 Drop(s) Both EYES two times a day  latanoprost 0.005% Ophthalmic Solution 1 Drop(s) Both EYES at bedtime  lactobacillus acidophilus 1 Tablet(s) Oral daily  vancomycin  IVPB   IV Intermittent   vancomycin  IVPB 1000 milliGRAM(s) IV Intermittent every 12 hours  metoprolol succinate ER 50 milliGRAM(s) Oral daily  docusate sodium 100 milliGRAM(s) Oral three times a day  fluticasone propionate 50 MICROgram(s)/spray Nasal Spray 1 Spray(s) Both Nostrils two times a day  piperacillin/tazobactam IVPB. 3.375 Gram(s) IV Intermittent every 8 hours  aspirin  chewable 81 milliGRAM(s) Oral daily  enoxaparin Injectable 40 milliGRAM(s) SubCutaneous daily    MEDICATIONS  (PRN):  ondansetron    Tablet 4 milliGRAM(s) Oral every 6 hours PRN Nausea and/or Vomiting  acetaminophen   Tablet. 650 milliGRAM(s) Oral every 6 hours PRN Mild Pain (1 - 3)  oxyCODONE    5 mG/acetaminophen 325 mG 1 Tablet(s) Oral every 4 hours PRN Severe Pain (7 - 10)  HYDROmorphone  Injectable 0.5 milliGRAM(s) IV Push every 4 hours PRN Moderate Pain (4 - 6)

## 2017-08-02 NOTE — PROGRESS NOTE ADULT - PROBLEM SELECTOR PLAN 1
-MRI showed osteomyelitis.  S/p debridement in OR 8/31  Continue abx. Will discuss long term antibiotics with ID once cultures/path back.

## 2017-08-02 NOTE — PROGRESS NOTE ADULT - ASSESSMENT
Problem/Recommendation - 1:  Problem: Permanent atrial fibrillation. Recommendation: patient undergoing rate control strategy with Toprol XL. Will increase Toprol XL 50mg daily to 100mg daily. In the past, patient refused anticoagulation despite knowing the risks of stroke. Given her [id number] of 3, I would recommend oral anticoagulation and I explained this to her. She said she will think about it and will discuss it with us in our office. In the meantime, continue Aspirin 81mg daily.    Problem/Recommendation - 2:  ·  Problem: Left ventricular dysfunction.  Recommendation: inpatient Echocardiogram during this admission revealed EF 45%, which is likely an overestimation given the fact that patient has moderate-severe MR. In addition, she has moderate aortic valve insufficiency. The patient is currently asymptomatic from congestive symptom standpoint. She has been complaining of increased fatigue over the past 6 months which could be do to her cardiomyopathy. I reviewed the records from our office regarding this patient. I explained to her that my recommendation would be for her to undergo a cardiac catheterization as well as a OLEKSANDR in the future. Since she has no signs of ischemia/ACS or congestive symptoms at this time she should recover from her recent foot injury/surgery. When she is discharged from the hospital she should make an appointment in our office regarding the scheduling of these tests if she still wants to have them done.     Problem/Recommendation - 3:  ·  Problem: Other osteomyelitis of right foot.  Recommendation: on antibiotics. patient s/p debridement of the right foot POD #2. Continue supportive care and adequate pain control.     Problem/Recommendation - 4:  ·  Problem: Mitral valve insufficiency, unspecified etiology.  Recommendation: on transthoracic echocardiogram it demonstrates moderate to severe mitral regurgitation. The mechanism is unclear. I explained to the patient the need for a transesophageal echocardiogram in the future to better elicit the severity and mechanism of the mitral regurgitation. From a congestive standpoint, the patient is asymptomatic. She can follow up in our office with regards to scheduling this procedure.

## 2017-08-02 NOTE — GOALS OF CARE CONVERSATION - PERSONAL ADVANCE DIRECTIVE - CONVERSATION DETAILS
met pt, has hcp at home, none provided at prior hospitalizations.pt feels family unable to locate to provide. contact # given.

## 2017-08-02 NOTE — PROGRESS NOTE ADULT - SUBJECTIVE AND OBJECTIVE BOX
JACEK HERNDON is a 82yFemale , patient examined and chart reviewed. Patient seen and examined today being followed for infected ulcer rught ankles with increased pain       INTERVAL HPI/ OVERNIGHT EVENTS: Events noted, she is s/p   I & D of right ankle POD # 2. Podiatry follow up noted, no evidence of exposed bone seen intra operatively, all necrotic tissue and small abscess drained . She complaints of pain . wound seen with podiatry looks granulating . No exposed bone .       PAST MEDICAL & SURGICAL HISTORY:  C. difficile diarrhea  Gall stones  Breast cancer  Atrial fibrillation  PSVT (paroxysmal supraventricular tachycardia)  Glaucoma  Diverticulitis  S/P colon resection  H/O: hysterectomy  S/P ORIF (open reduction internal fixation) fracture: L lower extremity  H/O hernia repair  H/O mastectomy, bilateral  Status post appendectomy  S/P colon resection: march 11 2014      For details regarding the patient's social history, family history, and other miscellaneous elements, please refer the initial infectious diseases consultation and/or the admitting history and physical examination for this admission.    ROS:  CONSTITUTIONAL:  Negative fever or chills, feels well, good appetite  EYES:  Negative  blurry vision or double vision  CARDIOVASCULAR:  Negative for chest pain or palpitations  RESPIRATORY:  Negative for cough, wheezing, or SOB   GASTROINTESTINAL:  Negative for nausea, vomiting, diarrhea, constipation, or abdominal pain  GENITOURINARY:  Negative frequency, urgency or dysuria  NEUROLOGIC:  No headache, confusion, dizziness, lightheadedness  All other systems were reviewed and are negative         Current inpatient medications :  MEDICATIONS  (STANDING):  brimonidine 0.2% Ophthalmic Solution 1 Drop(s) Both EYES two times a day  dorzolamide 2%/timolol 0.5% Ophthalmic Solution 1 Drop(s) Both EYES two times a day  latanoprost 0.005% Ophthalmic Solution 1 Drop(s) Both EYES at bedtime  lactobacillus acidophilus 1 Tablet(s) Oral daily  vancomycin  IVPB   IV Intermittent   vancomycin  IVPB 1000 milliGRAM(s) IV Intermittent every 12 hours  docusate sodium 100 milliGRAM(s) Oral three times a day  fluticasone propionate 50 MICROgram(s)/spray Nasal Spray 1 Spray(s) Both Nostrils two times a day  piperacillin/tazobactam IVPB. 3.375 Gram(s) IV Intermittent every 8 hours  aspirin  chewable 81 milliGRAM(s) Oral daily  enoxaparin Injectable 40 milliGRAM(s) SubCutaneous daily    MEDICATIONS  (PRN):  ondansetron    Tablet 4 milliGRAM(s) Oral every 6 hours PRN Nausea and/or Vomiting  acetaminophen   Tablet. 650 milliGRAM(s) Oral every 6 hours PRN Mild Pain (1 - 3)  oxyCODONE    5 mG/acetaminophen 325 mG 1 Tablet(s) Oral every 4 hours PRN Severe Pain (7 - 10)  HYDROmorphone  Injectable 0.5 milliGRAM(s) IV Push every 4 hours PRN Moderate Pain (4 - 6)      Objective:    Vital Signs Last 24 Hrs  T(C): 37.2 (02 Aug 2017 11:46), Max: 37.3 (01 Aug 2017 15:49)  T(F): 99 (02 Aug 2017 11:46), Max: 99.2 (01 Aug 2017 15:49)  HR: 74 (02 Aug 2017 11:46) (74 - 105)  BP: 112/71 (02 Aug 2017 11:46) (112/71 - 126/83)  BP(mean): --  RR: 18 (02 Aug 2017 11:46) (18 - 19)  SpO2: 95% (02 Aug 2017 11:46) (91% - 95%)    Physical Exam:  General: well developed well nourished, in no acute distress  Eyes: sclera anicteric, pupils equal and reactive to light  ENMT: buccal mucosa moist, pharynx not injected  Neck: supple, trachea midline  Lungs: clear, no wheeze/rhonchi  Cardiovascular: regular rate and rhythm, S1 S2  Abdomen: soft, nontender, no organomegaly present, bowel sounds normal  Neurological: alert and oriented x3, Cranial Nerves II-XII grossly intact  Skin: no increased ecchymosis/petechiae/purpura  Lymph Nodes: no palpable cervical/supraclavicular lymph nodes enlargements  Extremities: no cyanosis/clubbing/edema, right ankle post op dressing in place . Wound as per podiatry - All web spaces are clean, dry and intact. Open skin wound noted to dorsal-lateral aspect of right foot, with moderate to severe active sanguinous drainage, no malodor, and no periwound erythema, negative probe to bone test, and granular wound bed.. no necrotic tissue       LABS:                               10.5   9.0   )-----------( 218      ( 01 Aug 2017 06:21 )             31.2     01 Aug 2017 06:21    137    |  102    |  13     ----------------------------<  103    4.0     |  28     |  0.99     Ca    8.4        01 Aug 2017 06:21      Vancomycin Level, Trough (07.29.17 @ 16:38)    Vancomycin Level, Trough: 13.6:     RECENT CULTURES:      Culture - Tissue with Gram Stain (07.31.17 @ 12:20)    Gram Stain:   No polymorphonuclear leukocytes per low power field  Moderate Yeast like cells per oil power field    Specimen Source: .Tissue Other, right foot    Culture Results:   Moderate Candida parapsilosis  Referred to Mycology        Culture - Other (07.27.17 @ 01:34)    Specimen Source: .Other right foot wound - swab    Culture Results:   No growth to date    Culture - Blood (07.26.17 @ 18:20)    Specimen Source: .Blood Blood-Peripheral    Culture Results:   No growth to date.    RADIOLOGY & ADDITIONAL STUDIES:  MRI Foot w/wo Cont, Right (07.27.17 @ 13:34) >  Impression:    Broad wound along the lateral malleolus. Acute osteomyelitis of the   imaged distal fibula with mild subcortical osteitis along the lateral   tibial plafond at the level of the syndesmosis. Fluid tracks from the   level of the wound at the lateral fibula along the dorsal subcutaneous   fat overlying the extensor digitorum brevis muscle consistent with a   small abscess. Prominent surrounding cellulitis is noted along the   lateral aspect of the ankle.    Nonspecific subcortical edema and enhancement along the lateral wall the   calcaneus just posterior to the lateral malleolus and along the anterior   lip of the tibial plafond with grossly preserved T1 marrow signal. This   may be related to reactive osteitis.    Small tibiotalar joint effusion with synovial enhancement consistent with   synovitis.     US Duplex Arterial Lower Ext Ltd, Right (07.27.17 @ 12:00) >    Impression:   1. Limited exam as discussed above.  2. No hemodynamically significant stenotic changes seen in the visualized   right leg arteries.        Assessment and Plan:   Assessment:  · Assessment		  81 yo F PMHx A-fib not on A/C due to pt refusal, diverticulosis s/p colon resection, history of C diff, breast CA s/p B/L mastectomy admitted with RLE cellulitis 2/2 failed treatment with doxycycline. She had a traumatic injury with skin necrosis on right ankle , due tot the proximity of the wound to lateral malleolus, possibility of OM cannot be ruled out .    MRI shows acute OM of lateral malleolus with small abscess adjacent to it , She is S/P I & D of right ankle POD # 1     Plan:  - Continue with  Vancomycin, add Diflucan 200 mg daily . DC zosyn as no gram negative   - PICC line   - weight bearing status as per podiatry  - DC plan home vs [facility]     - wound care as per podiatry   - pain control      I have discussed the above plan of care with patient in detail. She expressed understanding of the  treatment plan . Risks, benefits and alternatives discussed in detail. I have asked if she has any questions or concerns and appropriately addressed them to the best of my ability .    > 35 minutes were spent in direct patient care reviewing notes, medications ,labs data/ imaging , discussion with multidisciplinary team.    Thank you for allowing me to participate in care of your patient .    Neftali Esparza MD  [phone]

## 2017-08-02 NOTE — PROGRESS NOTE ADULT - PROBLEM SELECTOR PLAN 6
Continue current cardiac regimen.   Has Severe mitral regurgitation.   May need OLEKSANDR. Can likely be arranged outpatient after discharge.
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
## 2017-08-03 DIAGNOSIS — I48.91 UNSPECIFIED ATRIAL FIBRILLATION: ICD-10-CM

## 2017-08-03 DIAGNOSIS — M86.10 OTHER ACUTE OSTEOMYELITIS, UNSPECIFIED SITE: ICD-10-CM

## 2017-08-03 LAB
ANION GAP SERPL CALC-SCNC: 9 MMOL/L — SIGNIFICANT CHANGE UP (ref 5–17)
BUN SERPL-MCNC: 12 MG/DL — SIGNIFICANT CHANGE UP (ref 7–23)
CALCIUM SERPL-MCNC: 8.7 MG/DL — SIGNIFICANT CHANGE UP (ref 8.5–10.1)
CHLORIDE SERPL-SCNC: 103 MMOL/L — SIGNIFICANT CHANGE UP (ref 96–108)
CO2 SERPL-SCNC: 28 MMOL/L — SIGNIFICANT CHANGE UP (ref 22–31)
CREAT SERPL-MCNC: 0.98 MG/DL — SIGNIFICANT CHANGE UP (ref 0.5–1.3)
GLUCOSE SERPL-MCNC: 129 MG/DL — HIGH (ref 70–99)
HCT VFR BLD CALC: 31.8 % — LOW (ref 34.5–45)
HGB BLD-MCNC: 10.3 G/DL — LOW (ref 11.5–15.5)
MCHC RBC-ENTMCNC: 32.5 GM/DL — SIGNIFICANT CHANGE UP (ref 32–36)
MCHC RBC-ENTMCNC: 33 PG — SIGNIFICANT CHANGE UP (ref 27–34)
MCV RBC AUTO: 101.4 FL — HIGH (ref 80–100)
PLATELET # BLD AUTO: 254 K/UL — SIGNIFICANT CHANGE UP (ref 150–400)
POTASSIUM SERPL-MCNC: 4.1 MMOL/L — SIGNIFICANT CHANGE UP (ref 3.5–5.3)
POTASSIUM SERPL-SCNC: 4.1 MMOL/L — SIGNIFICANT CHANGE UP (ref 3.5–5.3)
RBC # BLD: 3.14 M/UL — LOW (ref 3.8–5.2)
RBC # FLD: 12.4 % — SIGNIFICANT CHANGE UP (ref 10.3–14.5)
SODIUM SERPL-SCNC: 140 MMOL/L — SIGNIFICANT CHANGE UP (ref 135–145)
WBC # BLD: 6.6 K/UL — SIGNIFICANT CHANGE UP (ref 3.8–10.5)
WBC # FLD AUTO: 6.6 K/UL — SIGNIFICANT CHANGE UP (ref 3.8–10.5)

## 2017-08-03 PROCEDURE — 36558 INSERT TUNNELED CV CATH: CPT

## 2017-08-03 PROCEDURE — 77001 FLUOROGUIDE FOR VEIN DEVICE: CPT | Mod: 26

## 2017-08-03 PROCEDURE — 73564 X-RAY EXAM KNEE 4 OR MORE: CPT | Mod: 26,RT

## 2017-08-03 PROCEDURE — 76937 US GUIDE VASCULAR ACCESS: CPT | Mod: 26

## 2017-08-03 PROCEDURE — 99233 SBSQ HOSP IP/OBS HIGH 50: CPT | Mod: GC

## 2017-08-03 PROCEDURE — 93971 EXTREMITY STUDY: CPT | Mod: 26,RT

## 2017-08-03 RX ORDER — OXYCODONE AND ACETAMINOPHEN 5; 325 MG/1; MG/1
1 TABLET ORAL ONCE
Qty: 0 | Refills: 0 | Status: DISCONTINUED | OUTPATIENT
Start: 2017-08-03 | End: 2017-08-03

## 2017-08-03 RX ORDER — POTASSIUM CHLORIDE 20 MEQ
40 PACKET (EA) ORAL ONCE
Qty: 0 | Refills: 0 | Status: COMPLETED | OUTPATIENT
Start: 2017-08-03 | End: 2017-08-03

## 2017-08-03 RX ADMIN — OXYCODONE AND ACETAMINOPHEN 1 TABLET(S): 5; 325 TABLET ORAL at 13:25

## 2017-08-03 RX ADMIN — Medication 1 SPRAY(S): at 17:55

## 2017-08-03 RX ADMIN — FLUCONAZOLE 200 MILLIGRAM(S): 150 TABLET ORAL at 13:28

## 2017-08-03 RX ADMIN — BRIMONIDINE TARTRATE 1 DROP(S): 2 SOLUTION/ DROPS OPHTHALMIC at 05:39

## 2017-08-03 RX ADMIN — OXYCODONE AND ACETAMINOPHEN 1 TABLET(S): 5; 325 TABLET ORAL at 02:18

## 2017-08-03 RX ADMIN — OXYCODONE AND ACETAMINOPHEN 1 TABLET(S): 5; 325 TABLET ORAL at 19:32

## 2017-08-03 RX ADMIN — Medication 100 MILLIGRAM(S): at 13:29

## 2017-08-03 RX ADMIN — HYDROMORPHONE HYDROCHLORIDE 0.5 MILLIGRAM(S): 2 INJECTION INTRAMUSCULAR; INTRAVENOUS; SUBCUTANEOUS at 22:32

## 2017-08-03 RX ADMIN — LATANOPROST 1 DROP(S): 0.05 SOLUTION/ DROPS OPHTHALMIC; TOPICAL at 22:08

## 2017-08-03 RX ADMIN — OXYCODONE AND ACETAMINOPHEN 1 TABLET(S): 5; 325 TABLET ORAL at 07:22

## 2017-08-03 RX ADMIN — Medication 250 MILLIGRAM(S): at 05:14

## 2017-08-03 RX ADMIN — Medication 1 SPRAY(S): at 07:24

## 2017-08-03 RX ADMIN — DORZOLAMIDE HYDROCHLORIDE TIMOLOL MALEATE 1 DROP(S): 20; 5 SOLUTION/ DROPS OPHTHALMIC at 17:56

## 2017-08-03 RX ADMIN — OXYCODONE AND ACETAMINOPHEN 1 TABLET(S): 5; 325 TABLET ORAL at 08:10

## 2017-08-03 RX ADMIN — Medication 40 MILLIEQUIVALENT(S): at 10:16

## 2017-08-03 RX ADMIN — Medication 250 MILLIGRAM(S): at 17:51

## 2017-08-03 RX ADMIN — OXYCODONE AND ACETAMINOPHEN 1 TABLET(S): 5; 325 TABLET ORAL at 17:01

## 2017-08-03 RX ADMIN — BRIMONIDINE TARTRATE 1 DROP(S): 2 SOLUTION/ DROPS OPHTHALMIC at 17:56

## 2017-08-03 RX ADMIN — OXYCODONE AND ACETAMINOPHEN 1 TABLET(S): 5; 325 TABLET ORAL at 14:19

## 2017-08-03 RX ADMIN — Medication 100 MILLIGRAM(S): at 05:39

## 2017-08-03 RX ADMIN — Medication 1 TABLET(S): at 13:29

## 2017-08-03 RX ADMIN — DORZOLAMIDE HYDROCHLORIDE TIMOLOL MALEATE 1 DROP(S): 20; 5 SOLUTION/ DROPS OPHTHALMIC at 05:39

## 2017-08-03 RX ADMIN — HYDROMORPHONE HYDROCHLORIDE 0.5 MILLIGRAM(S): 2 INJECTION INTRAMUSCULAR; INTRAVENOUS; SUBCUTANEOUS at 22:45

## 2017-08-03 NOTE — PROGRESS NOTE ADULT - PROBLEM SELECTOR PLAN 2
-Zofran prn Permanent A. Fib  -Recommend rate control with Toprol XL 100mg daily (first dose today)  -[id number] score of 3, recommend anticoagulation, pt has previously refused anticoagulation, continue aspirin 81mg

## 2017-08-03 NOTE — PROGRESS NOTE ADULT - PROBLEM SELECTOR PLAN 7
Lovenox sQ Unclear etiology, improved. no signs of infection or perforation on otoscopic exam. Started flonase for possible Eustachian tube dysfunction. Continue warm packs and Tylenol as needed.

## 2017-08-03 NOTE — PROGRESS NOTE ADULT - SUBJECTIVE AND OBJECTIVE BOX
JACEK HERNDON is a 82yFemale , patient examined and chart reviewed. Patient seen and examined today being followed for infected ulcer rught ankles with increased pain       INTERVAL HPI/ OVERNIGHT EVENTS: Events noted, she is s/p   I & D of right ankle POD # 3 . Podiatry follow up noted, no evidence of exposed bone seen intra operatively, all necrotic tissue and small abscess drained . She complaints of pain . wound seen with podiatry looks granulating . No exposed bone . undergoing PICC line placement . Complaints of increased right foot swelling       PAST MEDICAL & SURGICAL HISTORY:  C. difficile diarrhea  Gall stones  Breast cancer  Atrial fibrillation  PSVT (paroxysmal supraventricular tachycardia)  Glaucoma  Diverticulitis  S/P colon resection  H/O: hysterectomy  S/P ORIF (open reduction internal fixation) fracture: L lower extremity  H/O hernia repair  H/O mastectomy, bilateral  Status post appendectomy  S/P colon resection: march 11 2014      For details regarding the patient's social history, family history, and other miscellaneous elements, please refer the initial infectious diseases consultation and/or the admitting history and physical examination for this admission.    ROS:  CONSTITUTIONAL:  Negative fever or chills, feels well, good appetite  EYES:  Negative  blurry vision or double vision  CARDIOVASCULAR:  Negative for chest pain or palpitations  RESPIRATORY:  Negative for cough, wheezing, or SOB   GASTROINTESTINAL:  Negative for nausea, vomiting, diarrhea, constipation, or abdominal pain  GENITOURINARY:  Negative frequency, urgency or dysuria  NEUROLOGIC:  No headache, confusion, dizziness, lightheadedness  All other systems were reviewed and are negative         Current inpatient medications :  MEDICATIONS  (STANDING):  brimonidine 0.2% Ophthalmic Solution 1 Drop(s) Both EYES two times a day  dorzolamide 2%/timolol 0.5% Ophthalmic Solution 1 Drop(s) Both EYES two times a day  latanoprost 0.005% Ophthalmic Solution 1 Drop(s) Both EYES at bedtime  lactobacillus acidophilus 1 Tablet(s) Oral daily  vancomycin  IVPB   IV Intermittent   vancomycin  IVPB 1000 milliGRAM(s) IV Intermittent every 12 hours  docusate sodium 100 milliGRAM(s) Oral three times a day  fluticasone propionate 50 MICROgram(s)/spray Nasal Spray 1 Spray(s) Both Nostrils two times a day  piperacillin/tazobactam IVPB. 3.375 Gram(s) IV Intermittent every 8 hours  aspirin  chewable 81 milliGRAM(s) Oral daily  enoxaparin Injectable 40 milliGRAM(s) SubCutaneous daily    MEDICATIONS  (PRN):  ondansetron    Tablet 4 milliGRAM(s) Oral every 6 hours PRN Nausea and/or Vomiting  acetaminophen   Tablet. 650 milliGRAM(s) Oral every 6 hours PRN Mild Pain (1 - 3)  oxyCODONE    5 mG/acetaminophen 325 mG 1 Tablet(s) Oral every 4 hours PRN Severe Pain (7 - 10)  HYDROmorphone  Injectable 0.5 milliGRAM(s) IV Push every 4 hours PRN Moderate Pain (4 - 6)      Objective:    Vital Signs Last 24 Hrs  T(C): 37.2 (02 Aug 2017 11:46), Max: 37.3 (01 Aug 2017 15:49)  T(F): 99 (02 Aug 2017 11:46), Max: 99.2 (01 Aug 2017 15:49)  HR: 74 (02 Aug 2017 11:46) (74 - 105)  BP: 112/71 (02 Aug 2017 11:46) (112/71 - 126/83)  BP(mean): --  RR: 18 (02 Aug 2017 11:46) (18 - 19)  SpO2: 95% (02 Aug 2017 11:46) (91% - 95%)    Physical Exam:  General: well developed well nourished, in no acute distress  Eyes: sclera anicteric, pupils equal and reactive to light  ENMT: buccal mucosa moist, pharynx not injected  Neck: supple, trachea midline  Lungs: clear, no wheeze/rhonchi  Cardiovascular: regular rate and rhythm, S1 S2  Abdomen: soft, nontender, no organomegaly present, bowel sounds normal  Neurological: alert and oriented x3, Cranial Nerves II-XII grossly intact  Skin: no increased ecchymosis/petechiae/purpura  Lymph Nodes: no palpable cervical/supraclavicular lymph nodes enlargements  Extremities: no cyanosis/clubbing/edema, right ankle post op dressing in place . Wound as per podiatry - All web spaces are clean, dry and intact. Open skin wound noted to dorsal-lateral aspect of right foot, with moderate to severe active sanguinous drainage, no malodor, and no periwound erythema, negative probe to bone test, and granular wound bed.. no necrotic tissue       LABS:                          10.3   6.6   )-----------( 254      ( 03 Aug 2017 10:33 )             31.8     03 Aug 2017 10:33    140    |  103    |  12     ----------------------------<  129    4.1     |  28     |  0.98     Ca    8.7        03 Aug 2017 10:33      Vancomycin Level, Trough: 18.5:  (08.02.17 @ 17:26)    Surgical Pathology Final Report -  (07.31.17 @ 10:43)    Surgical Pathology Final Report - :   ACCESSION No:  [id number]    JACEK HERNDON    Surgical Final Report  Final Diagnosis    Soft tissue, right foot, debridement:  - Benign soft tissue demonstrating severe acute and chronic  necrotizing inflammation, dense fibrosis and reactive  changes.    Omid Castaneda M.D.  (Electronic Signature)  Reported on: 08/01/17    Clinical Information  Procedure: Surgical debridement right foot pulse lavage    Specimen Description  Pathology soft tissue right foot    Gross Description  The specimen is received in formalin and the specimen container  is labeled: Pathology soft tissue right foot. It consists of a  piece of dusky red-pink to gray-tan soft tissue measuring 5.0 x  1.0 x 0.3 cm. The specimen is serially sectioned and  representatively submitted. One cassette.    In addition to other data that may appear on the specimen  container, the label has been inspected to confirm the presence  of the patient's name and date of birth.  DN 8/1/2017 6:46AM          RECENT CULTURES:      Culture - Tissue with Gram Stain (07.31.17 @ 12:20)    Gram Stain:   No polymorphonuclear leukocytes per low power field  Moderate Yeast like cells per oil power field    Specimen Source: .Tissue Other, right foot    Culture Results:   Moderate Candida parapsilosis  Referred to Mycology        Culture - Other (07.27.17 @ 01:34)    Specimen Source: .Other right foot wound - swab    Culture Results:   No growth to date    Culture - Blood (07.26.17 @ 18:20)    Specimen Source: .Blood Blood-Peripheral    Culture Results:   No growth to date.    RADIOLOGY & ADDITIONAL STUDIES:  MRI Foot w/wo Cont, Right (07.27.17 @ 13:34) >  Impression:    Broad wound along the lateral malleolus. Acute osteomyelitis of the   imaged distal fibula with mild subcortical osteitis along the lateral   tibial plafond at the level of the syndesmosis. Fluid tracks from the   level of the wound at the lateral fibula along the dorsal subcutaneous   fat overlying the extensor digitorum brevis muscle consistent with a   small abscess. Prominent surrounding cellulitis is noted along the   lateral aspect of the ankle.    Nonspecific subcortical edema and enhancement along the lateral wall the   calcaneus just posterior to the lateral malleolus and along the anterior   lip of the tibial plafond with grossly preserved T1 marrow signal. This   may be related to reactive osteitis.    Small tibiotalar joint effusion with synovial enhancement consistent with   synovitis.     US Duplex Arterial Lower Ext Ltd, Right (07.27.17 @ 12:00) >    Impression:   1. Limited exam as discussed above.  2. No hemodynamically significant stenotic changes seen in the visualized   right leg arteries.        Assessment and Plan:   Assessment:  · Assessment		  83 yo F PMHx A-fib not on A/C due to pt refusal, diverticulosis s/p colon resection, history of C diff, breast CA s/p B/L mastectomy admitted with RLE cellulitis 2/2 failed treatment with doxycycline. She had a traumatic injury with skin necrosis on right ankle , due tot the proximity of the wound to lateral malleolus, possibility of OM cannot be ruled out .    MRI shows acute OM of lateral malleolus with small abscess adjacent to it , She is S/P I & D of right ankle POD # 3      Plan:  - Continue with  Vancomycin, add Diflucan 200 mg daily .   - PICC line placed    - weight bearing status as per podiatry  - DC plan home vs [facility]     - wound care as per podiatry   - pain control      I have discussed the above plan of care with patient in detail. She expressed understanding of the  treatment plan . Risks, benefits and alternatives discussed in detail. I have asked if she has any questions or concerns and appropriately addressed them to the best of my ability .    > 35 minutes were spent in direct patient care reviewing notes, medications ,labs data/ imaging , discussion with multidisciplinary team.    Thank you for allowing me to participate in care of your patient .    Neftali Esparza MD  [phone]

## 2017-08-03 NOTE — PROGRESS NOTE ADULT - PROBLEM SELECTOR PLAN 3
-Stable, controlled w/ no meds -Echo should EF 45%, with moderate to sever mitral regurg, aortic valve insufficiency  -Pt is currently no signs of ACS, Ischemia, congestive symptoms but has reported increase fatigue over past 6 months  Cardio: recommends cardiac catheterization and OLEKSANDR outpatient

## 2017-08-03 NOTE — PROGRESS NOTE ADULT - PROBLEM SELECTOR PLAN 1
-MRI showed osteomyelitis distal fibula, synovitis, reactive osteitis  -S/p debridement in OR 8/31  -Continue fluconazole 200mg PO Qd, Vancomycin 1000mg IV infusion     Will discuss long term antibiotics with ID once cultures/path back. -MRI showed osteomyelitis distal fibula, synovitis, reactive osteitis  -S/p debridement in OR, currently POD #3  -Continue fluconazole 200mg PO dialy, Vancomycin  IR - Left Internal Jugular PICC line placed today, ready for use  X Ray Right knee - Severe bicompartmental osteoarthritis  US R lower Ext: No evidence of DVT  Podiatry- Pt to be non-weight bearing until active bleeding subsides

## 2017-08-03 NOTE — PROGRESS NOTE ADULT - ASSESSMENT
Problem/Recommendation - 1:  Problem: Permanent atrial fibrillation. Recommendation: patient undergoing rate control strategy with Toprol XL. Dosage increased to 100mg daily (first dose this morning). In the past, patient refused anticoagulation despite knowing the risks of stroke. Given her [id number] of 3, I would recommend oral anticoagulation and I explained this to her. She said she will think about it and will discuss it with us in our office. In the meantime, continue Aspirin 81mg daily.    Problem/Recommendation - 2:  ·  Problem: Left ventricular dysfunction.  Recommendation: inpatient Echocardiogram during this admission revealed EF 45%, which is likely an overestimation given the fact that patient has moderate-severe MR. In addition, she has moderate aortic valve insufficiency. The patient is currently asymptomatic from congestive symptom standpoint. She has been complaining of increased fatigue over the past 6 months which could be do to her cardiomyopathy. I reviewed the records from our office regarding this patient. I explained to her that my recommendation would be for her to undergo a cardiac catheterization as well as a OLEKSANDR in the future. Since she has no signs of ischemia/ACS or congestive symptoms at this time she should recover from her recent foot injury/surgery. When she is discharged from the hospital she should make an appointment in our office regarding the scheduling of these tests if she still wants to have them done.     Problem/Recommendation - 3:  ·  Problem: Other osteomyelitis of right foot.  Recommendation: on antibiotics. patient s/p debridement of the right foot POD #3. Continue supportive care and adequate pain control. Patient will need insertion of PICC line for long term antibiotic use. ID following.    Problem/Recommendation - 4:  ·  Problem: Mitral valve insufficiency, unspecified etiology.  Recommendation: on transthoracic echocardiogram it demonstrates moderate to severe mitral regurgitation. The mechanism is unclear. I explained to the patient the need for a transesophageal echocardiogram in the future to better elicit the severity and mechanism of the mitral regurgitation. From a congestive standpoint, the patient is asymptomatic. She can follow up in our office with regards to scheduling this procedure.
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
## 2017-08-03 NOTE — PROGRESS NOTE ADULT - PROBLEM SELECTOR PLAN 6
Continue current cardiac regimen.   Has Severe mitral regurgitation.   May need OLEKSANDR. Can likely be arranged outpatient after discharge. -Continue drops as prescribed.

## 2017-08-03 NOTE — PROGRESS NOTE ADULT - SUBJECTIVE AND OBJECTIVE BOX
81 yo F PMHx A-fib not on A/C due to pt refusal, diverticulosis s/p colon resection, history of C diff, breast Ca s/p B/L masectomy, presenting with RLE wound with surrounding cellulitis. Failure to heal outpatient on Doxy.  S/p debridement of wound in OR on 7/31. Feels much better today. Pain well controlled. No SOB, CP, palpitations.       PHYSICAL EXAM:  GENERAL: NAD, well-groomed, well-developed  HEAD:  Atraumatic, Normocephalic  EYES: EOMI, PERRLA, conjunctiva and sclera clear  ENMT: No tonsillar erythema, exudates, or enlargement; Moist mucous membranes, Good dentition, No lesions  NECK: Supple, No JVD, Normal thyroid  NERVOUS SYSTEM:  Alert & Oriented X3, Good concentration; Motor Strength 5/5 B/L upper and lower extremities; DTRs 2+ intact and symmetric  CHEST/LUNG: Clear to percussion bilaterally; No rales, rhonchi, wheezing, or rubs  HEART: Regular rate and rhythm; No murmurs, rubs, or gallops  ABDOMEN: Soft, Nontender, Nondistended; Bowel sounds present  EXTREMITIES:  2+ Peripheral Pulses, No clubbing, cyanosis, or edema  LYMPH: No lymphadenopathy noted  SKIN: No rashes or lesions    MEDICATIONS  (STANDING):  brimonidine 0.2% Ophthalmic Solution 1 Drop(s) Both EYES two times a day  dorzolamide 2%/timolol 0.5% Ophthalmic Solution 1 Drop(s) Both EYES two times a day  latanoprost 0.005% Ophthalmic Solution 1 Drop(s) Both EYES at bedtime  lactobacillus acidophilus 1 Tablet(s) Oral daily  vancomycin  IVPB   IV Intermittent   vancomycin  IVPB 1000 milliGRAM(s) IV Intermittent every 12 hours  docusate sodium 100 milliGRAM(s) Oral three times a day  fluticasone propionate 50 MICROgram(s)/spray Nasal Spray 1 Spray(s) Both Nostrils two times a day  aspirin  chewable 81 milliGRAM(s) Oral daily  enoxaparin Injectable 40 milliGRAM(s) SubCutaneous daily  metoprolol succinate  milliGRAM(s) Oral daily  fluconAZOLE   Tablet 200 milliGRAM(s) Oral daily    MEDICATIONS  (PRN):  ondansetron    Tablet 4 milliGRAM(s) Oral every 6 hours PRN Nausea and/or Vomiting  acetaminophen   Tablet. 650 milliGRAM(s) Oral every 6 hours PRN Mild Pain (1 - 3)  oxyCODONE    5 mG/acetaminophen 325 mG 1 Tablet(s) Oral every 4 hours PRN Severe Pain (7 - 10)  HYDROmorphone  Injectable 0.5 milliGRAM(s) IV Push every 4 hours PRN Moderate Pain (4 - 6)      LABS:                        10.3   6.6   )-----------( 254      ( 03 Aug 2017 10:33 )             31.8     08-03    140  |  103  |  12  ----------------------------<  129<H>  4.1   |  28  |  0.98    Ca    8.7      03 Aug 2017 10:33          CAPILLARY BLOOD GLUCOSE                  RADIOLOGY & ADDITIONAL TESTS:    Imaging Personally Reviewed:       Advance Directives:      Palliative Care: 83 yo F PMHx A-fib not on A/C due to pt refusal, diverticulosis s/p colon resection, history of C diff, breast Ca s/p B/L masectomy, presenting with RLE wound with surrounding cellulitis. Failure to heal outpatient on Doxy, S/P debridement of wound in OR on . Now found to have acute osteomyelitis of distal fibula.       Pt seen and examined at bedside, pt complained about swelling in right lower extremity with difficulty ambulating since yesterday. Pt denies any chest pain, SOB, nausea or vomiting.      Vital Signs - Last 24 Hrs    T(C): 36.8 (17 @ 16:36), Max: 37.6 (17 @ 17:00)  HR: 108 (17 @ 16:36) (71 - 108)  BP: 107/73 (17 @ 16:36) (98/58 - 122/74)  RR: 16 (17 @ 16:36) (16 - 17)  SpO2: 96% (17 @ 16:36) (93% - 96%)  Wt(kg): --  Daily     Daily Weight in k.8 (03 Aug 2017 12:35)          PHYSICAL EXAM:  GENERAL: NAD  HEAD:  Atraumatic, Normocephalic  EYES: EOMI, PERRLA, conjunctiva and sclera clear  NERVOUS SYSTEM:  Alert & Oriented X3,   CHEST/LUNG: Clear to percussion bilaterally; No rales, rhonchi, wheezing, or rubs  HEART: Irregular rhythm; No murmurs, rubs, or gallops  ABDOMEN: Soft, Nontender, Nondistended; Bowel sounds present  EXTREMITIES: edematous and tender right lower extremity, non-erythematous, sensory and motor grossly intact in lower extremities        MEDICATIONS  (STANDING):  brimonidine 0.2% Ophthalmic Solution 1 Drop(s) Both EYES two times a day  dorzolamide 2%/timolol 0.5% Ophthalmic Solution 1 Drop(s) Both EYES two times a day  latanoprost 0.005% Ophthalmic Solution 1 Drop(s) Both EYES at bedtime  lactobacillus acidophilus 1 Tablet(s) Oral daily  vancomycin  IVPB   IV Intermittent   vancomycin  IVPB 1000 milliGRAM(s) IV Intermittent every 12 hours  docusate sodium 100 milliGRAM(s) Oral three times a day  fluticasone propionate 50 MICROgram(s)/spray Nasal Spray 1 Spray(s) Both Nostrils two times a day  aspirin  chewable 81 milliGRAM(s) Oral daily  enoxaparin Injectable 40 milliGRAM(s) SubCutaneous daily  metoprolol succinate  milliGRAM(s) Oral daily  fluconAZOLE   Tablet 200 milliGRAM(s) Oral daily    MEDICATIONS  (PRN):  ondansetron    Tablet 4 milliGRAM(s) Oral every 6 hours PRN Nausea and/or Vomiting  acetaminophen   Tablet. 650 milliGRAM(s) Oral every 6 hours PRN Mild Pain (1 - 3)  oxyCODONE    5 mG/acetaminophen 325 mG 1 Tablet(s) Oral every 4 hours PRN Severe Pain (7 - 10)  HYDROmorphone  Injectable 0.5 milliGRAM(s) IV Push every 4 hours PRN Moderate Pain (4 - 6)      LABS:                        10.3   6.6   )-----------( 254      ( 03 Aug 2017 10:33 )             31.8     08-    140  |  103  |  12  ----------------------------<  129<H>  4.1   |  28  |  0.98    Ca    8.7      03 Aug 2017 10:33          CAPILLARY BLOOD GLUCOSE                  RADIOLOGY & ADDITIONAL TESTS:    Imaging Personally Reviewed:       Advance Directives:      Palliative Care: 81 yo F PMHx A-fib not on A/C due to pt refusal, diverticulosis s/p colon resection, history of C diff, breast Ca s/p B/L masectomy, presenting with RLE wound with surrounding cellulitis. Failure to heal outpatient on Doxy, S/P debridement of wound in OR on . Now found to have acute osteomyelitis of distal fibula.       Pt seen and examined at bedside, pt complained about swelling in right lower extremity with difficulty ambulating since yesterday. Pt denies any chest pain, SOB, nausea or vomiting.    ROS: Per HPI      Vital Signs - Last 24 Hrs    T(C): 36.8 (17 @ 16:36), Max: 37.6 (17 @ 17:00)  HR: 108 (17 @ 16:36) (71 - 108)  BP: 107/73 (17 @ 16:36) (98/58 - 122/74)  RR: 16 (17 @ 16:36) (16 - 17)  SpO2: 96% (17 @ 16:36) (93% - 96%)  Wt(kg): --  Daily     Daily Weight in k.8 (03 Aug 2017 12:35)          PHYSICAL EXAM:  GENERAL: NAD  HEAD:  Atraumatic, Normocephalic  EYES: EOMI, PERRLA, conjunctiva and sclera clear  NERVOUS SYSTEM:  Alert & Oriented X3,   CHEST/LUNG: Clear to percussion bilaterally; No rales, rhonchi, wheezing, or rubs  HEART: Irregular rhythm; No murmurs, rubs, or gallops  ABDOMEN: Soft, Nontender, Nondistended; Bowel sounds present  EXTREMITIES: edematous and tender right lower extremity, non-erythematous, sensory and motor grossly intact in lower extremities        MEDICATIONS  (STANDING):  brimonidine 0.2% Ophthalmic Solution 1 Drop(s) Both EYES two times a day  dorzolamide 2%/timolol 0.5% Ophthalmic Solution 1 Drop(s) Both EYES two times a day  latanoprost 0.005% Ophthalmic Solution 1 Drop(s) Both EYES at bedtime  lactobacillus acidophilus 1 Tablet(s) Oral daily  vancomycin  IVPB   IV Intermittent   vancomycin  IVPB 1000 milliGRAM(s) IV Intermittent every 12 hours  docusate sodium 100 milliGRAM(s) Oral three times a day  fluticasone propionate 50 MICROgram(s)/spray Nasal Spray 1 Spray(s) Both Nostrils two times a day  aspirin  chewable 81 milliGRAM(s) Oral daily  enoxaparin Injectable 40 milliGRAM(s) SubCutaneous daily  metoprolol succinate  milliGRAM(s) Oral daily  fluconAZOLE   Tablet 200 milliGRAM(s) Oral daily    MEDICATIONS  (PRN):  ondansetron    Tablet 4 milliGRAM(s) Oral every 6 hours PRN Nausea and/or Vomiting  acetaminophen   Tablet. 650 milliGRAM(s) Oral every 6 hours PRN Mild Pain (1 - 3)  oxyCODONE    5 mG/acetaminophen 325 mG 1 Tablet(s) Oral every 4 hours PRN Severe Pain (7 - 10)  HYDROmorphone  Injectable 0.5 milliGRAM(s) IV Push every 4 hours PRN Moderate Pain (4 - 6)      LABS:                        10.3   6.6   )-----------( 254      ( 03 Aug 2017 10:33 )             31.8     08-    140  |  103  |  12  ----------------------------<  129<H>  4.1   |  28  |  0.98    Ca    8.7      03 Aug 2017 10:33          CAPILLARY BLOOD GLUCOSE                  RADIOLOGY & ADDITIONAL TESTS:    Imaging Personally Reviewed:       Advance Directives:      Palliative Care:

## 2017-08-03 NOTE — PROGRESS NOTE ADULT - ASSESSMENT
81 yo F PMHx A-fib not on A/C due to pt refusal, diverticulosis s/p colon resection, history of C diff, breast Ca s/p B/L masectomy, presenting with RLE wound with surrounding cellulitis, failed to heal outpatient on Doxy, S/P debridement of wound in OR on 7/31. Pt is now found to have acute osteomyelitis of distal fibula.

## 2017-08-04 ENCOUNTER — TRANSCRIPTION ENCOUNTER (OUTPATIENT)
Age: 82
End: 2017-08-04

## 2017-08-04 LAB
-  5-FLUCYTOSINE: SIGNIFICANT CHANGE UP
-  AMPHOTERICIN B: SIGNIFICANT CHANGE UP
-  ANIDULAFUNGIN: SIGNIFICANT CHANGE UP
-  CASPOFUNGIN: SIGNIFICANT CHANGE UP
-  FLUCONAZOLE: SIGNIFICANT CHANGE UP
-  ITRACONAZOLE: SIGNIFICANT CHANGE UP
-  MICAFUNGIN: SIGNIFICANT CHANGE UP
-  POSACONAZOLE: SIGNIFICANT CHANGE UP
-  VORICONAZOLE: SIGNIFICANT CHANGE UP
ANION GAP SERPL CALC-SCNC: 13 MMOL/L — SIGNIFICANT CHANGE UP (ref 5–17)
BUN SERPL-MCNC: 15 MG/DL — SIGNIFICANT CHANGE UP (ref 7–23)
CALCIUM SERPL-MCNC: 8.7 MG/DL — SIGNIFICANT CHANGE UP (ref 8.5–10.1)
CHLORIDE SERPL-SCNC: 103 MMOL/L — SIGNIFICANT CHANGE UP (ref 96–108)
CO2 SERPL-SCNC: 24 MMOL/L — SIGNIFICANT CHANGE UP (ref 22–31)
CREAT SERPL-MCNC: 0.94 MG/DL — SIGNIFICANT CHANGE UP (ref 0.5–1.3)
CULTURE RESULTS: SIGNIFICANT CHANGE UP
GLUCOSE SERPL-MCNC: 88 MG/DL — SIGNIFICANT CHANGE UP (ref 70–99)
HCT VFR BLD CALC: 31.1 % — LOW (ref 34.5–45)
HGB BLD-MCNC: 10.2 G/DL — LOW (ref 11.5–15.5)
MCHC RBC-ENTMCNC: 32.9 GM/DL — SIGNIFICANT CHANGE UP (ref 32–36)
MCHC RBC-ENTMCNC: 33.6 PG — SIGNIFICANT CHANGE UP (ref 27–34)
MCV RBC AUTO: 102 FL — HIGH (ref 80–100)
METHOD TYPE: SIGNIFICANT CHANGE UP
ORGANISM # SPEC MICROSCOPIC CNT: SIGNIFICANT CHANGE UP
ORGANISM # SPEC MICROSCOPIC CNT: SIGNIFICANT CHANGE UP
PLATELET # BLD AUTO: 262 K/UL — SIGNIFICANT CHANGE UP (ref 150–400)
POTASSIUM SERPL-MCNC: 4 MMOL/L — SIGNIFICANT CHANGE UP (ref 3.5–5.3)
POTASSIUM SERPL-SCNC: 4 MMOL/L — SIGNIFICANT CHANGE UP (ref 3.5–5.3)
RBC # BLD: 3.05 M/UL — LOW (ref 3.8–5.2)
RBC # FLD: 12.6 % — SIGNIFICANT CHANGE UP (ref 10.3–14.5)
SODIUM SERPL-SCNC: 140 MMOL/L — SIGNIFICANT CHANGE UP (ref 135–145)
SPECIMEN SOURCE: SIGNIFICANT CHANGE UP
WBC # BLD: 6.4 K/UL — SIGNIFICANT CHANGE UP (ref 3.8–10.5)
WBC # FLD AUTO: 6.4 K/UL — SIGNIFICANT CHANGE UP (ref 3.8–10.5)

## 2017-08-04 PROCEDURE — 99233 SBSQ HOSP IP/OBS HIGH 50: CPT | Mod: GC

## 2017-08-04 RX ORDER — VANCOMYCIN HCL 1 G
1500 VIAL (EA) INTRAVENOUS EVERY 24 HOURS
Qty: 0 | Refills: 0 | Status: DISCONTINUED | OUTPATIENT
Start: 2017-08-05 | End: 2017-08-09

## 2017-08-04 RX ADMIN — OXYCODONE AND ACETAMINOPHEN 1 TABLET(S): 5; 325 TABLET ORAL at 18:22

## 2017-08-04 RX ADMIN — ENOXAPARIN SODIUM 40 MILLIGRAM(S): 100 INJECTION SUBCUTANEOUS at 11:41

## 2017-08-04 RX ADMIN — FLUCONAZOLE 200 MILLIGRAM(S): 150 TABLET ORAL at 11:41

## 2017-08-04 RX ADMIN — DORZOLAMIDE HYDROCHLORIDE TIMOLOL MALEATE 1 DROP(S): 20; 5 SOLUTION/ DROPS OPHTHALMIC at 18:36

## 2017-08-04 RX ADMIN — OXYCODONE AND ACETAMINOPHEN 1 TABLET(S): 5; 325 TABLET ORAL at 22:00

## 2017-08-04 RX ADMIN — OXYCODONE AND ACETAMINOPHEN 1 TABLET(S): 5; 325 TABLET ORAL at 21:06

## 2017-08-04 RX ADMIN — Medication 1 TABLET(S): at 11:41

## 2017-08-04 RX ADMIN — BRIMONIDINE TARTRATE 1 DROP(S): 2 SOLUTION/ DROPS OPHTHALMIC at 05:38

## 2017-08-04 RX ADMIN — OXYCODONE AND ACETAMINOPHEN 1 TABLET(S): 5; 325 TABLET ORAL at 01:12

## 2017-08-04 RX ADMIN — BRIMONIDINE TARTRATE 1 DROP(S): 2 SOLUTION/ DROPS OPHTHALMIC at 18:36

## 2017-08-04 RX ADMIN — Medication 1 SPRAY(S): at 18:36

## 2017-08-04 RX ADMIN — Medication 100 MILLIGRAM(S): at 05:37

## 2017-08-04 RX ADMIN — DORZOLAMIDE HYDROCHLORIDE TIMOLOL MALEATE 1 DROP(S): 20; 5 SOLUTION/ DROPS OPHTHALMIC at 05:38

## 2017-08-04 RX ADMIN — Medication 250 MILLIGRAM(S): at 05:21

## 2017-08-04 RX ADMIN — OXYCODONE AND ACETAMINOPHEN 1 TABLET(S): 5; 325 TABLET ORAL at 01:55

## 2017-08-04 RX ADMIN — Medication 1 SPRAY(S): at 05:37

## 2017-08-04 RX ADMIN — OXYCODONE AND ACETAMINOPHEN 1 TABLET(S): 5; 325 TABLET ORAL at 16:18

## 2017-08-04 RX ADMIN — LATANOPROST 1 DROP(S): 0.05 SOLUTION/ DROPS OPHTHALMIC; TOPICAL at 21:07

## 2017-08-04 RX ADMIN — OXYCODONE AND ACETAMINOPHEN 1 TABLET(S): 5; 325 TABLET ORAL at 09:43

## 2017-08-04 RX ADMIN — Medication 81 MILLIGRAM(S): at 11:41

## 2017-08-04 RX ADMIN — OXYCODONE AND ACETAMINOPHEN 1 TABLET(S): 5; 325 TABLET ORAL at 10:33

## 2017-08-04 NOTE — PROGRESS NOTE ADULT - ASSESSMENT
83 yo F PMHx A-fib not on A/C due to pt refusal, diverticulosis s/p colon resection, history of C diff, breast Ca s/p B/L masectomy, presenting with RLE wound with surrounding cellulitis, failed to heal outpatient on Doxy, S/P debridement of wound in OR on 7/31. Pt is now found to have acute osteomyelitis of distal fibula.
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
## 2017-08-04 NOTE — PROGRESS NOTE ADULT - ASSESSMENT
Assessment:    Grade 2 ulcer, dorsal right foot  Cellulitis, dorsal right foot (resolving)  s/p right foot wound debridement, POD#4, 7/31/17    Plan:    -Pt seen and eval  -Chart reviewed  -Dressings removed and wound further examined, cleansed with normal saline  -Electrocautery applied yesterday, consent form signed/witnessed/in-chart, electrocautery site healing well, no active bleeding noted today  -Silver alginate, dry sterile dressings and ABD pad applied to right foot wound  -Follow infectious disease recommendations  -Pt to be partial weightbearing to right lower extremity, as tolerated, weightbearing order placed in [place]  -Pt Tx plan discussed w/Dr. Vanegas, agreed upon    Podiatry to cont following Pt while in-house Assessment:    Grade 2 ulcer, dorsal right foot  Cellulitis, dorsal right foot (resolving)  s/p right foot wound debridement, POD#4, 7/31/17    Plan:    -Pt seen and eval  -Chart reviewed  -Dressings removed and wound further examined, cleansed with normal saline  -Electrocautery applied yesterday, consent form signed/witnessed/in-chart, electrocautery site healing well, no active bleeding noted today  -Silver alginate, dry sterile dressings and ABD pad applied to right foot wound  -Follow infectious disease recommendations  -Pt to be weightbearing to right lower extremity, as tolerated, weightbearing order placed in [place]  -Pt Tx plan discussed w/Dr. Vanegas, agreed upon    Podiatry to cont following Pt while in-house

## 2017-08-04 NOTE — PROGRESS NOTE ADULT - SUBJECTIVE AND OBJECTIVE BOX
81 yo F PMHx A-fib not on A/C due to pt refusal, diverticulosis s/p colon resection, history of C diff, breast Ca s/p B/L masectomy, presenting with RLE wound with surrounding cellulitis. Failure to heal outpatient on Doxy, S/P debridement of wound in OR on 7/31. Now found to have acute osteomyelitis of distal fibula.     Pt seen and examined at bedside, pt complained about mild swelling in right lower extremity. Pt reported having the "best night of sleep" since being hospitalized this visit. Pt had an episode of bleeding at would with cauterization procedure at bedside. Pt denies any chest pain, SOB, nausea or vomiting.      Vital Signs Last 24 Hrs  T(C): 36.8 (08-04-17 @ 04:46), Max: 37.3 (08-03-17 @ 23:53)  T(F): 98.3 (08-04-17 @ 04:46), Max: 99.1 (08-03-17 @ 23:53)  HR: 86 (08-04-17 @ 04:46) (71 - 108)  BP: 112/69 (08-04-17 @ 04:46) (100/67 - 122/74)  BP(mean): --  RR: 16 (08-04-17 @ 04:46) (16 - 17)  SpO2: 95% (08-04-17 @ 04:46) (93% - 96%)    Physical Exam:  GENERAL: NAD  HEAD:  Atraumatic, Normocephalic  EYES: EOMI, PERRLA, conjunctiva and sclera clear  NERVOUS SYSTEM:  Alert & Oriented X3, no gross CN deficits  CHEST/LUNG: Clear to percussion bilaterally; No rales, rhonchi, wheezing, or rubs  HEART: Irregular rhythm; No murmurs, rubs, or gallops  ABDOMEN: Soft, Nontender, Nondistended; Bowel sounds present  EXTREMITIES: edematous and mildly tender right lower extremity, non-erythematous, sensory and motor grossly intact in lower extremities    LABS:                        10.2   6.4   )-----------( 262      ( 04 Aug 2017 06:31 )             31.1     04 Aug 2017 06:31    140    |  103    |  15     ----------------------------<  88     4.0     |  24     |  0.94     Ca    8.7        04 Aug 2017 06:31              CAPILLARY BLOOD GLUCOSE            RADIOLOGY & ADDITIONAL TESTS:    MEDICATIONS  (STANDING):  brimonidine 0.2% Ophthalmic Solution 1 Drop(s) Both EYES two times a day  dorzolamide 2%/timolol 0.5% Ophthalmic Solution 1 Drop(s) Both EYES two times a day  latanoprost 0.005% Ophthalmic Solution 1 Drop(s) Both EYES at bedtime  lactobacillus acidophilus 1 Tablet(s) Oral daily  vancomycin  IVPB   IV Intermittent   vancomycin  IVPB 1000 milliGRAM(s) IV Intermittent every 12 hours  docusate sodium 100 milliGRAM(s) Oral three times a day  fluticasone propionate 50 MICROgram(s)/spray Nasal Spray 1 Spray(s) Both Nostrils two times a day  aspirin  chewable 81 milliGRAM(s) Oral daily  enoxaparin Injectable 40 milliGRAM(s) SubCutaneous daily  metoprolol succinate  milliGRAM(s) Oral daily  fluconAZOLE   Tablet 200 milliGRAM(s) Oral daily    MEDICATIONS  (PRN):  ondansetron    Tablet 4 milliGRAM(s) Oral every 6 hours PRN Nausea and/or Vomiting  acetaminophen   Tablet. 650 milliGRAM(s) Oral every 6 hours PRN Mild Pain (1 - 3)  oxyCODONE    5 mG/acetaminophen 325 mG 1 Tablet(s) Oral every 4 hours PRN Severe Pain (7 - 10)  HYDROmorphone  Injectable 0.5 milliGRAM(s) IV Push every 4 hours PRN Moderate Pain (4 - 6)      Allergies    No Known Allergies    Intolerances 81 yo F PMHx A-fib not on A/C due to pt refusal, diverticulosis s/p colon resection, history of C diff, breast Ca s/p B/L masectomy, presenting with RLE wound with surrounding cellulitis. Failure to heal outpatient on Doxy, S/P debridement of wound in OR on 7/31. Now found to have acute osteomyelitis of distal fibula.     Pt seen and examined at bedside, pt complained about mild swelling in right lower extremity. Pt reported having the "best night of sleep" since being hospitalized this visit. Pt had an episode of bleeding at would with cauterization procedure at bedside. Pt denies any chest pain, SOB, nausea or vomiting.      Vital Signs Last 24 Hrs  T(C): 36.8 (08-04-17 @ 04:46), Max: 37.3 (08-03-17 @ 23:53)  T(F): 98.3 (08-04-17 @ 04:46), Max: 99.1 (08-03-17 @ 23:53)  HR: 86 (08-04-17 @ 04:46) (71 - 108)  BP: 112/69 (08-04-17 @ 04:46) (100/67 - 122/74)  BP(mean): --  RR: 16 (08-04-17 @ 04:46) (16 - 17)  SpO2: 95% (08-04-17 @ 04:46) (93% - 96%)    Physical Exam:  GENERAL: NAD  HEAD:  Atraumatic, Normocephalic  EYES: EOMI, PERRLA, conjunctiva and sclera clear  NERVOUS SYSTEM:  Alert & Oriented X3, no gross CN deficits  CHEST/LUNG: Clear to percussion bilaterally; No rales, rhonchi, wheezing, or rubs  HEART: Irregular rhythm; No murmurs, rubs, or gallops  ABDOMEN: Soft, Nontender, Nondistended; Bowel sounds present  EXTREMITIES: edematous and mildly tender right lower extremity, non-erythematous, sensory and motor grossly intact in lower extremities    LABS:                        10.2   6.4   )-----------( 262      ( 04 Aug 2017 06:31 )             31.1     04 Aug 2017 06:31    140    |  103    |  15     ----------------------------<  88     4.0     |  24     |  0.94     Ca    8.7        04 Aug 2017 06:31          MEDICATIONS  (STANDING):  brimonidine 0.2% Ophthalmic Solution 1 Drop(s) Both EYES two times a day  dorzolamide 2%/timolol 0.5% Ophthalmic Solution 1 Drop(s) Both EYES two times a day  latanoprost 0.005% Ophthalmic Solution 1 Drop(s) Both EYES at bedtime  lactobacillus acidophilus 1 Tablet(s) Oral daily  vancomycin  IVPB   IV Intermittent   vancomycin  IVPB 1000 milliGRAM(s) IV Intermittent every 12 hours  docusate sodium 100 milliGRAM(s) Oral three times a day  fluticasone propionate 50 MICROgram(s)/spray Nasal Spray 1 Spray(s) Both Nostrils two times a day  aspirin  chewable 81 milliGRAM(s) Oral daily  enoxaparin Injectable 40 milliGRAM(s) SubCutaneous daily  metoprolol succinate  milliGRAM(s) Oral daily  fluconAZOLE   Tablet 200 milliGRAM(s) Oral daily    MEDICATIONS  (PRN):  ondansetron    Tablet 4 milliGRAM(s) Oral every 6 hours PRN Nausea and/or Vomiting  acetaminophen   Tablet. 650 milliGRAM(s) Oral every 6 hours PRN Mild Pain (1 - 3)  oxyCODONE    5 mG/acetaminophen 325 mG 1 Tablet(s) Oral every 4 hours PRN Severe Pain (7 - 10)  HYDROmorphone  Injectable 0.5 milliGRAM(s) IV Push every 4 hours PRN Moderate Pain (4 - 6)      Allergies    No Known Allergies    Intolerances

## 2017-08-04 NOTE — PROGRESS NOTE ADULT - ASSESSMENT
Problem/Recommendation - 1:  Problem: Permanent atrial fibrillation. Recommendation: patient undergoing rate control strategy with Toprol XL.  Dosage was increased to 100mg daily. Rates have been between 100-110bpm, however patient in 8/10 pain around her foot area. Once pain improves heart rates will likely improve as well.  In the past, patient refused anticoagulation despite knowing the risks of stroke. Given her [id number] of 3, I would recommend oral anticoagulation and I explained this to her. She said she will think about it and will discuss it with us in our office. In the meantime, continue Aspirin 81mg daily.    Problem/Recommendation - 2:  ·  Problem: Left ventricular dysfunction.  Recommendation: inpatient Echocardiogram during this admission revealed EF 45%, which is likely an overestimation given the fact that patient has moderate-severe MR. In addition, she has moderate aortic valve insufficiency. The patient is currently asymptomatic from congestive symptom standpoint. She has been complaining of increased fatigue over the past 6 months which could be do to her cardiomyopathy. I reviewed the records from our office regarding this patient. I explained to her that my recommendation would be for her to undergo a cardiac catheterization as well as a OLEKSANDR in the future. Since she has no signs of ischemia/ACS or congestive symptoms at this time she should recover from her recent foot injury/surgery. When she is discharged from the hospital she should make an appointment in our office regarding the scheduling of these tests if she still wants to have them done.     Problem/Recommendation - 3:  ·  Problem: Other osteomyelitis of right foot.  Recommendation: on antibiotics. patient s/p debridement of the right foot POD #4. Continue supportive care and adequate pain control. Patient s/p insertion of PICC line for long term antibiotic use. ID following.    Problem/Recommendation - 4:  ·  Problem: Mitral valve insufficiency, unspecified etiology.  Recommendation: on transthoracic echocardiogram it demonstrates moderate to severe mitral regurgitation. The mechanism is unclear. I explained to the patient the need for a transesophageal echocardiogram in the future to better elicit the severity and mechanism of the mitral regurgitation. From a congestive standpoint, the patient is asymptomatic. She can follow up in our office with regards to scheduling this procedure.
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
## 2017-08-04 NOTE — PROGRESS NOTE ADULT - PROBLEM SELECTOR PLAN 3
-Echo should EF 45%, with moderate to sever mitral regurg, aortic valve insufficiency  -Pt is currently has no ACS, ischemia, or congestive symptoms but has reported increase fatigue over past 6 months  Cardio: recommends cardiac catheterization and OLEKSANDR outpatient

## 2017-08-04 NOTE — PROGRESS NOTE ADULT - PROBLEM SELECTOR PLAN 2
Permanent A. Fib  -Recommend rate control with Toprol XL 100mg daily (first dose today)  -[id number] score of 3, recommend anticoagulation, pt has previously refused anticoagulation, continue aspirin 81mg

## 2017-08-04 NOTE — PROGRESS NOTE ADULT - PROBLEM SELECTOR PLAN 1
-MRI showed osteomyelitis distal fibula, synovitis, reactive osteitis  -S/p debridement in OR, currently POD #4  -Continue fluconazole 200mg PO dialy, Vancomycin  IR - Left Internal Jugular PICC line placed, ready for use  X Ray Right knee - Severe bicompartmental osteoarthritis  US R lower Ext: No evidence of DVT  Podiatry: Pt to be non-weight bearing until active bleeding subsides

## 2017-08-04 NOTE — PROGRESS NOTE ADULT - SUBJECTIVE AND OBJECTIVE BOX
JACEK HERNDON is a 82yFemale , patient examined and chart reviewed. Patient seen and examined today being followed for infected ulcer rught ankles with increased pain       INTERVAL HPI/ OVERNIGHT EVENTS: Events noted, she is s/p   I & D of right ankle POD # 4 . Podiatry follow up noted, had significant bleeding from wound so it was cauterized . She had PermCath placed as patient has hx of lymphedema . Still complaints of right ankle pain       PAST MEDICAL & SURGICAL HISTORY:  C. difficile diarrhea  Gall stones  Breast cancer  Atrial fibrillation  PSVT (paroxysmal supraventricular tachycardia)  Glaucoma  Diverticulitis  S/P colon resection  H/O: hysterectomy  S/P ORIF (open reduction internal fixation) fracture: L lower extremity  H/O hernia repair  H/O mastectomy, bilateral  Status post appendectomy  S/P colon resection: march 11 2014      For details regarding the patient's social history, family history, and other miscellaneous elements, please refer the initial infectious diseases consultation and/or the admitting history and physical examination for this admission.    ROS:  CONSTITUTIONAL:  Negative fever or chills, feels well, good appetite  EYES:  Negative  blurry vision or double vision  CARDIOVASCULAR:  Negative for chest pain or palpitations  RESPIRATORY:  Negative for cough, wheezing, or SOB   GASTROINTESTINAL:  Negative for nausea, vomiting, diarrhea, constipation, or abdominal pain  GENITOURINARY:  Negative frequency, urgency or dysuria  NEUROLOGIC:  No headache, confusion, dizziness, lightheadedness  All other systems were reviewed and are negative         Current inpatient medications :  MEDICATIONS  (STANDING):  brimonidine 0.2% Ophthalmic Solution 1 Drop(s) Both EYES two times a day  dorzolamide 2%/timolol 0.5% Ophthalmic Solution 1 Drop(s) Both EYES two times a day  latanoprost 0.005% Ophthalmic Solution 1 Drop(s) Both EYES at bedtime  lactobacillus acidophilus 1 Tablet(s) Oral daily  vancomycin  IVPB   IV Intermittent   vancomycin  IVPB 1000 milliGRAM(s) IV Intermittent every 12 hours  docusate sodium 100 milliGRAM(s) Oral three times a day  fluticasone propionate 50 MICROgram(s)/spray Nasal Spray 1 Spray(s) Both Nostrils two times a day  piperacillin/tazobactam IVPB. 3.375 Gram(s) IV Intermittent every 8 hours  aspirin  chewable 81 milliGRAM(s) Oral daily  enoxaparin Injectable 40 milliGRAM(s) SubCutaneous daily    MEDICATIONS  (PRN):  ondansetron    Tablet 4 milliGRAM(s) Oral every 6 hours PRN Nausea and/or Vomiting  acetaminophen   Tablet. 650 milliGRAM(s) Oral every 6 hours PRN Mild Pain (1 - 3)  oxyCODONE    5 mG/acetaminophen 325 mG 1 Tablet(s) Oral every 4 hours PRN Severe Pain (7 - 10)  HYDROmorphone  Injectable 0.5 milliGRAM(s) IV Push every 4 hours PRN Moderate Pain (4 - 6)      Objective:  Vital Signs Last 24 Hrs  T(C): 36.9 (04 Aug 2017 07:30), Max: 37.3 (03 Aug 2017 23:53)  T(F): 98.5 (04 Aug 2017 07:30), Max: 99.1 (03 Aug 2017 23:53)  HR: 88 (04 Aug 2017 07:30) (85 - 108)  BP: 118/79 (04 Aug 2017 07:30) (100/67 - 118/79)  BP(mean): --  RR: 16 (04 Aug 2017 07:30) (16 - 16)  SpO2: 96% (04 Aug 2017 07:30) (94% - 96%)    Physical Exam:  General: well developed well nourished, in no acute distress  Eyes: sclera anicteric, pupils equal and reactive to light  ENMT: buccal mucosa moist, pharynx not injected  Neck: supple, trachea midline  Lungs: clear, no wheeze/rhonchi  Cardiovascular: regular rate and rhythm, S1 S2  Abdomen: soft, nontender, no organomegaly present, bowel sounds normal  Neurological: alert and oriented x3, Cranial Nerves II-XII grossly intact  Skin: no increased ecchymosis/petechiae/purpura  Lymph Nodes: no palpable cervical/supraclavicular lymph nodes enlargements  Extremities: no cyanosis/clubbing/edema, right ankle post op dressing in place . Wound as per podiatry - All web spaces are clean, dry and intact. Open skin wound noted to dorsal-lateral aspect of right foot, with moderate to severe active sanguinous drainage, no malodor, and no periwound erythema, negative probe to bone test, and granular wound bed.. no necrotic tissue       LABS:                           10.2   6.4   )-----------( 262      ( 04 Aug 2017 06:31 )             31.1     04 Aug 2017 06:31    140    |  103    |  15     ----------------------------<  88     4.0     |  24     |  0.94     Ca    8.7        04 Aug 2017 06:31      Vancomycin Level, Trough: 18.5:  (08.02.17 @ 17:26)    Surgical Pathology Final Report -  (07.31.17 @ 10:43)    Surgical Pathology Final Report - :   ACCESSION No:  [id number]    JACEK HERNDON                         1    Surgical Final Report  Final Diagnosis    Soft tissue, right foot, debridement:  - Benign soft tissue demonstrating severe acute and chronic  necrotizing inflammation, dense fibrosis and reactive  changes.    Omid Castaneda M.D.  (Electronic Signature)  Reported on: 08/01/17    Clinical Information  Procedure: Surgical debridement right foot pulse lavage    Specimen Description  Pathology soft tissue right foot    Gross Description  The specimen is received in formalin and the specimen container  is labeled: Pathology soft tissue right foot. It consists of a  piece of dusky red-pink to gray-tan soft tissue measuring 5.0 x  1.0 x 0.3 cm. The specimen is serially sectioned and  representatively submitted. One cassette.    In addition to other data that may appear on the specimen  container, the label has been inspected to confirm the presence  of the patient's name and date of birth.  DN 8/1/2017 6:46AM          RECENT CULTURES:      Culture - Tissue with Gram Stain (07.31.17 @ 12:20)    Gram Stain:   No polymorphonuclear leukocytes per low power field  Moderate Yeast like cells per oil power field    Specimen Source: .Tissue Other, right foot    Culture Results:   Moderate Candida parapsilosis  Referred to Mycology        Culture - Other (07.27.17 @ 01:34)    Specimen Source: .Other right foot wound - swab    Culture Results:   No growth to date    Culture - Blood (07.26.17 @ 18:20)    Specimen Source: .Blood Blood-Peripheral    Culture Results:   No growth to date.    RADIOLOGY & ADDITIONAL STUDIES:  MRI Foot w/wo Cont, Right (07.27.17 @ 13:34) >  Impression:    Broad wound along the lateral malleolus. Acute osteomyelitis of the   imaged distal fibula with mild subcortical osteitis along the lateral   tibial plafond at the level of the syndesmosis. Fluid tracks from the   level of the wound at the lateral fibula along the dorsal subcutaneous   fat overlying the extensor digitorum brevis muscle consistent with a   small abscess. Prominent surrounding cellulitis is noted along the   lateral aspect of the ankle.    Nonspecific subcortical edema and enhancement along the lateral wall the   calcaneus just posterior to the lateral malleolus and along the anterior   lip of the tibial plafond with grossly preserved T1 marrow signal. This   may be related to reactive osteitis.    Small tibiotalar joint effusion with synovial enhancement consistent with   synovitis.     US Duplex Arterial Lower Ext Ltd, Right (07.27.17 @ 12:00) >    Impression:   1. Limited exam as discussed above.  2. No hemodynamically significant stenotic changes seen in the visualized   right leg arteries.        Assessment and Plan:   Assessment:  · Assessment		  81 yo F PMHx A-fib not on A/C due to pt refusal, diverticulosis s/p colon resection, history of C diff, breast CA s/p B/L mastectomy admitted with RLE cellulitis 2/2 failed treatment with doxycycline. She had a traumatic injury with skin necrosis on right ankle , due tot the proximity of the wound to lateral malleolus, possibility of OM cannot be ruled out .    MRI shows acute OM of lateral malleolus with small abscess adjacent to it , She is S/P I & D of right ankle POD # 3      Plan:  - will change Vancomycin to 1500 mg daily ., she needs it for total 6 weeks from 7/26/17 .  - continue with Diflucan x 2 weeks   - She has a PermCath so will need to return to IR for removal once IV antibiotics are done   - weight bearing status as per podiatry  - DC plan to [facility] as doubt she can handle IV antibiotics at home     - wound care as per podiatry   - pain control      I have discussed the above plan of care with patient in detail. She expressed understanding of the  treatment plan . Risks, benefits and alternatives discussed in detail. I have asked if she has any questions or concerns and appropriately addressed them to the best of my ability .    > 35 minutes were spent in direct patient care reviewing notes, medications ,labs data/ imaging , discussion with multidisciplinary team.    Thank you for allowing me to participate in care of your patient .    Neftali Esparza MD  [phone]

## 2017-08-04 NOTE — PROGRESS NOTE ADULT - ATTENDING COMMENTS
Patient continues to refuse PT eval. Will need eval for further discharge planning. Will attempt to adjust meds to control pain as patient says this is why she refuses to work with PT.

## 2017-08-05 LAB
ANION GAP SERPL CALC-SCNC: 9 MMOL/L — SIGNIFICANT CHANGE UP (ref 5–17)
BUN SERPL-MCNC: 16 MG/DL — SIGNIFICANT CHANGE UP (ref 7–23)
CALCIUM SERPL-MCNC: 8.5 MG/DL — SIGNIFICANT CHANGE UP (ref 8.5–10.1)
CHLORIDE SERPL-SCNC: 103 MMOL/L — SIGNIFICANT CHANGE UP (ref 96–108)
CO2 SERPL-SCNC: 26 MMOL/L — SIGNIFICANT CHANGE UP (ref 22–31)
CREAT SERPL-MCNC: 0.96 MG/DL — SIGNIFICANT CHANGE UP (ref 0.5–1.3)
CULTURE RESULTS: SIGNIFICANT CHANGE UP
CULTURE RESULTS: SIGNIFICANT CHANGE UP
GLUCOSE SERPL-MCNC: 127 MG/DL — HIGH (ref 70–99)
HCT VFR BLD CALC: 30.5 % — LOW (ref 34.5–45)
HGB BLD-MCNC: 10 G/DL — LOW (ref 11.5–15.5)
MCHC RBC-ENTMCNC: 32.7 GM/DL — SIGNIFICANT CHANGE UP (ref 32–36)
MCHC RBC-ENTMCNC: 33.1 PG — SIGNIFICANT CHANGE UP (ref 27–34)
MCV RBC AUTO: 101 FL — HIGH (ref 80–100)
PLATELET # BLD AUTO: 253 K/UL — SIGNIFICANT CHANGE UP (ref 150–400)
POTASSIUM SERPL-MCNC: 3.6 MMOL/L — SIGNIFICANT CHANGE UP (ref 3.5–5.3)
POTASSIUM SERPL-SCNC: 3.6 MMOL/L — SIGNIFICANT CHANGE UP (ref 3.5–5.3)
RBC # BLD: 3.02 M/UL — LOW (ref 3.8–5.2)
RBC # FLD: 12.5 % — SIGNIFICANT CHANGE UP (ref 10.3–14.5)
SODIUM SERPL-SCNC: 138 MMOL/L — SIGNIFICANT CHANGE UP (ref 135–145)
SPECIMEN SOURCE: SIGNIFICANT CHANGE UP
SPECIMEN SOURCE: SIGNIFICANT CHANGE UP
WBC # BLD: 4.8 K/UL — SIGNIFICANT CHANGE UP (ref 3.8–10.5)
WBC # FLD AUTO: 4.8 K/UL — SIGNIFICANT CHANGE UP (ref 3.8–10.5)

## 2017-08-05 PROCEDURE — 99233 SBSQ HOSP IP/OBS HIGH 50: CPT

## 2017-08-05 RX ORDER — METOPROLOL TARTRATE 50 MG
50 TABLET ORAL DAILY
Qty: 0 | Refills: 0 | Status: DISCONTINUED | OUTPATIENT
Start: 2017-08-05 | End: 2017-08-08

## 2017-08-05 RX ORDER — ACETAMINOPHEN 500 MG
650 TABLET ORAL EVERY 6 HOURS
Qty: 0 | Refills: 0 | Status: DISCONTINUED | OUTPATIENT
Start: 2017-08-05 | End: 2017-08-09

## 2017-08-05 RX ORDER — ONDANSETRON 8 MG/1
4 TABLET, FILM COATED ORAL ONCE
Qty: 0 | Refills: 0 | Status: COMPLETED | OUTPATIENT
Start: 2017-08-05 | End: 2017-08-05

## 2017-08-05 RX ADMIN — OXYCODONE AND ACETAMINOPHEN 1 TABLET(S): 5; 325 TABLET ORAL at 18:42

## 2017-08-05 RX ADMIN — OXYCODONE AND ACETAMINOPHEN 1 TABLET(S): 5; 325 TABLET ORAL at 02:22

## 2017-08-05 RX ADMIN — OXYCODONE AND ACETAMINOPHEN 1 TABLET(S): 5; 325 TABLET ORAL at 23:40

## 2017-08-05 RX ADMIN — Medication 100 MILLIGRAM(S): at 21:39

## 2017-08-05 RX ADMIN — Medication 81 MILLIGRAM(S): at 11:40

## 2017-08-05 RX ADMIN — BRIMONIDINE TARTRATE 1 DROP(S): 2 SOLUTION/ DROPS OPHTHALMIC at 05:37

## 2017-08-05 RX ADMIN — Medication 1 SPRAY(S): at 05:36

## 2017-08-05 RX ADMIN — Medication 1 TABLET(S): at 11:40

## 2017-08-05 RX ADMIN — OXYCODONE AND ACETAMINOPHEN 1 TABLET(S): 5; 325 TABLET ORAL at 04:00

## 2017-08-05 RX ADMIN — Medication 100 MILLIGRAM(S): at 13:58

## 2017-08-05 RX ADMIN — Medication 300 MILLIGRAM(S): at 05:36

## 2017-08-05 RX ADMIN — HYDROMORPHONE HYDROCHLORIDE 0.5 MILLIGRAM(S): 2 INJECTION INTRAMUSCULAR; INTRAVENOUS; SUBCUTANEOUS at 11:45

## 2017-08-05 RX ADMIN — Medication 1 SPRAY(S): at 18:01

## 2017-08-05 RX ADMIN — DORZOLAMIDE HYDROCHLORIDE TIMOLOL MALEATE 1 DROP(S): 20; 5 SOLUTION/ DROPS OPHTHALMIC at 05:37

## 2017-08-05 RX ADMIN — DORZOLAMIDE HYDROCHLORIDE TIMOLOL MALEATE 1 DROP(S): 20; 5 SOLUTION/ DROPS OPHTHALMIC at 18:01

## 2017-08-05 RX ADMIN — FLUCONAZOLE 200 MILLIGRAM(S): 150 TABLET ORAL at 11:41

## 2017-08-05 RX ADMIN — OXYCODONE AND ACETAMINOPHEN 1 TABLET(S): 5; 325 TABLET ORAL at 19:15

## 2017-08-05 RX ADMIN — HYDROMORPHONE HYDROCHLORIDE 0.5 MILLIGRAM(S): 2 INJECTION INTRAMUSCULAR; INTRAVENOUS; SUBCUTANEOUS at 11:17

## 2017-08-05 RX ADMIN — ONDANSETRON 4 MILLIGRAM(S): 8 TABLET, FILM COATED ORAL at 13:58

## 2017-08-05 RX ADMIN — ENOXAPARIN SODIUM 40 MILLIGRAM(S): 100 INJECTION SUBCUTANEOUS at 11:41

## 2017-08-05 RX ADMIN — LATANOPROST 1 DROP(S): 0.05 SOLUTION/ DROPS OPHTHALMIC; TOPICAL at 21:39

## 2017-08-05 RX ADMIN — BRIMONIDINE TARTRATE 1 DROP(S): 2 SOLUTION/ DROPS OPHTHALMIC at 18:00

## 2017-08-05 NOTE — PROGRESS NOTE ADULT - ASSESSMENT
Problem/Recommendation - 1:  Problem: Permanent atrial fibrillation. Recommendation: patient undergoing rate control strategy with Toprol XL.  Dosage was increased to 100mg daily, however patient's blood pressure has been on low side and dose of Toprol XL was held for today. Will decrease dose to 50mg daily. HRs have been less than 100 generally. In the past, patient refused anticoagulation despite knowing the risks of stroke. Given her [id number] of 3, I would recommend oral anticoagulation and I explained this to her. She said she will think about it and will discuss it with us in our office. In the meantime, continue Aspirin 81mg daily.    Problem/Recommendation - 2:  ·  Problem: Left ventricular dysfunction.  Recommendation: inpatient Echocardiogram during this admission revealed EF 45%, which is likely an overestimation given the fact that patient has moderate-severe MR. In addition, she has moderate aortic valve insufficiency. The patient is currently asymptomatic from congestive symptom standpoint. She has been complaining of increased fatigue over the past 6 months which could be do to her cardiomyopathy. I reviewed the records from our office regarding this patient. I explained to her that my recommendation would be for her to undergo a cardiac catheterization as well as a OLEKSANDR in the future. Since she has no signs of ischemia/ACS or congestive symptoms at this time she should recover from her recent foot injury/surgery. When she is discharged from the hospital she should make an appointment in our office regarding the scheduling of these tests if she still wants to have them done.     Problem/Recommendation - 3:  ·  Problem: Other osteomyelitis of right foot.  Recommendation: on antibiotics. patient s/p debridement of the right foot POD #4. Continue supportive care and adequate pain control. Patient s/p insertion of PICC line for long term antibiotic use. ID following.    Problem/Recommendation - 4:  ·  Problem: Mitral valve insufficiency, unspecified etiology.  Recommendation: on transthoracic echocardiogram it demonstrates moderate to severe mitral regurgitation. The mechanism is unclear. I explained to the patient the need for a transesophageal echocardiogram in the future to better elicit the severity and mechanism of the mitral regurgitation. From a congestive standpoint, the patient is asymptomatic. She can follow up in our office with regards to scheduling this procedure.

## 2017-08-05 NOTE — PROGRESS NOTE ADULT - SUBJECTIVE AND OBJECTIVE BOX
83 yo F PMHx A-fib not on A/C due to pt refusal, diverticulosis s/p colon resection, history of C diff, breast Ca s/p B/L masectomy, presenting with RLE wound with surrounding cellulitis. Failure to heal outpatient on Doxy, S/P debridement of wound in OR on 7/31. Now found to have acute osteomyelitis of distal fibula. Pt had an episode of bleeding at would with cauterization procedure at bedside. Pt denies any chest pain, SOB, nausea or vomiting.      Pt seen and examined at bedside, pt complained about mild swelling in right lower extremity. She is c/o fatigue and tiredness.     Vital Signs Last 24 Hrs  T(C): 36.8 (08-04-17 @ 04:46), Max: 37.3 (08-03-17 @ 23:53)  T(F): 98.3 (08-04-17 @ 04:46), Max: 99.1 (08-03-17 @ 23:53)  HR: 86 (08-04-17 @ 04:46) (71 - 108)  BP: 112/69 (08-04-17 @ 04:46) (100/67 - 122/74)  BP(mean): --  RR: 16 (08-04-17 @ 04:46) (16 - 17)  SpO2: 95% (08-04-17 @ 04:46) (93% - 96%)    Physical Exam:  GENERAL: NAD  HEAD:  Atraumatic, Normocephalic  EYES: EOMI, PERRLA, conjunctiva and sclera clear  NERVOUS SYSTEM:  Alert & Oriented X3, no gross CN deficits  CHEST/LUNG: Clear to percussion bilaterally; No rales, rhonchi, wheezing, or rubs  HEART: Irregular rhythm; No murmurs, rubs, or gallops  ABDOMEN: Soft, Nontender, Nondistended; Bowel sounds present  EXTREMITIES: edematous and mildly tender right lower extremity, non-erythematous, sensory and motor grossly intact in lower extremities    Allergies    No Known Allergies    Intolerances  LABS:                        10.0   4.8   )-----------( 253      ( 05 Aug 2017 06:46 )             30.5     05 Aug 2017 06:46    138    |  103    |  16     ----------------------------<  127    3.6     |  26     |  0.96     Ca    8.5        05 Aug 2017 06:46          CAPILLARY BLOOD GLUCOSE        UCx       RADIOLOGY & ADDITIONAL TESTS:    MEDICATIONS  (STANDING):  brimonidine 0.2% Ophthalmic Solution 1 Drop(s) Both EYES two times a day  dorzolamide 2%/timolol 0.5% Ophthalmic Solution 1 Drop(s) Both EYES two times a day  latanoprost 0.005% Ophthalmic Solution 1 Drop(s) Both EYES at bedtime  lactobacillus acidophilus 1 Tablet(s) Oral daily  docusate sodium 100 milliGRAM(s) Oral three times a day  fluticasone propionate 50 MICROgram(s)/spray Nasal Spray 1 Spray(s) Both Nostrils two times a day  aspirin  chewable 81 milliGRAM(s) Oral daily  enoxaparin Injectable 40 milliGRAM(s) SubCutaneous daily  fluconAZOLE   Tablet 200 milliGRAM(s) Oral daily  vancomycin  IVPB 1500 milliGRAM(s) IV Intermittent every 24 hours  metoprolol succinate ER 50 milliGRAM(s) Oral daily  ondansetron Injectable 4 milliGRAM(s) IV Push once    MEDICATIONS  (PRN):  ondansetron    Tablet 4 milliGRAM(s) Oral every 6 hours PRN Nausea and/or Vomiting  acetaminophen   Tablet. 650 milliGRAM(s) Oral every 6 hours PRN Mild Pain (1 - 3)  oxyCODONE    5 mG/acetaminophen 325 mG 1 Tablet(s) Oral every 4 hours PRN Severe Pain (7 - 10)  HYDROmorphone  Injectable 0.5 milliGRAM(s) IV Push every 4 hours PRN Moderate Pain (4 - 6)

## 2017-08-05 NOTE — PROGRESS NOTE ADULT - PROBLEM SELECTOR PLAN 2
Permanent A. Fib  -Recommend rate control with Toprol XL rediced to 50mg daily for lower side BP  -[id number] score of 3, recommend anticoagulation, pt has previously refused anticoagulation, continue aspirin 81mg

## 2017-08-05 NOTE — PROGRESS NOTE ADULT - ASSESSMENT
Assessment:    Grade 2 ulcer, dorsal right foot  Cellulitis, dorsal right foot (resolving)  s/p right foot wound debridement, POD#5, 7/31/17    Plan:    -Pt seen and eval  -Chart reviewed  -Dressings removed and wound further examined, cleansed with normal saline  -Silver alginate, dry sterile dressings and ABD pad applied to right foot wound  -Follow infectious disease recommendations  -Pt to be weightbearing to right lower extremity, as tolerated, weightbearing order placed in [place]  -Pt Tx plan discussed w/Dr. Vanegas, agreed upon    Podiatry to cont following Pt while in-house    WOUND CARE ORDER AND INSTRUCTIONS:    Wound dressings to be re-applied every other day, after discharge from [facility]    Patient to return to [facility], for continued evaluation, within 1 week upon discharge from [facility]    1. Remove dressing to right foot and cleanse right foot wound with sterile saline.  2. Pad dry with sterile 4x4 gauze.  3. Apply Xeroform directly to wound and cover with sterile 4x4 gauze, ABD pads, secure with 4" emily and medical tape.  4. Monitor for infections.
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
## 2017-08-06 LAB
ANION GAP SERPL CALC-SCNC: 8 MMOL/L — SIGNIFICANT CHANGE UP (ref 5–17)
BUN SERPL-MCNC: 21 MG/DL — SIGNIFICANT CHANGE UP (ref 7–23)
CALCIUM SERPL-MCNC: 8.9 MG/DL — SIGNIFICANT CHANGE UP (ref 8.5–10.1)
CHLORIDE SERPL-SCNC: 103 MMOL/L — SIGNIFICANT CHANGE UP (ref 96–108)
CO2 SERPL-SCNC: 29 MMOL/L — SIGNIFICANT CHANGE UP (ref 22–31)
CREAT SERPL-MCNC: 0.98 MG/DL — SIGNIFICANT CHANGE UP (ref 0.5–1.3)
GLUCOSE SERPL-MCNC: 81 MG/DL — SIGNIFICANT CHANGE UP (ref 70–99)
HCT VFR BLD CALC: 30.8 % — LOW (ref 34.5–45)
HGB BLD-MCNC: 10 G/DL — LOW (ref 11.5–15.5)
MCHC RBC-ENTMCNC: 32.5 GM/DL — SIGNIFICANT CHANGE UP (ref 32–36)
MCHC RBC-ENTMCNC: 32.7 PG — SIGNIFICANT CHANGE UP (ref 27–34)
MCV RBC AUTO: 100.7 FL — HIGH (ref 80–100)
PLATELET # BLD AUTO: 283 K/UL — SIGNIFICANT CHANGE UP (ref 150–400)
POTASSIUM SERPL-MCNC: 4 MMOL/L — SIGNIFICANT CHANGE UP (ref 3.5–5.3)
POTASSIUM SERPL-SCNC: 4 MMOL/L — SIGNIFICANT CHANGE UP (ref 3.5–5.3)
RBC # BLD: 3.06 M/UL — LOW (ref 3.8–5.2)
RBC # FLD: 12.1 % — SIGNIFICANT CHANGE UP (ref 10.3–14.5)
SODIUM SERPL-SCNC: 140 MMOL/L — SIGNIFICANT CHANGE UP (ref 135–145)
WBC # BLD: 5 K/UL — SIGNIFICANT CHANGE UP (ref 3.8–10.5)
WBC # FLD AUTO: 5 K/UL — SIGNIFICANT CHANGE UP (ref 3.8–10.5)

## 2017-08-06 PROCEDURE — 99233 SBSQ HOSP IP/OBS HIGH 50: CPT

## 2017-08-06 RX ORDER — OXYCODONE AND ACETAMINOPHEN 5; 325 MG/1; MG/1
1 TABLET ORAL ONCE
Qty: 0 | Refills: 0 | Status: DISCONTINUED | OUTPATIENT
Start: 2017-08-06 | End: 2017-08-06

## 2017-08-06 RX ADMIN — Medication 1 SPRAY(S): at 17:36

## 2017-08-06 RX ADMIN — Medication 1 TABLET(S): at 11:28

## 2017-08-06 RX ADMIN — Medication 100 MILLIGRAM(S): at 13:48

## 2017-08-06 RX ADMIN — Medication 650 MILLIGRAM(S): at 11:32

## 2017-08-06 RX ADMIN — Medication 650 MILLIGRAM(S): at 10:54

## 2017-08-06 RX ADMIN — BRIMONIDINE TARTRATE 1 DROP(S): 2 SOLUTION/ DROPS OPHTHALMIC at 17:40

## 2017-08-06 RX ADMIN — Medication 100 MILLIGRAM(S): at 23:17

## 2017-08-06 RX ADMIN — Medication 300 MILLIGRAM(S): at 05:28

## 2017-08-06 RX ADMIN — OXYCODONE AND ACETAMINOPHEN 1 TABLET(S): 5; 325 TABLET ORAL at 03:52

## 2017-08-06 RX ADMIN — OXYCODONE AND ACETAMINOPHEN 1 TABLET(S): 5; 325 TABLET ORAL at 23:50

## 2017-08-06 RX ADMIN — FLUCONAZOLE 200 MILLIGRAM(S): 150 TABLET ORAL at 11:28

## 2017-08-06 RX ADMIN — ENOXAPARIN SODIUM 40 MILLIGRAM(S): 100 INJECTION SUBCUTANEOUS at 11:28

## 2017-08-06 RX ADMIN — BRIMONIDINE TARTRATE 1 DROP(S): 2 SOLUTION/ DROPS OPHTHALMIC at 05:30

## 2017-08-06 RX ADMIN — OXYCODONE AND ACETAMINOPHEN 1 TABLET(S): 5; 325 TABLET ORAL at 00:40

## 2017-08-06 RX ADMIN — Medication 81 MILLIGRAM(S): at 11:28

## 2017-08-06 RX ADMIN — LATANOPROST 1 DROP(S): 0.05 SOLUTION/ DROPS OPHTHALMIC; TOPICAL at 23:19

## 2017-08-06 RX ADMIN — DORZOLAMIDE HYDROCHLORIDE TIMOLOL MALEATE 1 DROP(S): 20; 5 SOLUTION/ DROPS OPHTHALMIC at 05:30

## 2017-08-06 RX ADMIN — Medication 1 SPRAY(S): at 05:29

## 2017-08-06 RX ADMIN — Medication 650 MILLIGRAM(S): at 18:30

## 2017-08-06 RX ADMIN — DORZOLAMIDE HYDROCHLORIDE TIMOLOL MALEATE 1 DROP(S): 20; 5 SOLUTION/ DROPS OPHTHALMIC at 17:39

## 2017-08-06 RX ADMIN — Medication 650 MILLIGRAM(S): at 17:36

## 2017-08-06 RX ADMIN — OXYCODONE AND ACETAMINOPHEN 1 TABLET(S): 5; 325 TABLET ORAL at 23:17

## 2017-08-06 RX ADMIN — OXYCODONE AND ACETAMINOPHEN 1 TABLET(S): 5; 325 TABLET ORAL at 04:42

## 2017-08-06 RX ADMIN — Medication 100 MILLIGRAM(S): at 05:29

## 2017-08-06 NOTE — PROGRESS NOTE ADULT - PROBLEM SELECTOR PLAN 2
Permanent A. Fib  -Recommend rate control with Toprol XL rediced to 50mg daily for lower side BP  -[id number] score of 3, recommend anticoagulation, pt has previously refused anticoagulation, continue aspirin 81mg

## 2017-08-06 NOTE — PROGRESS NOTE ADULT - PROBLEM SELECTOR PLAN 1
MRI showed osteomyelitis distal fibula, synovitis, reactive osteitis  S/p debridement in OR,   Continue fluconazole 200mg PO daily, and Vancomycin  IR - Left Internal Jugular PICC line placed, ready for use  PT with WBAT

## 2017-08-06 NOTE — PROGRESS NOTE ADULT - ASSESSMENT
Problem/Recommendation - 1:  Problem: Permanent atrial fibrillation. Recommendation: patient undergoing rate control strategy with Toprol XL.  Dosage was increased to 100mg daily, however patient's blood pressure has been on low side. Dose decreased to 50mg daily. HRs have been less than 100 generally. In the past, patient refused anticoagulation despite knowing the risks of stroke. Given her [id number] of 3, I would recommend oral anticoagulation and I explained this to her. She said she will think about it and will discuss it with us in our office. In the meantime, continue Aspirin 81mg daily.    Problem/Recommendation - 2:  ·  Problem: Left ventricular dysfunction.  Recommendation: inpatient Echocardiogram during this admission revealed EF 45%, which is likely an overestimation given the fact that patient has moderate-severe MR. In addition, she has moderate aortic valve insufficiency. The patient is currently asymptomatic from congestive symptom standpoint. She has been complaining of increased fatigue over the past 6 months which could be do to her cardiomyopathy. I reviewed the records from our office regarding this patient. I explained to her that my recommendation would be for her to undergo a cardiac catheterization as well as a OLEKSANDR in the future. Since she has no signs of ischemia/ACS or congestive symptoms at this time she should recover from her recent foot injury/surgery. When she is discharged from the hospital she should make an appointment in our office regarding the scheduling of these tests if she still wants to have them done.     Problem/Recommendation - 3:  ·  Problem: Other osteomyelitis of right foot.  Recommendation: on antibiotics. patient s/p debridement of the right foot POD #5. Continue supportive care and adequate pain control. Patient s/p insertion of PICC line for long term antibiotic use. ID following.    Problem/Recommendation - 4:  ·  Problem: Mitral valve insufficiency, unspecified etiology.  Recommendation: on transthoracic echocardiogram it demonstrates moderate to severe mitral regurgitation. The mechanism is unclear. I explained to the patient the need for a transesophageal echocardiogram in the future to better elicit the severity and mechanism of the mitral regurgitation. From a congestive standpoint, the patient is asymptomatic. She can follow up in our office with regards to scheduling this procedure.
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
## 2017-08-06 NOTE — PROGRESS NOTE ADULT - SUBJECTIVE AND OBJECTIVE BOX
81 yo F PMHx A-fib not on A/C due to pt refusal, diverticulosis s/p colon resection, history of C diff, breast Ca s/p B/L masectomy, presenting with RLE wound with surrounding cellulitis. Failure to heal outpatient on Doxy, S/P debridement of wound in OR on 7/31. Now found to have acute osteomyelitis of distal fibula. Pt had an episode of bleeding at would with cauterization procedure at bedside. Pt denies any chest pain, SOB, nausea or vomiting.      Pt seen and examined at bedside, pt complained about mild swelling in right lower extremity. She feels god today  Vital Signs Last 24 Hrs  T(C): 36.4 (06 Aug 2017 07:15), Max: 36.9 (05 Aug 2017 20:13)  T(F): 97.6 (06 Aug 2017 07:15), Max: 98.4 (05 Aug 2017 20:13)  HR: 94 (06 Aug 2017 07:15) (81 - 102)  BP: 134/71 (06 Aug 2017 07:15) (99/67 - 144/92)  BP(mean): --  RR: 17 (06 Aug 2017 07:15) (17 - 19)  SpO2: 96% (06 Aug 2017 07:15) (94% - 96%)    Physical Exam:  GENERAL: NAD  HEAD:  Atraumatic, Normocephalic  EYES: EOMI, PERRLA, conjunctiva and sclera clear  NERVOUS SYSTEM:  Alert & Oriented X3, no gross CN deficits  CHEST/LUNG: Clear to percussion bilaterally; No rales, rhonchi, wheezing, or rubs  HEART: Irregular rhythm; No murmurs, rubs, or gallops  ABDOMEN: Soft, Nontender, Nondistended; Bowel sounds present  EXTREMITIES: edematous and mildly tender right lower extremity, non-erythematous, sensory and motor grossly intact in lower extremities    Allergies    No Known Allergies    Intolerances  LABS:                        10.0   5.0   )-----------( 283      ( 06 Aug 2017 06:51 )             30.8     06 Aug 2017 06:51    140    |  103    |  21     ----------------------------<  81     4.0     |  29     |  0.98     Ca    8.9        06 Aug 2017 06:51          CAPILLARY BLOOD GLUCOSE        UCx       RADIOLOGY & ADDITIONAL TESTS:        RADIOLOGY & ADDITIONAL TESTS:    MEDICATIONS  (STANDING):  brimonidine 0.2% Ophthalmic Solution 1 Drop(s) Both EYES two times a day  dorzolamide 2%/timolol 0.5% Ophthalmic Solution 1 Drop(s) Both EYES two times a day  latanoprost 0.005% Ophthalmic Solution 1 Drop(s) Both EYES at bedtime  lactobacillus acidophilus 1 Tablet(s) Oral daily  docusate sodium 100 milliGRAM(s) Oral three times a day  fluticasone propionate 50 MICROgram(s)/spray Nasal Spray 1 Spray(s) Both Nostrils two times a day  aspirin  chewable 81 milliGRAM(s) Oral daily  enoxaparin Injectable 40 milliGRAM(s) SubCutaneous daily  fluconAZOLE   Tablet 200 milliGRAM(s) Oral daily  vancomycin  IVPB 1500 milliGRAM(s) IV Intermittent every 24 hours  metoprolol succinate ER 50 milliGRAM(s) Oral daily  ondansetron Injectable 4 milliGRAM(s) IV Push once    MEDICATIONS  (PRN):  ondansetron    Tablet 4 milliGRAM(s) Oral every 6 hours PRN Nausea and/or Vomiting  acetaminophen   Tablet. 650 milliGRAM(s) Oral every 6 hours PRN Mild Pain (1 - 3)  oxyCODONE    5 mG/acetaminophen 325 mG 1 Tablet(s) Oral every 4 hours PRN Severe Pain (7 - 10)  HYDROmorphone  Injectable 0.5 milliGRAM(s) IV Push every 4 hours PRN Moderate Pain (4 - 6)

## 2017-08-07 LAB — VANCOMYCIN TROUGH SERPL-MCNC: 12.6 UG/ML — SIGNIFICANT CHANGE UP (ref 10–20)

## 2017-08-07 PROCEDURE — 99233 SBSQ HOSP IP/OBS HIGH 50: CPT

## 2017-08-07 RX ORDER — OXYCODONE HYDROCHLORIDE 5 MG/1
10 TABLET ORAL EVERY 6 HOURS
Qty: 0 | Refills: 0 | Status: DISCONTINUED | OUTPATIENT
Start: 2017-08-07 | End: 2017-08-07

## 2017-08-07 RX ORDER — OXYCODONE AND ACETAMINOPHEN 5; 325 MG/1; MG/1
1 TABLET ORAL ONCE
Qty: 0 | Refills: 0 | Status: DISCONTINUED | OUTPATIENT
Start: 2017-08-07 | End: 2017-08-07

## 2017-08-07 RX ADMIN — ENOXAPARIN SODIUM 40 MILLIGRAM(S): 100 INJECTION SUBCUTANEOUS at 11:46

## 2017-08-07 RX ADMIN — OXYCODONE AND ACETAMINOPHEN 1 TABLET(S): 5; 325 TABLET ORAL at 23:49

## 2017-08-07 RX ADMIN — OXYCODONE AND ACETAMINOPHEN 1 TABLET(S): 5; 325 TABLET ORAL at 16:28

## 2017-08-07 RX ADMIN — OXYCODONE AND ACETAMINOPHEN 1 TABLET(S): 5; 325 TABLET ORAL at 05:44

## 2017-08-07 RX ADMIN — Medication 100 MILLIGRAM(S): at 13:33

## 2017-08-07 RX ADMIN — Medication 1 SPRAY(S): at 17:25

## 2017-08-07 RX ADMIN — Medication 1 TABLET(S): at 11:45

## 2017-08-07 RX ADMIN — Medication 100 MILLIGRAM(S): at 05:43

## 2017-08-07 RX ADMIN — BRIMONIDINE TARTRATE 1 DROP(S): 2 SOLUTION/ DROPS OPHTHALMIC at 05:43

## 2017-08-07 RX ADMIN — Medication 100 MILLIGRAM(S): at 23:48

## 2017-08-07 RX ADMIN — OXYCODONE AND ACETAMINOPHEN 1 TABLET(S): 5; 325 TABLET ORAL at 06:15

## 2017-08-07 RX ADMIN — Medication 300 MILLIGRAM(S): at 05:43

## 2017-08-07 RX ADMIN — DORZOLAMIDE HYDROCHLORIDE TIMOLOL MALEATE 1 DROP(S): 20; 5 SOLUTION/ DROPS OPHTHALMIC at 17:25

## 2017-08-07 RX ADMIN — OXYCODONE AND ACETAMINOPHEN 1 TABLET(S): 5; 325 TABLET ORAL at 17:00

## 2017-08-07 RX ADMIN — Medication 81 MILLIGRAM(S): at 11:45

## 2017-08-07 RX ADMIN — Medication 1 SPRAY(S): at 05:42

## 2017-08-07 RX ADMIN — BRIMONIDINE TARTRATE 1 DROP(S): 2 SOLUTION/ DROPS OPHTHALMIC at 17:25

## 2017-08-07 RX ADMIN — FLUCONAZOLE 200 MILLIGRAM(S): 150 TABLET ORAL at 11:45

## 2017-08-07 RX ADMIN — LATANOPROST 1 DROP(S): 0.05 SOLUTION/ DROPS OPHTHALMIC; TOPICAL at 23:49

## 2017-08-07 RX ADMIN — Medication 50 MILLIGRAM(S): at 05:44

## 2017-08-07 RX ADMIN — DORZOLAMIDE HYDROCHLORIDE TIMOLOL MALEATE 1 DROP(S): 20; 5 SOLUTION/ DROPS OPHTHALMIC at 05:43

## 2017-08-07 NOTE — PROGRESS NOTE ADULT - ATTENDING COMMENTS
3 yo F PMHx A-fib not on A/C due to pt refusal, diverticulosis s/p colon resection, history of C diff, breast Ca s/p B/L masectomy, presenting with RLE wound with surrounding cellulitis, failed to heal outpatient on Doxy, S/P debridement of wound in OR on 7/31 now POD#7. Pt is found to have acute osteomyelitis of distal fibula.   On IV antibx vanco, and diflucan .  Has PermCath and is stable for d/c to [facility].
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
## 2017-08-07 NOTE — PROGRESS NOTE ADULT - SUBJECTIVE AND OBJECTIVE BOX
JACEK HERNDON is a 82yFemale , patient examined and chart reviewed. Patient seen and examined today being followed for infected ulcer rught ankles with increased pain       INTERVAL HPI/ OVERNIGHT EVENTS: Events noted, she is s/p   I & D of right ankle . Podiatry follow up noted, had significant bleeding from wound so it was cauterized . She had PermCath placed as patient has hx of lymphedema . Still complaints of right ankle pain . Feels better       PAST MEDICAL & SURGICAL HISTORY:  C. difficile diarrhea  Gall stones  Breast cancer  Atrial fibrillation  PSVT (paroxysmal supraventricular tachycardia)  Glaucoma  Diverticulitis  S/P colon resection  H/O: hysterectomy  S/P ORIF (open reduction internal fixation) fracture: L lower extremity  H/O hernia repair  H/O mastectomy, bilateral  Status post appendectomy  S/P colon resection: march 11 2014      For details regarding the patient's social history, family history, and other miscellaneous elements, please refer the initial infectious diseases consultation and/or the admitting history and physical examination for this admission.    ROS:  CONSTITUTIONAL:  Negative fever or chills, feels well, good appetite  EYES:  Negative  blurry vision or double vision  CARDIOVASCULAR:  Negative for chest pain or palpitations  RESPIRATORY:  Negative for cough, wheezing, or SOB   GASTROINTESTINAL:  Negative for nausea, vomiting, diarrhea, constipation, or abdominal pain  GENITOURINARY:  Negative frequency, urgency or dysuria  NEUROLOGIC:  No headache, confusion, dizziness, lightheadedness  All other systems were reviewed and are negative         Current inpatient medications :  MEDICATIONS  (STANDING):  brimonidine 0.2% Ophthalmic Solution 1 Drop(s) Both EYES two times a day  dorzolamide 2%/timolol 0.5% Ophthalmic Solution 1 Drop(s) Both EYES two times a day  latanoprost 0.005% Ophthalmic Solution 1 Drop(s) Both EYES at bedtime  lactobacillus acidophilus 1 Tablet(s) Oral daily  docusate sodium 100 milliGRAM(s) Oral three times a day  fluticasone propionate 50 MICROgram(s)/spray Nasal Spray 1 Spray(s) Both Nostrils two times a day  aspirin  chewable 81 milliGRAM(s) Oral daily  enoxaparin Injectable 40 milliGRAM(s) SubCutaneous daily  fluconAZOLE   Tablet 200 milliGRAM(s) Oral daily  vancomycin  IVPB 1500 milliGRAM(s) IV Intermittent every 24 hours  metoprolol succinate ER 50 milliGRAM(s) Oral daily    MEDICATIONS  (PRN):  ondansetron    Tablet 4 milliGRAM(s) Oral every 6 hours PRN Nausea and/or Vomiting  acetaminophen   Tablet. 650 milliGRAM(s) Oral every 6 hours PRN Mild Pain (1 - 3)  acetaminophen   Tablet. 650 milliGRAM(s) Oral every 6 hours PRN Moderate Pain (4 - 6)    Objective:  Vital Signs Last 24 Hrs  T(C): 36.4 (07 Aug 2017 12:02), Max: 36.9 (06 Aug 2017 15:37)  T(F): 97.5 (07 Aug 2017 12:02), Max: 98.4 (06 Aug 2017 15:37)  HR: 92 (07 Aug 2017 12:02) (87 - 99)  BP: 100/66 (07 Aug 2017 12:02) (100/66 - 127/78)  BP(mean): --  RR: 17 (07 Aug 2017 12:02) (17 - 18)  SpO2: 94% (07 Aug 2017 12:02) (93% - 96%)    Physical Exam:  General: well developed well nourished, in no acute distress  Eyes: sclera anicteric, pupils equal and reactive to light  ENMT: buccal mucosa moist, pharynx not injected  Neck: supple, trachea midline  Lungs: clear, no wheeze/rhonchi  Cardiovascular: regular rate and rhythm, S1 S2  Abdomen: soft, nontender, no organomegaly present, bowel sounds normal  Neurological: alert and oriented x3, Cranial Nerves II-XII grossly intact  Skin: no increased ecchymosis/petechiae/purpura  Lymph Nodes: no palpable cervical/supraclavicular lymph nodes enlargements  Extremities: no cyanosis/clubbing/edema, right ankle post op dressing in place . Wound as per podiatry - All web spaces are clean, dry and intact. Open skin wound noted to dorsal-lateral aspect of right foot, with moderate to severe active sanguinous drainage, no malodor, and no periwound erythema, negative probe to bone test, and granular wound bed.. no necrotic tissue       LABS:    Basic Metabolic Panel in AM (08.06.17 @ 06:51)    Sodium, Serum: 140 mmol/L    Potassium, Serum: 4.0 mmol/L    Chloride, Serum: 103 mmol/L    Carbon Dioxide, Serum: 29 mmol/L    Anion Gap, Serum: 8 mmol/L    Blood Urea Nitrogen, Serum: 21 mg/dL    Creatinine, Serum: 0.98 mg/dL    Glucose, Serum: 81 mg/dL    Calcium, Total Serum: 8.9 mg/dL        Vancomycin Level, Trough: 12.6:  (08.07.17 @ 04:37)         Surgical Pathology Final Report -  (07.31.17 @ 10:43)    Surgical Pathology Final Report - :   ACCESSION No:  [id number]    JACEK HERNDON    Surgical Final Report  Final Diagnosis    Soft tissue, right foot, debridement:  - Benign soft tissue demonstrating severe acute and chronic  necrotizing inflammation, dense fibrosis and reactive  changes.    Omid Castaneda M.D.  (Electronic Signature)  Reported on: 08/01/17    Clinical Information  Procedure: Surgical debridement right foot pulse lavage    Specimen Description  Pathology soft tissue right foot    Gross Description  The specimen is received in formalin and the specimen container  is labeled: Pathology soft tissue right foot. It consists of a  piece of dusky red-pink to gray-tan soft tissue measuring 5.0 x  1.0 x 0.3 cm. The specimen is serially sectioned and  representatively submitted. One cassette.    In addition to other data that may appear on the specimen  container, the label has been inspected to confirm the presence  of the patient's name and date of birth.  DN 8/1/2017 6:46AM          RECENT CULTURES:      Culture - Tissue with Gram Stain (07.31.17 @ 12:20)    Gram Stain:   No polymorphonuclear leukocytes per low power field  Moderate Yeast like cells per oil power field    Specimen Source: .Tissue Other, right foot    Culture Results:   Moderate Candida parapsilosis  Referred to Mycology        Culture - Other (07.27.17 @ 01:34)    Specimen Source: .Other right foot wound - swab    Culture Results:   No growth to date    Culture - Blood (07.26.17 @ 18:20)    Specimen Source: .Blood Blood-Peripheral    Culture Results:   No growth to date.    RADIOLOGY & ADDITIONAL STUDIES:  MRI Foot w/wo Cont, Right (07.27.17 @ 13:34) >  Impression:    Broad wound along the lateral malleolus. Acute osteomyelitis of the   imaged distal fibula with mild subcortical osteitis along the lateral   tibial plafond at the level of the syndesmosis. Fluid tracks from the   level of the wound at the lateral fibula along the dorsal subcutaneous   fat overlying the extensor digitorum brevis muscle consistent with a   small abscess. Prominent surrounding cellulitis is noted along the   lateral aspect of the ankle.    Nonspecific subcortical edema and enhancement along the lateral wall the   calcaneus just posterior to the lateral malleolus and along the anterior   lip of the tibial plafond with grossly preserved T1 marrow signal. This   may be related to reactive osteitis.    Small tibiotalar joint effusion with synovial enhancement consistent with   synovitis.     US Duplex Arterial Lower Ext Ltd, Right (07.27.17 @ 12:00) >    Impression:   1. Limited exam as discussed above.  2. No hemodynamically significant stenotic changes seen in the visualized   right leg arteries.        Assessment and Plan:   Assessment:  · Assessment		  81 yo F PMHx A-fib not on A/C due to pt refusal, diverticulosis s/p colon resection, history of C diff, breast CA s/p B/L mastectomy admitted with RLE cellulitis 2/2 failed treatment with doxycycline. She had a traumatic injury with skin necrosis on right ankle , due tot the proximity of the wound to lateral malleolus, possibility of OM cannot be ruled out .    MRI shows acute OM of lateral malleolus with small abscess adjacent to it , She is S/P I & D of right ankle     Plan:  - will change Vancomycin to 1500 mg daily ., she needs it for total 6 weeks to end on 9/6/17  .  - continue with Diflucan x 2 weeks   - She has a PermCath so will need to return to IR for removal once IV antibiotics are done   - weight bearing status as per podiatry  - DC plan to [facility] as doubt she can handle IV antibiotics at home     - wound care as per podiatry   - pain control      I have discussed the above plan of care with patient in detail. She expressed understanding of the  treatment plan . Risks, benefits and alternatives discussed in detail. I have asked if she has any questions or concerns and appropriately addressed them to the best of my ability .    > 35 minutes were spent in direct patient care reviewing notes, medications ,labs data/ imaging , discussion with multidisciplinary team.    Thank you for allowing me to participate in care of your patient .    Neftali Esparza MD  [phone]

## 2017-08-07 NOTE — PROGRESS NOTE ADULT - ASSESSMENT
Problem/Recommendation - 1:  Problem: Permanent atrial fibrillation. Recommendation: patient undergoing rate control strategy with Toprol XL.  Dosage was increased to 100mg daily, however patient's blood pressure has been on low side. Dose decreased to 50mg daily. HRs have been less than 100 generally. Patient had episode of rapid rates while washing herself this morning, however she missed a dose of Toprol XL 100mg yesterday. No medication adjustment needed at this time.  In the past, patient refused anticoagulation despite knowing the risks of stroke. Given her [id number] of 3, I would recommend oral anticoagulation and I explained this to her. She said she will think about it and will discuss it with us in our office. In the meantime, continue Aspirin 81mg daily.    Problem/Recommendation - 2:  ·  Problem: Left ventricular dysfunction.  Recommendation: inpatient Echocardiogram during this admission revealed EF 45%, which is likely an overestimation given the fact that patient has moderate-severe MR. In addition, she has moderate aortic valve insufficiency. The patient is currently asymptomatic from congestive symptom standpoint. She has been complaining of increased fatigue over the past 6 months which could be do to her cardiomyopathy. I reviewed the records from our office regarding this patient. I explained to her that my recommendation would be for her to undergo a cardiac catheterization as well as a OLEKSANDR in the future. Since she has no signs of ischemia/ACS or congestive symptoms at this time she should recover from her recent foot injury/surgery. When she is discharged from the hospital she should make an appointment in our office regarding the scheduling of these tests if she still wants to have them done.     Problem/Recommendation - 3:  ·  Problem: Other osteomyelitis of right foot.  Recommendation: on antibiotics. patient s/p debridement of the right foot POD #5. Continue supportive care and adequate pain control. Patient s/p insertion of PICC line for long term antibiotic use. ID following.    Problem/Recommendation - 4:  ·  Problem: Mitral valve insufficiency, unspecified etiology.  Recommendation: on transthoracic echocardiogram it demonstrates moderate to severe mitral regurgitation. The mechanism is unclear. I explained to the patient the need for a transesophageal echocardiogram in the future to better elicit the severity and mechanism of the mitral regurgitation. From a congestive standpoint, the patient is asymptomatic. She can follow up in our office with regards to scheduling this procedure.

## 2017-08-07 NOTE — PROGRESS NOTE ADULT - SUBJECTIVE AND OBJECTIVE BOX
81 yo F PMHx A-fib not on A/C due to pt refusal, diverticulosis s/p colon resection, history of C diff, breast Ca s/p B/L masectomy, presenting with RLE wound with surrounding cellulitis. Failure to heal outpatient on Doxy, S/P debridement of wound in OR on 7/31. Now found to have acute osteomyelitis of distal fibula. Pt had an episode of bleeding at would with cauterization procedure at bedside. Pt denies any chest pain, SOB, nausea or vomiting. 83 yo F PMHx A-fib not on A/C due to pt refusal, diverticulosis s/p colon resection, history of C diff, breast Ca s/p B/L masectomy, presenting with RLE wound with surrounding cellulitis. Failure to heal outpatient on Doxy, S/P debridement of wound in OR on . Now found to have acute osteomyelitis of distal fibula. Pt had an episode of bleeding at would with cauterization procedure at bedside. Pt denies any chest pain, SOB, nausea or vomiting.    Pt seen and examined at bedside, pt denies any acute complaints at this time.  Tele overnight: A. Fib and flutter, , 160-170s when pt on commode.     Vital Signs - Last 24 Hrs    T(C): 37.1 (17 @ 15:14), Max: 37.1 (17 @ 15:14)  HR: 94 (17 @ 15:14) (87 - 99)  BP: 92/60 (17 @ 15:14) (92/60 - 127/78)  RR: 17 (17 @ 15:14) (17 - 18)  SpO2: 95% (17 @ 15:14) (93% - 96%)  Wt(kg): --  Daily     Daily Weight in k.4 (07 Aug 2017 05:29)    I&O's Summary    06 Aug 2017 07:01  -  07 Aug 2017 07:00  --------------------------------------------------------  IN: 860 mL / OUT: 0 mL / NET: 860 mL       Physical Exam:  GENERAL: NAD  HEAD:  Atraumatic, Normocephalic  EYES: EOMI, PERRLA, conjunctiva and sclera clear  NERVOUS SYSTEM:  Alert & Oriented X3, no gross CN deficits  CHEST/LUNG: Clear to percussion bilaterally; No rales, rhonchi, wheezing, or rubs  HEART: Irregular rhythm; No murmurs, rubs, or gallops  ABDOMEN: Soft, Nontender, Nondistended; Bowel sounds present  EXTREMITIES: edematous and mildly tender right lower extremity, non-erythematous, sensory and motor grossly intact in lower extremities, Wound- healing well, bandage is clean, dry,      LABS:      Ca    8.9        06 Aug 2017 06:51    CAPILLARY BLOOD GLUCOSE    RADIOLOGY & ADDITIONAL TESTS:    MEDICATIONS  (STANDING):  brimonidine 0.2% Ophthalmic Solution 1 Drop(s) Both EYES two times a day  dorzolamide 2%/timolol 0.5% Ophthalmic Solution 1 Drop(s) Both EYES two times a day  latanoprost 0.005% Ophthalmic Solution 1 Drop(s) Both EYES at bedtime  lactobacillus acidophilus 1 Tablet(s) Oral daily  docusate sodium 100 milliGRAM(s) Oral three times a day  fluticasone propionate 50 MICROgram(s)/spray Nasal Spray 1 Spray(s) Both Nostrils two times a day  aspirin  chewable 81 milliGRAM(s) Oral daily  enoxaparin Injectable 40 milliGRAM(s) SubCutaneous daily  fluconAZOLE   Tablet 200 milliGRAM(s) Oral daily  vancomycin  IVPB 1500 milliGRAM(s) IV Intermittent every 24 hours  metoprolol succinate ER 50 milliGRAM(s) Oral daily    MEDICATIONS  (PRN):  ondansetron    Tablet 4 milliGRAM(s) Oral every 6 hours PRN Nausea and/or Vomiting  acetaminophen   Tablet. 650 milliGRAM(s) Oral every 6 hours PRN Mild Pain (1 - 3)  acetaminophen   Tablet. 650 milliGRAM(s) Oral every 6 hours PRN Moderate Pain (4 - 6)      Allergies    No Known Allergies    Intolerances

## 2017-08-07 NOTE — PROGRESS NOTE ADULT - ASSESSMENT
83 yo F PMHx A-fib not on A/C due to pt refusal, diverticulosis s/p colon resection, history of C diff, breast Ca s/p B/L masectomy, presenting with RLE wound with surrounding cellulitis, failed to heal outpatient on Doxy, S/P debridement of wound in OR on 7/31 now POD#7. Pt is now found to have acute osteomyelitis of distal fibula.

## 2017-08-07 NOTE — PROGRESS NOTE ADULT - PROBLEM SELECTOR PLAN 1
MRI showed osteomyelitis distal fibula, synovitis, reactive osteitis  S/p debridement in OR,   Vancomycin to 1500 mg daily, she needs it for total 6 weeks to end on 9/6/17   - continue with Diflucan x 2 weeks   IR - Left Internal Jugular PICC line placed, ready for use, (return to IR for PermCath removal once IV antibiotics are done)  -Weight bearing status, DC to subacute rehab per PT recs,

## 2017-08-07 NOTE — PROGRESS NOTE ADULT - ATTENDING COMMENTS
Patient stable from a cardiovascular standpoint to be discharged to rehab. Patient to follow up with her cardiologist Dr. Rich Hogan.
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
## 2017-08-07 NOTE — PROGRESS NOTE ADULT - PROBLEM SELECTOR PLAN 2
Permanent A. Fib  -Recommend rate control with Toprol XL reduced to 50mg daily for lower side BP  -[id number] score of 3, recommend anticoagulation, pt has previously refused anticoagulation, continue aspirin 81mg

## 2017-08-07 NOTE — PROGRESS NOTE ADULT - ASSESSMENT
Assessment:    Grade 2 ulcer, dorsal right foot  Cellulitis, dorsal right foot (resolving)  s/p right foot wound debridement, POD#7, 7/31/17    Plan:    -Pt seen and eval  -Chart reviewed  -Dressings removed and wound further examined, cleansed with normal saline  -Silver alginate, dry sterile dressings and ABD pad applied to right foot wound  -Follow infectious disease recommendations  -Pt to be weightbearing to right lower extremity, as tolerated, weightbearing order placed in [place]  -Pt Tx plan discussed w/Dr. Vanegas, agreed upon    Podiatry to cont following Pt while in-house    WOUND CARE ORDER AND INSTRUCTIONS:    Wound dressings to be re-applied every day, after discharge from [facility]    Patient to return to [facility], for continued evaluation, within 1 week upon discharge from [facility]    1. Remove dressing to right foot and cleanse right foot wound with sterile saline.  2. Pad dry with sterile 4x4 gauze.  3. Apply silver alginate directly to wound and cover with sterile 4x4 gauze, ABD pads, secure with 4" emily and medical tape.  4. Monitor for infections. Assessment:    Grade 2 ulcer, dorsal right foot  Cellulitis, dorsal right foot (resolving)  s/p right foot wound debridement, POD#7, 7/31/17    Plan:    -Pt seen and eval  -Chart reviewed  -Dressings removed and wound further examined, cleansed with normal saline  -Silver alginate, dry sterile dressings and ABD pad applied to right foot wound  -Follow infectious disease recommendations  -Pt to be weightbearing to right lower extremity, as tolerated, weightbearing order placed in [place]  -Pt Tx plan discussed w/Dr. Vanegas, agreed upon    Podiatry to cont following Pt while in-house    WOUND CARE ORDER AND INSTRUCTIONS:    Wound dressings to be re-applied every day, after discharge from [facility]    Patient to return to [facility], for continued evaluation, within 2-3 days upon discharge from [facility]    1. Remove dressing to right foot and cleanse right foot wound with sterile saline.  2. Pad dry with sterile 4x4 gauze.  3. Apply silver alginate directly to wound and cover with sterile 4x4 gauze, ABD pads, secure with 4" emily and medical tape.  4. Monitor for infections. Assessment:    Grade 2 ulcer, dorsal right foot  Cellulitis, dorsal right foot (resolving)  s/p right foot wound debridement, POD#7, 7/31/17    Plan:    -Pt seen and eval  -Chart reviewed  -Dressings removed and wound further examined, cleansed with normal saline  -Silver alginate, dry sterile dressings and ABD pad applied to right foot wound  -Follow infectious disease recommendations  -Pt to be weightbearing to right lower extremity, as tolerated, weightbearing order placed in [place]  -Pt Tx plan discussed w/Dr. Vanegas, agreed upon    Podiatry to cont following Pt while in-house    WOUND CARE ORDER AND INSTRUCTIONS:    Wound dressings to be re-applied every day, after discharge from [facility]    Patient to return to [facility]'s [facility], for continued evaluation, within 2-3 days upon discharge from [facility]    1. Remove dressing to right foot and cleanse right foot wound with sterile saline.  2. Pad dry with sterile 4x4 gauze.  3. Apply hydrogel directly to wound and cover with sterile 4x4 gauze, ABD pads, secure with 4" emily and medical tape.  4. Monitor for infections.
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
## 2017-08-07 NOTE — PROGRESS NOTE ADULT - PROBLEM SELECTOR PLAN 3
-Echo should EF 45%, with moderate to sever mitral regurg, aortic valve insufficiency  -Pt is currently has no ACS, ischemia, or congestive symptoms but has reported increase fatigue over past 6 months  Cardio: recommends cardiac catheterization and OLEKSANDR outpatient -Echo showed EF 45%, with moderate to sever mitral regurg, aortic valve insufficiency  -Pt is currently has no ACS, ischemia, or congestive symptoms but has reported increase fatigue over past 6 months  Cardio: recommends cardiac catheterization and OLEKSANDR outpatient

## 2017-08-08 LAB
ANION GAP SERPL CALC-SCNC: 9 MMOL/L — SIGNIFICANT CHANGE UP (ref 5–17)
BUN SERPL-MCNC: 20 MG/DL — SIGNIFICANT CHANGE UP (ref 7–23)
CALCIUM SERPL-MCNC: 9 MG/DL — SIGNIFICANT CHANGE UP (ref 8.5–10.1)
CHLORIDE SERPL-SCNC: 101 MMOL/L — SIGNIFICANT CHANGE UP (ref 96–108)
CO2 SERPL-SCNC: 27 MMOL/L — SIGNIFICANT CHANGE UP (ref 22–31)
CREAT SERPL-MCNC: 0.96 MG/DL — SIGNIFICANT CHANGE UP (ref 0.5–1.3)
GLUCOSE SERPL-MCNC: 68 MG/DL — LOW (ref 70–99)
HCT VFR BLD CALC: 33.6 % — LOW (ref 34.5–45)
HGB BLD-MCNC: 11.1 G/DL — LOW (ref 11.5–15.5)
MCHC RBC-ENTMCNC: 33.2 GM/DL — SIGNIFICANT CHANGE UP (ref 32–36)
MCHC RBC-ENTMCNC: 33.4 PG — SIGNIFICANT CHANGE UP (ref 27–34)
MCV RBC AUTO: 100.8 FL — HIGH (ref 80–100)
PLATELET # BLD AUTO: 356 K/UL — SIGNIFICANT CHANGE UP (ref 150–400)
POTASSIUM SERPL-MCNC: 3.9 MMOL/L — SIGNIFICANT CHANGE UP (ref 3.5–5.3)
POTASSIUM SERPL-SCNC: 3.9 MMOL/L — SIGNIFICANT CHANGE UP (ref 3.5–5.3)
RBC # BLD: 3.33 M/UL — LOW (ref 3.8–5.2)
RBC # FLD: 12.6 % — SIGNIFICANT CHANGE UP (ref 10.3–14.5)
SODIUM SERPL-SCNC: 137 MMOL/L — SIGNIFICANT CHANGE UP (ref 135–145)
WBC # BLD: 6.5 K/UL — SIGNIFICANT CHANGE UP (ref 3.8–10.5)
WBC # FLD AUTO: 6.5 K/UL — SIGNIFICANT CHANGE UP (ref 3.8–10.5)

## 2017-08-08 PROCEDURE — 99233 SBSQ HOSP IP/OBS HIGH 50: CPT

## 2017-08-08 RX ORDER — OXYCODONE AND ACETAMINOPHEN 5; 325 MG/1; MG/1
1 TABLET ORAL ONCE
Qty: 0 | Refills: 0 | Status: DISCONTINUED | OUTPATIENT
Start: 2017-08-08 | End: 2017-08-08

## 2017-08-08 RX ORDER — METOPROLOL TARTRATE 50 MG
50 TABLET ORAL DAILY
Qty: 0 | Refills: 0 | Status: DISCONTINUED | OUTPATIENT
Start: 2017-08-08 | End: 2017-08-09

## 2017-08-08 RX ORDER — POLYETHYLENE GLYCOL 3350 17 G/17G
17 POWDER, FOR SOLUTION ORAL DAILY
Qty: 0 | Refills: 0 | Status: DISCONTINUED | OUTPATIENT
Start: 2017-08-08 | End: 2017-08-09

## 2017-08-08 RX ADMIN — FLUCONAZOLE 200 MILLIGRAM(S): 150 TABLET ORAL at 11:38

## 2017-08-08 RX ADMIN — OXYCODONE AND ACETAMINOPHEN 1 TABLET(S): 5; 325 TABLET ORAL at 00:30

## 2017-08-08 RX ADMIN — BRIMONIDINE TARTRATE 1 DROP(S): 2 SOLUTION/ DROPS OPHTHALMIC at 17:47

## 2017-08-08 RX ADMIN — POLYETHYLENE GLYCOL 3350 17 GRAM(S): 17 POWDER, FOR SOLUTION ORAL at 11:38

## 2017-08-08 RX ADMIN — OXYCODONE AND ACETAMINOPHEN 1 TABLET(S): 5; 325 TABLET ORAL at 21:11

## 2017-08-08 RX ADMIN — ONDANSETRON 4 MILLIGRAM(S): 8 TABLET, FILM COATED ORAL at 16:46

## 2017-08-08 RX ADMIN — BRIMONIDINE TARTRATE 1 DROP(S): 2 SOLUTION/ DROPS OPHTHALMIC at 05:20

## 2017-08-08 RX ADMIN — Medication 50 MILLIGRAM(S): at 10:22

## 2017-08-08 RX ADMIN — Medication 1 SPRAY(S): at 17:46

## 2017-08-08 RX ADMIN — Medication 1 SPRAY(S): at 05:20

## 2017-08-08 RX ADMIN — Medication 100 MILLIGRAM(S): at 21:11

## 2017-08-08 RX ADMIN — Medication 100 MILLIGRAM(S): at 13:22

## 2017-08-08 RX ADMIN — DORZOLAMIDE HYDROCHLORIDE TIMOLOL MALEATE 1 DROP(S): 20; 5 SOLUTION/ DROPS OPHTHALMIC at 05:21

## 2017-08-08 RX ADMIN — Medication 1 TABLET(S): at 11:38

## 2017-08-08 RX ADMIN — Medication 100 MILLIGRAM(S): at 05:20

## 2017-08-08 RX ADMIN — Medication 300 MILLIGRAM(S): at 05:19

## 2017-08-08 RX ADMIN — Medication 650 MILLIGRAM(S): at 10:49

## 2017-08-08 RX ADMIN — Medication 81 MILLIGRAM(S): at 11:38

## 2017-08-08 RX ADMIN — OXYCODONE AND ACETAMINOPHEN 1 TABLET(S): 5; 325 TABLET ORAL at 03:08

## 2017-08-08 RX ADMIN — Medication 650 MILLIGRAM(S): at 11:20

## 2017-08-08 RX ADMIN — ENOXAPARIN SODIUM 40 MILLIGRAM(S): 100 INJECTION SUBCUTANEOUS at 11:40

## 2017-08-08 RX ADMIN — LATANOPROST 1 DROP(S): 0.05 SOLUTION/ DROPS OPHTHALMIC; TOPICAL at 21:11

## 2017-08-08 RX ADMIN — DORZOLAMIDE HYDROCHLORIDE TIMOLOL MALEATE 1 DROP(S): 20; 5 SOLUTION/ DROPS OPHTHALMIC at 17:47

## 2017-08-08 NOTE — PROGRESS NOTE ADULT - SUBJECTIVE AND OBJECTIVE BOX
83 yo F PMHx A-fib not on A/C due to pt refusal, diverticulosis s/p colon resection, history of C diff, breast Ca s/p B/L masectomy, presenting with RLE wound with surrounding cellulitis. Failure to heal outpatient on Doxy, S/P debridement of wound in OR on . Now found to have acute osteomyelitis of distal fibula. Pt had an episode of bleeding at would with cauterization procedure at bedside. Pt denies any chest pain, SOB, nausea or vomiting.    Pt seen and examined at bedside, pt reports severe itching, but no bleeding in leg wound. Pt counseled and agrees to taking anticoagulation.    Tele overnight: A. Fib and flutter, runs of aberrancy or wide complex QRS (3-4 beats);   HR 80s, 120-130s when pt on commode.     Constitutional: denies fever, chills, general malaise, weight loss,   HEENT: denies dry mouth, sore throat, runny nose, photophobia, blurry vision, double vision  Respiratory: denies SOB, RANGEL, cough, sputum production,  Cardiovascular: denies CP, palpitations, edema  Gastrointestinal: denies nausea, vomiting, diarrhea, constipation, abdominal pain, melena, hematochezia   Genitourinary: denies dysuria, frequency, urgency, incontinence, hematuria   Skin/Breast: denies rash, hives, itching, masses, hair loss   Musculoskeletal: itching at leg, no bleeding, muscle weakness  Neurologic: denies syncope, LOC, headache, weakness,    ROS negative except as noted above    Physical Exam:  GENERAL: NAD  HEAD:  Atraumatic, Normocephalic  EYES: EOMI, PERRLA, conjunctiva and sclera clear  NERVOUS SYSTEM:  Alert & Oriented X3, no gross CN deficits  CHEST/LUNG: Clear to percussion bilaterally; No rales, rhonchi, wheezing, or rubs  HEART: Irregular rhythm; No murmurs, rubs, or gallops  ABDOMEN: Soft, Nontender, Nondistended; Bowel sounds present  EXTREMITIES: edematous and mildly tender right lower extremity, non-erythematous, sensory and motor grossly intact in lower extremities, Wound- healing well, bandage is clean, dry,          Vital Signs - Last 24 Hrs    T(C): 36.4 (17 @ 04:05), Max: 37.2 (17 @ 20:03)  HR: 106 (17 @ 04:05) (85 - 106)  BP: 100/63 (17 @ 04:05) (92/60 - 111/72)  RR: 20 (17 @ 04:05) (17 - 20)  SpO2: 95% (17 @ 04:05) (93% - 100%)  Wt(kg): --  Daily     Daily Weight in k.8 (08 Aug 2017 04:05)    I&O's Summary    07 Aug 2017 07:01  -  08 Aug 2017 07:00  --------------------------------------------------------  IN: 1220 mL / OUT: 0 mL / NET: 1220 mL 83 yo F PMHx A-fib not on A/C due to pt refusal, diverticulosis s/p colon resection, history of C diff, breast Ca s/p B/L masectomy, presenting with RLE wound with surrounding cellulitis. Failure to heal outpatient on Doxy, S/P debridement of wound in OR on . Now found to have acute osteomyelitis of distal fibula. Pt had an episode of bleeding at would with cauterization procedure at bedside. Pt denies any chest pain, SOB, nausea or vomiting.    Pt seen and examined at bedside, pt reports severe itching, but no bleeding in leg wound. Pt counseled and agrees to taking anticoagulation.    Tele overnight: A. Fib and flutter, runs of aberrancy or wide complex QRS (3-4 beats);   HR 80s, 120-130s when pt on commode.     Review of Systems:  Constitutional: denies fever, chills, general malaise, weight loss,   HEENT: denies dry mouth, sore throat, runny nose, photophobia, blurry vision, double vision  Respiratory: denies SOB, RANGEL, cough, sputum production,  Cardiovascular: denies CP, palpitations, edema  Gastrointestinal: denies nausea, vomiting, diarrhea, constipation, abdominal pain, melena, hematochezia   Genitourinary: denies dysuria, frequency, urgency, incontinence, hematuria   Skin/Breast: denies rash, hives, itching, masses, hair loss   Musculoskeletal: itching at leg, no bleeding, muscle weakness  Neurologic: denies syncope, LOC, headache, weakness,      Physical Exam:  GENERAL: NAD  HEAD:  Atraumatic, Normocephalic  EYES: EOMI, PERRLA, conjunctiva and sclera clear  NERVOUS SYSTEM:  Alert & Oriented X3, no gross CN deficits  CHEST/LUNG: Clear to percussion bilaterally; No rales, rhonchi, wheezing, or rubs  HEART: Irregular rhythm; No murmurs, rubs, or gallops  ABDOMEN: Soft, Nontender, Nondistended; Bowel sounds present  EXTREMITIES: edematous and mildly tender right lower extremity, non-erythematous, sensory and motor grossly intact in lower extremities, Wound- healing well, bandage is clean, dry,          Vital Signs - Last 24 Hrs    T(C): 36.4 (17 @ 04:05), Max: 37.2 (17 @ 20:03)  HR: 106 (17 @ 04:05) (85 - 106)  BP: 100/63 (17 @ 04:05) (92/60 - 111/72)  RR: 20 (17 @ 04:05) (17 - 20)  SpO2: 95% (17 @ 04:05) (93% - 100%)  Wt(kg): --  Daily     Daily Weight in k.8 (08 Aug 2017 04:05)    I&O's Summary    07 Aug 2017 07:01  -  08 Aug 2017 07:00  --------------------------------------------------------  IN: 1220 mL / OUT: 0 mL / NET: 1220 mL      LABS:    CAPILLARY BLOOD GLUCOSE    RADIOLOGY & ADDITIONAL TESTS:    MEDICATIONS  (STANDING):  brimonidine 0.2% Ophthalmic Solution 1 Drop(s) Both EYES two times a day  dorzolamide 2%/timolol 0.5% Ophthalmic Solution 1 Drop(s) Both EYES two times a day  latanoprost 0.005% Ophthalmic Solution 1 Drop(s) Both EYES at bedtime  lactobacillus acidophilus 1 Tablet(s) Oral daily  docusate sodium 100 milliGRAM(s) Oral three times a day  fluticasone propionate 50 MICROgram(s)/spray Nasal Spray 1 Spray(s) Both Nostrils two times a day  aspirin  chewable 81 milliGRAM(s) Oral daily  enoxaparin Injectable 40 milliGRAM(s) SubCutaneous daily  fluconAZOLE   Tablet 200 milliGRAM(s) Oral daily  vancomycin  IVPB 1500 milliGRAM(s) IV Intermittent every 24 hours  metoprolol succinate ER 50 milliGRAM(s) Oral daily    MEDICATIONS  (PRN):  ondansetron    Tablet 4 milliGRAM(s) Oral every 6 hours PRN Nausea and/or Vomiting  acetaminophen   Tablet. 650 milliGRAM(s) Oral every 6 hours PRN Mild Pain (1 - 3)  acetaminophen   Tablet. 650 milliGRAM(s) Oral every 6 hours PRN Moderate Pain (4 - 6)      Allergies  No Known Allergies  Intolerances

## 2017-08-08 NOTE — PROGRESS NOTE ADULT - PROBLEM SELECTOR PLAN 2
Permanent A. Fib  -Pt agrees to taking anticoagulation, cardio to follow  -Recommend rate control with Toprol XL reduced to 50mg daily for lower side BP  -[id number] score of 3, recommend anticoagulation, continue aspirin 81mg
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
## 2017-08-08 NOTE — PROVIDER CONTACT NOTE (OTHER) - ACTION/TREATMENT ORDERED:
Continue to monitor pt.
MD will put in orders for Percocet
Will give stat dose of lopressor per order and will continue to monitor pt.
Will come and see patient  1851: pt states pain subsided.

## 2017-08-08 NOTE — PROGRESS NOTE ADULT - ATTENDING COMMENTS
83 yo F PMHx A-fib not on A/C due to pt refusal, diverticulosis s/p colon resection, history of C diff, breast Ca s/p B/L masectomy, presenting with RLE wound with surrounding cellulitis, failed to heal outpatient on Doxy, S/P debridement of wound in OR on 7/31 now POD#7. Pt is found to have acute osteomyelitis of distal fibula.   On IV antibx vanco, and diflucan .  Has PermCath and is stable for d/c to [facility].

## 2017-08-08 NOTE — PROVIDER CONTACT NOTE (OTHER) - ASSESSMENT
Pt c/o pain to right foot 10/10
Pt in bed, NAD, VSS
Pt in bed, asymptomatic, VSS.
pain 7/10. 98.2 oral, 16, 94%room air, 98hr, 108/67. pt Is atrial fib rate controlled on monitor.

## 2017-08-08 NOTE — PROGRESS NOTE ADULT - ASSESSMENT
Assessment:    Grade 2 ulcer, dorsal right foot  Cellulitis, dorsal right foot (resolving)  s/p right foot wound debridement, POD#8, 7/31/17    Plan:    -Pt seen and eval  -Chart reviewed  -Dressings removed and wound further examined, cleansed with normal saline  -Silver alginate, dry sterile dressings and ABD pad applied to right foot wound  -Follow infectious disease recommendations  -Pt to be weightbearing to right lower extremity, as tolerated, weightbearing order placed in [place]  -Pt Tx plan discussed w/Dr. Vanegas, agreed upon    Podiatry to cont following Pt while in-house    WOUND CARE ORDER AND INSTRUCTIONS:    Wound dressings to be re-applied every day, after discharge from [facility]    Patient to return to [facility], for continued evaluation, within 2-3 days upon discharge from [facility]    1. Remove dressing to right foot and cleanse right foot wound with sterile saline.  2. Pad dry with sterile 4x4 gauze.  3. Apply hydrogel directly to wound and cover with sterile 4x4 gauze, ABD pads, secure with 4" emily and medical tape.  4. Monitor for infections.

## 2017-08-08 NOTE — PROGRESS NOTE ADULT - ASSESSMENT
81 yo F PMHx A-fib not on A/C due to pt refusal, diverticulosis s/p colon resection, history of C diff, breast Ca s/p B/L masectomy, presenting with RLE wound with surrounding cellulitis, failed to heal outpatient on Doxy, S/P debridement of wound in OR on 7/31 now POD#7. Pt is now found to have acute osteomyelitis of distal fibula.
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
## 2017-08-08 NOTE — PROGRESS NOTE ADULT - ATTENDING COMMENTS
I discussed at length, once again, about my recommendation of oral anticoagulation given the patient's history of atrial fibrillation and [id number] of 3. The patient would like to discuss it with her own cardiologist prior to starting the medication. Continue aspirin 81mg daily.

## 2017-08-08 NOTE — PROGRESS NOTE ADULT - ASSESSMENT
Problem/Recommendation - 1:  Problem: Permanent atrial fibrillation. Recommendation: patient undergoing rate control strategy with Toprol XL.  Dosage was increased to 100mg daily, however patient's blood pressure has been on low side. Dose decreased to 50mg daily. HRs have been less than 100 generally. Patient had episode of rapid rates while washing herself this morning, however she missed a dose of Toprol XL 100mg yesterday. No medication adjustment needed at this time.  In the past, patient refused anticoagulation despite knowing the risks of stroke. Given her [id number] of 3, I would recommend oral anticoagulation and I explained this to her. She said she will think about it and will discuss it with her own cardiologist in our office. In the meantime, continue Aspirin 81mg daily.    Problem/Recommendation - 2:  ·  Problem: Left ventricular dysfunction.  Recommendation: inpatient Echocardiogram during this admission revealed EF 45%, which is likely an overestimation given the fact that patient has moderate-severe MR. In addition, she has moderate aortic valve insufficiency. The patient is currently asymptomatic from congestive symptom standpoint. She has been complaining of increased fatigue over the past 6 months which could be do to her cardiomyopathy. I reviewed the records from our office regarding this patient. I explained to her that my recommendation would be for her to undergo a cardiac catheterization as well as a OLEKSANDR in the future. Since she has no signs of ischemia/ACS or congestive symptoms at this time she should recover from her recent foot injury/surgery. When she is discharged from the hospital she should make an appointment in our office regarding the scheduling of these tests if she still wants to have them done.     Problem/Recommendation - 3:  ·  Problem: Other osteomyelitis of right foot.  Recommendation: on antibiotics. patient s/p debridement of the right foot POD #5. Continue supportive care and adequate pain control. Patient s/p insertion of PICC line for long term antibiotic use. ID following.    Problem/Recommendation - 4:  ·  Problem: Mitral valve insufficiency, unspecified etiology.  Recommendation: on transthoracic echocardiogram it demonstrates moderate to severe mitral regurgitation. The mechanism is unclear. I explained to the patient the need for a transesophageal echocardiogram in the future to better elicit the severity and mechanism of the mitral regurgitation. From a congestive standpoint, the patient is asymptomatic. She can follow up in our office with regards to scheduling this procedure. Problem/Recommendation - 1:  Problem: Permanent atrial fibrillation. Recommendation: patient undergoing rate control strategy with Toprol XL.  Dosage was increased to 100mg daily, however patient's blood pressure has been on low side. Dose decreased to 50mg daily. HRs have been less than 100 generally. Patient had episode of rapid rates while washing herself this morning, however she missed a dose of Toprol XL 100mg yesterday. No medication adjustment needed at this time.  In the past, patient refused anticoagulation despite knowing the risks of stroke. Given her [id number] of 3, I would recommend oral anticoagulation and I explained this to her. She said she will think about it and will discuss it with her own cardiologist in our office. In the meantime, continue Aspirin 81mg daily.    Problem/Recommendation - 2:  ·  Problem: Left ventricular dysfunction.  Recommendation: inpatient Echocardiogram during this admission revealed EF 45%, which is likely an overestimation given the fact that patient has moderate-severe MR. In addition, she has moderate aortic valve insufficiency. The patient is currently asymptomatic from congestive symptom standpoint. She has been complaining of increased fatigue over the past 6 months which could be do to her cardiomyopathy. I reviewed the records from our office regarding this patient. I explained to her that my recommendation would be for her to undergo a cardiac catheterization as well as a OLEKSANDR in the future. Since she has no signs of ischemia/ACS or congestive symptoms at this time she should recover from her recent foot injury/surgery. When she is discharged from the hospital she should make an appointment in our office regarding the scheduling of these tests if she still wants to have them done.     Problem/Recommendation - 3:  ·  Problem: Other osteomyelitis of right foot.  Recommendation: on antibiotics. patient s/p debridement of the right foot POD #6. Continue supportive care and adequate pain control. Patient s/p insertion of PICC line for long term antibiotic use. ID following.    Problem/Recommendation - 4:  ·  Problem: Mitral valve insufficiency, unspecified etiology.  Recommendation: on transthoracic echocardiogram it demonstrates moderate to severe mitral regurgitation. The mechanism is unclear. I explained to the patient the need for a transesophageal echocardiogram in the future to better elicit the severity and mechanism of the mitral regurgitation. From a congestive standpoint, the patient is asymptomatic. She can follow up in our office with regards to scheduling this procedure.

## 2017-08-08 NOTE — PROVIDER CONTACT NOTE (OTHER) - REASON
6 beats wide complex rhythm on cardiac monitor at 1030am today
frequent PVCs on cardiac monitor
pain
Pain

## 2017-08-09 VITALS
DIASTOLIC BLOOD PRESSURE: 75 MMHG | OXYGEN SATURATION: 95 % | RESPIRATION RATE: 18 BRPM | SYSTOLIC BLOOD PRESSURE: 133 MMHG | TEMPERATURE: 98 F | HEART RATE: 90 BPM

## 2017-08-09 PROCEDURE — 99239 HOSP IP/OBS DSCHRG MGMT >30: CPT

## 2017-08-09 RX ORDER — FLUTICASONE PROPIONATE 50 MCG
1 SPRAY, SUSPENSION NASAL
Qty: 0 | Refills: 0 | DISCHARGE
Start: 2017-08-09

## 2017-08-09 RX ORDER — COLLAGENASE CLOSTRIDIUM HIST. 250 UNIT/G
1 OINTMENT (GRAM) TOPICAL
Qty: 0 | Refills: 0 | DISCHARGE
Start: 2017-08-09

## 2017-08-09 RX ORDER — METOPROLOL TARTRATE 50 MG
1 TABLET ORAL
Qty: 0 | Refills: 0 | COMMUNITY
Start: 2017-08-09

## 2017-08-09 RX ORDER — FLUCONAZOLE 150 MG/1
1 TABLET ORAL
Qty: 0 | Refills: 0 | DISCHARGE
Start: 2017-08-09

## 2017-08-09 RX ORDER — OXYCODONE HYDROCHLORIDE 5 MG/1
1 TABLET ORAL
Qty: 0 | Refills: 0 | COMMUNITY
Start: 2017-08-09

## 2017-08-09 RX ORDER — ACETAMINOPHEN 500 MG
2 TABLET ORAL
Qty: 0 | Refills: 0 | COMMUNITY
Start: 2017-08-09

## 2017-08-09 RX ORDER — UBIDECARENONE 100 MG
1 CAPSULE ORAL
Qty: 0 | Refills: 0 | COMMUNITY

## 2017-08-09 RX ORDER — ASPIRIN/CALCIUM CARB/MAGNESIUM 324 MG
1 TABLET ORAL
Qty: 0 | Refills: 0 | DISCHARGE
Start: 2017-08-09

## 2017-08-09 RX ORDER — OXYCODONE AND ACETAMINOPHEN 5; 325 MG/1; MG/1
1 TABLET ORAL EVERY 8 HOURS
Qty: 0 | Refills: 0 | Status: DISCONTINUED | OUTPATIENT
Start: 2017-08-09 | End: 2017-08-09

## 2017-08-09 RX ORDER — DOCUSATE SODIUM 100 MG
1 CAPSULE ORAL
Qty: 0 | Refills: 0 | COMMUNITY
Start: 2017-08-09

## 2017-08-09 RX ORDER — POLYETHYLENE GLYCOL 3350 17 G/17G
17 POWDER, FOR SOLUTION ORAL
Qty: 0 | Refills: 0 | DISCHARGE
Start: 2017-08-09

## 2017-08-09 RX ORDER — VANCOMYCIN HCL 1 G
1500 VIAL (EA) INTRAVENOUS
Qty: 0 | Refills: 0 | DISCHARGE
Start: 2017-08-09

## 2017-08-09 RX ORDER — COLLAGENASE CLOSTRIDIUM HIST. 250 UNIT/G
1 OINTMENT (GRAM) TOPICAL DAILY
Qty: 0 | Refills: 0 | Status: DISCONTINUED | OUTPATIENT
Start: 2017-08-09 | End: 2017-08-09

## 2017-08-09 RX ADMIN — Medication 100 MILLIGRAM(S): at 13:20

## 2017-08-09 RX ADMIN — Medication 1 APPLICATION(S): at 11:55

## 2017-08-09 RX ADMIN — Medication 50 MILLIGRAM(S): at 06:25

## 2017-08-09 RX ADMIN — Medication 650 MILLIGRAM(S): at 05:30

## 2017-08-09 RX ADMIN — ENOXAPARIN SODIUM 40 MILLIGRAM(S): 100 INJECTION SUBCUTANEOUS at 11:01

## 2017-08-09 RX ADMIN — Medication 81 MILLIGRAM(S): at 11:01

## 2017-08-09 RX ADMIN — Medication 100 MILLIGRAM(S): at 06:25

## 2017-08-09 RX ADMIN — Medication 1 SPRAY(S): at 06:26

## 2017-08-09 RX ADMIN — FLUCONAZOLE 200 MILLIGRAM(S): 150 TABLET ORAL at 11:01

## 2017-08-09 RX ADMIN — Medication 1 TABLET(S): at 11:01

## 2017-08-09 RX ADMIN — BRIMONIDINE TARTRATE 1 DROP(S): 2 SOLUTION/ DROPS OPHTHALMIC at 06:26

## 2017-08-09 RX ADMIN — Medication 650 MILLIGRAM(S): at 11:53

## 2017-08-09 RX ADMIN — OXYCODONE AND ACETAMINOPHEN 1 TABLET(S): 5; 325 TABLET ORAL at 16:43

## 2017-08-09 RX ADMIN — Medication 650 MILLIGRAM(S): at 13:09

## 2017-08-09 RX ADMIN — DORZOLAMIDE HYDROCHLORIDE TIMOLOL MALEATE 1 DROP(S): 20; 5 SOLUTION/ DROPS OPHTHALMIC at 06:26

## 2017-08-09 RX ADMIN — Medication 650 MILLIGRAM(S): at 04:42

## 2017-08-09 RX ADMIN — Medication 300 MILLIGRAM(S): at 06:25

## 2017-08-09 NOTE — PROGRESS NOTE ADULT - ASSESSMENT
Assessment:    Grade 2 ulcer, dorsal right foot  Cellulitis, dorsal right foot (resolving)  s/p right foot wound debridement, POD#9, 7/31/17    Plan:    -Pt seen and eval  -Chart reviewed  -Dressings removed and wound further examined, cleansed with normal saline  -Aqualcell, dry sterile dressings and ABD pad applied to right foot wound  -Santyl ordered for bedside tomorrow  -Follow infectious disease recommendations  -Pt to be weightbearing to right lower extremity, as tolerated, weightbearing order placed in [place]  -Pt Tx plan discussed w/Dr. Vanegas, agreed upon    Podiatry to cont following Pt while in-house    WOUND CARE ORDER AND INSTRUCTIONS:    Wound dressings to be re-applied every day, after discharge from [facility]    Patient to return to [facility]'s [facility], for continued evaluation, within 2-3 days upon discharge from [facility]    1. Remove dressing to right foot and cleanse right foot wound with sterile saline.  2. Pad dry with sterile 4x4 gauze.  3. Apply hydrogel directly to wound and cover with sterile 4x4 gauze, ABD pads, secure with 4" emily and medical tape.  4. Monitor for infections.

## 2017-08-09 NOTE — PROGRESS NOTE ADULT - ASSESSMENT
Problem/Recommendation - 1:  Problem: Permanent atrial fibrillation. Recommendation: patient undergoing rate control strategy with Toprol XL.  Dosage was increased to 100mg daily, however patient's blood pressure has been on low side. Dose decreased to 50mg daily. HRs have been less than 100 generally. In the past, patient refused anticoagulation despite knowing the risks of stroke. Given her [id number] of 3, I would recommend oral anticoagulation and I explained this to her. She said she will think about it and will discuss it with her own cardiologist in our office. In the meantime, continue Aspirin 81mg daily.    Problem/Recommendation - 2:  ·  Problem: Left ventricular dysfunction.  Recommendation: inpatient Echocardiogram during this admission revealed EF 45%, which is likely an overestimation given the fact that patient has moderate-severe MR. In addition, she has moderate aortic valve insufficiency. The patient is currently asymptomatic from congestive symptom standpoint. She has been complaining of increased fatigue over the past 6 months which could be do to her cardiomyopathy. I reviewed the records from our office regarding this patient. I explained to her that my recommendation would be for her to undergo a cardiac catheterization as well as a OLEKSANDR in the future. Since she has no signs of ischemia/ACS or congestive symptoms at this time she should recover from her recent foot injury/surgery. When she is discharged from the hospital she should make an appointment in our office regarding the scheduling of these tests if she still wants to have them done.     Problem/Recommendation - 3:  ·  Problem: Other osteomyelitis of right foot.  Recommendation: on antibiotics. patient s/p debridement of the right foot POD #7. Continue supportive care and adequate pain control. Patient s/p insertion of PICC line for long term antibiotic use. ID following.    Problem/Recommendation - 4:  ·  Problem: Mitral valve insufficiency, unspecified etiology.  Recommendation: on transthoracic echocardiogram it demonstrates moderate to severe mitral regurgitation. The mechanism is unclear. I explained to the patient the need for a transesophageal echocardiogram in the future to better elicit the severity and mechanism of the mitral regurgitation. From a congestive standpoint, the patient is asymptomatic. She can follow up in our office with regards to scheduling this procedure.

## 2017-08-09 NOTE — PROGRESS NOTE ADULT - PROBLEM SELECTOR PLAN 2
Permanent A. Fib  -Pt agrees to taking anticoagulation, cardio to follow  -Recommend rate control with Toprol XL reduced to 50mg daily for lower side BP  -[id number] score of 3, recommend anticoagulation, continue aspirin 81mg

## 2017-08-09 NOTE — PROGRESS NOTE ADULT - PROBLEM SELECTOR PLAN 3
-Echo showed EF 45%, with moderate to sever mitral regurg, aortic valve insufficiency  -Pt is currently has no ACS, ischemia, or congestive symptoms but has reported increase fatigue over past 6 months  Cardio: recommends cardiac catheterization and OLEKSANDR outpatient

## 2017-08-09 NOTE — PROGRESS NOTE ADULT - NSHPATTENDINGPLANDISCUSS_GEN_ALL_CORE
Dr. Vanegas
Hospitalist
patient and hospitalist team
pt
Pt and RN
Pt and RN
Pt and pod
pt
Dr. Vanegas
Dr. Vnaegas
pt
Pt and residents

## 2017-08-09 NOTE — PROGRESS NOTE ADULT - PROVIDER SPECIALTY LIST ADULT
Anesthesia
Anesthesia
Cardiology
Hospitalist
Infectious Disease
Internal Medicine
Internal Medicine
Podiatry
Infectious Disease
Podiatry
Podiatry

## 2017-08-09 NOTE — PROGRESS NOTE ADULT - SUBJECTIVE AND OBJECTIVE BOX
JACEK HERNDON is a 82yFemale , patient examined and chart reviewed. Patient seen and examined today being followed for infected ulcer rught ankles with increased pain       INTERVAL HPI/ OVERNIGHT EVENTS: Events noted, she is s/p   I & D of right ankle . Podiatry follow up noted, had significant bleeding from wound so it was cauterized . She had PermCath placed as patient has hx of lymphedema . Still complaints of right ankle pain . Feels better . going to [facility] today       PAST MEDICAL & SURGICAL HISTORY:  C. difficile diarrhea  Gall stones  Breast cancer  Atrial fibrillation  PSVT (paroxysmal supraventricular tachycardia)  Glaucoma  Diverticulitis  S/P colon resection  H/O: hysterectomy  S/P ORIF (open reduction internal fixation) fracture: L lower extremity  H/O hernia repair  H/O mastectomy, bilateral  Status post appendectomy  S/P colon resection: march 11 2014      For details regarding the patient's social history, family history, and other miscellaneous elements, please refer the initial infectious diseases consultation and/or the admitting history and physical examination for this admission.    ROS:  CONSTITUTIONAL:  Negative fever or chills, feels well, good appetite  EYES:  Negative  blurry vision or double vision  CARDIOVASCULAR:  Negative for chest pain or palpitations  RESPIRATORY:  Negative for cough, wheezing, or SOB   GASTROINTESTINAL:  Negative for nausea, vomiting, diarrhea, constipation, or abdominal pain  GENITOURINARY:  Negative frequency, urgency or dysuria  NEUROLOGIC:  No headache, confusion, dizziness, lightheadedness  All other systems were reviewed and are negative         Current inpatient medications :  MEDICATIONS  (STANDING):  brimonidine 0.2% Ophthalmic Solution 1 Drop(s) Both EYES two times a day  dorzolamide 2%/timolol 0.5% Ophthalmic Solution 1 Drop(s) Both EYES two times a day  latanoprost 0.005% Ophthalmic Solution 1 Drop(s) Both EYES at bedtime  lactobacillus acidophilus 1 Tablet(s) Oral daily  docusate sodium 100 milliGRAM(s) Oral three times a day  fluticasone propionate 50 MICROgram(s)/spray Nasal Spray 1 Spray(s) Both Nostrils two times a day  aspirin  chewable 81 milliGRAM(s) Oral daily  enoxaparin Injectable 40 milliGRAM(s) SubCutaneous daily  fluconAZOLE   Tablet 200 milliGRAM(s) Oral daily  vancomycin  IVPB 1500 milliGRAM(s) IV Intermittent every 24 hours  metoprolol succinate ER 50 milliGRAM(s) Oral daily  collagenase Ointment 1 Application(s) Topical daily    MEDICATIONS  (PRN):  ondansetron    Tablet 4 milliGRAM(s) Oral every 6 hours PRN Nausea and/or Vomiting  acetaminophen   Tablet. 650 milliGRAM(s) Oral every 6 hours PRN Mild Pain (1 - 3)  acetaminophen   Tablet. 650 milliGRAM(s) Oral every 6 hours PRN Moderate Pain (4 - 6)  polyethylene glycol 3350 17 Gram(s) Oral daily PRN Constipation  oxyCODONE    5 mG/acetaminophen 325 mG 1 Tablet(s) Oral every 8 hours PRN Moderate Pain (4 - 6)    Objective:  Vital Signs Last 24 Hrs  T(C): 36.7 (09 Aug 2017 11:45), Max: 36.7 (08 Aug 2017 20:02)  T(F): 98.1 (09 Aug 2017 11:45), Max: 98.1 (08 Aug 2017 20:02)  HR: 90 (09 Aug 2017 11:45) (83 - 101)  BP: 133/75 (09 Aug 2017 11:45) (97/65 - 133/75)  BP(mean): --  RR: 18 (09 Aug 2017 11:45) (17 - 18)  SpO2: 95% (09 Aug 2017 11:45) (92% - 97%)    Physical Exam:  General: well developed well nourished, in no acute distress  Eyes: sclera anicteric, pupils equal and reactive to light  ENMT: buccal mucosa moist, pharynx not injected  Neck: supple, trachea midline  Lungs: clear, no wheeze/rhonchi  Cardiovascular: regular rate and rhythm, S1 S2  Abdomen: soft, nontender, no organomegaly present, bowel sounds normal  Neurological: alert and oriented x3, Cranial Nerves II-XII grossly intact  Skin: no increased ecchymosis/petechiae/purpura  Lymph Nodes: no palpable cervical/supraclavicular lymph nodes enlargements  Extremities: no cyanosis/clubbing/edema, right ankle post op dressing in place . Wound as per podiatry - All web spaces are clean, dry and intact. Open skin wound noted to dorsal-lateral aspect of right foot, with moderate to severe active sanguinous drainage, no malodor, and no periwound erythema, negative probe to bone test, and granular wound bed.. no necrotic tissue       LABS:      Vancomycin Level, Trough: 12.6:  (08.07.17 @ 04:37)         Surgical Pathology Final Report -  (07.31.17 @ 10:43)    Surgical Pathology Final Report - :   ACCESSION No:  [id number]    JACEK HERNDON    Surgical Final Report  Final Diagnosis    Soft tissue, right foot, debridement:  - Benign soft tissue demonstrating severe acute and chronic  necrotizing inflammation, dense fibrosis and reactive  changes.    Omid Castaneda M.D.  (Electronic Signature)  Reported on: 08/01/17    Clinical Information  Procedure: Surgical debridement right foot pulse lavage    Specimen Description  Pathology soft tissue right foot    Gross Description  The specimen is received in formalin and the specimen container  is labeled: Pathology soft tissue right foot. It consists of a  piece of dusky red-pink to gray-tan soft tissue measuring 5.0 x  1.0 x 0.3 cm. The specimen is serially sectioned and  representatively submitted. One cassette.    In addition to other data that may appear on the specimen  container, the label has been inspected to confirm the presence  of the patient's name and date of birth.  DN 8/1/2017 6:46AM          RECENT CULTURES:      Culture - Tissue with Gram Stain (07.31.17 @ 12:20)    Gram Stain:   No polymorphonuclear leukocytes per low power field  Moderate Yeast like cells per oil power field    Specimen Source: .Tissue Other, right foot    Culture Results:   Moderate Candida parapsilosis  Referred to Mycology        Culture - Other (07.27.17 @ 01:34)    Specimen Source: .Other right foot wound - swab    Culture Results:   No growth to date    Culture - Blood (07.26.17 @ 18:20)    Specimen Source: .Blood Blood-Peripheral    Culture Results:   No growth to date.    RADIOLOGY & ADDITIONAL STUDIES:  MRI Foot w/wo Cont, Right (07.27.17 @ 13:34) >  Impression:    Broad wound along the lateral malleolus. Acute osteomyelitis of the   imaged distal fibula with mild subcortical osteitis along the lateral   tibial plafond at the level of the syndesmosis. Fluid tracks from the   level of the wound at the lateral fibula along the dorsal subcutaneous   fat overlying the extensor digitorum brevis muscle consistent with a   small abscess. Prominent surrounding cellulitis is noted along the   lateral aspect of the ankle.    Nonspecific subcortical edema and enhancement along the lateral wall the   calcaneus just posterior to the lateral malleolus and along the anterior   lip of the tibial plafond with grossly preserved T1 marrow signal. This   may be related to reactive osteitis.    Small tibiotalar joint effusion with synovial enhancement consistent with   synovitis.     US Duplex Arterial Lower Ext Ltd, Right (07.27.17 @ 12:00) >    Impression:   1. Limited exam as discussed above.  2. No hemodynamically significant stenotic changes seen in the visualized   right leg arteries.        Assessment and Plan:   Assessment:  · Assessment		  81 yo F PMHx A-fib not on A/C due to pt refusal, diverticulosis s/p colon resection, history of C diff, breast CA s/p B/L mastectomy admitted with RLE cellulitis 2/2 failed treatment with doxycycline. She had a traumatic injury with skin necrosis on right ankle , due tot the proximity of the wound to lateral malleolus, possibility of OM cannot be ruled out .    MRI shows acute OM of lateral malleolus with small abscess adjacent to it , She is S/P I & D of right ankle     Plan:  - will change Vancomycin to 1500 mg daily ., she needs it for total 6 weeks to end on 9/6/17  .  - check vanco trough , BMP once a week   - continue with Diflucan x 2 weeks   - She has a PermCath so will need to return to IR for removal once IV antibiotics are done   - weight bearing status as per podiatry  - she will follow up with Dr. Vanegas in wound center .  - wound care as per podiatry   - pain control      I have discussed the above plan of care with patient in detail. She expressed understanding of the  treatment plan . Risks, benefits and alternatives discussed in detail. I have asked if she has any questions or concerns and appropriately addressed them to the best of my ability .    > 35 minutes were spent in direct patient care reviewing notes, medications ,labs data/ imaging , discussion with multidisciplinary team.    Thank you for allowing me to participate in care of your patient .    Neftali Esparza MD  [phone]

## 2017-08-09 NOTE — PROGRESS NOTE ADULT - PROBLEM SELECTOR PROBLEM 1
Acute osteomyelitis
Cellulitis of right lower extremity
Abscess of foot
Cellulitis of right lower extremity
Cellulitis of right lower extremity
Foot ulcer, right, limited to breakdown of skin
Skin ulcer of right ankle, limited to breakdown of skin
Foot ulcer, right, limited to breakdown of skin

## 2017-08-09 NOTE — PROGRESS NOTE ADULT - SUBJECTIVE AND OBJECTIVE BOX
81 yo F PMHx A-fib not on A/C due to pt refusal, diverticulosis s/p colon resection, history of C diff, breast Ca s/p B/L masectomy, presenting with RLE wound with surrounding cellulitis. Failure to heal outpatient on Doxy, S/P debridement of wound in OR on 7/31. Now found to have acute osteomyelitis of distal fibula. Pt had an episode of bleeding at would with cauterization procedure at bedside. Pt denies any chest pain, SOB, nausea or vomiting.    Pt seen and examined at bedside, pt reports no bleeding in leg wound. Pt reports pain in RL Extremity and heating pad is helping.     Tele overnight: A. Fib, HR 93, 150s when pt on commode or ambulating.     Review of Systems:  Constitutional: denies fever, chills, general malaise, weight loss,   HEENT: denies dry mouth, sore throat, runny nose, photophobia, blurry vision, double vision  Respiratory: denies SOB, RANGEL, cough, sputum production,  Cardiovascular: denies CP, palpitations, edema  Gastrointestinal: denies nausea, vomiting, diarrhea, constipation, abdominal pain, melena, hematochezia   Genitourinary: denies dysuria, frequency, urgency, incontinence, hematuria   Skin/Breast: denies rash, hives, itching, masses, hair loss   Musculoskeletal: itching at leg, no bleeding, muscle weakness  Neurologic: denies syncope, LOC, headache, weakness,    Physical Exam:  GENERAL: NAD  HEAD:  Atraumatic, Normocephalic  NERVOUS SYSTEM:  Alert & Oriented X3, no gross CN deficits  CHEST/LUNG: Clear to auscultation bilaterally; No rales, rhonchi, wheezing, or rubs  HEART: Irregular rhythm; No murmurs, rubs, or gallops  ABDOMEN: Soft, Nontender, Nondistended; Bowel sounds present  EXTREMITIES: edematous and mildly tender right lower extremity, non-erythematous, sensory and motor grossly intact in lower extremities, Wound- bandage is clean, dry, intact      Vital Signs - Last 24 Hrs    T(C): 36.6 (08-09-17 @ 04:14), Max: 37.1 (08-08-17 @ 12:30)  HR: 89 (08-09-17 @ 04:14) (84 - 101)  BP: 102/69 (08-09-17 @ 04:14) (102/69 - 120/72)  RR: 18 (08-09-17 @ 04:14) (17 - 18)  SpO2: 93% (08-09-17 @ 04:14) (93% - 97%)  Wt(kg): --  Daily     Daily     I&O's Summary    07 Aug 2017 07:01  -  08 Aug 2017 07:00  --------------------------------------------------------  IN: 1220 mL / OUT: 0 mL / NET: 1220 mL      MEDICATIONS  (STANDING):  brimonidine 0.2% Ophthalmic Solution 1 Drop(s) Both EYES two times a day  dorzolamide 2%/timolol 0.5% Ophthalmic Solution 1 Drop(s) Both EYES two times a day  latanoprost 0.005% Ophthalmic Solution 1 Drop(s) Both EYES at bedtime  lactobacillus acidophilus 1 Tablet(s) Oral daily  docusate sodium 100 milliGRAM(s) Oral three times a day  fluticasone propionate 50 MICROgram(s)/spray Nasal Spray 1 Spray(s) Both Nostrils two times a day  aspirin  chewable 81 milliGRAM(s) Oral daily  enoxaparin Injectable 40 milliGRAM(s) SubCutaneous daily  fluconAZOLE   Tablet 200 milliGRAM(s) Oral daily  vancomycin  IVPB 1500 milliGRAM(s) IV Intermittent every 24 hours  metoprolol succinate ER 50 milliGRAM(s) Oral daily    MEDICATIONS  (PRN):  ondansetron    Tablet 4 milliGRAM(s) Oral every 6 hours PRN Nausea and/or Vomiting  acetaminophen   Tablet. 650 milliGRAM(s) Oral every 6 hours PRN Mild Pain (1 - 3)  acetaminophen   Tablet. 650 milliGRAM(s) Oral every 6 hours PRN Moderate Pain (4 - 6)  polyethylene glycol 3350 17 Gram(s) Oral daily PRN Constipation      LABS:                        11.1   6.5   )-----------( 356      ( 08 Aug 2017 08:15 )             33.6     08-08    137  |  101  |  20  ----------------------------<  68<L>  3.9   |  27  |  0.96    Ca    9.0      08 Aug 2017 08:15
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
## 2017-08-11 DIAGNOSIS — H40.9 UNSPECIFIED GLAUCOMA: ICD-10-CM

## 2017-08-11 DIAGNOSIS — E86.0 DEHYDRATION: ICD-10-CM

## 2017-08-11 DIAGNOSIS — N18.2 CHRONIC KIDNEY DISEASE, STAGE 2 (MILD): ICD-10-CM

## 2017-08-11 DIAGNOSIS — Z85.3 PERSONAL HISTORY OF MALIGNANT NEOPLASM OF BREAST: ICD-10-CM

## 2017-08-11 DIAGNOSIS — L97.311 NON-PRESSURE CHRONIC ULCER OF RIGHT ANKLE LIMITED TO BREAKDOWN OF SKIN: ICD-10-CM

## 2017-08-11 DIAGNOSIS — L03.115 CELLULITIS OF RIGHT LOWER LIMB: ICD-10-CM

## 2017-08-11 DIAGNOSIS — Z90.49 ACQUIRED ABSENCE OF OTHER SPECIFIED PARTS OF DIGESTIVE TRACT: ICD-10-CM

## 2017-08-11 DIAGNOSIS — E78.5 HYPERLIPIDEMIA, UNSPECIFIED: ICD-10-CM

## 2017-08-11 DIAGNOSIS — I34.0 NONRHEUMATIC MITRAL (VALVE) INSUFFICIENCY: ICD-10-CM

## 2017-08-11 DIAGNOSIS — M86.161 OTHER ACUTE OSTEOMYELITIS, RIGHT TIBIA AND FIBULA: ICD-10-CM

## 2017-08-11 DIAGNOSIS — I50.9 HEART FAILURE, UNSPECIFIED: ICD-10-CM

## 2017-08-11 DIAGNOSIS — Z90.13 ACQUIRED ABSENCE OF BILATERAL BREASTS AND NIPPLES: ICD-10-CM

## 2017-08-11 DIAGNOSIS — H92.09 OTALGIA, UNSPECIFIED EAR: ICD-10-CM

## 2017-08-11 DIAGNOSIS — I48.2 CHRONIC ATRIAL FIBRILLATION: ICD-10-CM

## 2017-08-14 DIAGNOSIS — M17.11 UNILATERAL PRIMARY OSTEOARTHRITIS, RIGHT KNEE: ICD-10-CM

## 2017-08-22 ENCOUNTER — OUTPATIENT (OUTPATIENT)
Dept: OUTPATIENT SERVICES | Facility: HOSPITAL | Age: 82
LOS: 1 days | End: 2017-08-22
Payer: COMMERCIAL

## 2017-08-22 DIAGNOSIS — Z98.89 OTHER SPECIFIED POSTPROCEDURAL STATES: Chronic | ICD-10-CM

## 2017-08-22 DIAGNOSIS — Z90.13 ACQUIRED ABSENCE OF BILATERAL BREASTS AND NIPPLES: Chronic | ICD-10-CM

## 2017-08-22 DIAGNOSIS — Z96.7 PRESENCE OF OTHER BONE AND TENDON IMPLANTS: Chronic | ICD-10-CM

## 2017-08-22 DIAGNOSIS — Z98.890 OTHER SPECIFIED POSTPROCEDURAL STATES: Chronic | ICD-10-CM

## 2017-08-22 DIAGNOSIS — L03.113 CELLULITIS OF RIGHT UPPER LIMB: ICD-10-CM

## 2017-08-22 DIAGNOSIS — Z90.710 ACQUIRED ABSENCE OF BOTH CERVIX AND UTERUS: Chronic | ICD-10-CM

## 2017-08-22 PROCEDURE — G0463: CPT

## 2017-08-24 DIAGNOSIS — Z87.81 PERSONAL HISTORY OF (HEALED) TRAUMATIC FRACTURE: ICD-10-CM
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
## 2017-08-24 DIAGNOSIS — H40.9 UNSPECIFIED GLAUCOMA: ICD-10-CM

## 2017-08-24 DIAGNOSIS — H91.90 UNSPECIFIED HEARING LOSS, UNSPECIFIED EAR: ICD-10-CM

## 2017-08-24 DIAGNOSIS — K21.9 GASTRO-ESOPHAGEAL REFLUX DISEASE WITHOUT ESOPHAGITIS: ICD-10-CM

## 2017-08-24 DIAGNOSIS — S97.01XD CRUSHING INJURY OF RIGHT ANKLE, SUBSEQUENT ENCOUNTER: ICD-10-CM

## 2017-08-24 DIAGNOSIS — Z90.11 ACQUIRED ABSENCE OF RIGHT BREAST AND NIPPLE: ICD-10-CM

## 2017-08-24 DIAGNOSIS — M19.90 UNSPECIFIED OSTEOARTHRITIS, UNSPECIFIED SITE: ICD-10-CM

## 2017-08-24 DIAGNOSIS — I48.91 UNSPECIFIED ATRIAL FIBRILLATION: ICD-10-CM

## 2017-08-24 DIAGNOSIS — Z92.3 PERSONAL HISTORY OF IRRADIATION: ICD-10-CM

## 2017-08-24 DIAGNOSIS — Z98.42 CATARACT EXTRACTION STATUS, LEFT EYE: ICD-10-CM

## 2017-08-24 DIAGNOSIS — E66.9 OBESITY, UNSPECIFIED: ICD-10-CM

## 2017-08-24 DIAGNOSIS — S97.81XD CRUSHING INJURY OF RIGHT FOOT, SUBSEQUENT ENCOUNTER: ICD-10-CM
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
## 2017-08-24 DIAGNOSIS — Z85.3 PERSONAL HISTORY OF MALIGNANT NEOPLASM OF BREAST: ICD-10-CM

## 2017-08-24 DIAGNOSIS — Z82.49 FAMILY HISTORY OF ISCHEMIC HEART DISEASE AND OTHER DISEASES OF THE CIRCULATORY SYSTEM: ICD-10-CM
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
## 2017-08-24 DIAGNOSIS — Z98.41 CATARACT EXTRACTION STATUS, RIGHT EYE: ICD-10-CM

## 2017-08-24 DIAGNOSIS — Z90.710 ACQUIRED ABSENCE OF BOTH CERVIX AND UTERUS: ICD-10-CM
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
## 2017-08-24 DIAGNOSIS — Z90.49 ACQUIRED ABSENCE OF OTHER SPECIFIED PARTS OF DIGESTIVE TRACT: ICD-10-CM

## 2017-08-24 DIAGNOSIS — Z87.442 PERSONAL HISTORY OF URINARY CALCULI: ICD-10-CM

## 2017-08-24 DIAGNOSIS — Z88.7 ALLERGY STATUS TO SERUM AND VACCINE: ICD-10-CM

## 2017-08-24 DIAGNOSIS — Z87.891 PERSONAL HISTORY OF NICOTINE DEPENDENCE: ICD-10-CM

## 2017-08-24 DIAGNOSIS — Z79.899 OTHER LONG TERM (CURRENT) DRUG THERAPY: ICD-10-CM

## 2017-08-30 ENCOUNTER — OUTPATIENT (OUTPATIENT)
Dept: OUTPATIENT SERVICES | Facility: HOSPITAL | Age: 82
LOS: 1 days | End: 2017-08-30
Payer: COMMERCIAL

## 2017-08-30 ENCOUNTER — EMERGENCY (EMERGENCY)
Facility: HOSPITAL | Age: 82
LOS: 1 days | Discharge: LEFT BEFORE DISCHARGE | End: 2017-08-30
Attending: EMERGENCY MEDICINE | Admitting: EMERGENCY MEDICINE
Payer: COMMERCIAL

## 2017-08-30 VITALS
RESPIRATION RATE: 16 BRPM | DIASTOLIC BLOOD PRESSURE: 77 MMHG | OXYGEN SATURATION: 98 % | HEART RATE: 100 BPM | TEMPERATURE: 98 F | SYSTOLIC BLOOD PRESSURE: 114 MMHG

## 2017-08-30 VITALS
OXYGEN SATURATION: 96 % | DIASTOLIC BLOOD PRESSURE: 77 MMHG | RESPIRATION RATE: 15 BRPM | HEART RATE: 98 BPM | TEMPERATURE: 98 F | SYSTOLIC BLOOD PRESSURE: 111 MMHG | WEIGHT: 179.9 LBS

## 2017-08-30 DIAGNOSIS — Z98.89 OTHER SPECIFIED POSTPROCEDURAL STATES: Chronic | ICD-10-CM

## 2017-08-30 DIAGNOSIS — Z98.890 OTHER SPECIFIED POSTPROCEDURAL STATES: Chronic | ICD-10-CM

## 2017-08-30 DIAGNOSIS — Z90.13 ACQUIRED ABSENCE OF BILATERAL BREASTS AND NIPPLES: Chronic | ICD-10-CM

## 2017-08-30 DIAGNOSIS — S97.01XD CRUSHING INJURY OF RIGHT ANKLE, SUBSEQUENT ENCOUNTER: ICD-10-CM

## 2017-08-30 DIAGNOSIS — Z90.710 ACQUIRED ABSENCE OF BOTH CERVIX AND UTERUS: Chronic | ICD-10-CM

## 2017-08-30 DIAGNOSIS — Z96.7 PRESENCE OF OTHER BONE AND TENDON IMPLANTS: Chronic | ICD-10-CM

## 2017-08-30 LAB
ALBUMIN SERPL ELPH-MCNC: 3.3 G/DL — SIGNIFICANT CHANGE UP (ref 3.3–5)
ALP SERPL-CCNC: 50 U/L — SIGNIFICANT CHANGE UP (ref 40–120)
ALT FLD-CCNC: 20 U/L — SIGNIFICANT CHANGE UP (ref 12–78)
ANION GAP SERPL CALC-SCNC: 8 MMOL/L — SIGNIFICANT CHANGE UP (ref 5–17)
AST SERPL-CCNC: 15 U/L — SIGNIFICANT CHANGE UP (ref 15–37)
BASOPHILS # BLD AUTO: 0.1 K/UL — SIGNIFICANT CHANGE UP (ref 0–0.2)
BASOPHILS NFR BLD AUTO: 1.1 % — SIGNIFICANT CHANGE UP (ref 0–2)
BILIRUB SERPL-MCNC: 0.3 MG/DL — SIGNIFICANT CHANGE UP (ref 0.2–1.2)
BUN SERPL-MCNC: 12 MG/DL — SIGNIFICANT CHANGE UP (ref 7–23)
CALCIUM SERPL-MCNC: 9.1 MG/DL — SIGNIFICANT CHANGE UP (ref 8.5–10.1)
CHLORIDE SERPL-SCNC: 106 MMOL/L — SIGNIFICANT CHANGE UP (ref 96–108)
CK MB BLD-MCNC: 1.8 % — SIGNIFICANT CHANGE UP (ref 0–3.5)
CK MB CFR SERPL CALC: 1.1 NG/ML — SIGNIFICANT CHANGE UP (ref 0–3.6)
CK SERPL-CCNC: 62 U/L — SIGNIFICANT CHANGE UP (ref 26–192)
CO2 SERPL-SCNC: 27 MMOL/L — SIGNIFICANT CHANGE UP (ref 22–31)
CREAT SERPL-MCNC: 1 MG/DL — SIGNIFICANT CHANGE UP (ref 0.5–1.3)
EOSINOPHIL # BLD AUTO: 0.3 K/UL — SIGNIFICANT CHANGE UP (ref 0–0.5)
EOSINOPHIL NFR BLD AUTO: 4.3 % — SIGNIFICANT CHANGE UP (ref 0–6)
GLUCOSE SERPL-MCNC: 98 MG/DL — SIGNIFICANT CHANGE UP (ref 70–99)
HCT VFR BLD CALC: 35 % — SIGNIFICANT CHANGE UP (ref 34.5–45)
HGB BLD-MCNC: 11.2 G/DL — LOW (ref 11.5–15.5)
LYMPHOCYTES # BLD AUTO: 2 K/UL — SIGNIFICANT CHANGE UP (ref 1–3.3)
LYMPHOCYTES # BLD AUTO: 33 % — SIGNIFICANT CHANGE UP (ref 13–44)
MCHC RBC-ENTMCNC: 32 GM/DL — SIGNIFICANT CHANGE UP (ref 32–36)
MCHC RBC-ENTMCNC: 32 PG — SIGNIFICANT CHANGE UP (ref 27–34)
MCV RBC AUTO: 99.7 FL — SIGNIFICANT CHANGE UP (ref 80–100)
MONOCYTES # BLD AUTO: 0.5 K/UL — SIGNIFICANT CHANGE UP (ref 0–0.9)
MONOCYTES NFR BLD AUTO: 8.4 % — SIGNIFICANT CHANGE UP (ref 1–9)
NEUTROPHILS # BLD AUTO: 3.3 K/UL — SIGNIFICANT CHANGE UP (ref 1.8–7.4)
NEUTROPHILS NFR BLD AUTO: 53.2 % — SIGNIFICANT CHANGE UP (ref 43–77)
PLATELET # BLD AUTO: 265 K/UL — SIGNIFICANT CHANGE UP (ref 150–400)
POTASSIUM SERPL-MCNC: 3.9 MMOL/L — SIGNIFICANT CHANGE UP (ref 3.5–5.3)
POTASSIUM SERPL-SCNC: 3.9 MMOL/L — SIGNIFICANT CHANGE UP (ref 3.5–5.3)
PROT SERPL-MCNC: 7.2 G/DL — SIGNIFICANT CHANGE UP (ref 6–8.3)
RBC # BLD: 3.51 M/UL — LOW (ref 3.8–5.2)
RBC # FLD: 12.4 % — SIGNIFICANT CHANGE UP (ref 10.3–14.5)
SODIUM SERPL-SCNC: 141 MMOL/L — SIGNIFICANT CHANGE UP (ref 135–145)
TROPONIN I SERPL-MCNC: <.015 NG/ML — SIGNIFICANT CHANGE UP (ref 0.01–0.04)
WBC # BLD: 6.2 K/UL — SIGNIFICANT CHANGE UP (ref 3.8–10.5)
WBC # FLD AUTO: 6.2 K/UL — SIGNIFICANT CHANGE UP (ref 3.8–10.5)

## 2017-08-30 PROCEDURE — 71045 X-RAY EXAM CHEST 1 VIEW: CPT

## 2017-08-30 PROCEDURE — 72125 CT NECK SPINE W/O DYE: CPT

## 2017-08-30 PROCEDURE — 80053 COMPREHEN METABOLIC PANEL: CPT

## 2017-08-30 PROCEDURE — 70450 CT HEAD/BRAIN W/O DYE: CPT | Mod: 26

## 2017-08-30 PROCEDURE — 99284 EMERGENCY DEPT VISIT MOD MDM: CPT | Mod: 25

## 2017-08-30 PROCEDURE — 99285 EMERGENCY DEPT VISIT HI MDM: CPT

## 2017-08-30 PROCEDURE — 72125 CT NECK SPINE W/O DYE: CPT | Mod: 26

## 2017-08-30 PROCEDURE — G0463: CPT

## 2017-08-30 PROCEDURE — 85027 COMPLETE CBC AUTOMATED: CPT

## 2017-08-30 PROCEDURE — 71010: CPT | Mod: 26

## 2017-08-30 PROCEDURE — 84484 ASSAY OF TROPONIN QUANT: CPT

## 2017-08-30 PROCEDURE — 70450 CT HEAD/BRAIN W/O DYE: CPT

## 2017-08-30 PROCEDURE — 82550 ASSAY OF CK (CPK): CPT

## 2017-08-30 PROCEDURE — 82553 CREATINE MB FRACTION: CPT

## 2017-08-30 RX ORDER — PSYLLIUM SEED (WITH DEXTROSE)
2 POWDER (GRAM) ORAL
Qty: 0 | Refills: 0 | COMMUNITY

## 2017-08-30 NOTE — ED ADULT NURSE REASSESSMENT NOTE - NS ED NURSE REASSESS COMMENT FT1
Patient left AMA without signing paperwork. Patient states she wanted to deal with her migraine at home and she didn't want to wait any longer. Vitals were stable before patient walked out of ED.

## 2017-08-31 DIAGNOSIS — Z87.81 PERSONAL HISTORY OF (HEALED) TRAUMATIC FRACTURE: ICD-10-CM

## 2017-08-31 DIAGNOSIS — Z88.7 ALLERGY STATUS TO SERUM AND VACCINE: ICD-10-CM

## 2017-08-31 DIAGNOSIS — E66.9 OBESITY, UNSPECIFIED: ICD-10-CM

## 2017-08-31 DIAGNOSIS — M19.90 UNSPECIFIED OSTEOARTHRITIS, UNSPECIFIED SITE: ICD-10-CM
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
## 2017-08-31 DIAGNOSIS — S97.81XD CRUSHING INJURY OF RIGHT FOOT, SUBSEQUENT ENCOUNTER: ICD-10-CM

## 2017-08-31 DIAGNOSIS — Z87.891 PERSONAL HISTORY OF NICOTINE DEPENDENCE: ICD-10-CM

## 2017-08-31 DIAGNOSIS — Z92.3 PERSONAL HISTORY OF IRRADIATION: ICD-10-CM
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
## 2017-08-31 DIAGNOSIS — Z90.710 ACQUIRED ABSENCE OF BOTH CERVIX AND UTERUS: ICD-10-CM

## 2017-08-31 DIAGNOSIS — Z79.899 OTHER LONG TERM (CURRENT) DRUG THERAPY: ICD-10-CM

## 2017-08-31 DIAGNOSIS — H40.9 UNSPECIFIED GLAUCOMA: ICD-10-CM

## 2017-08-31 DIAGNOSIS — Z98.41 CATARACT EXTRACTION STATUS, RIGHT EYE: ICD-10-CM

## 2017-08-31 DIAGNOSIS — S97.01XD CRUSHING INJURY OF RIGHT ANKLE, SUBSEQUENT ENCOUNTER: ICD-10-CM

## 2017-08-31 DIAGNOSIS — Z82.49 FAMILY HISTORY OF ISCHEMIC HEART DISEASE AND OTHER DISEASES OF THE CIRCULATORY SYSTEM: ICD-10-CM

## 2017-08-31 DIAGNOSIS — Z90.11 ACQUIRED ABSENCE OF RIGHT BREAST AND NIPPLE: ICD-10-CM

## 2017-08-31 DIAGNOSIS — I48.91 UNSPECIFIED ATRIAL FIBRILLATION: ICD-10-CM

## 2017-08-31 DIAGNOSIS — Z85.3 PERSONAL HISTORY OF MALIGNANT NEOPLASM OF BREAST: ICD-10-CM

## 2017-08-31 DIAGNOSIS — K21.9 GASTRO-ESOPHAGEAL REFLUX DISEASE WITHOUT ESOPHAGITIS: ICD-10-CM

## 2017-08-31 DIAGNOSIS — Z87.442 PERSONAL HISTORY OF URINARY CALCULI: ICD-10-CM

## 2017-08-31 DIAGNOSIS — Z98.42 CATARACT EXTRACTION STATUS, LEFT EYE: ICD-10-CM

## 2017-08-31 DIAGNOSIS — H91.90 UNSPECIFIED HEARING LOSS, UNSPECIFIED EAR: ICD-10-CM

## 2017-08-31 DIAGNOSIS — Z90.49 ACQUIRED ABSENCE OF OTHER SPECIFIED PARTS OF DIGESTIVE TRACT: ICD-10-CM
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
## 2017-08-31 NOTE — ED PROVIDER NOTE - NEUROLOGICAL, MLM
Alert and oriented, no focal deficits, no motor or sensory deficits. cn ii to xii intact. normal cerebellar functioning. normal gait.

## 2017-08-31 NOTE — ED PROVIDER NOTE - CHPI ED SYMPTOMS NEG
no nausea/no chills/no vomiting/no dizziness/no fever/no decreased eating/drinking/no tingling/no weakness/no numbness

## 2017-08-31 NOTE — ED PROVIDER NOTE - MEDICAL DECISION MAKING DETAILS
Patient was requested multiple times, by me, to stay in the hospital for further workup, labs, imaging studies, admission, specialist consultation, further specialty testing and management.  Patient opts to go home against medical advice (AMA).  Patient and family made well aware of all risks of leaving AMA.  All risks of leaving AMA personally explained to and understood by patient/family, including those of death, permanent disability and multiorgan failure.  Patient was asked to immediately return for any signs/symptoms. All potential detrimental diagnoses and risks of each discussed with patient/family.  All benefits of staying discussed and understood.  Patient/family demonstrate full understanding of all risks and opt to go home.  Patient will follow up with PMD and specialist tomorrow morning.  Patient was asked to immediately return for any signs/symptoms.  Patient demonstrates competence, health literacy and medical decision making capacity and opts to leave AMA.  Patient informed of and demonstrates full understanding of all signs and symptoms for immediate ER return.

## 2017-09-02 ENCOUNTER — EMERGENCY (EMERGENCY)
Facility: HOSPITAL | Age: 82
LOS: 1 days | Discharge: HOME | End: 2017-09-02
Attending: EMERGENCY MEDICINE | Admitting: EMERGENCY MEDICINE
Payer: COMMERCIAL

## 2017-09-02 VITALS
SYSTOLIC BLOOD PRESSURE: 121 MMHG | WEIGHT: 182.1 LBS | TEMPERATURE: 98 F | RESPIRATION RATE: 14 BRPM | DIASTOLIC BLOOD PRESSURE: 68 MMHG | OXYGEN SATURATION: 96 % | HEART RATE: 97 BPM

## 2017-09-02 DIAGNOSIS — Z90.13 ACQUIRED ABSENCE OF BILATERAL BREASTS AND NIPPLES: ICD-10-CM

## 2017-09-02 DIAGNOSIS — L03.90 CELLULITIS, UNSPECIFIED: ICD-10-CM

## 2017-09-02 DIAGNOSIS — I48.91 UNSPECIFIED ATRIAL FIBRILLATION: ICD-10-CM

## 2017-09-02 DIAGNOSIS — Z90.13 ACQUIRED ABSENCE OF BILATERAL BREASTS AND NIPPLES: Chronic | ICD-10-CM

## 2017-09-02 DIAGNOSIS — Z87.891 PERSONAL HISTORY OF NICOTINE DEPENDENCE: ICD-10-CM

## 2017-09-02 DIAGNOSIS — I47.1 SUPRAVENTRICULAR TACHYCARDIA: ICD-10-CM

## 2017-09-02 DIAGNOSIS — R51 HEADACHE: ICD-10-CM

## 2017-09-02 DIAGNOSIS — Z79.2 LONG TERM (CURRENT) USE OF ANTIBIOTICS: ICD-10-CM

## 2017-09-02 DIAGNOSIS — Z79.82 LONG TERM (CURRENT) USE OF ASPIRIN: ICD-10-CM

## 2017-09-02 DIAGNOSIS — Z98.0 INTESTINAL BYPASS AND ANASTOMOSIS STATUS: ICD-10-CM

## 2017-09-02 DIAGNOSIS — G43.909 MIGRAINE, UNSPECIFIED, NOT INTRACTABLE, WITHOUT STATUS MIGRAINOSUS: ICD-10-CM

## 2017-09-02 DIAGNOSIS — Z96.7 PRESENCE OF OTHER BONE AND TENDON IMPLANTS: Chronic | ICD-10-CM

## 2017-09-02 DIAGNOSIS — Z98.89 OTHER SPECIFIED POSTPROCEDURAL STATES: Chronic | ICD-10-CM

## 2017-09-02 DIAGNOSIS — Z98.890 OTHER SPECIFIED POSTPROCEDURAL STATES: Chronic | ICD-10-CM

## 2017-09-02 DIAGNOSIS — H40.9 UNSPECIFIED GLAUCOMA: ICD-10-CM

## 2017-09-02 DIAGNOSIS — Z85.3 PERSONAL HISTORY OF MALIGNANT NEOPLASM OF BREAST: ICD-10-CM

## 2017-09-02 DIAGNOSIS — L89.513 PRESSURE ULCER OF RIGHT ANKLE, STAGE 3: ICD-10-CM

## 2017-09-02 DIAGNOSIS — Z45.2 ENCOUNTER FOR ADJUSTMENT AND MANAGEMENT OF VASCULAR ACCESS DEVICE: ICD-10-CM

## 2017-09-02 DIAGNOSIS — Z90.710 ACQUIRED ABSENCE OF BOTH CERVIX AND UTERUS: Chronic | ICD-10-CM

## 2017-09-02 PROCEDURE — 96365 THER/PROPH/DIAG IV INF INIT: CPT

## 2017-09-02 PROCEDURE — 99284 EMERGENCY DEPT VISIT MOD MDM: CPT | Mod: 25

## 2017-09-02 PROCEDURE — 99283 EMERGENCY DEPT VISIT LOW MDM: CPT

## 2017-09-02 RX ORDER — VANCOMYCIN HCL 1 G
1500 VIAL (EA) INTRAVENOUS ONCE
Qty: 0 | Refills: 0 | Status: COMPLETED | OUTPATIENT
Start: 2017-09-02 | End: 2017-09-02

## 2017-09-02 RX ADMIN — Medication 300 MILLIGRAM(S): at 17:04

## 2017-09-02 NOTE — ED PROVIDER NOTE - CARE PLAN
Principal Discharge DX:	Encounter for assessment of peripherally inserted central venous catheter (PICC)  Secondary Diagnosis:	Cellulitis

## 2017-09-02 NOTE — CHART NOTE - NSCHARTNOTEFT_GEN_A_CORE
Please see Dr. Crowe's note regarding the ED visit. I did not examine this patient. I was called regarding potential for hospital admission due to apparently unusable PICC line for home infusion of IV abx. I was told the infusion center was refusing to administer IV vancomycin because the line was unable to have venous return in the home. I spoke w/ Alban (please see RN note for contact info). I informed her that if the line wasn't functioning properly, the pt was have to be admitted for IR/vascular consult to assess need for placement of new line but due to long holiday weekend this is unavailable until 9/5. She requested that small dose of tPA be given to dissolve potential fibrin sheath vs. clot at the tip of the catheter, she stated that this is a commonly offered alternative in the outpatient setting and that hospital admission was not required if this could be performed in the ED. I informed her that I'm unfamiliar with this procedure and that they would need to discuss w/ the ED and they can discuss the proper management. I informed the RN assigned to the patient in the ED and I informed the ED attending. I requested that if they request hospital admission I can assess the patient but until now, it seems as though the patient will be discharged and further management was deferred to the ED/home infusion team.

## 2017-09-02 NOTE — ED ADULT NURSE REASSESSMENT NOTE - NS ED NURSE REASSESS COMMENT FT1
spoke with emergency line at [facility] infusion service, Evie, who will have a RN call me back so we can let her know that the PICC in usable as per DR Sommer

## 2017-09-02 NOTE — ED ADULT NURSE NOTE - CHIEF COMPLAINT QUOTE
"unable to left upper chest [id number]" headache x 2wks "unable to left upper chest Aflce-t-oont" headache x 2wks  left lower leg/foot traumaa wound "unable to flush left upper chest [id number]" headache x 2wks

## 2017-09-02 NOTE — ED PROVIDER NOTE - PROGRESS NOTE DETAILS
RN checked PICC line, flushes easily, but unable to draw back blood, d/w dr. powell ([facility]) he now relates ok to use picc if flushes, may d/c home and f/u as outpt with IR

## 2017-09-02 NOTE — ED PROVIDER NOTE - OBJECTIVE STATEMENT
pt referred by home care rn for nonfunctioning left acw picc line. pt on iv abx for cellulitis. pt relates was in rehab, staff hadn't been able to draw blood from picc, but today visiting RN relates couldn't draw back blood or flush picc today. no fevers, chills, cp, sob, weakness, numbness. pt also c/o ha x >2 weeks, seen in er 2 days ago for same, had labs and ct.

## 2017-09-02 NOTE — ED ADULT NURSE NOTE - OBJECTIVE STATEMENT
pt states he PICC line is not working properly, it infuses her antibiotics but RNs at rehab and home have been unable to draw blood from PICC line. Pt also with complaints of headache for the past two weeks.

## 2017-09-02 NOTE — ED ADULT NURSE REASSESSMENT NOTE - NS ED NURSE REASSESS COMMENT FT1
spoke with Fabiola TOLEDO at [facility] infusion services, they will not use PICC line to infuse pt antibiotic because there is no return of blood. pt will need to be admitted. spoke with Fabiola RN at [facility] infusion services, they will not use PICC line to infuse pt antibiotic because there is no return of blood. pt will need to be admitted.  Fabiola wanted us to have Dr Sommer call them to discuss. Dr Crowe called Dr Steinberg (admitting MD ) and Dr Steinberg wanted the info I gave, Director is Alban [phone]

## 2017-09-02 NOTE — ED ADULT NURSE REASSESSMENT NOTE - NS ED NURSE REASSESS COMMENT FT1
spoke with Alban TOLEDO at [facility]. they will administer the cathflow to pt at home. ANNA DOWNEY doesn't want to order because line is not occluded for flushing.

## 2017-09-02 NOTE — ED ADULT NURSE REASSESSMENT NOTE - NS ED NURSE REASSESS COMMENT FT1
ANNA DOWNEY aware we are unable to place an IV or obtain blood. will call lab to come draw pt ANNA DOWNEY aware we are unable to place an IV or obtain blood.  PICC line does flush ER MD aware.

## 2017-09-02 NOTE — ED ADULT NURSE REASSESSMENT NOTE - NS ED NURSE REASSESS COMMENT FT1
Me and Dr Crowe spoke with Elizabeth regarding the use of the PICC line. she was not receptive to pt going home to use PICC if there was no blood return, (Dr Sommer) spoke with Dr Crowe that we are able to use the PICC line. vancomycin infusing currently without any issues. Me and Dr Crowe spoke with Fabiola  regarding the use of the PICC line. she was not receptive to pt going home to use PICC if there was no blood return, (Dr Sommer) spoke with Dr Crowe that we are able to use the PICC line. vancomycin infusing currently without any issues.

## 2017-09-08 ENCOUNTER — EMERGENCY (EMERGENCY)
Facility: HOSPITAL | Age: 82
LOS: 1 days | Discharge: HOME | End: 2017-09-08
Attending: EMERGENCY MEDICINE | Admitting: EMERGENCY MEDICINE
Payer: COMMERCIAL

## 2017-09-08 ENCOUNTER — OUTPATIENT (OUTPATIENT)
Dept: OUTPATIENT SERVICES | Facility: HOSPITAL | Age: 82
LOS: 1 days | End: 2017-09-08
Payer: COMMERCIAL

## 2017-09-08 VITALS
OXYGEN SATURATION: 100 % | SYSTOLIC BLOOD PRESSURE: 135 MMHG | RESPIRATION RATE: 14 BRPM | DIASTOLIC BLOOD PRESSURE: 83 MMHG | HEART RATE: 84 BPM | WEIGHT: 179.9 LBS | TEMPERATURE: 98 F

## 2017-09-08 DIAGNOSIS — Z45.2 ENCOUNTER FOR ADJUSTMENT AND MANAGEMENT OF VASCULAR ACCESS DEVICE: ICD-10-CM

## 2017-09-08 DIAGNOSIS — H40.9 UNSPECIFIED GLAUCOMA: ICD-10-CM

## 2017-09-08 DIAGNOSIS — Z98.890 OTHER SPECIFIED POSTPROCEDURAL STATES: Chronic | ICD-10-CM

## 2017-09-08 DIAGNOSIS — Z96.7 PRESENCE OF OTHER BONE AND TENDON IMPLANTS: Chronic | ICD-10-CM

## 2017-09-08 DIAGNOSIS — L89.513 PRESSURE ULCER OF RIGHT ANKLE, STAGE 3: ICD-10-CM

## 2017-09-08 DIAGNOSIS — Z87.19 PERSONAL HISTORY OF OTHER DISEASES OF THE DIGESTIVE SYSTEM: ICD-10-CM

## 2017-09-08 DIAGNOSIS — Z79.82 LONG TERM (CURRENT) USE OF ASPIRIN: ICD-10-CM

## 2017-09-08 DIAGNOSIS — I47.1 SUPRAVENTRICULAR TACHYCARDIA: ICD-10-CM

## 2017-09-08 DIAGNOSIS — Z98.89 OTHER SPECIFIED POSTPROCEDURAL STATES: Chronic | ICD-10-CM

## 2017-09-08 DIAGNOSIS — Z90.13 ACQUIRED ABSENCE OF BILATERAL BREASTS AND NIPPLES: Chronic | ICD-10-CM

## 2017-09-08 DIAGNOSIS — Z98.0 INTESTINAL BYPASS AND ANASTOMOSIS STATUS: ICD-10-CM

## 2017-09-08 DIAGNOSIS — Z85.3 PERSONAL HISTORY OF MALIGNANT NEOPLASM OF BREAST: ICD-10-CM

## 2017-09-08 DIAGNOSIS — R51 HEADACHE: ICD-10-CM

## 2017-09-08 DIAGNOSIS — I48.91 UNSPECIFIED ATRIAL FIBRILLATION: ICD-10-CM

## 2017-09-08 DIAGNOSIS — Z79.2 LONG TERM (CURRENT) USE OF ANTIBIOTICS: ICD-10-CM

## 2017-09-08 DIAGNOSIS — Z87.891 PERSONAL HISTORY OF NICOTINE DEPENDENCE: ICD-10-CM

## 2017-09-08 DIAGNOSIS — Z90.13 ACQUIRED ABSENCE OF BILATERAL BREASTS AND NIPPLES: ICD-10-CM

## 2017-09-08 DIAGNOSIS — Z90.710 ACQUIRED ABSENCE OF BOTH CERVIX AND UTERUS: Chronic | ICD-10-CM

## 2017-09-08 DIAGNOSIS — S97.01XD CRUSHING INJURY OF RIGHT ANKLE, SUBSEQUENT ENCOUNTER: ICD-10-CM

## 2017-09-08 DIAGNOSIS — L03.90 CELLULITIS, UNSPECIFIED: ICD-10-CM

## 2017-09-08 PROCEDURE — 99283 EMERGENCY DEPT VISIT LOW MDM: CPT | Mod: 25

## 2017-09-08 PROCEDURE — 71020: CPT | Mod: 26

## 2017-09-08 PROCEDURE — 99283 EMERGENCY DEPT VISIT LOW MDM: CPT

## 2017-09-08 PROCEDURE — 71046 X-RAY EXAM CHEST 2 VIEWS: CPT

## 2017-09-08 PROCEDURE — G0463: CPT

## 2017-09-08 NOTE — ED PROVIDER NOTE - CARDIAC, MLM
Normal rate, regular rhythm.  Heart sounds S1, S2.  No murmurs, rubs or gallops. +catheter to Left ACW; no erythema; no swelling

## 2017-09-08 NOTE — ED PROVIDER NOTE - OBJECTIVE STATEMENT
81 yo female with hx a fib, recent right foot wound/surgery came to have picc line removed. Patient was receiving IV vancomycin at home, and it completed yesterday. VNS was unable to remove catheter, so patient was referred to ED. denies fever or chills. denies pain.

## 2017-09-10 DIAGNOSIS — Z87.442 PERSONAL HISTORY OF URINARY CALCULI: ICD-10-CM

## 2017-09-10 DIAGNOSIS — Z90.11 ACQUIRED ABSENCE OF RIGHT BREAST AND NIPPLE: ICD-10-CM

## 2017-09-10 DIAGNOSIS — K21.9 GASTRO-ESOPHAGEAL REFLUX DISEASE WITHOUT ESOPHAGITIS: ICD-10-CM

## 2017-09-10 DIAGNOSIS — Z90.49 ACQUIRED ABSENCE OF OTHER SPECIFIED PARTS OF DIGESTIVE TRACT: ICD-10-CM
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
## 2017-09-10 DIAGNOSIS — H91.90 UNSPECIFIED HEARING LOSS, UNSPECIFIED EAR: ICD-10-CM

## 2017-09-10 DIAGNOSIS — S97.81XD CRUSHING INJURY OF RIGHT FOOT, SUBSEQUENT ENCOUNTER: ICD-10-CM

## 2017-09-10 DIAGNOSIS — M19.90 UNSPECIFIED OSTEOARTHRITIS, UNSPECIFIED SITE: ICD-10-CM

## 2017-09-10 DIAGNOSIS — Z87.81 PERSONAL HISTORY OF (HEALED) TRAUMATIC FRACTURE: ICD-10-CM

## 2017-09-10 DIAGNOSIS — Z98.42 CATARACT EXTRACTION STATUS, LEFT EYE: ICD-10-CM
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
## 2017-09-10 DIAGNOSIS — Z88.7 ALLERGY STATUS TO SERUM AND VACCINE: ICD-10-CM

## 2017-09-10 DIAGNOSIS — Z98.41 CATARACT EXTRACTION STATUS, RIGHT EYE: ICD-10-CM

## 2017-09-10 DIAGNOSIS — Z82.49 FAMILY HISTORY OF ISCHEMIC HEART DISEASE AND OTHER DISEASES OF THE CIRCULATORY SYSTEM: ICD-10-CM

## 2017-09-10 DIAGNOSIS — Z92.3 PERSONAL HISTORY OF IRRADIATION: ICD-10-CM

## 2017-09-10 DIAGNOSIS — Z79.899 OTHER LONG TERM (CURRENT) DRUG THERAPY: ICD-10-CM

## 2017-09-10 DIAGNOSIS — Z90.710 ACQUIRED ABSENCE OF BOTH CERVIX AND UTERUS: ICD-10-CM
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
## 2017-09-10 DIAGNOSIS — E66.9 OBESITY, UNSPECIFIED: ICD-10-CM

## 2017-09-15 ENCOUNTER — OUTPATIENT (OUTPATIENT)
Dept: OUTPATIENT SERVICES | Facility: HOSPITAL | Age: 82
LOS: 1 days | End: 2017-09-15
Payer: COMMERCIAL

## 2017-09-15 DIAGNOSIS — Z90.710 ACQUIRED ABSENCE OF BOTH CERVIX AND UTERUS: Chronic | ICD-10-CM

## 2017-09-15 DIAGNOSIS — S97.01XD CRUSHING INJURY OF RIGHT ANKLE, SUBSEQUENT ENCOUNTER: ICD-10-CM

## 2017-09-15 DIAGNOSIS — Z96.7 PRESENCE OF OTHER BONE AND TENDON IMPLANTS: Chronic | ICD-10-CM

## 2017-09-15 DIAGNOSIS — Z98.89 OTHER SPECIFIED POSTPROCEDURAL STATES: Chronic | ICD-10-CM

## 2017-09-15 DIAGNOSIS — Z98.890 OTHER SPECIFIED POSTPROCEDURAL STATES: Chronic | ICD-10-CM

## 2017-09-15 DIAGNOSIS — Z90.13 ACQUIRED ABSENCE OF BILATERAL BREASTS AND NIPPLES: Chronic | ICD-10-CM

## 2017-09-15 PROCEDURE — 97602 WOUND(S) CARE NON-SELECTIVE: CPT

## 2017-09-16 DIAGNOSIS — Z82.49 FAMILY HISTORY OF ISCHEMIC HEART DISEASE AND OTHER DISEASES OF THE CIRCULATORY SYSTEM: ICD-10-CM

## 2017-09-16 DIAGNOSIS — Z90.49 ACQUIRED ABSENCE OF OTHER SPECIFIED PARTS OF DIGESTIVE TRACT: ICD-10-CM

## 2017-09-16 DIAGNOSIS — Z87.442 PERSONAL HISTORY OF URINARY CALCULI: ICD-10-CM

## 2017-09-16 DIAGNOSIS — Z98.42 CATARACT EXTRACTION STATUS, LEFT EYE: ICD-10-CM

## 2017-09-16 DIAGNOSIS — Z88.7 ALLERGY STATUS TO SERUM AND VACCINE: ICD-10-CM

## 2017-09-16 DIAGNOSIS — I48.91 UNSPECIFIED ATRIAL FIBRILLATION: ICD-10-CM

## 2017-09-16 DIAGNOSIS — Z87.891 PERSONAL HISTORY OF NICOTINE DEPENDENCE: ICD-10-CM
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
## 2017-09-16 DIAGNOSIS — H40.9 UNSPECIFIED GLAUCOMA: ICD-10-CM

## 2017-09-16 DIAGNOSIS — L92.9 GRANULOMATOUS DISORDER OF THE SKIN AND SUBCUTANEOUS TISSUE, UNSPECIFIED: ICD-10-CM

## 2017-09-16 DIAGNOSIS — Z90.710 ACQUIRED ABSENCE OF BOTH CERVIX AND UTERUS: ICD-10-CM

## 2017-09-16 DIAGNOSIS — S97.81XD CRUSHING INJURY OF RIGHT FOOT, SUBSEQUENT ENCOUNTER: ICD-10-CM

## 2017-09-16 DIAGNOSIS — Z98.41 CATARACT EXTRACTION STATUS, RIGHT EYE: ICD-10-CM

## 2017-09-16 DIAGNOSIS — Z87.81 PERSONAL HISTORY OF (HEALED) TRAUMATIC FRACTURE: ICD-10-CM

## 2017-09-16 DIAGNOSIS — Z85.3 PERSONAL HISTORY OF MALIGNANT NEOPLASM OF BREAST: ICD-10-CM
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
## 2017-09-16 DIAGNOSIS — E66.9 OBESITY, UNSPECIFIED: ICD-10-CM

## 2017-09-16 DIAGNOSIS — Z79.899 OTHER LONG TERM (CURRENT) DRUG THERAPY: ICD-10-CM

## 2017-09-16 DIAGNOSIS — Z92.3 PERSONAL HISTORY OF IRRADIATION: ICD-10-CM

## 2017-09-16 DIAGNOSIS — K21.9 GASTRO-ESOPHAGEAL REFLUX DISEASE WITHOUT ESOPHAGITIS: ICD-10-CM
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
## 2017-09-16 DIAGNOSIS — Z90.11 ACQUIRED ABSENCE OF RIGHT BREAST AND NIPPLE: ICD-10-CM
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
## 2017-09-16 DIAGNOSIS — M19.90 UNSPECIFIED OSTEOARTHRITIS, UNSPECIFIED SITE: ICD-10-CM

## 2017-09-16 DIAGNOSIS — H91.90 UNSPECIFIED HEARING LOSS, UNSPECIFIED EAR: ICD-10-CM

## 2017-09-21 LAB
CULTURE RESULTS: SIGNIFICANT CHANGE UP
ORGANISM # SPEC MICROSCOPIC CNT: SIGNIFICANT CHANGE UP
SPECIMEN SOURCE: SIGNIFICANT CHANGE UP

## 2017-09-22 ENCOUNTER — OUTPATIENT (OUTPATIENT)
Dept: OUTPATIENT SERVICES | Facility: HOSPITAL | Age: 82
LOS: 1 days | End: 2017-09-22
Payer: COMMERCIAL

## 2017-09-22 DIAGNOSIS — Z90.13 ACQUIRED ABSENCE OF BILATERAL BREASTS AND NIPPLES: Chronic | ICD-10-CM

## 2017-09-22 DIAGNOSIS — Z96.7 PRESENCE OF OTHER BONE AND TENDON IMPLANTS: Chronic | ICD-10-CM

## 2017-09-22 DIAGNOSIS — Z98.89 OTHER SPECIFIED POSTPROCEDURAL STATES: Chronic | ICD-10-CM

## 2017-09-22 DIAGNOSIS — Z98.890 OTHER SPECIFIED POSTPROCEDURAL STATES: Chronic | ICD-10-CM

## 2017-09-22 DIAGNOSIS — Z90.710 ACQUIRED ABSENCE OF BOTH CERVIX AND UTERUS: Chronic | ICD-10-CM

## 2017-09-22 DIAGNOSIS — S97.01XD CRUSHING INJURY OF RIGHT ANKLE, SUBSEQUENT ENCOUNTER: ICD-10-CM

## 2017-09-22 PROCEDURE — G0463: CPT
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
## 2017-09-25 DIAGNOSIS — H91.90 UNSPECIFIED HEARING LOSS, UNSPECIFIED EAR: ICD-10-CM
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
## 2017-09-25 DIAGNOSIS — M19.90 UNSPECIFIED OSTEOARTHRITIS, UNSPECIFIED SITE: ICD-10-CM

## 2017-09-25 DIAGNOSIS — S97.01XD CRUSHING INJURY OF RIGHT ANKLE, SUBSEQUENT ENCOUNTER: ICD-10-CM

## 2017-09-25 DIAGNOSIS — Z98.42 CATARACT EXTRACTION STATUS, LEFT EYE: ICD-10-CM

## 2017-09-25 DIAGNOSIS — Z79.899 OTHER LONG TERM (CURRENT) DRUG THERAPY: ICD-10-CM

## 2017-09-25 DIAGNOSIS — S97.81XD CRUSHING INJURY OF RIGHT FOOT, SUBSEQUENT ENCOUNTER: ICD-10-CM

## 2017-09-25 DIAGNOSIS — Z90.49 ACQUIRED ABSENCE OF OTHER SPECIFIED PARTS OF DIGESTIVE TRACT: ICD-10-CM

## 2017-09-25 DIAGNOSIS — E66.9 OBESITY, UNSPECIFIED: ICD-10-CM

## 2017-09-25 DIAGNOSIS — I48.91 UNSPECIFIED ATRIAL FIBRILLATION: ICD-10-CM

## 2017-09-25 DIAGNOSIS — Z92.3 PERSONAL HISTORY OF IRRADIATION: ICD-10-CM

## 2017-09-25 DIAGNOSIS — K21.9 GASTRO-ESOPHAGEAL REFLUX DISEASE WITHOUT ESOPHAGITIS: ICD-10-CM

## 2017-09-25 DIAGNOSIS — Z88.7 ALLERGY STATUS TO SERUM AND VACCINE: ICD-10-CM

## 2017-09-25 DIAGNOSIS — Z90.710 ACQUIRED ABSENCE OF BOTH CERVIX AND UTERUS: ICD-10-CM

## 2017-09-25 DIAGNOSIS — Z98.41 CATARACT EXTRACTION STATUS, RIGHT EYE: ICD-10-CM

## 2017-09-25 DIAGNOSIS — H40.9 UNSPECIFIED GLAUCOMA: ICD-10-CM

## 2017-09-25 DIAGNOSIS — Z87.442 PERSONAL HISTORY OF URINARY CALCULI: ICD-10-CM

## 2017-09-25 DIAGNOSIS — Z90.11 ACQUIRED ABSENCE OF RIGHT BREAST AND NIPPLE: ICD-10-CM

## 2017-09-25 DIAGNOSIS — Z85.3 PERSONAL HISTORY OF MALIGNANT NEOPLASM OF BREAST: ICD-10-CM

## 2017-09-25 DIAGNOSIS — Z87.891 PERSONAL HISTORY OF NICOTINE DEPENDENCE: ICD-10-CM

## 2017-09-25 DIAGNOSIS — Z82.49 FAMILY HISTORY OF ISCHEMIC HEART DISEASE AND OTHER DISEASES OF THE CIRCULATORY SYSTEM: ICD-10-CM
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
## 2017-09-25 DIAGNOSIS — Z87.81 PERSONAL HISTORY OF (HEALED) TRAUMATIC FRACTURE: ICD-10-CM

## 2017-09-29 ENCOUNTER — OUTPATIENT (OUTPATIENT)
Dept: OUTPATIENT SERVICES | Facility: HOSPITAL | Age: 82
LOS: 1 days | End: 2017-09-29
Payer: COMMERCIAL

## 2017-09-29 DIAGNOSIS — Z90.710 ACQUIRED ABSENCE OF BOTH CERVIX AND UTERUS: Chronic | ICD-10-CM

## 2017-09-29 DIAGNOSIS — Z96.7 PRESENCE OF OTHER BONE AND TENDON IMPLANTS: Chronic | ICD-10-CM

## 2017-09-29 DIAGNOSIS — Z98.89 OTHER SPECIFIED POSTPROCEDURAL STATES: Chronic | ICD-10-CM

## 2017-09-29 DIAGNOSIS — Z90.13 ACQUIRED ABSENCE OF BILATERAL BREASTS AND NIPPLES: Chronic | ICD-10-CM

## 2017-09-29 DIAGNOSIS — S97.01XD CRUSHING INJURY OF RIGHT ANKLE, SUBSEQUENT ENCOUNTER: ICD-10-CM

## 2017-09-29 DIAGNOSIS — Z98.890 OTHER SPECIFIED POSTPROCEDURAL STATES: Chronic | ICD-10-CM

## 2017-09-29 PROCEDURE — 17250 CHEM CAUT OF GRANLTJ TISSUE: CPT
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
## 2017-09-30 DIAGNOSIS — Z82.49 FAMILY HISTORY OF ISCHEMIC HEART DISEASE AND OTHER DISEASES OF THE CIRCULATORY SYSTEM: ICD-10-CM

## 2017-09-30 DIAGNOSIS — Z87.81 PERSONAL HISTORY OF (HEALED) TRAUMATIC FRACTURE: ICD-10-CM
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
## 2017-09-30 DIAGNOSIS — H91.90 UNSPECIFIED HEARING LOSS, UNSPECIFIED EAR: ICD-10-CM

## 2017-09-30 DIAGNOSIS — Z98.42 CATARACT EXTRACTION STATUS, LEFT EYE: ICD-10-CM

## 2017-09-30 DIAGNOSIS — E66.9 OBESITY, UNSPECIFIED: ICD-10-CM

## 2017-09-30 DIAGNOSIS — L92.9 GRANULOMATOUS DISORDER OF THE SKIN AND SUBCUTANEOUS TISSUE, UNSPECIFIED: ICD-10-CM

## 2017-09-30 DIAGNOSIS — H40.9 UNSPECIFIED GLAUCOMA: ICD-10-CM

## 2017-09-30 DIAGNOSIS — Z92.3 PERSONAL HISTORY OF IRRADIATION: ICD-10-CM

## 2017-09-30 DIAGNOSIS — M19.90 UNSPECIFIED OSTEOARTHRITIS, UNSPECIFIED SITE: ICD-10-CM

## 2017-09-30 DIAGNOSIS — Z90.49 ACQUIRED ABSENCE OF OTHER SPECIFIED PARTS OF DIGESTIVE TRACT: ICD-10-CM

## 2017-09-30 DIAGNOSIS — Z98.41 CATARACT EXTRACTION STATUS, RIGHT EYE: ICD-10-CM

## 2017-09-30 DIAGNOSIS — S97.01XD CRUSHING INJURY OF RIGHT ANKLE, SUBSEQUENT ENCOUNTER: ICD-10-CM

## 2017-09-30 DIAGNOSIS — Z90.11 ACQUIRED ABSENCE OF RIGHT BREAST AND NIPPLE: ICD-10-CM

## 2017-09-30 DIAGNOSIS — K21.9 GASTRO-ESOPHAGEAL REFLUX DISEASE WITHOUT ESOPHAGITIS: ICD-10-CM
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
## 2017-09-30 DIAGNOSIS — Z85.3 PERSONAL HISTORY OF MALIGNANT NEOPLASM OF BREAST: ICD-10-CM

## 2017-09-30 DIAGNOSIS — S97.81XD CRUSHING INJURY OF RIGHT FOOT, SUBSEQUENT ENCOUNTER: ICD-10-CM

## 2017-09-30 DIAGNOSIS — I48.91 UNSPECIFIED ATRIAL FIBRILLATION: ICD-10-CM

## 2017-09-30 DIAGNOSIS — Z87.891 PERSONAL HISTORY OF NICOTINE DEPENDENCE: ICD-10-CM

## 2017-09-30 DIAGNOSIS — Z88.7 ALLERGY STATUS TO SERUM AND VACCINE: ICD-10-CM

## 2017-09-30 DIAGNOSIS — Z90.710 ACQUIRED ABSENCE OF BOTH CERVIX AND UTERUS: ICD-10-CM

## 2017-09-30 DIAGNOSIS — Z87.442 PERSONAL HISTORY OF URINARY CALCULI: ICD-10-CM

## 2017-09-30 DIAGNOSIS — Z79.899 OTHER LONG TERM (CURRENT) DRUG THERAPY: ICD-10-CM
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
## 2017-10-06 ENCOUNTER — OUTPATIENT (OUTPATIENT)
Dept: OUTPATIENT SERVICES | Facility: HOSPITAL | Age: 82
LOS: 1 days | End: 2017-10-06
Payer: COMMERCIAL

## 2017-10-06 DIAGNOSIS — Z98.890 OTHER SPECIFIED POSTPROCEDURAL STATES: Chronic | ICD-10-CM

## 2017-10-06 DIAGNOSIS — Z96.7 PRESENCE OF OTHER BONE AND TENDON IMPLANTS: Chronic | ICD-10-CM

## 2017-10-06 DIAGNOSIS — S97.01XD CRUSHING INJURY OF RIGHT ANKLE, SUBSEQUENT ENCOUNTER: ICD-10-CM

## 2017-10-06 DIAGNOSIS — Z98.89 OTHER SPECIFIED POSTPROCEDURAL STATES: Chronic | ICD-10-CM

## 2017-10-06 DIAGNOSIS — Z90.13 ACQUIRED ABSENCE OF BILATERAL BREASTS AND NIPPLES: Chronic | ICD-10-CM

## 2017-10-06 DIAGNOSIS — Z90.710 ACQUIRED ABSENCE OF BOTH CERVIX AND UTERUS: Chronic | ICD-10-CM

## 2017-10-06 PROCEDURE — 17250 CHEM CAUT OF GRANLTJ TISSUE: CPT

## 2017-10-09 DIAGNOSIS — Z98.41 CATARACT EXTRACTION STATUS, RIGHT EYE: ICD-10-CM

## 2017-10-09 DIAGNOSIS — H40.9 UNSPECIFIED GLAUCOMA: ICD-10-CM

## 2017-10-09 DIAGNOSIS — Z87.442 PERSONAL HISTORY OF URINARY CALCULI: ICD-10-CM

## 2017-10-09 DIAGNOSIS — S97.81XD CRUSHING INJURY OF RIGHT FOOT, SUBSEQUENT ENCOUNTER: ICD-10-CM

## 2017-10-09 DIAGNOSIS — Z90.11 ACQUIRED ABSENCE OF RIGHT BREAST AND NIPPLE: ICD-10-CM
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
## 2017-10-09 DIAGNOSIS — M19.90 UNSPECIFIED OSTEOARTHRITIS, UNSPECIFIED SITE: ICD-10-CM

## 2017-10-09 DIAGNOSIS — Z90.49 ACQUIRED ABSENCE OF OTHER SPECIFIED PARTS OF DIGESTIVE TRACT: ICD-10-CM

## 2017-10-09 DIAGNOSIS — Z79.899 OTHER LONG TERM (CURRENT) DRUG THERAPY: ICD-10-CM

## 2017-10-09 DIAGNOSIS — Z90.710 ACQUIRED ABSENCE OF BOTH CERVIX AND UTERUS: ICD-10-CM

## 2017-10-09 DIAGNOSIS — Z85.3 PERSONAL HISTORY OF MALIGNANT NEOPLASM OF BREAST: ICD-10-CM

## 2017-10-09 DIAGNOSIS — H91.90 UNSPECIFIED HEARING LOSS, UNSPECIFIED EAR: ICD-10-CM

## 2017-10-09 DIAGNOSIS — Z88.7 ALLERGY STATUS TO SERUM AND VACCINE: ICD-10-CM
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
## 2017-10-09 DIAGNOSIS — I48.91 UNSPECIFIED ATRIAL FIBRILLATION: ICD-10-CM

## 2017-10-09 DIAGNOSIS — E66.9 OBESITY, UNSPECIFIED: ICD-10-CM

## 2017-10-09 DIAGNOSIS — Z92.3 PERSONAL HISTORY OF IRRADIATION: ICD-10-CM

## 2017-10-09 DIAGNOSIS — K21.9 GASTRO-ESOPHAGEAL REFLUX DISEASE WITHOUT ESOPHAGITIS: ICD-10-CM

## 2017-10-09 DIAGNOSIS — S97.01XD CRUSHING INJURY OF RIGHT ANKLE, SUBSEQUENT ENCOUNTER: ICD-10-CM

## 2017-10-09 DIAGNOSIS — Z87.81 PERSONAL HISTORY OF (HEALED) TRAUMATIC FRACTURE: ICD-10-CM

## 2017-10-09 DIAGNOSIS — Z82.49 FAMILY HISTORY OF ISCHEMIC HEART DISEASE AND OTHER DISEASES OF THE CIRCULATORY SYSTEM: ICD-10-CM

## 2017-10-09 DIAGNOSIS — L92.9 GRANULOMATOUS DISORDER OF THE SKIN AND SUBCUTANEOUS TISSUE, UNSPECIFIED: ICD-10-CM

## 2017-10-09 DIAGNOSIS — Z98.42 CATARACT EXTRACTION STATUS, LEFT EYE: ICD-10-CM
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
## 2017-10-09 DIAGNOSIS — Z87.891 PERSONAL HISTORY OF NICOTINE DEPENDENCE: ICD-10-CM

## 2017-10-10 ENCOUNTER — OUTPATIENT (OUTPATIENT)
Dept: OUTPATIENT SERVICES | Facility: HOSPITAL | Age: 82
LOS: 1 days | End: 2017-10-10
Payer: COMMERCIAL

## 2017-10-10 ENCOUNTER — APPOINTMENT (OUTPATIENT)
Dept: ULTRASOUND IMAGING | Facility: CLINIC | Age: 82
End: 2017-10-10
Payer: MEDICARE

## 2017-10-10 DIAGNOSIS — Z90.710 ACQUIRED ABSENCE OF BOTH CERVIX AND UTERUS: Chronic | ICD-10-CM

## 2017-10-10 DIAGNOSIS — Z98.89 OTHER SPECIFIED POSTPROCEDURAL STATES: Chronic | ICD-10-CM

## 2017-10-10 DIAGNOSIS — Z98.890 OTHER SPECIFIED POSTPROCEDURAL STATES: Chronic | ICD-10-CM

## 2017-10-10 DIAGNOSIS — Z00.8 ENCOUNTER FOR OTHER GENERAL EXAMINATION: ICD-10-CM

## 2017-10-10 DIAGNOSIS — Z90.13 ACQUIRED ABSENCE OF BILATERAL BREASTS AND NIPPLES: Chronic | ICD-10-CM

## 2017-10-10 DIAGNOSIS — Z96.7 PRESENCE OF OTHER BONE AND TENDON IMPLANTS: Chronic | ICD-10-CM

## 2017-10-10 PROCEDURE — 76700 US EXAM ABDOM COMPLETE: CPT | Mod: 26

## 2017-10-10 PROCEDURE — 76700 US EXAM ABDOM COMPLETE: CPT

## 2017-10-13 ENCOUNTER — OUTPATIENT (OUTPATIENT)
Dept: OUTPATIENT SERVICES | Facility: HOSPITAL | Age: 82
LOS: 1 days | End: 2017-10-13
Payer: COMMERCIAL

## 2017-10-13 DIAGNOSIS — Z98.89 OTHER SPECIFIED POSTPROCEDURAL STATES: Chronic | ICD-10-CM

## 2017-10-13 DIAGNOSIS — Z96.7 PRESENCE OF OTHER BONE AND TENDON IMPLANTS: Chronic | ICD-10-CM

## 2017-10-13 DIAGNOSIS — Z90.710 ACQUIRED ABSENCE OF BOTH CERVIX AND UTERUS: Chronic | ICD-10-CM

## 2017-10-13 DIAGNOSIS — Z98.890 OTHER SPECIFIED POSTPROCEDURAL STATES: Chronic | ICD-10-CM

## 2017-10-13 DIAGNOSIS — Z90.13 ACQUIRED ABSENCE OF BILATERAL BREASTS AND NIPPLES: Chronic | ICD-10-CM

## 2017-10-13 DIAGNOSIS — S97.01XD CRUSHING INJURY OF RIGHT ANKLE, SUBSEQUENT ENCOUNTER: ICD-10-CM

## 2017-10-13 PROCEDURE — G0463: CPT

## 2017-10-17 DIAGNOSIS — Z85.3 PERSONAL HISTORY OF MALIGNANT NEOPLASM OF BREAST: ICD-10-CM

## 2017-10-17 DIAGNOSIS — H91.90 UNSPECIFIED HEARING LOSS, UNSPECIFIED EAR: ICD-10-CM
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
## 2017-10-17 DIAGNOSIS — Z87.891 PERSONAL HISTORY OF NICOTINE DEPENDENCE: ICD-10-CM
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
## 2017-10-17 DIAGNOSIS — Z87.442 PERSONAL HISTORY OF URINARY CALCULI: ICD-10-CM
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
## 2017-10-17 DIAGNOSIS — Z98.41 CATARACT EXTRACTION STATUS, RIGHT EYE: ICD-10-CM

## 2017-10-17 DIAGNOSIS — S97.01XD CRUSHING INJURY OF RIGHT ANKLE, SUBSEQUENT ENCOUNTER: ICD-10-CM

## 2017-10-17 DIAGNOSIS — E66.9 OBESITY, UNSPECIFIED: ICD-10-CM

## 2017-10-17 DIAGNOSIS — S97.81XD CRUSHING INJURY OF RIGHT FOOT, SUBSEQUENT ENCOUNTER: ICD-10-CM

## 2017-10-17 DIAGNOSIS — Z92.3 PERSONAL HISTORY OF IRRADIATION: ICD-10-CM

## 2017-10-17 DIAGNOSIS — H40.9 UNSPECIFIED GLAUCOMA: ICD-10-CM

## 2017-10-17 DIAGNOSIS — Z82.49 FAMILY HISTORY OF ISCHEMIC HEART DISEASE AND OTHER DISEASES OF THE CIRCULATORY SYSTEM: ICD-10-CM

## 2017-10-17 DIAGNOSIS — M19.90 UNSPECIFIED OSTEOARTHRITIS, UNSPECIFIED SITE: ICD-10-CM

## 2017-10-17 DIAGNOSIS — K21.9 GASTRO-ESOPHAGEAL REFLUX DISEASE WITHOUT ESOPHAGITIS: ICD-10-CM
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
## 2017-10-17 DIAGNOSIS — Z90.710 ACQUIRED ABSENCE OF BOTH CERVIX AND UTERUS: ICD-10-CM

## 2017-10-17 DIAGNOSIS — Z90.49 ACQUIRED ABSENCE OF OTHER SPECIFIED PARTS OF DIGESTIVE TRACT: ICD-10-CM

## 2017-10-17 DIAGNOSIS — Z88.7 ALLERGY STATUS TO SERUM AND VACCINE: ICD-10-CM

## 2017-10-17 DIAGNOSIS — Z98.42 CATARACT EXTRACTION STATUS, LEFT EYE: ICD-10-CM

## 2017-10-17 DIAGNOSIS — Z90.11 ACQUIRED ABSENCE OF RIGHT BREAST AND NIPPLE: ICD-10-CM

## 2017-10-17 DIAGNOSIS — Z87.81 PERSONAL HISTORY OF (HEALED) TRAUMATIC FRACTURE: ICD-10-CM

## 2017-10-17 DIAGNOSIS — I48.91 UNSPECIFIED ATRIAL FIBRILLATION: ICD-10-CM

## 2017-10-17 DIAGNOSIS — Z79.899 OTHER LONG TERM (CURRENT) DRUG THERAPY: ICD-10-CM

## 2017-10-19 PROCEDURE — 85652 RBC SED RATE AUTOMATED: CPT

## 2017-10-19 PROCEDURE — 87640 STAPH A DNA AMP PROBE: CPT

## 2017-10-19 PROCEDURE — 80048 BASIC METABOLIC PNL TOTAL CA: CPT

## 2017-10-19 PROCEDURE — 73718 MRI LOWER EXTREMITY W/O DYE: CPT

## 2017-10-19 PROCEDURE — 76937 US GUIDE VASCULAR ACCESS: CPT

## 2017-10-19 PROCEDURE — C1894: CPT

## 2017-10-19 PROCEDURE — 71045 X-RAY EXAM CHEST 1 VIEW: CPT

## 2017-10-19 PROCEDURE — 93926 LOWER EXTREMITY STUDY: CPT

## 2017-10-19 PROCEDURE — 73564 X-RAY EXAM KNEE 4 OR MORE: CPT

## 2017-10-19 PROCEDURE — 99285 EMERGENCY DEPT VISIT HI MDM: CPT | Mod: 25

## 2017-10-19 PROCEDURE — 87641 MR-STAPH DNA AMP PROBE: CPT

## 2017-10-19 PROCEDURE — 93971 EXTREMITY STUDY: CPT

## 2017-10-19 PROCEDURE — C1751: CPT

## 2017-10-19 PROCEDURE — 36558 INSERT TUNNELED CV CATH: CPT

## 2017-10-19 PROCEDURE — 88304 TISSUE EXAM BY PATHOLOGIST: CPT

## 2017-10-19 PROCEDURE — 87070 CULTURE OTHR SPECIMN AEROBIC: CPT

## 2017-10-19 PROCEDURE — 73610 X-RAY EXAM OF ANKLE: CPT

## 2017-10-19 PROCEDURE — 77001 FLUOROGUIDE FOR VEIN DEVICE: CPT

## 2017-10-19 PROCEDURE — 87150 DNA/RNA AMPLIFIED PROBE: CPT

## 2017-10-19 PROCEDURE — 80202 ASSAY OF VANCOMYCIN: CPT

## 2017-10-19 PROCEDURE — A9579: CPT

## 2017-10-19 PROCEDURE — 81003 URINALYSIS AUTO W/O SCOPE: CPT

## 2017-10-19 PROCEDURE — 80053 COMPREHEN METABOLIC PANEL: CPT

## 2017-10-19 PROCEDURE — 73720 MRI LWR EXTREMITY W/O&W/DYE: CPT

## 2017-10-19 PROCEDURE — 97530 THERAPEUTIC ACTIVITIES: CPT

## 2017-10-19 PROCEDURE — 86140 C-REACTIVE PROTEIN: CPT

## 2017-10-19 PROCEDURE — 36415 COLL VENOUS BLD VENIPUNCTURE: CPT

## 2017-10-19 PROCEDURE — 87181 SC STD AGAR DILUTION PER AGT: CPT

## 2017-10-19 PROCEDURE — 97161 PT EVAL LOW COMPLEX 20 MIN: CPT

## 2017-10-19 PROCEDURE — 73630 X-RAY EXAM OF FOOT: CPT

## 2017-10-19 PROCEDURE — 85027 COMPLETE CBC AUTOMATED: CPT

## 2017-10-19 PROCEDURE — C1889: CPT

## 2017-10-19 PROCEDURE — 93005 ELECTROCARDIOGRAM TRACING: CPT

## 2017-10-19 PROCEDURE — 94640 AIRWAY INHALATION TREATMENT: CPT

## 2017-10-19 PROCEDURE — 97116 GAIT TRAINING THERAPY: CPT

## 2017-10-19 PROCEDURE — 87040 BLOOD CULTURE FOR BACTERIA: CPT

## 2017-10-19 PROCEDURE — 96367 TX/PROPH/DG ADDL SEQ IV INF: CPT

## 2017-10-19 PROCEDURE — 93306 TTE W/DOPPLER COMPLETE: CPT

## 2017-10-19 PROCEDURE — 83605 ASSAY OF LACTIC ACID: CPT

## 2017-10-19 PROCEDURE — 96365 THER/PROPH/DIAG IV INF INIT: CPT

## 2017-10-20 ENCOUNTER — OUTPATIENT (OUTPATIENT)
Dept: OUTPATIENT SERVICES | Facility: HOSPITAL | Age: 82
LOS: 1 days | End: 2017-10-20
Payer: COMMERCIAL

## 2017-10-20 DIAGNOSIS — Z98.890 OTHER SPECIFIED POSTPROCEDURAL STATES: Chronic | ICD-10-CM

## 2017-10-20 DIAGNOSIS — Z90.13 ACQUIRED ABSENCE OF BILATERAL BREASTS AND NIPPLES: Chronic | ICD-10-CM

## 2017-10-20 DIAGNOSIS — Z98.89 OTHER SPECIFIED POSTPROCEDURAL STATES: Chronic | ICD-10-CM

## 2017-10-20 DIAGNOSIS — S97.01XD CRUSHING INJURY OF RIGHT ANKLE, SUBSEQUENT ENCOUNTER: ICD-10-CM

## 2017-10-20 DIAGNOSIS — Z96.7 PRESENCE OF OTHER BONE AND TENDON IMPLANTS: Chronic | ICD-10-CM

## 2017-10-20 DIAGNOSIS — Z90.710 ACQUIRED ABSENCE OF BOTH CERVIX AND UTERUS: Chronic | ICD-10-CM

## 2017-10-20 PROCEDURE — G0463: CPT

## 2017-10-21 DIAGNOSIS — H40.9 UNSPECIFIED GLAUCOMA: ICD-10-CM

## 2017-10-21 DIAGNOSIS — Z87.81 PERSONAL HISTORY OF (HEALED) TRAUMATIC FRACTURE: ICD-10-CM

## 2017-10-21 DIAGNOSIS — S97.01XD CRUSHING INJURY OF RIGHT ANKLE, SUBSEQUENT ENCOUNTER: ICD-10-CM

## 2017-10-21 DIAGNOSIS — Z98.42 CATARACT EXTRACTION STATUS, LEFT EYE: ICD-10-CM

## 2017-10-21 DIAGNOSIS — H91.90 UNSPECIFIED HEARING LOSS, UNSPECIFIED EAR: ICD-10-CM
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
## 2017-10-21 DIAGNOSIS — Z90.710 ACQUIRED ABSENCE OF BOTH CERVIX AND UTERUS: ICD-10-CM

## 2017-10-21 DIAGNOSIS — Z82.49 FAMILY HISTORY OF ISCHEMIC HEART DISEASE AND OTHER DISEASES OF THE CIRCULATORY SYSTEM: ICD-10-CM

## 2017-10-21 DIAGNOSIS — Z90.49 ACQUIRED ABSENCE OF OTHER SPECIFIED PARTS OF DIGESTIVE TRACT: ICD-10-CM

## 2017-10-21 DIAGNOSIS — Z88.7 ALLERGY STATUS TO SERUM AND VACCINE: ICD-10-CM

## 2017-10-21 DIAGNOSIS — I48.91 UNSPECIFIED ATRIAL FIBRILLATION: ICD-10-CM

## 2017-10-21 DIAGNOSIS — Z87.442 PERSONAL HISTORY OF URINARY CALCULI: ICD-10-CM

## 2017-10-21 DIAGNOSIS — Z98.41 CATARACT EXTRACTION STATUS, RIGHT EYE: ICD-10-CM

## 2017-10-21 DIAGNOSIS — E66.9 OBESITY, UNSPECIFIED: ICD-10-CM
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
## 2017-10-21 DIAGNOSIS — Z87.891 PERSONAL HISTORY OF NICOTINE DEPENDENCE: ICD-10-CM

## 2017-10-21 DIAGNOSIS — M19.90 UNSPECIFIED OSTEOARTHRITIS, UNSPECIFIED SITE: ICD-10-CM

## 2017-10-21 DIAGNOSIS — K21.9 GASTRO-ESOPHAGEAL REFLUX DISEASE WITHOUT ESOPHAGITIS: ICD-10-CM
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
## 2017-10-21 DIAGNOSIS — Z79.899 OTHER LONG TERM (CURRENT) DRUG THERAPY: ICD-10-CM

## 2017-10-21 DIAGNOSIS — S97.81XD CRUSHING INJURY OF RIGHT FOOT, SUBSEQUENT ENCOUNTER: ICD-10-CM
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
## 2017-10-21 DIAGNOSIS — Z90.11 ACQUIRED ABSENCE OF RIGHT BREAST AND NIPPLE: ICD-10-CM

## 2017-10-21 DIAGNOSIS — Z85.3 PERSONAL HISTORY OF MALIGNANT NEOPLASM OF BREAST: ICD-10-CM

## 2017-10-21 DIAGNOSIS — Z92.3 PERSONAL HISTORY OF IRRADIATION: ICD-10-CM

## 2017-10-27 ENCOUNTER — OUTPATIENT (OUTPATIENT)
Dept: OUTPATIENT SERVICES | Facility: HOSPITAL | Age: 82
LOS: 1 days | End: 2017-10-27
Payer: COMMERCIAL

## 2017-10-27 DIAGNOSIS — Z90.710 ACQUIRED ABSENCE OF BOTH CERVIX AND UTERUS: Chronic | ICD-10-CM

## 2017-10-27 DIAGNOSIS — S97.01XD CRUSHING INJURY OF RIGHT ANKLE, SUBSEQUENT ENCOUNTER: ICD-10-CM

## 2017-10-27 DIAGNOSIS — Z90.13 ACQUIRED ABSENCE OF BILATERAL BREASTS AND NIPPLES: Chronic | ICD-10-CM

## 2017-10-27 DIAGNOSIS — Z98.89 OTHER SPECIFIED POSTPROCEDURAL STATES: Chronic | ICD-10-CM

## 2017-10-27 DIAGNOSIS — Z98.890 OTHER SPECIFIED POSTPROCEDURAL STATES: Chronic | ICD-10-CM

## 2017-10-27 DIAGNOSIS — Z96.7 PRESENCE OF OTHER BONE AND TENDON IMPLANTS: Chronic | ICD-10-CM

## 2017-10-27 PROCEDURE — 17250 CHEM CAUT OF GRANLTJ TISSUE: CPT

## 2017-10-27 PROCEDURE — G0463: CPT | Mod: 25
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
## 2017-10-30 DIAGNOSIS — Z98.41 CATARACT EXTRACTION STATUS, RIGHT EYE: ICD-10-CM
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
## 2017-10-30 DIAGNOSIS — K21.9 GASTRO-ESOPHAGEAL REFLUX DISEASE WITHOUT ESOPHAGITIS: ICD-10-CM

## 2017-10-30 DIAGNOSIS — Z82.49 FAMILY HISTORY OF ISCHEMIC HEART DISEASE AND OTHER DISEASES OF THE CIRCULATORY SYSTEM: ICD-10-CM

## 2017-10-30 DIAGNOSIS — Z87.81 PERSONAL HISTORY OF (HEALED) TRAUMATIC FRACTURE: ICD-10-CM

## 2017-10-30 DIAGNOSIS — Z87.891 PERSONAL HISTORY OF NICOTINE DEPENDENCE: ICD-10-CM

## 2017-10-30 DIAGNOSIS — I48.91 UNSPECIFIED ATRIAL FIBRILLATION: ICD-10-CM

## 2017-10-30 DIAGNOSIS — H91.90 UNSPECIFIED HEARING LOSS, UNSPECIFIED EAR: ICD-10-CM

## 2017-10-30 DIAGNOSIS — M25.512 PAIN IN LEFT SHOULDER: ICD-10-CM

## 2017-10-30 DIAGNOSIS — M19.90 UNSPECIFIED OSTEOARTHRITIS, UNSPECIFIED SITE: ICD-10-CM
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
## 2017-10-30 DIAGNOSIS — Z85.3 PERSONAL HISTORY OF MALIGNANT NEOPLASM OF BREAST: ICD-10-CM

## 2017-10-30 DIAGNOSIS — H40.9 UNSPECIFIED GLAUCOMA: ICD-10-CM

## 2017-10-30 DIAGNOSIS — Z88.7 ALLERGY STATUS TO SERUM AND VACCINE: ICD-10-CM

## 2017-10-30 DIAGNOSIS — R20.0 ANESTHESIA OF SKIN: ICD-10-CM
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
## 2017-10-30 DIAGNOSIS — Z90.11 ACQUIRED ABSENCE OF RIGHT BREAST AND NIPPLE: ICD-10-CM

## 2017-10-30 DIAGNOSIS — L92.9 GRANULOMATOUS DISORDER OF THE SKIN AND SUBCUTANEOUS TISSUE, UNSPECIFIED: ICD-10-CM

## 2017-10-30 DIAGNOSIS — S97.01XD CRUSHING INJURY OF RIGHT ANKLE, SUBSEQUENT ENCOUNTER: ICD-10-CM

## 2017-10-30 DIAGNOSIS — Z87.442 PERSONAL HISTORY OF URINARY CALCULI: ICD-10-CM

## 2017-10-30 DIAGNOSIS — S97.81XD CRUSHING INJURY OF RIGHT FOOT, SUBSEQUENT ENCOUNTER: ICD-10-CM

## 2017-10-30 DIAGNOSIS — Z90.710 ACQUIRED ABSENCE OF BOTH CERVIX AND UTERUS: ICD-10-CM

## 2017-10-30 DIAGNOSIS — Z90.49 ACQUIRED ABSENCE OF OTHER SPECIFIED PARTS OF DIGESTIVE TRACT: ICD-10-CM

## 2017-10-30 DIAGNOSIS — Z92.3 PERSONAL HISTORY OF IRRADIATION: ICD-10-CM

## 2017-10-30 DIAGNOSIS — E66.9 OBESITY, UNSPECIFIED: ICD-10-CM

## 2017-10-30 DIAGNOSIS — Z79.899 OTHER LONG TERM (CURRENT) DRUG THERAPY: ICD-10-CM

## 2017-10-30 DIAGNOSIS — Z98.42 CATARACT EXTRACTION STATUS, LEFT EYE: ICD-10-CM

## 2017-11-04 ENCOUNTER — OUTPATIENT (OUTPATIENT)
Dept: OUTPATIENT SERVICES | Facility: HOSPITAL | Age: 82
LOS: 1 days | Discharge: HOME | End: 2017-11-04
Payer: COMMERCIAL

## 2017-11-04 DIAGNOSIS — Z98.890 OTHER SPECIFIED POSTPROCEDURAL STATES: Chronic | ICD-10-CM

## 2017-11-04 DIAGNOSIS — S97.01XD CRUSHING INJURY OF RIGHT ANKLE, SUBSEQUENT ENCOUNTER: ICD-10-CM

## 2017-11-04 DIAGNOSIS — Z98.89 OTHER SPECIFIED POSTPROCEDURAL STATES: Chronic | ICD-10-CM

## 2017-11-04 DIAGNOSIS — Z90.710 ACQUIRED ABSENCE OF BOTH CERVIX AND UTERUS: Chronic | ICD-10-CM

## 2017-11-04 DIAGNOSIS — Z96.7 PRESENCE OF OTHER BONE AND TENDON IMPLANTS: Chronic | ICD-10-CM

## 2017-11-04 DIAGNOSIS — Z90.13 ACQUIRED ABSENCE OF BILATERAL BREASTS AND NIPPLES: Chronic | ICD-10-CM

## 2017-11-04 PROCEDURE — G0463: CPT

## 2017-11-06 DIAGNOSIS — I48.91 UNSPECIFIED ATRIAL FIBRILLATION: ICD-10-CM

## 2017-11-06 DIAGNOSIS — L92.9 GRANULOMATOUS DISORDER OF THE SKIN AND SUBCUTANEOUS TISSUE, UNSPECIFIED: ICD-10-CM
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
## 2017-11-06 DIAGNOSIS — Z85.3 PERSONAL HISTORY OF MALIGNANT NEOPLASM OF BREAST: ICD-10-CM

## 2017-11-06 DIAGNOSIS — Z88.7 ALLERGY STATUS TO SERUM AND VACCINE: ICD-10-CM

## 2017-11-06 DIAGNOSIS — E66.9 OBESITY, UNSPECIFIED: ICD-10-CM

## 2017-11-06 DIAGNOSIS — Z90.11 ACQUIRED ABSENCE OF RIGHT BREAST AND NIPPLE: ICD-10-CM

## 2017-11-06 DIAGNOSIS — S97.01XD CRUSHING INJURY OF RIGHT ANKLE, SUBSEQUENT ENCOUNTER: ICD-10-CM
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
## 2017-11-06 DIAGNOSIS — S97.81XD CRUSHING INJURY OF RIGHT FOOT, SUBSEQUENT ENCOUNTER: ICD-10-CM

## 2017-11-06 DIAGNOSIS — K21.9 GASTRO-ESOPHAGEAL REFLUX DISEASE WITHOUT ESOPHAGITIS: ICD-10-CM

## 2017-11-06 DIAGNOSIS — Z79.899 OTHER LONG TERM (CURRENT) DRUG THERAPY: ICD-10-CM

## 2017-11-06 DIAGNOSIS — Z87.442 PERSONAL HISTORY OF URINARY CALCULI: ICD-10-CM

## 2017-11-06 DIAGNOSIS — Z87.891 PERSONAL HISTORY OF NICOTINE DEPENDENCE: ICD-10-CM

## 2017-11-06 DIAGNOSIS — Z87.81 PERSONAL HISTORY OF (HEALED) TRAUMATIC FRACTURE: ICD-10-CM

## 2017-11-06 DIAGNOSIS — Z90.710 ACQUIRED ABSENCE OF BOTH CERVIX AND UTERUS: ICD-10-CM

## 2017-11-06 DIAGNOSIS — Z92.3 PERSONAL HISTORY OF IRRADIATION: ICD-10-CM
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
## 2017-11-06 DIAGNOSIS — Z82.49 FAMILY HISTORY OF ISCHEMIC HEART DISEASE AND OTHER DISEASES OF THE CIRCULATORY SYSTEM: ICD-10-CM
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
## 2017-11-06 DIAGNOSIS — Z90.49 ACQUIRED ABSENCE OF OTHER SPECIFIED PARTS OF DIGESTIVE TRACT: ICD-10-CM

## 2017-11-06 DIAGNOSIS — M19.90 UNSPECIFIED OSTEOARTHRITIS, UNSPECIFIED SITE: ICD-10-CM

## 2017-11-10 ENCOUNTER — OUTPATIENT (OUTPATIENT)
Dept: OUTPATIENT SERVICES | Facility: HOSPITAL | Age: 82
LOS: 1 days | Discharge: HOME | End: 2017-11-10
Payer: COMMERCIAL

## 2017-11-10 DIAGNOSIS — Z90.710 ACQUIRED ABSENCE OF BOTH CERVIX AND UTERUS: Chronic | ICD-10-CM

## 2017-11-10 DIAGNOSIS — Z98.89 OTHER SPECIFIED POSTPROCEDURAL STATES: Chronic | ICD-10-CM

## 2017-11-10 DIAGNOSIS — Z98.890 OTHER SPECIFIED POSTPROCEDURAL STATES: Chronic | ICD-10-CM

## 2017-11-10 DIAGNOSIS — Z90.13 ACQUIRED ABSENCE OF BILATERAL BREASTS AND NIPPLES: Chronic | ICD-10-CM

## 2017-11-10 DIAGNOSIS — S97.01XD CRUSHING INJURY OF RIGHT ANKLE, SUBSEQUENT ENCOUNTER: ICD-10-CM

## 2017-11-10 DIAGNOSIS — Z96.7 PRESENCE OF OTHER BONE AND TENDON IMPLANTS: Chronic | ICD-10-CM

## 2017-11-10 PROCEDURE — G0463: CPT

## 2017-11-11 DIAGNOSIS — Z92.3 PERSONAL HISTORY OF IRRADIATION: ICD-10-CM

## 2017-11-11 DIAGNOSIS — Z88.7 ALLERGY STATUS TO SERUM AND VACCINE: ICD-10-CM
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
## 2017-11-11 DIAGNOSIS — K21.9 GASTRO-ESOPHAGEAL REFLUX DISEASE WITHOUT ESOPHAGITIS: ICD-10-CM
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
## 2017-11-11 DIAGNOSIS — Z79.899 OTHER LONG TERM (CURRENT) DRUG THERAPY: ICD-10-CM

## 2017-11-11 DIAGNOSIS — E66.9 OBESITY, UNSPECIFIED: ICD-10-CM

## 2017-11-11 DIAGNOSIS — Z82.49 FAMILY HISTORY OF ISCHEMIC HEART DISEASE AND OTHER DISEASES OF THE CIRCULATORY SYSTEM: ICD-10-CM

## 2017-11-11 DIAGNOSIS — Z90.710 ACQUIRED ABSENCE OF BOTH CERVIX AND UTERUS: ICD-10-CM
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
## 2017-11-11 DIAGNOSIS — Z90.11 ACQUIRED ABSENCE OF RIGHT BREAST AND NIPPLE: ICD-10-CM

## 2017-11-11 DIAGNOSIS — S97.01XD CRUSHING INJURY OF RIGHT ANKLE, SUBSEQUENT ENCOUNTER: ICD-10-CM

## 2017-11-11 DIAGNOSIS — I48.91 UNSPECIFIED ATRIAL FIBRILLATION: ICD-10-CM

## 2017-11-11 DIAGNOSIS — Z87.442 PERSONAL HISTORY OF URINARY CALCULI: ICD-10-CM

## 2017-11-11 DIAGNOSIS — S97.81XD CRUSHING INJURY OF RIGHT FOOT, SUBSEQUENT ENCOUNTER: ICD-10-CM

## 2017-11-11 DIAGNOSIS — Z85.3 PERSONAL HISTORY OF MALIGNANT NEOPLASM OF BREAST: ICD-10-CM
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
## 2017-11-11 DIAGNOSIS — Z87.81 PERSONAL HISTORY OF (HEALED) TRAUMATIC FRACTURE: ICD-10-CM

## 2017-11-11 DIAGNOSIS — Z90.49 ACQUIRED ABSENCE OF OTHER SPECIFIED PARTS OF DIGESTIVE TRACT: ICD-10-CM

## 2017-11-11 DIAGNOSIS — Z87.891 PERSONAL HISTORY OF NICOTINE DEPENDENCE: ICD-10-CM

## 2017-11-11 DIAGNOSIS — M19.90 UNSPECIFIED OSTEOARTHRITIS, UNSPECIFIED SITE: ICD-10-CM

## 2017-11-18 ENCOUNTER — OUTPATIENT (OUTPATIENT)
Dept: OUTPATIENT SERVICES | Facility: HOSPITAL | Age: 82
LOS: 1 days | Discharge: HOME | End: 2017-11-18
Payer: COMMERCIAL

## 2017-11-18 DIAGNOSIS — S97.01XD CRUSHING INJURY OF RIGHT ANKLE, SUBSEQUENT ENCOUNTER: ICD-10-CM

## 2017-11-18 DIAGNOSIS — Z88.7 ALLERGY STATUS TO SERUM AND VACCINE: ICD-10-CM
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
## 2017-11-18 DIAGNOSIS — Z92.3 PERSONAL HISTORY OF IRRADIATION: ICD-10-CM

## 2017-11-18 DIAGNOSIS — Z87.442 PERSONAL HISTORY OF URINARY CALCULI: ICD-10-CM

## 2017-11-18 DIAGNOSIS — Z98.890 OTHER SPECIFIED POSTPROCEDURAL STATES: Chronic | ICD-10-CM

## 2017-11-18 DIAGNOSIS — Z82.49 FAMILY HISTORY OF ISCHEMIC HEART DISEASE AND OTHER DISEASES OF THE CIRCULATORY SYSTEM: ICD-10-CM
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
## 2017-11-18 DIAGNOSIS — Z90.710 ACQUIRED ABSENCE OF BOTH CERVIX AND UTERUS: Chronic | ICD-10-CM

## 2017-11-18 DIAGNOSIS — Z90.49 ACQUIRED ABSENCE OF OTHER SPECIFIED PARTS OF DIGESTIVE TRACT: ICD-10-CM
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
## 2017-11-18 DIAGNOSIS — Z90.710 ACQUIRED ABSENCE OF BOTH CERVIX AND UTERUS: ICD-10-CM

## 2017-11-18 DIAGNOSIS — S97.81XD CRUSHING INJURY OF RIGHT FOOT, SUBSEQUENT ENCOUNTER: ICD-10-CM
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
## 2017-11-18 DIAGNOSIS — I48.91 UNSPECIFIED ATRIAL FIBRILLATION: ICD-10-CM

## 2017-11-18 DIAGNOSIS — Z90.11 ACQUIRED ABSENCE OF RIGHT BREAST AND NIPPLE: ICD-10-CM

## 2017-11-18 DIAGNOSIS — Z90.13 ACQUIRED ABSENCE OF BILATERAL BREASTS AND NIPPLES: Chronic | ICD-10-CM

## 2017-11-18 DIAGNOSIS — Z79.899 OTHER LONG TERM (CURRENT) DRUG THERAPY: ICD-10-CM

## 2017-11-18 DIAGNOSIS — M19.90 UNSPECIFIED OSTEOARTHRITIS, UNSPECIFIED SITE: ICD-10-CM

## 2017-11-18 DIAGNOSIS — K21.9 GASTRO-ESOPHAGEAL REFLUX DISEASE WITHOUT ESOPHAGITIS: ICD-10-CM

## 2017-11-18 DIAGNOSIS — Z96.7 PRESENCE OF OTHER BONE AND TENDON IMPLANTS: Chronic | ICD-10-CM

## 2017-11-18 DIAGNOSIS — Z87.81 PERSONAL HISTORY OF (HEALED) TRAUMATIC FRACTURE: ICD-10-CM

## 2017-11-18 DIAGNOSIS — E66.9 OBESITY, UNSPECIFIED: ICD-10-CM

## 2017-11-18 DIAGNOSIS — Z98.89 OTHER SPECIFIED POSTPROCEDURAL STATES: Chronic | ICD-10-CM

## 2017-11-18 DIAGNOSIS — Z85.3 PERSONAL HISTORY OF MALIGNANT NEOPLASM OF BREAST: ICD-10-CM

## 2017-11-18 DIAGNOSIS — Z87.891 PERSONAL HISTORY OF NICOTINE DEPENDENCE: ICD-10-CM

## 2017-11-18 PROCEDURE — G0463: CPT

## 2017-11-24 ENCOUNTER — OUTPATIENT (OUTPATIENT)
Dept: OUTPATIENT SERVICES | Facility: HOSPITAL | Age: 82
LOS: 1 days | Discharge: HOME | End: 2017-11-24
Payer: COMMERCIAL

## 2017-11-24 DIAGNOSIS — S97.01XD CRUSHING INJURY OF RIGHT ANKLE, SUBSEQUENT ENCOUNTER: ICD-10-CM

## 2017-11-24 DIAGNOSIS — Z90.710 ACQUIRED ABSENCE OF BOTH CERVIX AND UTERUS: Chronic | ICD-10-CM

## 2017-11-24 DIAGNOSIS — Z96.7 PRESENCE OF OTHER BONE AND TENDON IMPLANTS: Chronic | ICD-10-CM

## 2017-11-24 DIAGNOSIS — Z98.89 OTHER SPECIFIED POSTPROCEDURAL STATES: Chronic | ICD-10-CM

## 2017-11-24 DIAGNOSIS — Z98.890 OTHER SPECIFIED POSTPROCEDURAL STATES: Chronic | ICD-10-CM

## 2017-11-24 DIAGNOSIS — Z90.13 ACQUIRED ABSENCE OF BILATERAL BREASTS AND NIPPLES: Chronic | ICD-10-CM

## 2017-11-24 PROCEDURE — G0463: CPT

## 2017-11-25 DIAGNOSIS — E66.9 OBESITY, UNSPECIFIED: ICD-10-CM

## 2017-11-25 DIAGNOSIS — Z90.11 ACQUIRED ABSENCE OF RIGHT BREAST AND NIPPLE: ICD-10-CM

## 2017-11-25 DIAGNOSIS — I48.91 UNSPECIFIED ATRIAL FIBRILLATION: ICD-10-CM

## 2017-11-25 DIAGNOSIS — S97.81XD CRUSHING INJURY OF RIGHT FOOT, SUBSEQUENT ENCOUNTER: ICD-10-CM

## 2017-11-25 DIAGNOSIS — M19.90 UNSPECIFIED OSTEOARTHRITIS, UNSPECIFIED SITE: ICD-10-CM

## 2017-11-25 DIAGNOSIS — Z90.710 ACQUIRED ABSENCE OF BOTH CERVIX AND UTERUS: ICD-10-CM
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
## 2017-11-25 DIAGNOSIS — Z85.3 PERSONAL HISTORY OF MALIGNANT NEOPLASM OF BREAST: ICD-10-CM

## 2017-11-25 DIAGNOSIS — Z82.49 FAMILY HISTORY OF ISCHEMIC HEART DISEASE AND OTHER DISEASES OF THE CIRCULATORY SYSTEM: ICD-10-CM

## 2017-11-25 DIAGNOSIS — Z90.49 ACQUIRED ABSENCE OF OTHER SPECIFIED PARTS OF DIGESTIVE TRACT: ICD-10-CM

## 2017-11-25 DIAGNOSIS — Z88.7 ALLERGY STATUS TO SERUM AND VACCINE: ICD-10-CM

## 2017-11-25 DIAGNOSIS — Z79.899 OTHER LONG TERM (CURRENT) DRUG THERAPY: ICD-10-CM

## 2017-11-25 DIAGNOSIS — S97.01XD CRUSHING INJURY OF RIGHT ANKLE, SUBSEQUENT ENCOUNTER: ICD-10-CM

## 2017-11-25 DIAGNOSIS — Z87.81 PERSONAL HISTORY OF (HEALED) TRAUMATIC FRACTURE: ICD-10-CM

## 2017-11-25 DIAGNOSIS — K21.9 GASTRO-ESOPHAGEAL REFLUX DISEASE WITHOUT ESOPHAGITIS: ICD-10-CM
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
## 2017-11-25 DIAGNOSIS — Z87.442 PERSONAL HISTORY OF URINARY CALCULI: ICD-10-CM

## 2017-11-25 DIAGNOSIS — Z92.3 PERSONAL HISTORY OF IRRADIATION: ICD-10-CM

## 2017-11-25 DIAGNOSIS — Z87.891 PERSONAL HISTORY OF NICOTINE DEPENDENCE: ICD-10-CM
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
## 2017-12-01 ENCOUNTER — OUTPATIENT (OUTPATIENT)
Dept: OUTPATIENT SERVICES | Facility: HOSPITAL | Age: 82
LOS: 1 days | Discharge: HOME | End: 2017-12-01
Payer: COMMERCIAL

## 2017-12-01 DIAGNOSIS — Z96.7 PRESENCE OF OTHER BONE AND TENDON IMPLANTS: Chronic | ICD-10-CM

## 2017-12-01 DIAGNOSIS — Z98.890 OTHER SPECIFIED POSTPROCEDURAL STATES: Chronic | ICD-10-CM

## 2017-12-01 DIAGNOSIS — Z90.13 ACQUIRED ABSENCE OF BILATERAL BREASTS AND NIPPLES: Chronic | ICD-10-CM

## 2017-12-01 DIAGNOSIS — Z98.89 OTHER SPECIFIED POSTPROCEDURAL STATES: Chronic | ICD-10-CM

## 2017-12-01 DIAGNOSIS — Z90.710 ACQUIRED ABSENCE OF BOTH CERVIX AND UTERUS: Chronic | ICD-10-CM

## 2017-12-01 DIAGNOSIS — S97.01XD CRUSHING INJURY OF RIGHT ANKLE, SUBSEQUENT ENCOUNTER: ICD-10-CM

## 2017-12-01 PROCEDURE — G0463: CPT
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
## 2017-12-02 DIAGNOSIS — Z87.891 PERSONAL HISTORY OF NICOTINE DEPENDENCE: ICD-10-CM

## 2017-12-02 DIAGNOSIS — S97.01XD CRUSHING INJURY OF RIGHT ANKLE, SUBSEQUENT ENCOUNTER: ICD-10-CM

## 2017-12-02 DIAGNOSIS — M19.90 UNSPECIFIED OSTEOARTHRITIS, UNSPECIFIED SITE: ICD-10-CM

## 2017-12-02 DIAGNOSIS — I48.91 UNSPECIFIED ATRIAL FIBRILLATION: ICD-10-CM

## 2017-12-02 DIAGNOSIS — Z87.442 PERSONAL HISTORY OF URINARY CALCULI: ICD-10-CM

## 2017-12-02 DIAGNOSIS — S97.81XD CRUSHING INJURY OF RIGHT FOOT, SUBSEQUENT ENCOUNTER: ICD-10-CM

## 2017-12-02 DIAGNOSIS — Z79.899 OTHER LONG TERM (CURRENT) DRUG THERAPY: ICD-10-CM

## 2017-12-02 DIAGNOSIS — Z90.11 ACQUIRED ABSENCE OF RIGHT BREAST AND NIPPLE: ICD-10-CM

## 2017-12-02 DIAGNOSIS — K21.9 GASTRO-ESOPHAGEAL REFLUX DISEASE WITHOUT ESOPHAGITIS: ICD-10-CM
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
## 2017-12-02 DIAGNOSIS — Z85.3 PERSONAL HISTORY OF MALIGNANT NEOPLASM OF BREAST: ICD-10-CM
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
## 2017-12-02 DIAGNOSIS — Z90.710 ACQUIRED ABSENCE OF BOTH CERVIX AND UTERUS: ICD-10-CM

## 2017-12-02 DIAGNOSIS — Z88.7 ALLERGY STATUS TO SERUM AND VACCINE: ICD-10-CM
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
## 2017-12-02 DIAGNOSIS — Z87.81 PERSONAL HISTORY OF (HEALED) TRAUMATIC FRACTURE: ICD-10-CM

## 2017-12-02 DIAGNOSIS — Z90.49 ACQUIRED ABSENCE OF OTHER SPECIFIED PARTS OF DIGESTIVE TRACT: ICD-10-CM

## 2017-12-02 DIAGNOSIS — E66.9 OBESITY, UNSPECIFIED: ICD-10-CM

## 2017-12-02 DIAGNOSIS — Z82.49 FAMILY HISTORY OF ISCHEMIC HEART DISEASE AND OTHER DISEASES OF THE CIRCULATORY SYSTEM: ICD-10-CM

## 2017-12-02 DIAGNOSIS — Z92.3 PERSONAL HISTORY OF IRRADIATION: ICD-10-CM

## 2017-12-15 ENCOUNTER — OUTPATIENT (OUTPATIENT)
Dept: OUTPATIENT SERVICES | Facility: HOSPITAL | Age: 82
LOS: 1 days | Discharge: HOME | End: 2017-12-15
Payer: COMMERCIAL

## 2017-12-15 DIAGNOSIS — Z98.89 OTHER SPECIFIED POSTPROCEDURAL STATES: Chronic | ICD-10-CM

## 2017-12-15 DIAGNOSIS — S97.01XD CRUSHING INJURY OF RIGHT ANKLE, SUBSEQUENT ENCOUNTER: ICD-10-CM

## 2017-12-15 DIAGNOSIS — Z90.13 ACQUIRED ABSENCE OF BILATERAL BREASTS AND NIPPLES: Chronic | ICD-10-CM

## 2017-12-15 DIAGNOSIS — Z90.710 ACQUIRED ABSENCE OF BOTH CERVIX AND UTERUS: Chronic | ICD-10-CM

## 2017-12-15 DIAGNOSIS — Z98.890 OTHER SPECIFIED POSTPROCEDURAL STATES: Chronic | ICD-10-CM

## 2017-12-15 DIAGNOSIS — Z96.7 PRESENCE OF OTHER BONE AND TENDON IMPLANTS: Chronic | ICD-10-CM

## 2017-12-15 PROCEDURE — G0463: CPT

## 2017-12-16 DIAGNOSIS — Z90.11 ACQUIRED ABSENCE OF RIGHT BREAST AND NIPPLE: ICD-10-CM

## 2017-12-16 DIAGNOSIS — Z79.899 OTHER LONG TERM (CURRENT) DRUG THERAPY: ICD-10-CM

## 2017-12-16 DIAGNOSIS — Z88.7 ALLERGY STATUS TO SERUM AND VACCINE: ICD-10-CM

## 2017-12-16 DIAGNOSIS — S97.81XD CRUSHING INJURY OF RIGHT FOOT, SUBSEQUENT ENCOUNTER: ICD-10-CM

## 2017-12-16 DIAGNOSIS — Z82.49 FAMILY HISTORY OF ISCHEMIC HEART DISEASE AND OTHER DISEASES OF THE CIRCULATORY SYSTEM: ICD-10-CM

## 2017-12-16 DIAGNOSIS — Z90.49 ACQUIRED ABSENCE OF OTHER SPECIFIED PARTS OF DIGESTIVE TRACT: ICD-10-CM

## 2017-12-16 DIAGNOSIS — M19.90 UNSPECIFIED OSTEOARTHRITIS, UNSPECIFIED SITE: ICD-10-CM
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
## 2017-12-16 DIAGNOSIS — K21.9 GASTRO-ESOPHAGEAL REFLUX DISEASE WITHOUT ESOPHAGITIS: ICD-10-CM
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
## 2017-12-16 DIAGNOSIS — Z87.81 PERSONAL HISTORY OF (HEALED) TRAUMATIC FRACTURE: ICD-10-CM
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
## 2017-12-16 DIAGNOSIS — Z90.710 ACQUIRED ABSENCE OF BOTH CERVIX AND UTERUS: ICD-10-CM

## 2017-12-16 DIAGNOSIS — Z85.3 PERSONAL HISTORY OF MALIGNANT NEOPLASM OF BREAST: ICD-10-CM

## 2017-12-16 DIAGNOSIS — E66.9 OBESITY, UNSPECIFIED: ICD-10-CM
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
## 2017-12-16 DIAGNOSIS — I48.91 UNSPECIFIED ATRIAL FIBRILLATION: ICD-10-CM

## 2017-12-16 DIAGNOSIS — Z92.3 PERSONAL HISTORY OF IRRADIATION: ICD-10-CM

## 2017-12-16 DIAGNOSIS — S97.01XD CRUSHING INJURY OF RIGHT ANKLE, SUBSEQUENT ENCOUNTER: ICD-10-CM

## 2017-12-16 DIAGNOSIS — Z87.442 PERSONAL HISTORY OF URINARY CALCULI: ICD-10-CM

## 2017-12-16 DIAGNOSIS — Z87.891 PERSONAL HISTORY OF NICOTINE DEPENDENCE: ICD-10-CM

## 2017-12-29 ENCOUNTER — OUTPATIENT (OUTPATIENT)
Dept: OUTPATIENT SERVICES | Facility: HOSPITAL | Age: 82
LOS: 1 days | Discharge: HOME | End: 2017-12-29
Payer: COMMERCIAL

## 2017-12-29 DIAGNOSIS — Z96.7 PRESENCE OF OTHER BONE AND TENDON IMPLANTS: Chronic | ICD-10-CM

## 2017-12-29 DIAGNOSIS — Z90.710 ACQUIRED ABSENCE OF BOTH CERVIX AND UTERUS: Chronic | ICD-10-CM

## 2017-12-29 DIAGNOSIS — Z98.890 OTHER SPECIFIED POSTPROCEDURAL STATES: Chronic | ICD-10-CM

## 2017-12-29 DIAGNOSIS — Z98.89 OTHER SPECIFIED POSTPROCEDURAL STATES: Chronic | ICD-10-CM

## 2017-12-29 DIAGNOSIS — Z90.13 ACQUIRED ABSENCE OF BILATERAL BREASTS AND NIPPLES: Chronic | ICD-10-CM

## 2017-12-29 DIAGNOSIS — S97.01XD CRUSHING INJURY OF RIGHT ANKLE, SUBSEQUENT ENCOUNTER: ICD-10-CM

## 2017-12-29 PROCEDURE — G0463: CPT

## 2017-12-30 DIAGNOSIS — Z90.710 ACQUIRED ABSENCE OF BOTH CERVIX AND UTERUS: ICD-10-CM

## 2017-12-30 DIAGNOSIS — Z85.3 PERSONAL HISTORY OF MALIGNANT NEOPLASM OF BREAST: ICD-10-CM

## 2017-12-30 DIAGNOSIS — S97.01XD CRUSHING INJURY OF RIGHT ANKLE, SUBSEQUENT ENCOUNTER: ICD-10-CM

## 2017-12-30 DIAGNOSIS — Z87.891 PERSONAL HISTORY OF NICOTINE DEPENDENCE: ICD-10-CM

## 2017-12-30 DIAGNOSIS — K21.9 GASTRO-ESOPHAGEAL REFLUX DISEASE WITHOUT ESOPHAGITIS: ICD-10-CM
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
## 2017-12-30 DIAGNOSIS — Z87.442 PERSONAL HISTORY OF URINARY CALCULI: ICD-10-CM

## 2017-12-30 DIAGNOSIS — E66.9 OBESITY, UNSPECIFIED: ICD-10-CM

## 2017-12-30 DIAGNOSIS — Z79.899 OTHER LONG TERM (CURRENT) DRUG THERAPY: ICD-10-CM
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
## 2017-12-30 DIAGNOSIS — Z92.3 PERSONAL HISTORY OF IRRADIATION: ICD-10-CM

## 2017-12-30 DIAGNOSIS — Z82.49 FAMILY HISTORY OF ISCHEMIC HEART DISEASE AND OTHER DISEASES OF THE CIRCULATORY SYSTEM: ICD-10-CM

## 2017-12-30 DIAGNOSIS — Z87.81 PERSONAL HISTORY OF (HEALED) TRAUMATIC FRACTURE: ICD-10-CM

## 2017-12-30 DIAGNOSIS — Z90.49 ACQUIRED ABSENCE OF OTHER SPECIFIED PARTS OF DIGESTIVE TRACT: ICD-10-CM

## 2017-12-30 DIAGNOSIS — Z88.7 ALLERGY STATUS TO SERUM AND VACCINE: ICD-10-CM

## 2017-12-30 DIAGNOSIS — S97.81XD CRUSHING INJURY OF RIGHT FOOT, SUBSEQUENT ENCOUNTER: ICD-10-CM
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
## 2017-12-30 DIAGNOSIS — M19.90 UNSPECIFIED OSTEOARTHRITIS, UNSPECIFIED SITE: ICD-10-CM

## 2017-12-30 DIAGNOSIS — I48.91 UNSPECIFIED ATRIAL FIBRILLATION: ICD-10-CM

## 2017-12-30 DIAGNOSIS — Z90.11 ACQUIRED ABSENCE OF RIGHT BREAST AND NIPPLE: ICD-10-CM

## 2018-01-18 ENCOUNTER — OUTPATIENT (OUTPATIENT)
Dept: OUTPATIENT SERVICES | Facility: HOSPITAL | Age: 83
LOS: 1 days | Discharge: HOME | End: 2018-01-18
Payer: COMMERCIAL

## 2018-01-18 DIAGNOSIS — S97.01XD CRUSHING INJURY OF RIGHT ANKLE, SUBSEQUENT ENCOUNTER: ICD-10-CM

## 2018-01-18 DIAGNOSIS — Z98.89 OTHER SPECIFIED POSTPROCEDURAL STATES: Chronic | ICD-10-CM

## 2018-01-18 DIAGNOSIS — Z96.7 PRESENCE OF OTHER BONE AND TENDON IMPLANTS: Chronic | ICD-10-CM

## 2018-01-18 DIAGNOSIS — Z90.710 ACQUIRED ABSENCE OF BOTH CERVIX AND UTERUS: Chronic | ICD-10-CM

## 2018-01-18 DIAGNOSIS — Z98.890 OTHER SPECIFIED POSTPROCEDURAL STATES: Chronic | ICD-10-CM

## 2018-01-18 DIAGNOSIS — Z90.13 ACQUIRED ABSENCE OF BILATERAL BREASTS AND NIPPLES: Chronic | ICD-10-CM

## 2018-01-18 PROCEDURE — G0463: CPT

## 2018-01-22 DIAGNOSIS — Z87.442 PERSONAL HISTORY OF URINARY CALCULI: ICD-10-CM

## 2018-01-22 DIAGNOSIS — Z79.899 OTHER LONG TERM (CURRENT) DRUG THERAPY: ICD-10-CM

## 2018-01-22 DIAGNOSIS — Z85.3 PERSONAL HISTORY OF MALIGNANT NEOPLASM OF BREAST: ICD-10-CM

## 2018-01-22 DIAGNOSIS — S97.01XD CRUSHING INJURY OF RIGHT ANKLE, SUBSEQUENT ENCOUNTER: ICD-10-CM

## 2018-01-22 DIAGNOSIS — Z90.11 ACQUIRED ABSENCE OF RIGHT BREAST AND NIPPLE: ICD-10-CM

## 2018-01-22 DIAGNOSIS — Z92.3 PERSONAL HISTORY OF IRRADIATION: ICD-10-CM

## 2018-01-22 DIAGNOSIS — I48.91 UNSPECIFIED ATRIAL FIBRILLATION: ICD-10-CM

## 2018-01-22 DIAGNOSIS — E66.9 OBESITY, UNSPECIFIED: ICD-10-CM

## 2018-01-22 DIAGNOSIS — Z87.81 PERSONAL HISTORY OF (HEALED) TRAUMATIC FRACTURE: ICD-10-CM
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
## 2018-01-22 DIAGNOSIS — K21.9 GASTRO-ESOPHAGEAL REFLUX DISEASE WITHOUT ESOPHAGITIS: ICD-10-CM
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
## 2018-01-22 DIAGNOSIS — Z87.891 PERSONAL HISTORY OF NICOTINE DEPENDENCE: ICD-10-CM
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
## 2018-01-22 DIAGNOSIS — Z82.49 FAMILY HISTORY OF ISCHEMIC HEART DISEASE AND OTHER DISEASES OF THE CIRCULATORY SYSTEM: ICD-10-CM

## 2018-01-22 DIAGNOSIS — Z90.49 ACQUIRED ABSENCE OF OTHER SPECIFIED PARTS OF DIGESTIVE TRACT: ICD-10-CM

## 2018-01-22 DIAGNOSIS — Z90.710 ACQUIRED ABSENCE OF BOTH CERVIX AND UTERUS: ICD-10-CM

## 2018-01-22 DIAGNOSIS — Z88.7 ALLERGY STATUS TO SERUM AND VACCINE: ICD-10-CM

## 2018-02-01 ENCOUNTER — OUTPATIENT (OUTPATIENT)
Dept: OUTPATIENT SERVICES | Facility: HOSPITAL | Age: 83
LOS: 1 days | Discharge: HOME | End: 2018-02-01
Payer: COMMERCIAL

## 2018-02-01 DIAGNOSIS — L92.9 GRANULOMATOUS DISORDER OF THE SKIN AND SUBCUTANEOUS TISSUE, UNSPECIFIED: ICD-10-CM

## 2018-02-01 DIAGNOSIS — Z98.89 OTHER SPECIFIED POSTPROCEDURAL STATES: Chronic | ICD-10-CM

## 2018-02-01 DIAGNOSIS — Z96.7 PRESENCE OF OTHER BONE AND TENDON IMPLANTS: Chronic | ICD-10-CM

## 2018-02-01 DIAGNOSIS — Z98.890 OTHER SPECIFIED POSTPROCEDURAL STATES: Chronic | ICD-10-CM

## 2018-02-01 DIAGNOSIS — Z90.13 ACQUIRED ABSENCE OF BILATERAL BREASTS AND NIPPLES: Chronic | ICD-10-CM

## 2018-02-01 DIAGNOSIS — Z90.710 ACQUIRED ABSENCE OF BOTH CERVIX AND UTERUS: Chronic | ICD-10-CM

## 2018-02-01 PROCEDURE — G0463: CPT

## 2018-02-03 DIAGNOSIS — Z88.7 ALLERGY STATUS TO SERUM AND VACCINE: ICD-10-CM
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
## 2018-02-03 DIAGNOSIS — M19.90 UNSPECIFIED OSTEOARTHRITIS, UNSPECIFIED SITE: ICD-10-CM

## 2018-02-03 DIAGNOSIS — Z87.81 PERSONAL HISTORY OF (HEALED) TRAUMATIC FRACTURE: ICD-10-CM

## 2018-02-03 DIAGNOSIS — E66.9 OBESITY, UNSPECIFIED: ICD-10-CM

## 2018-02-03 DIAGNOSIS — Z90.710 ACQUIRED ABSENCE OF BOTH CERVIX AND UTERUS: ICD-10-CM

## 2018-02-03 DIAGNOSIS — Z85.3 PERSONAL HISTORY OF MALIGNANT NEOPLASM OF BREAST: ICD-10-CM

## 2018-02-03 DIAGNOSIS — Z90.49 ACQUIRED ABSENCE OF OTHER SPECIFIED PARTS OF DIGESTIVE TRACT: ICD-10-CM

## 2018-02-03 DIAGNOSIS — S97.01XD CRUSHING INJURY OF RIGHT ANKLE, SUBSEQUENT ENCOUNTER: ICD-10-CM

## 2018-02-03 DIAGNOSIS — I48.91 UNSPECIFIED ATRIAL FIBRILLATION: ICD-10-CM

## 2018-02-03 DIAGNOSIS — Z90.11 ACQUIRED ABSENCE OF RIGHT BREAST AND NIPPLE: ICD-10-CM

## 2018-02-03 DIAGNOSIS — Z87.891 PERSONAL HISTORY OF NICOTINE DEPENDENCE: ICD-10-CM

## 2018-02-03 DIAGNOSIS — Z79.899 OTHER LONG TERM (CURRENT) DRUG THERAPY: ICD-10-CM

## 2018-02-03 DIAGNOSIS — Z82.49 FAMILY HISTORY OF ISCHEMIC HEART DISEASE AND OTHER DISEASES OF THE CIRCULATORY SYSTEM: ICD-10-CM
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
## 2018-02-03 DIAGNOSIS — Z87.442 PERSONAL HISTORY OF URINARY CALCULI: ICD-10-CM

## 2018-02-03 DIAGNOSIS — Z92.3 PERSONAL HISTORY OF IRRADIATION: ICD-10-CM

## 2018-02-03 DIAGNOSIS — K21.9 GASTRO-ESOPHAGEAL REFLUX DISEASE WITHOUT ESOPHAGITIS: ICD-10-CM

## 2018-02-22 ENCOUNTER — OUTPATIENT (OUTPATIENT)
Dept: OUTPATIENT SERVICES | Facility: HOSPITAL | Age: 83
LOS: 1 days | Discharge: HOME | End: 2018-02-22
Payer: COMMERCIAL

## 2018-02-22 DIAGNOSIS — Z98.89 OTHER SPECIFIED POSTPROCEDURAL STATES: Chronic | ICD-10-CM

## 2018-02-22 DIAGNOSIS — Z98.890 OTHER SPECIFIED POSTPROCEDURAL STATES: Chronic | ICD-10-CM

## 2018-02-22 DIAGNOSIS — L92.9 GRANULOMATOUS DISORDER OF THE SKIN AND SUBCUTANEOUS TISSUE, UNSPECIFIED: ICD-10-CM

## 2018-02-22 DIAGNOSIS — Z90.13 ACQUIRED ABSENCE OF BILATERAL BREASTS AND NIPPLES: Chronic | ICD-10-CM

## 2018-02-22 DIAGNOSIS — Z96.7 PRESENCE OF OTHER BONE AND TENDON IMPLANTS: Chronic | ICD-10-CM

## 2018-02-22 DIAGNOSIS — Z90.710 ACQUIRED ABSENCE OF BOTH CERVIX AND UTERUS: Chronic | ICD-10-CM

## 2018-02-22 PROCEDURE — G0463: CPT

## 2018-02-24 DIAGNOSIS — Z87.81 PERSONAL HISTORY OF (HEALED) TRAUMATIC FRACTURE: ICD-10-CM

## 2018-02-24 DIAGNOSIS — S97.01XD CRUSHING INJURY OF RIGHT ANKLE, SUBSEQUENT ENCOUNTER: ICD-10-CM

## 2018-02-24 DIAGNOSIS — Z85.3 PERSONAL HISTORY OF MALIGNANT NEOPLASM OF BREAST: ICD-10-CM

## 2018-02-24 DIAGNOSIS — M19.90 UNSPECIFIED OSTEOARTHRITIS, UNSPECIFIED SITE: ICD-10-CM

## 2018-02-24 DIAGNOSIS — Z87.442 PERSONAL HISTORY OF URINARY CALCULI: ICD-10-CM
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
## 2018-02-24 DIAGNOSIS — Z88.7 ALLERGY STATUS TO SERUM AND VACCINE: ICD-10-CM

## 2018-02-24 DIAGNOSIS — Z90.11 ACQUIRED ABSENCE OF RIGHT BREAST AND NIPPLE: ICD-10-CM

## 2018-02-24 DIAGNOSIS — Z92.3 PERSONAL HISTORY OF IRRADIATION: ICD-10-CM

## 2018-02-24 DIAGNOSIS — I48.91 UNSPECIFIED ATRIAL FIBRILLATION: ICD-10-CM

## 2018-02-24 DIAGNOSIS — E66.9 OBESITY, UNSPECIFIED: ICD-10-CM

## 2018-02-24 DIAGNOSIS — Z90.49 ACQUIRED ABSENCE OF OTHER SPECIFIED PARTS OF DIGESTIVE TRACT: ICD-10-CM

## 2018-02-24 DIAGNOSIS — Z82.49 FAMILY HISTORY OF ISCHEMIC HEART DISEASE AND OTHER DISEASES OF THE CIRCULATORY SYSTEM: ICD-10-CM

## 2018-02-24 DIAGNOSIS — Z87.891 PERSONAL HISTORY OF NICOTINE DEPENDENCE: ICD-10-CM

## 2018-02-24 DIAGNOSIS — Z90.710 ACQUIRED ABSENCE OF BOTH CERVIX AND UTERUS: ICD-10-CM

## 2018-02-24 DIAGNOSIS — Z79.899 OTHER LONG TERM (CURRENT) DRUG THERAPY: ICD-10-CM
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
## 2018-02-24 DIAGNOSIS — K21.9 GASTRO-ESOPHAGEAL REFLUX DISEASE WITHOUT ESOPHAGITIS: ICD-10-CM
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
## 2018-07-25 ENCOUNTER — APPOINTMENT (OUTPATIENT)
Dept: GASTROENTEROLOGY | Facility: CLINIC | Age: 83
End: 2018-07-25
Payer: MEDICARE

## 2018-07-25 VITALS
OXYGEN SATURATION: 97 % | HEIGHT: 62 IN | DIASTOLIC BLOOD PRESSURE: 70 MMHG | SYSTOLIC BLOOD PRESSURE: 120 MMHG | RESPIRATION RATE: 14 BRPM | WEIGHT: 194 LBS | HEART RATE: 91 BPM | BODY MASS INDEX: 35.7 KG/M2

## 2018-07-25 DIAGNOSIS — R10.84 GENERALIZED ABDOMINAL PAIN: ICD-10-CM

## 2018-07-25 PROBLEM — C50.919 MALIGNANT NEOPLASM OF UNSPECIFIED SITE OF UNSPECIFIED FEMALE BREAST: Chronic | Status: ACTIVE | Noted: 2017-07-08

## 2018-07-25 PROBLEM — A04.7 ENTEROCOLITIS DUE TO CLOSTRIDIUM DIFFICILE: Chronic | Status: ACTIVE | Noted: 2017-07-08

## 2018-07-25 PROBLEM — I48.91 UNSPECIFIED ATRIAL FIBRILLATION: Chronic | Status: ACTIVE | Noted: 2017-07-08

## 2018-07-25 PROBLEM — K80.20 CALCULUS OF GALLBLADDER WITHOUT CHOLECYSTITIS WITHOUT OBSTRUCTION: Chronic | Status: ACTIVE | Noted: 2017-07-08

## 2018-07-25 PROCEDURE — 99204 OFFICE O/P NEW MOD 45 MIN: CPT

## 2018-07-30 ENCOUNTER — FORM ENCOUNTER (OUTPATIENT)
Age: 83
End: 2018-07-30

## 2018-07-31 ENCOUNTER — OUTPATIENT (OUTPATIENT)
Dept: OUTPATIENT SERVICES | Facility: HOSPITAL | Age: 83
LOS: 1 days | End: 2018-07-31
Payer: COMMERCIAL

## 2018-07-31 ENCOUNTER — APPOINTMENT (OUTPATIENT)
Dept: CT IMAGING | Facility: CLINIC | Age: 83
End: 2018-07-31
Payer: MEDICARE

## 2018-07-31 DIAGNOSIS — Z96.7 PRESENCE OF OTHER BONE AND TENDON IMPLANTS: Chronic | ICD-10-CM

## 2018-07-31 DIAGNOSIS — Z98.89 OTHER SPECIFIED POSTPROCEDURAL STATES: Chronic | ICD-10-CM

## 2018-07-31 DIAGNOSIS — Z90.13 ACQUIRED ABSENCE OF BILATERAL BREASTS AND NIPPLES: Chronic | ICD-10-CM

## 2018-07-31 DIAGNOSIS — Z98.890 OTHER SPECIFIED POSTPROCEDURAL STATES: Chronic | ICD-10-CM

## 2018-07-31 DIAGNOSIS — Z90.710 ACQUIRED ABSENCE OF BOTH CERVIX AND UTERUS: Chronic | ICD-10-CM

## 2018-07-31 DIAGNOSIS — Z00.8 ENCOUNTER FOR OTHER GENERAL EXAMINATION: ICD-10-CM

## 2018-07-31 PROCEDURE — 74177 CT ABD & PELVIS W/CONTRAST: CPT

## 2018-07-31 PROCEDURE — 82565 ASSAY OF CREATININE: CPT

## 2018-07-31 PROCEDURE — 74177 CT ABD & PELVIS W/CONTRAST: CPT | Mod: 26

## 2018-08-03 ENCOUNTER — OTHER (OUTPATIENT)
Age: 83
End: 2018-08-03

## 2018-08-20 ENCOUNTER — EMERGENCY (EMERGENCY)
Facility: HOSPITAL | Age: 83
LOS: 1 days | Discharge: LEFT BEFORE DISCHARGE | End: 2018-08-20
Attending: EMERGENCY MEDICINE
Payer: COMMERCIAL

## 2018-08-20 VITALS
RESPIRATION RATE: 19 BRPM | DIASTOLIC BLOOD PRESSURE: 73 MMHG | HEART RATE: 63 BPM | OXYGEN SATURATION: 96 % | SYSTOLIC BLOOD PRESSURE: 110 MMHG

## 2018-08-20 VITALS
TEMPERATURE: 98 F | DIASTOLIC BLOOD PRESSURE: 66 MMHG | HEIGHT: 62 IN | SYSTOLIC BLOOD PRESSURE: 100 MMHG | HEART RATE: 105 BPM | WEIGHT: 192.02 LBS | RESPIRATION RATE: 16 BRPM | OXYGEN SATURATION: 96 %

## 2018-08-20 DIAGNOSIS — Z98.890 OTHER SPECIFIED POSTPROCEDURAL STATES: Chronic | ICD-10-CM

## 2018-08-20 DIAGNOSIS — Z96.7 PRESENCE OF OTHER BONE AND TENDON IMPLANTS: Chronic | ICD-10-CM

## 2018-08-20 DIAGNOSIS — Z90.710 ACQUIRED ABSENCE OF BOTH CERVIX AND UTERUS: Chronic | ICD-10-CM

## 2018-08-20 DIAGNOSIS — Z90.13 ACQUIRED ABSENCE OF BILATERAL BREASTS AND NIPPLES: Chronic | ICD-10-CM

## 2018-08-20 DIAGNOSIS — Z98.89 OTHER SPECIFIED POSTPROCEDURAL STATES: Chronic | ICD-10-CM

## 2018-08-20 LAB
ALBUMIN SERPL ELPH-MCNC: 3.7 G/DL — SIGNIFICANT CHANGE UP (ref 3.3–5)
ALP SERPL-CCNC: 43 U/L — SIGNIFICANT CHANGE UP (ref 40–120)
ALT FLD-CCNC: 21 U/L — SIGNIFICANT CHANGE UP (ref 12–78)
ANION GAP SERPL CALC-SCNC: 9 MMOL/L — SIGNIFICANT CHANGE UP (ref 5–17)
APTT BLD: 33.9 SEC — SIGNIFICANT CHANGE UP (ref 27.5–37.4)
AST SERPL-CCNC: 21 U/L — SIGNIFICANT CHANGE UP (ref 15–37)
BASOPHILS # BLD AUTO: 0.04 K/UL — SIGNIFICANT CHANGE UP (ref 0–0.2)
BASOPHILS NFR BLD AUTO: 0.8 % — SIGNIFICANT CHANGE UP (ref 0–2)
BILIRUB SERPL-MCNC: 0.3 MG/DL — SIGNIFICANT CHANGE UP (ref 0.2–1.2)
BUN SERPL-MCNC: 22 MG/DL — SIGNIFICANT CHANGE UP (ref 7–23)
CALCIUM SERPL-MCNC: 9 MG/DL — SIGNIFICANT CHANGE UP (ref 8.5–10.1)
CHLORIDE SERPL-SCNC: 105 MMOL/L — SIGNIFICANT CHANGE UP (ref 96–108)
CK MB BLD-MCNC: 1.6 % — SIGNIFICANT CHANGE UP (ref 0–3.5)
CK MB CFR SERPL CALC: 3 NG/ML — SIGNIFICANT CHANGE UP (ref 0–3.6)
CK SERPL-CCNC: 190 U/L — SIGNIFICANT CHANGE UP (ref 26–192)
CO2 SERPL-SCNC: 28 MMOL/L — SIGNIFICANT CHANGE UP (ref 22–31)
CREAT SERPL-MCNC: 1.2 MG/DL — SIGNIFICANT CHANGE UP (ref 0.5–1.3)
EOSINOPHIL # BLD AUTO: 0.31 K/UL — SIGNIFICANT CHANGE UP (ref 0–0.5)
EOSINOPHIL NFR BLD AUTO: 6 % — SIGNIFICANT CHANGE UP (ref 0–6)
GLUCOSE SERPL-MCNC: 91 MG/DL — SIGNIFICANT CHANGE UP (ref 70–99)
HCT VFR BLD CALC: 38.1 % — SIGNIFICANT CHANGE UP (ref 34.5–45)
HGB BLD-MCNC: 13.1 G/DL — SIGNIFICANT CHANGE UP (ref 11.5–15.5)
IMM GRANULOCYTES NFR BLD AUTO: 0.2 % — SIGNIFICANT CHANGE UP (ref 0–1.5)
INR BLD: 1.04 RATIO — SIGNIFICANT CHANGE UP (ref 0.88–1.16)
LYMPHOCYTES # BLD AUTO: 1.8 K/UL — SIGNIFICANT CHANGE UP (ref 1–3.3)
LYMPHOCYTES # BLD AUTO: 34.7 % — SIGNIFICANT CHANGE UP (ref 13–44)
MCHC RBC-ENTMCNC: 33.9 PG — SIGNIFICANT CHANGE UP (ref 27–34)
MCHC RBC-ENTMCNC: 34.4 GM/DL — SIGNIFICANT CHANGE UP (ref 32–36)
MCV RBC AUTO: 98.4 FL — SIGNIFICANT CHANGE UP (ref 80–100)
MONOCYTES # BLD AUTO: 0.5 K/UL — SIGNIFICANT CHANGE UP (ref 0–0.9)
MONOCYTES NFR BLD AUTO: 9.7 % — SIGNIFICANT CHANGE UP (ref 2–14)
NEUTROPHILS # BLD AUTO: 2.52 K/UL — SIGNIFICANT CHANGE UP (ref 1.8–7.4)
NEUTROPHILS NFR BLD AUTO: 48.6 % — SIGNIFICANT CHANGE UP (ref 43–77)
NRBC # BLD: 0 /100 WBCS — SIGNIFICANT CHANGE UP (ref 0–0)
NT-PROBNP SERPL-SCNC: 554 PG/ML — HIGH (ref 0–450)
PLATELET # BLD AUTO: 251 K/UL — SIGNIFICANT CHANGE UP (ref 150–400)
POTASSIUM SERPL-MCNC: 4.6 MMOL/L — SIGNIFICANT CHANGE UP (ref 3.5–5.3)
POTASSIUM SERPL-SCNC: 4.6 MMOL/L — SIGNIFICANT CHANGE UP (ref 3.5–5.3)
PROT SERPL-MCNC: 7.3 G/DL — SIGNIFICANT CHANGE UP (ref 6–8.3)
PROTHROM AB SERPL-ACNC: 11.3 SEC — SIGNIFICANT CHANGE UP (ref 9.8–12.7)
RBC # BLD: 3.87 M/UL — SIGNIFICANT CHANGE UP (ref 3.8–5.2)
RBC # FLD: 13.1 % — SIGNIFICANT CHANGE UP (ref 10.3–14.5)
SODIUM SERPL-SCNC: 142 MMOL/L — SIGNIFICANT CHANGE UP (ref 135–145)
TROPONIN I SERPL-MCNC: <.015 NG/ML — SIGNIFICANT CHANGE UP (ref 0.01–0.04)
WBC # BLD: 5.18 K/UL — SIGNIFICANT CHANGE UP (ref 3.8–10.5)
WBC # FLD AUTO: 5.18 K/UL — SIGNIFICANT CHANGE UP (ref 3.8–10.5)

## 2018-08-20 PROCEDURE — 84484 ASSAY OF TROPONIN QUANT: CPT

## 2018-08-20 PROCEDURE — 82553 CREATINE MB FRACTION: CPT

## 2018-08-20 PROCEDURE — 71045 X-RAY EXAM CHEST 1 VIEW: CPT

## 2018-08-20 PROCEDURE — 99284 EMERGENCY DEPT VISIT MOD MDM: CPT | Mod: 25

## 2018-08-20 PROCEDURE — 93005 ELECTROCARDIOGRAM TRACING: CPT

## 2018-08-20 PROCEDURE — 83880 ASSAY OF NATRIURETIC PEPTIDE: CPT

## 2018-08-20 PROCEDURE — 71045 X-RAY EXAM CHEST 1 VIEW: CPT | Mod: 26

## 2018-08-20 PROCEDURE — 82550 ASSAY OF CK (CPK): CPT

## 2018-08-20 PROCEDURE — 99285 EMERGENCY DEPT VISIT HI MDM: CPT | Mod: 25

## 2018-08-20 PROCEDURE — 36415 COLL VENOUS BLD VENIPUNCTURE: CPT

## 2018-08-20 PROCEDURE — 93010 ELECTROCARDIOGRAM REPORT: CPT

## 2018-08-20 PROCEDURE — 85610 PROTHROMBIN TIME: CPT

## 2018-08-20 PROCEDURE — 85027 COMPLETE CBC AUTOMATED: CPT

## 2018-08-20 PROCEDURE — 85730 THROMBOPLASTIN TIME PARTIAL: CPT

## 2018-08-20 PROCEDURE — 80053 COMPREHEN METABOLIC PANEL: CPT

## 2018-08-20 RX ORDER — ASPIRIN/CALCIUM CARB/MAGNESIUM 324 MG
325 TABLET ORAL ONCE
Qty: 0 | Refills: 0 | Status: COMPLETED | OUTPATIENT
Start: 2018-08-20 | End: 2018-08-20

## 2018-08-20 RX ORDER — SODIUM CHLORIDE 9 MG/ML
3 INJECTION INTRAMUSCULAR; INTRAVENOUS; SUBCUTANEOUS ONCE
Qty: 0 | Refills: 0 | Status: COMPLETED | OUTPATIENT
Start: 2018-08-20 | End: 2018-08-20

## 2018-08-20 RX ADMIN — SODIUM CHLORIDE 3 MILLILITER(S): 9 INJECTION INTRAMUSCULAR; INTRAVENOUS; SUBCUTANEOUS at 18:05

## 2018-08-20 NOTE — ED PROVIDER NOTE - CONSTITUTIONAL, MLM
normal... Well appearing, pleasant, well nourished, awake, alert, oriented to person, place, time/situation and in no apparent distress.

## 2018-08-20 NOTE — ED ADULT NURSE NOTE - OBJECTIVE STATEMENT
Assumed patient care @ 1740. Pt received sitting on stretcher in no apparent distress, ST on cardiac monitor. Pt AOx3 C/O 4/10 midsternal chest pain/indigestion since 1000pm last night. Patient reports taking 4 Baby ASA then and some antacids. "Patient then had the same feeling today and around 1600 it was getting worse and I became sweating and called my cardiologist and he told me to come to the ED". Lungs clear to ausculation, respirations even unlabored. Abd soft non tender, + bowel sounds x 4quadrants. Denies Vomiting, Diarrhea. Skin warm, dry, color appropriate for age and race.
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
## 2018-08-20 NOTE — ED ADULT NURSE REASSESSMENT NOTE - NS ED NURSE REASSESS COMMENT FT1
Report given to TRE Artis who will assume patient care at this time.
Pt received with c/o chest pain , neg. trop resulted , awaiting for repeat troponin , otherwise v/s stable  and made comfortable in bed.

## 2018-08-20 NOTE — ED ADULT NURSE NOTE - NSIMPLEMENTINTERV_GEN_ALL_ED
Implemented All Universal Safety Interventions:  [place] to call system. Call bell, personal items and telephone within reach. Instruct patient to call for assistance. Room bathroom lighting operational. Non-slip footwear when patient is off stretcher. Physically safe environment: no spills, clutter or unnecessary equipment. Stretcher in lowest position, wheels locked, appropriate side rails in place.

## 2018-08-20 NOTE — ED PROVIDER NOTE - PROGRESS NOTE DETAILS
[facility] paged for consultation rosario galvez recc 3 setsc ce monitoring for arrhythmia will see in am pt feels better aware of plan discussed [facility] cardiology. she is willing to stay for second set at 11 pm but can not stay overnight for am labs as she has to be home to care for her grandson for whom she is legal guardian (daughter  in child birth) and he has mr downs. refusing to stay pt states she no longer wants to wait she must get home  The patient has decided to leave against medical advice (AMA).  I have made reasonable attempts to explain to the patient that leaving prior to completion of work up and treatment may result in recurrent or worsening of symptoms, severe permanent disability, pain and suffering, harm, injury, and/or death.  I have explained the risks, benefits, and alternatives to treatment as well as the attendant risks of refusing treatment at this time.  The patient has demonstrated comprehension and verbalizes understanding of these risks.  The patient has been told that they must return to the ER immediately for persistent or recurring symptoms, worsening symptoms, or any concerning symptoms.  The patient has also been informed that they may return to the ER immediately at any time if they change their mind and wish to resume care. The patient has been given the opportunity to ask questions and have them fully answered.

## 2018-08-29 ENCOUNTER — APPOINTMENT (OUTPATIENT)
Dept: GASTROENTEROLOGY | Facility: CLINIC | Age: 83
End: 2018-08-29

## 2018-09-19 ENCOUNTER — APPOINTMENT (OUTPATIENT)
Dept: GASTROENTEROLOGY | Facility: CLINIC | Age: 83
End: 2018-09-19
Payer: MEDICARE

## 2018-09-19 VITALS
SYSTOLIC BLOOD PRESSURE: 105 MMHG | OXYGEN SATURATION: 98 % | HEIGHT: 62 IN | HEART RATE: 96 BPM | DIASTOLIC BLOOD PRESSURE: 60 MMHG | WEIGHT: 190 LBS | BODY MASS INDEX: 34.96 KG/M2 | TEMPERATURE: 98.1 F

## 2018-09-19 DIAGNOSIS — K57.32 DIVERTICULITIS OF LARGE INTESTINE W/OUT PERFORATION OR ABSCESS W/OUT BLEEDING: ICD-10-CM

## 2018-09-19 DIAGNOSIS — Z90.10 ACQUIRED ABSENCE OF UNSPECIFIED BREAST AND NIPPLE: ICD-10-CM

## 2018-09-19 DIAGNOSIS — A04.72 ENTEROCOLITIS DUE TO CLOSTRIDIUM DIFFICILE, NOT SPECIFIED AS RECURRENT: ICD-10-CM

## 2018-09-19 DIAGNOSIS — Z85.3 PERSONAL HISTORY OF MALIGNANT NEOPLASM OF BREAST: ICD-10-CM

## 2018-09-19 PROCEDURE — 99203 OFFICE O/P NEW LOW 30 MIN: CPT

## 2018-09-19 RX ORDER — POLYETHYLENE GLYCOL-3350 AND ELECTROLYTES 236; 6.74; 5.86; 2.97; 22.74 G/274.31G; G/274.31G; G/274.31G; G/274.31G; G/274.31G
236 POWDER, FOR SOLUTION ORAL
Qty: 1 | Refills: 0 | Status: ACTIVE | COMMUNITY
Start: 2018-09-19 | End: 1900-01-01

## 2018-09-20 ENCOUNTER — APPOINTMENT (OUTPATIENT)
Dept: ULTRASOUND IMAGING | Facility: CLINIC | Age: 83
End: 2018-09-20

## 2018-09-20 ENCOUNTER — OUTPATIENT (OUTPATIENT)
Dept: OUTPATIENT SERVICES | Facility: HOSPITAL | Age: 83
LOS: 1 days | End: 2018-09-20
Payer: COMMERCIAL

## 2018-09-20 DIAGNOSIS — Z96.7 PRESENCE OF OTHER BONE AND TENDON IMPLANTS: Chronic | ICD-10-CM

## 2018-09-20 DIAGNOSIS — Z98.89 OTHER SPECIFIED POSTPROCEDURAL STATES: Chronic | ICD-10-CM

## 2018-09-20 DIAGNOSIS — Z90.710 ACQUIRED ABSENCE OF BOTH CERVIX AND UTERUS: Chronic | ICD-10-CM

## 2018-09-20 DIAGNOSIS — Z00.8 ENCOUNTER FOR OTHER GENERAL EXAMINATION: ICD-10-CM

## 2018-09-20 DIAGNOSIS — Z90.13 ACQUIRED ABSENCE OF BILATERAL BREASTS AND NIPPLES: Chronic | ICD-10-CM

## 2018-09-20 DIAGNOSIS — Z98.890 OTHER SPECIFIED POSTPROCEDURAL STATES: Chronic | ICD-10-CM

## 2018-09-20 PROCEDURE — 76642 ULTRASOUND BREAST LIMITED: CPT

## 2018-09-20 PROCEDURE — 76642 ULTRASOUND BREAST LIMITED: CPT | Mod: 26,RT

## 2018-09-24 ENCOUNTER — OTHER (OUTPATIENT)
Age: 83
End: 2018-09-24

## 2020-06-29 NOTE — ED ADULT NURSE NOTE - CAS DISCH TRANSFER METHOD
Nursing notes reviewed and accepted.    Mimi Coronel is a 16 month old female who presents for 15 month well child exam.  Patient presents with Mother.    Concerns raised today include:  none     Feeding:  on table food  Sleeping:  No sleep or behavioral concerns.  Elimination: Normal wet diapers and bowel movements.    SOCIAL:  Primary caretakers:  Parents  Support at home:  Yes  Smoke exposure:  none    DEVELOPMENT:  walks backward, says 3 words besides \"Andres\" and \"Mama\", points to 1-2 body parts (\"show me your nose, eyes,\"), drinks from a cup, imitates household chores such as sweeping, understands the meaning of \"Get up\" or \"Sit down\", stacks 2 blocks and scribbles  Nursing notes were reviewed.     PAST HISTORIES:  Birth history, medical history, surgical history, and family history reviewed and updated.    PHYSICAL EXAM:  Height 30.25\" (76.8 cm), weight 9.4 kg, head circumference 44 cm (17.32\").    GENERAL: Well appearing  female, nontoxic, no acute distress. Alert and interactive.  SKIN: Warm, normal turgor. No cyanosis. No bruises or lesions.  HEAD: Normocephalic, atraumatic. Anterior fontanelle open, soft, and flat.  EYES: Conjunctiva appear normal, non-injected, non-icteric.  Pupils equal, round & reactive to light, Extraocular movements intact.  NOSE: Appears normal, no flaring.  EARS: Normal pinnae, no preauricular skin tags or pit. Tympanic membranes are transparent with good landmarks.  THROAT: Oropharynx with moist mucus membranes and no lesions.  NECK: Supple, no lymphadenopathy or masses.  HEART: Regular rate and rhythm. Quiet precordium. Normal S1, S2. No murmurs, rubs, gallops.   LUNGS: Clear to auscultation bilaterally. No wheezes, rales, rhonchi. Normal work of breathing.  ABDOMEN: Soft, nontender. No organomegaly or masses.  GENITOURINARY: No labial adhesions or lesions.  MUSCULOSKELETAL: Hips within normal range of motion. Negative Hampton, Ortolani. Spine straight. Normal  sacrum.  EXTREMITIES: Warm, dry, without abnormalities.  NEUROLOGICAL: Normal tone, bulk, strength.    ASSESSMENT:  16 month old female well child.  Need for vaccination  - DTAP HEPB IPV COMBINED VACCINE IM (PEDIARIX)  - MMR VACC, SQ  - VARICELLA CHICKEN POX LIVES VACC, SQ (VARIVAX)  - HIB VACC,PRP-OMP,IM(PEDVAXHIB)    Encounter for routine child health examination without abnormal findings  - LEAD BLOOD/VENOUS; Future  - CBC WITH DIFFERENTIAL; Future    Delayed immunizations      PLAN:  All parental concerns and questions discussed.  Anticipatory guidance provided, handout given.  · Development  · Diet  · Accident Prevention: Childproof home, Water safety  · Name and vocalize objects  · Drink from cup  · Analgesices/Antipyretics  · Sun exposure  · Tobacco-free home  · Dental care  · Lead exposure risk: None  · Vitamin D supplementation: not needed  · Fluoride 0.25 mg PO daily:  not needed    Immunizations per orders. Risks, benefits, and side effects discussed.  Return to clinic for 18 month well child exam or sooner as needed for illness/concerns.         Private car

## 2020-10-23 ENCOUNTER — EMERGENCY (EMERGENCY)
Facility: HOSPITAL | Age: 85
LOS: 1 days | Discharge: HOME | End: 2020-10-23
Attending: EMERGENCY MEDICINE | Admitting: EMERGENCY MEDICINE
Payer: COMMERCIAL

## 2020-10-23 VITALS
HEART RATE: 94 BPM | TEMPERATURE: 98 F | OXYGEN SATURATION: 98 % | SYSTOLIC BLOOD PRESSURE: 105 MMHG | RESPIRATION RATE: 19 BRPM | DIASTOLIC BLOOD PRESSURE: 72 MMHG

## 2020-10-23 VITALS
WEIGHT: 179.9 LBS | DIASTOLIC BLOOD PRESSURE: 62 MMHG | SYSTOLIC BLOOD PRESSURE: 96 MMHG | OXYGEN SATURATION: 97 % | RESPIRATION RATE: 18 BRPM | TEMPERATURE: 99 F | HEIGHT: 62 IN | HEART RATE: 93 BPM

## 2020-10-23 DIAGNOSIS — Z98.890 OTHER SPECIFIED POSTPROCEDURAL STATES: Chronic | ICD-10-CM

## 2020-10-23 DIAGNOSIS — Z98.89 OTHER SPECIFIED POSTPROCEDURAL STATES: Chronic | ICD-10-CM

## 2020-10-23 DIAGNOSIS — Z90.710 ACQUIRED ABSENCE OF BOTH CERVIX AND UTERUS: Chronic | ICD-10-CM

## 2020-10-23 DIAGNOSIS — Z90.13 ACQUIRED ABSENCE OF BILATERAL BREASTS AND NIPPLES: Chronic | ICD-10-CM

## 2020-10-23 DIAGNOSIS — Z96.7 PRESENCE OF OTHER BONE AND TENDON IMPLANTS: Chronic | ICD-10-CM

## 2020-10-23 LAB
ALBUMIN SERPL ELPH-MCNC: 3.5 G/DL — SIGNIFICANT CHANGE UP (ref 3.3–5)
ALP SERPL-CCNC: 49 U/L — SIGNIFICANT CHANGE UP (ref 40–120)
ALT FLD-CCNC: 19 U/L — SIGNIFICANT CHANGE UP (ref 12–78)
ANION GAP SERPL CALC-SCNC: 7 MMOL/L — SIGNIFICANT CHANGE UP (ref 5–17)
APTT BLD: 31 SEC — SIGNIFICANT CHANGE UP (ref 27.5–35.5)
AST SERPL-CCNC: 19 U/L — SIGNIFICANT CHANGE UP (ref 15–37)
BASOPHILS # BLD AUTO: 0.04 K/UL — SIGNIFICANT CHANGE UP (ref 0–0.2)
BASOPHILS NFR BLD AUTO: 0.5 % — SIGNIFICANT CHANGE UP (ref 0–2)
BILIRUB SERPL-MCNC: 0.6 MG/DL — SIGNIFICANT CHANGE UP (ref 0.2–1.2)
BUN SERPL-MCNC: 21 MG/DL — SIGNIFICANT CHANGE UP (ref 7–23)
CALCIUM SERPL-MCNC: 9 MG/DL — SIGNIFICANT CHANGE UP (ref 8.5–10.1)
CHLORIDE SERPL-SCNC: 106 MMOL/L — SIGNIFICANT CHANGE UP (ref 96–108)
CK MB CFR SERPL CALC: 1.9 NG/ML — SIGNIFICANT CHANGE UP (ref 0–3.6)
CO2 SERPL-SCNC: 27 MMOL/L — SIGNIFICANT CHANGE UP (ref 22–31)
CREAT SERPL-MCNC: 1.1 MG/DL — SIGNIFICANT CHANGE UP (ref 0.5–1.3)
EOSINOPHIL # BLD AUTO: 0.09 K/UL — SIGNIFICANT CHANGE UP (ref 0–0.5)
EOSINOPHIL NFR BLD AUTO: 1 % — SIGNIFICANT CHANGE UP (ref 0–6)
GLUCOSE SERPL-MCNC: 124 MG/DL — HIGH (ref 70–99)
HCT VFR BLD CALC: 39.8 % — SIGNIFICANT CHANGE UP (ref 34.5–45)
HGB BLD-MCNC: 13.3 G/DL — SIGNIFICANT CHANGE UP (ref 11.5–15.5)
IMM GRANULOCYTES NFR BLD AUTO: 0.2 % — SIGNIFICANT CHANGE UP (ref 0–1.5)
INR BLD: 1.06 RATIO — SIGNIFICANT CHANGE UP (ref 0.88–1.16)
LYMPHOCYTES # BLD AUTO: 1.51 K/UL — SIGNIFICANT CHANGE UP (ref 1–3.3)
LYMPHOCYTES # BLD AUTO: 17.3 % — SIGNIFICANT CHANGE UP (ref 13–44)
MCHC RBC-ENTMCNC: 33.1 PG — SIGNIFICANT CHANGE UP (ref 27–34)
MCHC RBC-ENTMCNC: 33.4 GM/DL — SIGNIFICANT CHANGE UP (ref 32–36)
MCV RBC AUTO: 99 FL — SIGNIFICANT CHANGE UP (ref 80–100)
MONOCYTES # BLD AUTO: 0.76 K/UL — SIGNIFICANT CHANGE UP (ref 0–0.9)
MONOCYTES NFR BLD AUTO: 8.7 % — SIGNIFICANT CHANGE UP (ref 2–14)
NEUTROPHILS # BLD AUTO: 6.31 K/UL — SIGNIFICANT CHANGE UP (ref 1.8–7.4)
NEUTROPHILS NFR BLD AUTO: 72.3 % — SIGNIFICANT CHANGE UP (ref 43–77)
NRBC # BLD: 0 /100 WBCS — SIGNIFICANT CHANGE UP (ref 0–0)
PLATELET # BLD AUTO: 241 K/UL — SIGNIFICANT CHANGE UP (ref 150–400)
POTASSIUM SERPL-MCNC: 4.2 MMOL/L — SIGNIFICANT CHANGE UP (ref 3.5–5.3)
POTASSIUM SERPL-SCNC: 4.2 MMOL/L — SIGNIFICANT CHANGE UP (ref 3.5–5.3)
PROT SERPL-MCNC: 7.5 G/DL — SIGNIFICANT CHANGE UP (ref 6–8.3)
PROTHROM AB SERPL-ACNC: 12.4 SEC — SIGNIFICANT CHANGE UP (ref 10.6–13.6)
RBC # BLD: 4.02 M/UL — SIGNIFICANT CHANGE UP (ref 3.8–5.2)
RBC # FLD: 13 % — SIGNIFICANT CHANGE UP (ref 10.3–14.5)
SODIUM SERPL-SCNC: 140 MMOL/L — SIGNIFICANT CHANGE UP (ref 135–145)
TROPONIN I SERPL-MCNC: <.015 NG/ML — SIGNIFICANT CHANGE UP (ref 0.01–0.04)
WBC # BLD: 8.73 K/UL — SIGNIFICANT CHANGE UP (ref 3.8–10.5)
WBC # FLD AUTO: 8.73 K/UL — SIGNIFICANT CHANGE UP (ref 3.8–10.5)

## 2020-10-23 PROCEDURE — 70450 CT HEAD/BRAIN W/O DYE: CPT

## 2020-10-23 PROCEDURE — 82553 CREATINE MB FRACTION: CPT

## 2020-10-23 PROCEDURE — 72125 CT NECK SPINE W/O DYE: CPT | Mod: 26

## 2020-10-23 PROCEDURE — 99284 EMERGENCY DEPT VISIT MOD MDM: CPT | Mod: 25

## 2020-10-23 PROCEDURE — 72125 CT NECK SPINE W/O DYE: CPT

## 2020-10-23 PROCEDURE — 96374 THER/PROPH/DIAG INJ IV PUSH: CPT

## 2020-10-23 PROCEDURE — 36415 COLL VENOUS BLD VENIPUNCTURE: CPT

## 2020-10-23 PROCEDURE — 70450 CT HEAD/BRAIN W/O DYE: CPT | Mod: 26

## 2020-10-23 PROCEDURE — 80053 COMPREHEN METABOLIC PANEL: CPT

## 2020-10-23 PROCEDURE — 84484 ASSAY OF TROPONIN QUANT: CPT

## 2020-10-23 PROCEDURE — 85730 THROMBOPLASTIN TIME PARTIAL: CPT

## 2020-10-23 PROCEDURE — 85610 PROTHROMBIN TIME: CPT

## 2020-10-23 PROCEDURE — 85025 COMPLETE CBC W/AUTO DIFF WBC: CPT

## 2020-10-23 PROCEDURE — 93005 ELECTROCARDIOGRAM TRACING: CPT

## 2020-10-23 PROCEDURE — 93010 ELECTROCARDIOGRAM REPORT: CPT

## 2020-10-23 PROCEDURE — 99284 EMERGENCY DEPT VISIT MOD MDM: CPT

## 2020-10-23 RX ORDER — ACETAMINOPHEN 500 MG
650 TABLET ORAL ONCE
Refills: 0 | Status: COMPLETED | OUTPATIENT
Start: 2020-10-23 | End: 2020-10-23

## 2020-10-23 RX ORDER — KETOROLAC TROMETHAMINE 30 MG/ML
15 SYRINGE (ML) INJECTION ONCE
Refills: 0 | Status: DISCONTINUED | OUTPATIENT
Start: 2020-10-23 | End: 2020-10-23

## 2020-10-23 RX ORDER — CYCLOBENZAPRINE HYDROCHLORIDE 10 MG/1
1 TABLET, FILM COATED ORAL
Qty: 15 | Refills: 0
Start: 2020-10-23 | End: 2020-10-27

## 2020-10-23 RX ADMIN — Medication 15 MILLIGRAM(S): at 10:28

## 2020-10-23 RX ADMIN — Medication 650 MILLIGRAM(S): at 11:06

## 2020-10-23 RX ADMIN — Medication 15 MILLIGRAM(S): at 10:12

## 2020-10-23 RX ADMIN — Medication 650 MILLIGRAM(S): at 10:06

## 2020-10-23 NOTE — ED PROVIDER NOTE - PROGRESS NOTE DETAILS
labs, ekg, ct head, c-spine, reviwed, no acute findings, patient states she feels better but still has pain with movement of head, will f/u with PMD, understands to return to ER for worsneing of symptoms

## 2020-10-23 NOTE — ED ADULT NURSE NOTE - OBJECTIVE STATEMENT
Patient reports neck pain x1 day when she was siting. and suddenly had a sharp pain to the posterior left side of head that radiated to left side of neck. Patient states pain worsened over night. Patient reports neck pain x1 day when she was siting. and suddenly had a sharp pain to the posterior left side of head that radiated to left side of neck and left arm. Patient states pain worsened over night.

## 2020-10-23 NOTE — ED ADULT TRIAGE NOTE - CCCP TRG CHIEF CMPLNT
neck pain Arthritis    Chronic back pain    GERD with esophagitis    HLD (hyperlipidemia)    HTN (hypertension)    HTN (hypertension)    Hyperlipidemia    Palpitations    Panic attack    Vertigo    Vertigo

## 2020-10-23 NOTE — ED PROVIDER NOTE - PATIENT PORTAL LINK FT
You can access the FollowMyHealth Patient Portal offered by [facility] by registering at the following website: [email or web address]. By joining [employer]’s FollowMyHealth portal, you will also be able to view your health information using other applications (apps) compatible with our system.

## 2020-10-23 NOTE — ED PROVIDER NOTE - NSFOLLOWUPINSTRUCTIONS_ED_ALL_ED_FT
WHAT YOU NEED TO KNOW:    Acute neck pain starts suddenly, increases quickly, and goes away in a few days. The pain may come and go, or be worse with certain movements. The pain may be only in your neck, or it may move to your arms, back, or shoulders. You may also have pain that starts in another body area and moves to your neck.     Vertebral Column         DISCHARGE INSTRUCTIONS:    Return to the emergency department if:   •You have an injury that causes neck pain and shooting pain down your arms or legs.      •Your neck pain suddenly becomes severe.      •You have neck pain along with numbness, tingling, or weakness in your arms or legs.      •You have a stiff neck, a headache, and a fever.      Contact your healthcare provider if:   •You have new or worsening symptoms.      •Your symptoms continue even after treatment.      •You have questions or concerns about your condition or care.      Medicines:   •NSAIDs, such as ibuprofen, help decrease swelling, pain, and fever. This medicine is available without a doctor's order. Ask your healthcare provider which medicine to take and how often to take it. Follow directions. NSAIDs can cause stomach bleeding or kidney problems if not taken correctly. If you take blood thinner medicine, always ask if NSAIDs are safe for you.      •Acetaminophen helps decrease pain and fever. Ask your healthcare provider how much to take and how often to take it. Follow directions. Acetaminophen can cause liver damage if not taken correctly.      •Steroid medicine may be used to reduce inflammation. This can help relieve pain caused by swelling.      •Take your medicine as directed. Contact your healthcare provider if you think your medicine is not helping or if you have side effects. Tell him or her if you are allergic to any medicine. Keep a list of the medicines, vitamins, and herbs you take. Include the amounts, and when and why you take them. Bring the list or the pill bottles to follow-up visits. Carry your medicine list with you in case of an emergency.      Manage or prevent acute neck pain:   •Rest your neck as directed. Do not make sudden movements, such as turning your head quickly. Your healthcare provider may recommend you wear a cervical collar for a short time. The collar will prevent you from moving your head. This will give your neck time to heal if an injury is causing your neck pain. Ask your healthcare provider when you can return to sports or other normal daily activities.      •Apply heat as directed. Heat helps relieve pain and swelling. Use a heat wrap, or soak a small towel in warm water. Wring out the extra water. Apply the heat wrap or towel for 20 minutes every hour, or as directed.      •Apply ice as directed. Ice helps relieve pain and swelling, and can help prevent tissue damage. Use an ice pack, or put ice in a bag. Cover the ice pack or back with a towel before you apply it to your neck. Apply the ice pack or ice for 15 minutes every hour, or as directed. Your healthcare provider can tell you how often to apply ice.      •Do neck exercises as directed. Neck exercises help strengthen the muscles and increase range of motion. Your healthcare provider will tell you which exercises are right for you. He may give you instructions, or he may recommend that you work with a physical therapist. Your healthcare provider or therapist can make sure you are doing the exercises correctly.       •Maintain good posture. Try to keep your head and shoulders lifted when you sit. If you work in front of a computer, make sure the monitor is at the right level. You should not need to look up down to see the screen. You should also not have to lean forward to be able to read what is on the screen. Make sure your keyboard, mouse, and other computer items are placed where you do not have to extend your shoulder to reach them. Get up often if you work in front of a computer or sit for long periods of time. Stretch or walk around to keep your neck muscles loose.      Follow up with your healthcare provider as directed: Your healthcare provider may refer you to a specialist if your pain does not get better with treatment. Write down your questions so you remember to ask them during your visits.       © Copyright [employer] 2020           back to top                          © Copyright [employer] 2020

## 2020-10-23 NOTE — ED PROVIDER NOTE - OBJECTIVE STATEMENT
85 female drove to ER c/o posterior head and left sided neck pain, started yesterday, denies fall, or trauma, pain is worse with movement of head to the right and movement of arm in abduction. Patient took 2 baby aspirin with no relief.

## 2020-10-23 NOTE — ED ADULT NURSE NOTE - BREATHING, MLM
Patient contacted she has continued to take the Acifex BID she would like to be seen by Dr Lawrence but understands he may not be available and could see who she could see the soonest MD or Evonne Cabello NP as she has seen her in the past,  is called but received voicemail we will have them call patient.   Spontaneous, unlabored and symmetrical

## 2020-11-10 NOTE — PROGRESS NOTE ADULT - PROBLEM SELECTOR PLAN 6
Alert-The patient is alert, awake and responds to voice. The patient is oriented to time, place, and person. The triage nurse is able to obtain subjective information. -Continue drops as prescribed.

## 2020-11-12 NOTE — PROGRESS NOTE ADULT - PROBLEM SELECTOR PLAN 3
12-Nov-2020 -Echo showed EF 45%, with moderate to sever mitral regurg, aortic valve insufficiency  -Pt is currently has no ACS, ischemia, or congestive symptoms but has reported increase fatigue over past 6 months  Cardio: recommends cardiac catheterization and OLEKSANDR outpatient

## 2020-12-02 NOTE — PROGRESS NOTE ADULT - PROBLEM SELECTOR PROBLEM 5
[facility] DAILY PROGRESS NOTE  SUBJECTIVE:   Resting comfortably.  Tolerated breakfast this morning.  Denies shortness of breath, chest pain, chest tightness, nausea, vomiting or abdominal discomfort.    OBJECTIVE:    VITAL SIGNS:     Vital Last Value 24 Hour Range   Temperature 96.4 °F (35.8 °C) (12/02/20 0335) Temp  Min: 96.4 °F (35.8 °C)  Max: 97.2 °F (36.2 °C)   Pulse 73 (12/02/20 0716) Pulse  Min: 73  Max: 104   Respiratory 19 (12/02/20 0716) Resp  Min: 16  Max: 19   Non-Invasive  Blood Pressure 132/72 (12/02/20 0716) BP  Min: 98/51  Max: 140/85   Pulse Oximetry 92 % (12/02/20 0716) SpO2  Min: 92 %  Max: 100 %     Vital Today Admitted   Weight 82.4 kg (181 lb 10.5 oz) (12/02/20 0335) Weight: 80 kg (176 lb 5.9 oz) (11/30/20 0713)   Height N/A Height: 5' 6\" (167.6 cm) (11/30/20 0713)   Body Mass Index N/A BMI (Calculated): 28.47 (11/30/20 0713)     INTAKE/OUTPUT:      Intake/Output Summary (Last 24 hours) at 12/2/2020 1123  Last data filed at 12/2/2020 0500  Gross per 24 hour   Intake 120 ml   Output 370 ml   Net -250 ml         PHYSICAL EXAM:    Physical Exam     Neck: Neck supple.   Cardiovascular: Normal rate.   Pulmonary/Chest: Effort normal.  Diminished air entry bilateral bases but no wheezing or crackles.    Abdominal: Soft. Bowel sounds are normal. . There is no abdominal tenderness.   Neurological: Answers all questions appropriately, awake and alert.  Skin: Skin is warm and dry.   Multiple skin tears and bruising on bilateral arms  Bruising on bilateral shins   Psychiatric: Mood stable    LABORATORY DATA:    CHEM7:  Sodium (mmol/L)   Date Value   12/01/2020 147 (H)     Potassium (mmol/L)   Date Value   12/01/2020 4.3     Chloride (mmol/L)   Date Value   12/01/2020 108 (H)     Glucose (mg/dL)   Date Value   12/01/2020 110 (H)     CALCIUM (mg/dL)   Date Value   12/01/2020 8.5     Carbon Dioxide (mmol/L)   Date Value   12/01/2020 30     BUN (mg/dL)   Date Value   12/01/2020 22 (H)     Creatinine (mg/dL)   Date  Value   12/01/2020 0.81       CBC:  WBC (K/mcL)   Date Value   12/01/2020 8.2     RBC (mil/mcL)   Date Value   12/01/2020 3.34 (L)     HCT (%)   Date Value   12/01/2020 33.3 (L)     HGB (g/dL)   Date Value   12/01/2020 10.2 (L)     PLT (K/mcL)   Date Value   12/01/2020 381      Coags:  INR (no units)   Date Value   11/30/2020 1.0        Hepatic Panel:  AST/SGOT (Units/L)   Date Value   12/01/2020 31     ALT/SGPT (Units/L)   Date Value   12/01/2020 59     No results found for: GGTP  ALK PHOSPHATASE (Units/L)   Date Value   12/01/2020 224 (H)     TOTAL BILIRUBIN (mg/dL)   Date Value   12/01/2020 0.4         IMAGING STUDIES:    CT HEAD WO CONTRAST   Final Result   Addendum 1 of 1   Addendum:      Paranasal sinuses and mastoids appear grossly unremarkable.      Electronically Signed by: DUANE LACY MD    Signed on: 11/30/2020 8:18 AM          Final      XR CHEST PA OR AP 1 VIEW   Final Result    Stable contours the cardiac mediastinal silhouette.       New/worsening patchy airspace consolidations are noted throughout the mid to lower lungs bilaterally. Small bilateral pleural effusions are present. Findings can be seen in pulmonary edema or viral pneumonitis.       Some calcifications aortic arch are noted. No appreciable pneumothorax.      Follow-up is advised.      Degenerative changes of the spine and shoulder joints are present.      Electronically Signed by: VINH MOROCHO D.O.    Signed on: 11/30/2020 7:32 AM                                      ASSESSMENT/PLAN:       Acute hypoxic respiratory failure secondary to acute on chronic diastolic dysfunction with fluid overload  Ruled out COVID-19    - Oxygenated on RA today  - CXR (11/30) shown above, suggestive of pulmonary edema or viral pneumonitis   - Recent Echo (11/23) showed EF 55% with grade 1 diastolic dysfunction. Akinesis of apical myocardium. Partial VSD noted in apical septal without any left to right shunt  - COVID-19 Test was negative (aaliyah 11/30)    - NT proBNP was 4,077 (11/30)  - Continue with losartan and metoprolol   - Cardiology (Dr. Villafana) followed     NSTEMI  - CXR (11/30) shown above   - Troponin was 5.06 --> 4.04 (11/30)   - Continue with losartan and metoprolol  - Held atorvastatin due to transaminitis   - Held aspirin due to recent SDH   - Cardiology (Dr. Villafana) followed    Recent subdural / falcine hematoma 2/2 mechanical fall prior to arrival      - CT Head (11/30) showed no acute pathology, and no change from prior   - AC: None for 3 weeks     Acute macrocytic anemia  - Hgb was 11.3 --> 10.2 (12/1)  - There was no evidence of active bleed     Hypomagnesemia   - Mg was 1.6 --> 2.5 today, monitored  - Repleted with IV mag sulfate     Hypernatremia   - Na was 142 --> 147 (12/1), monitored  -Encouraged free water intake as able.     Transaminitis, appears chronic  - LFT's (12/1): Alk Phos 234 --> 224, AST 45 --> WNL, ALT 77 --> WNL, and T.bili WNL  - Held atorvastatin                 Diabetes mellitus type 2   - BS was elevated in the 160s today  - Continue Lantus 10U  - Held metformin  - Monitored via Accu-Cheks, and covered with SSI     Primary hypertension   - BP was controlled in the 130/70s today on losartan and metoprolol     GERD - Not on any medication  CKD Stage 3 - There were no acute concerns, Creatinine WNL  Hyperlipidemia - Held atorvastatin due to transaminitis   Hypothyroidism - Continue with levothyroxine  Anxiety/Depression - Continue with Cymbalta    Code Status    Code Status: Do Not Resuscitate    DVT PPx: SCDs. No AC for three weeks due to recent subdural / falcine hematoma    DISPOSITION: Please see discharge summary dated 12/1/2020. Discharge was held due to patient awaiting placement. She will be discharged to [facility] today.    PCP:  Kai SAUL MD     Charting performed by Clint Phan for Dr. Tracey Amezcua    All medical record entries made by the clint were at my direction. I  have reviewed the chart and agree that the record accurately reflects my personal performance of the history, physical exam, hospital course, and assessment and plan.       Ear pain

## 2021-04-23 ENCOUNTER — EMERGENCY (EMERGENCY)
Facility: HOSPITAL | Age: 86
LOS: 1 days | Discharge: HOME | End: 2021-04-23
Attending: EMERGENCY MEDICINE | Admitting: EMERGENCY MEDICINE
Payer: COMMERCIAL

## 2021-04-23 VITALS
SYSTOLIC BLOOD PRESSURE: 150 MMHG | TEMPERATURE: 98 F | OXYGEN SATURATION: 98 % | DIASTOLIC BLOOD PRESSURE: 86 MMHG | RESPIRATION RATE: 16 BRPM | HEART RATE: 110 BPM

## 2021-04-23 VITALS
OXYGEN SATURATION: 97 % | RESPIRATION RATE: 18 BRPM | HEART RATE: 98 BPM | HEIGHT: 62 IN | DIASTOLIC BLOOD PRESSURE: 55 MMHG | TEMPERATURE: 98 F | WEIGHT: 179.02 LBS | SYSTOLIC BLOOD PRESSURE: 124 MMHG

## 2021-04-23 DIAGNOSIS — Z90.710 ACQUIRED ABSENCE OF BOTH CERVIX AND UTERUS: Chronic | ICD-10-CM

## 2021-04-23 DIAGNOSIS — Z96.7 PRESENCE OF OTHER BONE AND TENDON IMPLANTS: Chronic | ICD-10-CM

## 2021-04-23 DIAGNOSIS — Z98.890 OTHER SPECIFIED POSTPROCEDURAL STATES: Chronic | ICD-10-CM

## 2021-04-23 DIAGNOSIS — Z98.89 OTHER SPECIFIED POSTPROCEDURAL STATES: Chronic | ICD-10-CM

## 2021-04-23 DIAGNOSIS — Z90.13 ACQUIRED ABSENCE OF BILATERAL BREASTS AND NIPPLES: Chronic | ICD-10-CM

## 2021-04-23 LAB
ALBUMIN SERPL ELPH-MCNC: 3.8 G/DL — SIGNIFICANT CHANGE UP (ref 3.3–5)
ALP SERPL-CCNC: 59 U/L — SIGNIFICANT CHANGE UP (ref 40–120)
ALT FLD-CCNC: 21 U/L — SIGNIFICANT CHANGE UP (ref 12–78)
ANION GAP SERPL CALC-SCNC: 7 MMOL/L — SIGNIFICANT CHANGE UP (ref 5–17)
APTT BLD: 35 SEC — SIGNIFICANT CHANGE UP (ref 27.5–35.5)
AST SERPL-CCNC: 25 U/L — SIGNIFICANT CHANGE UP (ref 15–37)
BASOPHILS # BLD AUTO: 0.04 K/UL — SIGNIFICANT CHANGE UP (ref 0–0.2)
BASOPHILS NFR BLD AUTO: 0.6 % — SIGNIFICANT CHANGE UP (ref 0–2)
BILIRUB SERPL-MCNC: 0.5 MG/DL — SIGNIFICANT CHANGE UP (ref 0.2–1.2)
BUN SERPL-MCNC: 17 MG/DL — SIGNIFICANT CHANGE UP (ref 7–23)
CALCIUM SERPL-MCNC: 8.8 MG/DL — SIGNIFICANT CHANGE UP (ref 8.5–10.1)
CHLORIDE SERPL-SCNC: 105 MMOL/L — SIGNIFICANT CHANGE UP (ref 96–108)
CO2 SERPL-SCNC: 29 MMOL/L — SIGNIFICANT CHANGE UP (ref 22–31)
CREAT SERPL-MCNC: 1.1 MG/DL — SIGNIFICANT CHANGE UP (ref 0.5–1.3)
EOSINOPHIL # BLD AUTO: 0.19 K/UL — SIGNIFICANT CHANGE UP (ref 0–0.5)
EOSINOPHIL NFR BLD AUTO: 2.8 % — SIGNIFICANT CHANGE UP (ref 0–6)
GLUCOSE SERPL-MCNC: 96 MG/DL — SIGNIFICANT CHANGE UP (ref 70–99)
HCT VFR BLD CALC: 41.5 % — SIGNIFICANT CHANGE UP (ref 34.5–45)
HGB BLD-MCNC: 13.6 G/DL — SIGNIFICANT CHANGE UP (ref 11.5–15.5)
IMM GRANULOCYTES NFR BLD AUTO: 0.3 % — SIGNIFICANT CHANGE UP (ref 0–1.5)
INR BLD: 1.06 RATIO — SIGNIFICANT CHANGE UP (ref 0.88–1.16)
LYMPHOCYTES # BLD AUTO: 2.13 K/UL — SIGNIFICANT CHANGE UP (ref 1–3.3)
LYMPHOCYTES # BLD AUTO: 31.2 % — SIGNIFICANT CHANGE UP (ref 13–44)
MCHC RBC-ENTMCNC: 32.5 PG — SIGNIFICANT CHANGE UP (ref 27–34)
MCHC RBC-ENTMCNC: 32.8 GM/DL — SIGNIFICANT CHANGE UP (ref 32–36)
MCV RBC AUTO: 99 FL — SIGNIFICANT CHANGE UP (ref 80–100)
MONOCYTES # BLD AUTO: 0.66 K/UL — SIGNIFICANT CHANGE UP (ref 0–0.9)
MONOCYTES NFR BLD AUTO: 9.7 % — SIGNIFICANT CHANGE UP (ref 2–14)
NEUTROPHILS # BLD AUTO: 3.78 K/UL — SIGNIFICANT CHANGE UP (ref 1.8–7.4)
NEUTROPHILS NFR BLD AUTO: 55.4 % — SIGNIFICANT CHANGE UP (ref 43–77)
NRBC # BLD: 0 /100 WBCS — SIGNIFICANT CHANGE UP (ref 0–0)
PLATELET # BLD AUTO: 247 K/UL — SIGNIFICANT CHANGE UP (ref 150–400)
POTASSIUM SERPL-MCNC: 4.4 MMOL/L — SIGNIFICANT CHANGE UP (ref 3.5–5.3)
POTASSIUM SERPL-SCNC: 4.4 MMOL/L — SIGNIFICANT CHANGE UP (ref 3.5–5.3)
PROT SERPL-MCNC: 7.9 G/DL — SIGNIFICANT CHANGE UP (ref 6–8.3)
PROTHROM AB SERPL-ACNC: 12.4 SEC — SIGNIFICANT CHANGE UP (ref 10.6–13.6)
RBC # BLD: 4.19 M/UL — SIGNIFICANT CHANGE UP (ref 3.8–5.2)
RBC # FLD: 13.2 % — SIGNIFICANT CHANGE UP (ref 10.3–14.5)
SARS-COV-2 RNA SPEC QL NAA+PROBE: SIGNIFICANT CHANGE UP
SODIUM SERPL-SCNC: 141 MMOL/L — SIGNIFICANT CHANGE UP (ref 135–145)
TROPONIN I SERPL-MCNC: <.015 NG/ML — SIGNIFICANT CHANGE UP (ref 0.01–0.04)
WBC # BLD: 6.82 K/UL — SIGNIFICANT CHANGE UP (ref 3.8–10.5)
WBC # FLD AUTO: 6.82 K/UL — SIGNIFICANT CHANGE UP (ref 3.8–10.5)

## 2021-04-23 PROCEDURE — G1004: CPT

## 2021-04-23 PROCEDURE — 93005 ELECTROCARDIOGRAM TRACING: CPT

## 2021-04-23 PROCEDURE — 71045 X-RAY EXAM CHEST 1 VIEW: CPT

## 2021-04-23 PROCEDURE — 80053 COMPREHEN METABOLIC PANEL: CPT

## 2021-04-23 PROCEDURE — 70496 CT ANGIOGRAPHY HEAD: CPT | Mod: 26,MA

## 2021-04-23 PROCEDURE — 70496 CT ANGIOGRAPHY HEAD: CPT

## 2021-04-23 PROCEDURE — U0005: CPT

## 2021-04-23 PROCEDURE — 71260 CT THORAX DX C+: CPT

## 2021-04-23 PROCEDURE — 99285 EMERGENCY DEPT VISIT HI MDM: CPT

## 2021-04-23 PROCEDURE — 70498 CT ANGIOGRAPHY NECK: CPT | Mod: 26,MA

## 2021-04-23 PROCEDURE — 71260 CT THORAX DX C+: CPT | Mod: 26,MG

## 2021-04-23 PROCEDURE — 93010 ELECTROCARDIOGRAM REPORT: CPT

## 2021-04-23 PROCEDURE — 85610 PROTHROMBIN TIME: CPT

## 2021-04-23 PROCEDURE — 70498 CT ANGIOGRAPHY NECK: CPT

## 2021-04-23 PROCEDURE — 0042T: CPT

## 2021-04-23 PROCEDURE — 85025 COMPLETE CBC W/AUTO DIFF WBC: CPT

## 2021-04-23 PROCEDURE — 84484 ASSAY OF TROPONIN QUANT: CPT

## 2021-04-23 PROCEDURE — 82962 GLUCOSE BLOOD TEST: CPT

## 2021-04-23 PROCEDURE — 71045 X-RAY EXAM CHEST 1 VIEW: CPT | Mod: 26

## 2021-04-23 PROCEDURE — 99284 EMERGENCY DEPT VISIT MOD MDM: CPT | Mod: 25

## 2021-04-23 PROCEDURE — U0003: CPT

## 2021-04-23 PROCEDURE — 36415 COLL VENOUS BLD VENIPUNCTURE: CPT

## 2021-04-23 PROCEDURE — 85730 THROMBOPLASTIN TIME PARTIAL: CPT

## 2021-04-23 PROCEDURE — 70450 CT HEAD/BRAIN W/O DYE: CPT

## 2021-04-23 RX ORDER — METOPROLOL TARTRATE 50 MG
1 TABLET ORAL
Qty: 14 | Refills: 0
Start: 2021-04-23 | End: 2021-05-06

## 2021-04-23 NOTE — ED PROVIDER NOTE - NSFOLLOWUPINSTRUCTIONS_ED_ALL_ED_FT
Since you were last evaluated by a physician you have developed atrial fibrillation. Atrial fibrillation is the most common heart rhythm problem. The condition puts you at risk of stroke, heart attack, and other problems. Another term for atrial fibrillation is "A-fib."    In people with A-fib, the electrical signals that control the heartbeat are abnormal. As a result, the top 2 chambers of the heart stop pumping effectively, and a small amount of the blood that should move out of these chambers gets left behind. As the blood pools, it can start to form clots. These clots can travel to the brain through the blood vessels, and cause strokes.  In some people, A-fib never goes away. In others, A-fib can come and go, even with treatment. Many people with A-fib are able to live normal lives. Still, it is important that you take the medicines your doctor prescribes every day. Taking your medicines as directed can help reduce the chances that your A-fib will cause a stroke.    It's also a good idea to learn what the signs and symptoms of a stroke are: The Acronym "FAST" is helpful. F: Face uneven (someone's face does not appear symmetrical), A: Arm weakness (when one arm or leg and not the other is weak), S: Speech is strange, T: TIME TO CALL AN AMBULANCE, if you experience any of those three symptoms.

## 2021-04-23 NOTE — ED PROVIDER NOTE - PROVIDER TOKENS
PROVIDER:[TOKEN:[[id number]],FOLLOWUP:[1-3 Days]],PROVIDER:[TOKEN:[[id number]],FOLLOWUP:[1-3 Days]]

## 2021-04-23 NOTE — ED ADULT NURSE NOTE - GLASGOW COMA SCALE: BEST MOTOR RESPONSE
How Severe Is Your Acne?: mild
Is This A New Presentation, Or A Follow-Up?: Acne
What Type Of Note Output Would You Prefer (Optional)?: Standard Output
Additional Comments (Use Complete Sentences): \\nNew patient here for acne. He has got some cystic acne on his nose in the past. His dad was on accutane as a teen. He is accompanied with his mom.
(M6) obeys commands

## 2021-04-23 NOTE — ED PROVIDER NOTE - ATTENDING CONTRIBUTION TO CARE
pt is an 86 yo f who proudly shares her pmhx pshx and includes "im a bad patient" due to her non compliance.  she does not like being in hospitals.  she is sp bl mastectomy for breast ca refused chemo underwent xrt only is remote former smoker has hx of afib no anticoagulation, no daily asa no  meds presents to er today with 3 days of severe pain in upper back and r side of face arm and leg. she describes the pain as so bad she could not sit to use toilet, rather having to toilet standing up in shower, not able to lay back in bed needing to sleep in an chair at home with feet up.  the pain was so bad that today she called her pmd who told her he was going to call an ambulance, to which she remarked she would have refused.  on eval:  wd wn female awake alert no distress but must sit up right for comfort  heent nc at mmm perrla no facial asymmetry no motor weakness or speech changes  neck supple  cor irregular rapid hr with roselyn  chest cta kyphotic spine and on eval pt has hypesthesia along the r trap muscle and bony thoracic spine (region of t4) the pain radiates into r shoulder r axilla and chest. there is no rash no crepitance and no bruising pt denies injury  abd soft nt nd has multiple non painful hernias  ext pt has pain on rom rue, bl le edema but left is >>Right no calf pain  neuro: nihss=1 for r arm drift due to pain  plan rapid afib stroke like chief complaint of weakness ro cva hx of breast ca no chemo ro mets to brain or spine or hnp early shingles ro dissection acs.

## 2021-04-23 NOTE — ED PROVIDER NOTE - PROGRESS NOTE DETAILS
radiology called no ct evidence of acute stroke bleed and ct perfusion study  was "normal" Patient in a fib 110-120s. Case discussed with her cardiologist Dr. Hogan, state patient always refuses to take any medication for her A Fib. Patient currently declining to be started on any new medication however will send prescription for metoprolol in case patient changes her mind. Recommend follow up with neuro and cards. Reevaluated patient at bedside.  Patient feeling much improved.  Discussed the results of all diagnostic testing in ED and copies of all available reports given.   An opportunity to ask questions was provided.  Discussed the importance of prompt, close medical follow-up.  Patient will return with any changes, concerns or persistent/worsening symptoms.  Understanding of all instructions verbalized.

## 2021-04-23 NOTE — ED ADULT NURSE NOTE - OBJECTIVE STATEMENT
pt comes to ED c/o LEFT sided chest pain/neck/shoulder/back only, for past 2 days. when first occurred, pt felt pain from head to toe on entire RIGHT of body and feeling "paralyzed" on entire right side. pt states now it is subsiding but still present. pt has hx of afib. CM=afib, from 110-160. most typically 110-130. PA aware. pt is noncompliant with medications. states she does "not take any medications for her afib or any of her problems because I've lived this long with it and im fine"    IV placed, ekg completed, covid swabbed, bsfs 90, pt to have the ordered CT's. per PA Pt OK to go to CT with transport only.       pt independent from home, ambulates with a cane. takes care of autistic grandson.

## 2021-04-23 NOTE — ED PROVIDER NOTE - CARE PROVIDERS DIRECT ADDRESSES
,DirectAddress_Unknown,[email or web address]

## 2021-04-23 NOTE — ED PROVIDER NOTE - CARE PROVIDER_API CALL
Mele Benitez  NEUROLOGY  [address] [place] [address]  Phone: [phone]  Fax: [phone]  Follow Up Time: 1-3 Days    Rich Hogan (DO)  Cardiovascular Disease; Internal Medicine  [facility], [address] [place] [address], [place] [address]  Phone: [phone]  Fax: [phone]  Follow Up Time: 1-3 Days

## 2021-04-23 NOTE — ED PROVIDER NOTE - PATIENT PORTAL LINK FT
You can access the FollowMyHealth Patient Portal offered by [facility] by registering at the following website: [email or web address]. By joining [employer]’s FollowMyHealth portal, you will also be able to view your health information using other applications (apps) compatible with our system.

## 2021-04-23 NOTE — ED ADULT NURSE REASSESSMENT NOTE - NS ED NURSE REASSESS COMMENT FT1
Patient's cardiologist consulted via telephone by LEAH Daly. Plan to discharge patient with new Rx for metoprolol. Patient understands the importance of close follow up with her cardiologist. Pending discharge paperwork.

## 2021-04-23 NOTE — ED PROVIDER NOTE - CLINICAL SUMMARY MEDICAL DECISION MAKING FREE TEXT BOX
86 yo female with h/o A fib (not on any medication), breast cancer (in remission) presents to the ED c/o pain to right side of her body x 4 days. Patient states suddenly she started having pain to her right head, neck, chest, right arm and right leg at 2 am. Patient with severe pain in right arm with difficulty moving arm x 4 days. Patient taking nsaids with minimal improvement. Pain states she has difficulty moving right arm due to pain - denies weakness or paresthesias. Patient states she received Moderna vaccine 3 days after symptoms started. Patient spoke with her pmd who recommended coming ED immediately for evaluation. Denies fever, chill, dizziness, visual changes, sob, abd pain, N/V, UE/LE weakness or paresthesias. PE: as above. A/P: 86 yo female with h/o A fib (not on any medication), breast cancer (in remission) presents to the ED c/o pain to right side of her body x 4 days. Patient states suddenly she started having pain to her right head, neck, chest, right arm and right leg at 2 am. Patient with severe pain in right arm with difficulty moving arm x 4 days. Patient taking nsaids with minimal improvement. Pain states she has difficulty moving right arm due to pain - denies weakness or paresthesias. Patient states she received Moderna vaccine 3 days after symptoms started. Patient spoke with her pmd who recommended coming ED immediately for evaluation. Denies fever, chill, dizziness, visual changes, sob, abd pain, N/V, UE/LE weakness or paresthesias. PE: as above. A/P: ekg, labs, CT head/neck/chest, reassess.

## 2021-04-23 NOTE — ED PROVIDER NOTE - OBJECTIVE STATEMENT
84 yo female with h/o A fib (not on any medication), breast cancer (in remission) presents to the ED c/o pain to right side of her body x 4 days. Patient states suddenly she started having pain to her right head, neck, chest, right arm and right leg at 2 am. Patient with severe pain in right arm with difficulty moving arm x 4 days. Patient taking nsaids with minimal improvement. Pain states she has difficulty moving right arm due to pain - denies weakness or paresthesias. Patient states she received Moderna vaccine 3 days after symptoms started. Patient spoke with her pmd who recommended coming ED immediately for evaluation. Denies fever, chill, dizziness, visual changes, sob, abd pain, N/V, UE/LE weakness or paresthesias.  Patient also c/o "lump" under right axilla x several days.     pmd: Dr. Angelo Romero, cards: Dr. Randall

## 2021-05-26 NOTE — DISCHARGE NOTE ADULT - PATIENT PORTAL LINK FT
“You can access the FollowHealth Patient Portal, offered by [facility], by registering with the following website: [email or web address]” Adequate

## 2021-08-03 ENCOUNTER — EMERGENCY (EMERGENCY)
Facility: HOSPITAL | Age: 86
LOS: 1 days | Discharge: HOME | End: 2021-08-03
Attending: EMERGENCY MEDICINE | Admitting: EMERGENCY MEDICINE
Payer: COMMERCIAL

## 2021-08-03 VITALS
RESPIRATION RATE: 22 BRPM | HEART RATE: 116 BPM | OXYGEN SATURATION: 98 % | TEMPERATURE: 98 F | HEIGHT: 62 IN | SYSTOLIC BLOOD PRESSURE: 135 MMHG | WEIGHT: 182.1 LBS | DIASTOLIC BLOOD PRESSURE: 66 MMHG

## 2021-08-03 VITALS
HEART RATE: 107 BPM | SYSTOLIC BLOOD PRESSURE: 119 MMHG | RESPIRATION RATE: 19 BRPM | TEMPERATURE: 98 F | DIASTOLIC BLOOD PRESSURE: 82 MMHG | OXYGEN SATURATION: 97 %

## 2021-08-03 DIAGNOSIS — Z90.13 ACQUIRED ABSENCE OF BILATERAL BREASTS AND NIPPLES: Chronic | ICD-10-CM

## 2021-08-03 DIAGNOSIS — Z96.7 PRESENCE OF OTHER BONE AND TENDON IMPLANTS: Chronic | ICD-10-CM

## 2021-08-03 DIAGNOSIS — Z98.89 OTHER SPECIFIED POSTPROCEDURAL STATES: Chronic | ICD-10-CM

## 2021-08-03 DIAGNOSIS — Z98.890 OTHER SPECIFIED POSTPROCEDURAL STATES: Chronic | ICD-10-CM

## 2021-08-03 DIAGNOSIS — Z90.710 ACQUIRED ABSENCE OF BOTH CERVIX AND UTERUS: Chronic | ICD-10-CM

## 2021-08-03 LAB
ALBUMIN SERPL ELPH-MCNC: 3.4 G/DL — SIGNIFICANT CHANGE UP (ref 3.3–5)
ALP SERPL-CCNC: 52 U/L — SIGNIFICANT CHANGE UP (ref 40–120)
ALT FLD-CCNC: 26 U/L — SIGNIFICANT CHANGE UP (ref 12–78)
ANION GAP SERPL CALC-SCNC: 7 MMOL/L — SIGNIFICANT CHANGE UP (ref 5–17)
APPEARANCE UR: CLEAR — SIGNIFICANT CHANGE UP
APTT BLD: 33.3 SEC — SIGNIFICANT CHANGE UP (ref 27.5–35.5)
AST SERPL-CCNC: 24 U/L — SIGNIFICANT CHANGE UP (ref 15–37)
BASOPHILS # BLD AUTO: 0.05 K/UL — SIGNIFICANT CHANGE UP (ref 0–0.2)
BASOPHILS NFR BLD AUTO: 0.7 % — SIGNIFICANT CHANGE UP (ref 0–2)
BILIRUB SERPL-MCNC: 0.6 MG/DL — SIGNIFICANT CHANGE UP (ref 0.2–1.2)
BILIRUB UR-MCNC: NEGATIVE — SIGNIFICANT CHANGE UP
BUN SERPL-MCNC: 18 MG/DL — SIGNIFICANT CHANGE UP (ref 7–23)
CALCIUM SERPL-MCNC: 9 MG/DL — SIGNIFICANT CHANGE UP (ref 8.5–10.1)
CHLORIDE SERPL-SCNC: 105 MMOL/L — SIGNIFICANT CHANGE UP (ref 96–108)
CK MB CFR SERPL CALC: 2.5 NG/ML — SIGNIFICANT CHANGE UP (ref 0–3.6)
CO2 SERPL-SCNC: 29 MMOL/L — SIGNIFICANT CHANGE UP (ref 22–31)
COLOR SPEC: YELLOW — SIGNIFICANT CHANGE UP
CREAT SERPL-MCNC: 0.97 MG/DL — SIGNIFICANT CHANGE UP (ref 0.5–1.3)
D DIMER BLD IA.RAPID-MCNC: 350 NG/ML DDU — HIGH
DIFF PNL FLD: NEGATIVE — SIGNIFICANT CHANGE UP
EOSINOPHIL # BLD AUTO: 0.18 K/UL — SIGNIFICANT CHANGE UP (ref 0–0.5)
EOSINOPHIL NFR BLD AUTO: 2.7 % — SIGNIFICANT CHANGE UP (ref 0–6)
GLUCOSE SERPL-MCNC: 101 MG/DL — HIGH (ref 70–99)
GLUCOSE UR QL: NEGATIVE — SIGNIFICANT CHANGE UP
HCT VFR BLD CALC: 41.4 % — SIGNIFICANT CHANGE UP (ref 34.5–45)
HGB BLD-MCNC: 13.6 G/DL — SIGNIFICANT CHANGE UP (ref 11.5–15.5)
IMM GRANULOCYTES NFR BLD AUTO: 0.3 % — SIGNIFICANT CHANGE UP (ref 0–1.5)
INR BLD: 1.05 RATIO — SIGNIFICANT CHANGE UP (ref 0.88–1.16)
KETONES UR-MCNC: NEGATIVE — SIGNIFICANT CHANGE UP
LACTATE SERPL-SCNC: 1.8 MMOL/L — SIGNIFICANT CHANGE UP (ref 0.7–2)
LEUKOCYTE ESTERASE UR-ACNC: NEGATIVE — SIGNIFICANT CHANGE UP
LIDOCAIN IGE QN: 98 U/L — SIGNIFICANT CHANGE UP (ref 73–393)
LYMPHOCYTES # BLD AUTO: 1.83 K/UL — SIGNIFICANT CHANGE UP (ref 1–3.3)
LYMPHOCYTES # BLD AUTO: 27.1 % — SIGNIFICANT CHANGE UP (ref 13–44)
MAGNESIUM SERPL-MCNC: 1.9 MG/DL — SIGNIFICANT CHANGE UP (ref 1.6–2.6)
MCHC RBC-ENTMCNC: 32.7 PG — SIGNIFICANT CHANGE UP (ref 27–34)
MCHC RBC-ENTMCNC: 32.9 GM/DL — SIGNIFICANT CHANGE UP (ref 32–36)
MCV RBC AUTO: 99.5 FL — SIGNIFICANT CHANGE UP (ref 80–100)
MONOCYTES # BLD AUTO: 0.44 K/UL — SIGNIFICANT CHANGE UP (ref 0–0.9)
MONOCYTES NFR BLD AUTO: 6.5 % — SIGNIFICANT CHANGE UP (ref 2–14)
NEUTROPHILS # BLD AUTO: 4.24 K/UL — SIGNIFICANT CHANGE UP (ref 1.8–7.4)
NEUTROPHILS NFR BLD AUTO: 62.7 % — SIGNIFICANT CHANGE UP (ref 43–77)
NITRITE UR-MCNC: NEGATIVE — SIGNIFICANT CHANGE UP
NRBC # BLD: 0 /100 WBCS — SIGNIFICANT CHANGE UP (ref 0–0)
NT-PROBNP SERPL-SCNC: 2140 PG/ML — HIGH (ref 0–450)
PH UR: 7 — SIGNIFICANT CHANGE UP (ref 5–8)
PLATELET # BLD AUTO: 224 K/UL — SIGNIFICANT CHANGE UP (ref 150–400)
POTASSIUM SERPL-MCNC: 3.9 MMOL/L — SIGNIFICANT CHANGE UP (ref 3.5–5.3)
POTASSIUM SERPL-SCNC: 3.9 MMOL/L — SIGNIFICANT CHANGE UP (ref 3.5–5.3)
PROT SERPL-MCNC: 7.4 G/DL — SIGNIFICANT CHANGE UP (ref 6–8.3)
PROT UR-MCNC: NEGATIVE — SIGNIFICANT CHANGE UP
PROTHROM AB SERPL-ACNC: 12.3 SEC — SIGNIFICANT CHANGE UP (ref 10.6–13.6)
RBC # BLD: 4.16 M/UL — SIGNIFICANT CHANGE UP (ref 3.8–5.2)
RBC # FLD: 12.9 % — SIGNIFICANT CHANGE UP (ref 10.3–14.5)
SARS-COV-2 RNA SPEC QL NAA+PROBE: SIGNIFICANT CHANGE UP
SODIUM SERPL-SCNC: 141 MMOL/L — SIGNIFICANT CHANGE UP (ref 135–145)
SP GR SPEC: 1.01 — SIGNIFICANT CHANGE UP (ref 1.01–1.02)
TROPONIN I SERPL-MCNC: <.015 NG/ML — SIGNIFICANT CHANGE UP (ref 0.01–0.04)
TSH SERPL-MCNC: 3.58 UIU/ML — SIGNIFICANT CHANGE UP (ref 0.36–3.74)
UROBILINOGEN FLD QL: NEGATIVE — SIGNIFICANT CHANGE UP
WBC # BLD: 6.76 K/UL — SIGNIFICANT CHANGE UP (ref 3.8–10.5)
WBC # FLD AUTO: 6.76 K/UL — SIGNIFICANT CHANGE UP (ref 3.8–10.5)

## 2021-08-03 PROCEDURE — 83880 ASSAY OF NATRIURETIC PEPTIDE: CPT

## 2021-08-03 PROCEDURE — 83735 ASSAY OF MAGNESIUM: CPT

## 2021-08-03 PROCEDURE — 71045 X-RAY EXAM CHEST 1 VIEW: CPT

## 2021-08-03 PROCEDURE — 85379 FIBRIN DEGRADATION QUANT: CPT

## 2021-08-03 PROCEDURE — 84443 ASSAY THYROID STIM HORMONE: CPT

## 2021-08-03 PROCEDURE — 36415 COLL VENOUS BLD VENIPUNCTURE: CPT

## 2021-08-03 PROCEDURE — 87635 SARS-COV-2 COVID-19 AMP PRB: CPT

## 2021-08-03 PROCEDURE — 85730 THROMBOPLASTIN TIME PARTIAL: CPT

## 2021-08-03 PROCEDURE — 84484 ASSAY OF TROPONIN QUANT: CPT

## 2021-08-03 PROCEDURE — 99285 EMERGENCY DEPT VISIT HI MDM: CPT

## 2021-08-03 PROCEDURE — 85025 COMPLETE CBC W/AUTO DIFF WBC: CPT

## 2021-08-03 PROCEDURE — 93010 ELECTROCARDIOGRAM REPORT: CPT

## 2021-08-03 PROCEDURE — 83605 ASSAY OF LACTIC ACID: CPT

## 2021-08-03 PROCEDURE — 96374 THER/PROPH/DIAG INJ IV PUSH: CPT

## 2021-08-03 PROCEDURE — 82553 CREATINE MB FRACTION: CPT

## 2021-08-03 PROCEDURE — 99285 EMERGENCY DEPT VISIT HI MDM: CPT | Mod: 25

## 2021-08-03 PROCEDURE — 93005 ELECTROCARDIOGRAM TRACING: CPT

## 2021-08-03 PROCEDURE — 71045 X-RAY EXAM CHEST 1 VIEW: CPT | Mod: 26

## 2021-08-03 PROCEDURE — 81003 URINALYSIS AUTO W/O SCOPE: CPT

## 2021-08-03 PROCEDURE — 87040 BLOOD CULTURE FOR BACTERIA: CPT

## 2021-08-03 PROCEDURE — 87086 URINE CULTURE/COLONY COUNT: CPT

## 2021-08-03 PROCEDURE — 83690 ASSAY OF LIPASE: CPT

## 2021-08-03 PROCEDURE — 80053 COMPREHEN METABOLIC PANEL: CPT

## 2021-08-03 PROCEDURE — 85610 PROTHROMBIN TIME: CPT

## 2021-08-03 PROCEDURE — 96376 TX/PRO/DX INJ SAME DRUG ADON: CPT

## 2021-08-03 RX ORDER — FUROSEMIDE 40 MG
1 TABLET ORAL
Qty: 10 | Refills: 0
Start: 2021-08-03 | End: 2021-08-12

## 2021-08-03 RX ORDER — FUROSEMIDE 40 MG
40 TABLET ORAL ONCE
Refills: 0 | Status: COMPLETED | OUTPATIENT
Start: 2021-08-03 | End: 2021-08-03

## 2021-08-03 RX ORDER — METOPROLOL TARTRATE 50 MG
25 TABLET ORAL DAILY
Refills: 0 | Status: DISCONTINUED | OUTPATIENT
Start: 2021-08-03 | End: 2021-08-06

## 2021-08-03 RX ORDER — METOPROLOL TARTRATE 50 MG
1 TABLET ORAL
Qty: 30 | Refills: 0
Start: 2021-08-03 | End: 2021-09-01

## 2021-08-03 RX ORDER — POTASSIUM CHLORIDE 20 MEQ
1 PACKET (EA) ORAL
Qty: 10 | Refills: 0
Start: 2021-08-03 | End: 2021-08-12

## 2021-08-03 RX ORDER — SODIUM CHLORIDE 9 MG/ML
1000 INJECTION INTRAMUSCULAR; INTRAVENOUS; SUBCUTANEOUS ONCE
Refills: 0 | Status: DISCONTINUED | OUTPATIENT
Start: 2021-08-03 | End: 2021-08-03

## 2021-08-03 RX ADMIN — Medication 40 MILLIGRAM(S): at 12:25

## 2021-08-03 RX ADMIN — Medication 40 MILLIGRAM(S): at 16:54

## 2021-08-03 RX ADMIN — Medication 25 MILLIGRAM(S): at 15:57

## 2021-08-03 NOTE — ED PROVIDER NOTE - OBJECTIVE STATEMENT
86 year old female with PMH of a fib, breast cancer, c-difficle, diverticulitis, gallstones, glaucoma, PSVT, and s/p colon resection, presents with shortness of breath and fatigue. States 2 days ago she started feeling short of breath with a weight on her chest, and fatigue, shortness of breath worsens with standing up, has been taking Lasix 1 or 2 days per week, for her leg swelling. Additionally she has a decreased appetite over the last couple days, yesterday ate a hard boiled egg with tea, today had a glass of juice. Denies fever, cough, vomiting, diarrhea, rash, weight loss, or vision changes. 86 year old female with PMH of a fib, breast cancer, c-difficle, diverticulitis, gallstones, glaucoma, PSVT, and s/p colon resection, presents with shortness of breath and fatigue. States 2 days ago she started feeling short of breath with a weight on her chest, and fatigue, shortness of breath worsens with standing up, has been taking Lasix 1 or 2 days per week, for her leg swelling. Additionally she had a decreased appetite over the last couple days, yesterday ate a hard boiled egg with tea, today had a glass of juice. Denies fever, cough, vomiting, diarrhea, rash, weight loss, or vision changes.

## 2021-08-03 NOTE — ED PROVIDER NOTE - NSFOLLOWUPINSTRUCTIONS_ED_ALL_ED_FT
Follow up with your cardiologist this week. Take lasix, metoprolol and potassium as directed.        WHAT YOU NEED TO KNOW:    Heart failure is a condition that does not allow your heart to fill or pump properly. Not enough oxygen in your blood gets to your organs and tissues. Fluid may not move through your body properly. Fluid builds up and causes swelling and trouble breathing. This is known as congestive heart failure. Heart failure may start in the left or right ventricle. Heart failure is often caused by damage or injury to your heart. The damage may be caused by other heart problems, diabetes, or high blood pressure. The damage may have also been caused by an infection. Heart failure is a long-term condition that tends to get worse over time. It is important to manage your health to improve your quality of life.    Heart Failure         DISCHARGE INSTRUCTIONS:    Call your local emergency number (911 in the ) if:   •You have any of the following signs of a heart attack: ?Squeezing, pressure, or pain in your chest      ?You may also have any of the following: ?Discomfort or pain in your back, neck, jaw, stomach, or arm      ?Shortness of breath      ?Nausea or vomiting      ?Lightheadedness or a sudden cold sweat            Call your doctor if:   •Your heartbeat is fast, slow, or uneven all the time.      •You have symptoms of worsening heart failure: ?Shortness of breath at rest, at night, or that is getting worse in any way      ?Weight gain of 3 or more pounds (1.4 kg) in a day, or more than your healthcare provider says is okay      ?More swelling in your legs or ankles      ?Abdominal pain or swelling      ?More coughing      ?Loss of appetite      ?Feeling tired all the time      •You feel hopeless or depressed, or you have lost interest in things you used to enjoy.      •You often feel worried or afraid.      •You have questions or concerns about your condition or care.      Medicines:   •Medicines may be needed to help regulate your heart rhythm. You may also need medicine to lower your blood pressure, and to decrease extra fluids.      •Take your medicine as directed. Contact your healthcare provider if you think your medicine is not helping or if you have side effects. Tell him of her if you are allergic to any medicine. Keep a list of the medicines, vitamins, and herbs you take. Include the amounts, and when and why you take them. Bring the list or the pill bottles to follow-up visits. Carry your medicine list with you in case of an emergency.      Go to cardiac rehab if directed: Cardiac rehab is a program run by specialists who will help you safely strengthen your heart. The program includes exercise, relaxation, stress management, and heart-healthy nutrition.    Manage swelling from extra fluid:   •Elevate (raise) your legs above the level of your heart. This will help with fluid that builds up in your legs or ankles. Elevate your legs as often as possible during the day. Prop your legs on pillows or blankets to keep them elevated comfortably. Try not to stand for long periods of time during the day. Move around to keep your blood circulating.  Elevate Leg           •Limit sodium (salt). Ask how much sodium you can have each day. Your healthcare provider may give you a limit, such as 2,300 milligrams (mg) a day. Your provider or a dietitian can teach you how to read food labels for the number of mg in a food. He or she can also help you find ways to have less salt. For example, if you add salt to food as you cook, do not add more at the table.             •Drink liquids as directed. You may need to limit the amount of liquid you drink within 24 hours. Your healthcare provider will tell you how much liquid to have and which liquids are best for you. He or she may tell you to limit liquid to 1.5 to 2 liters in a day. He or she will also tell you how often to drink liquid throughout the day.      •Weigh yourself every morning. Use the same scale, in the same spot. Do this after you use the bathroom, but before you eat or drink. Wear the same type of clothing each time. Write down your weight and call your healthcare provider if you have a sudden weight gain. Swelling and weight gain are signs of fluid buildup.  Weight Checks BRENNA           Manage heart failure: Your quality of life may improve with treatment and the following:  •Do not smoke. Nicotine and other chemicals in cigarettes and cigars can cause lung and heart damage. Ask your healthcare provider for information if you currently smoke and need help to quit. E-cigarettes or smokeless tobacco still contain nicotine. Talk to your healthcare provider before you use these products.      •Do not drink alcohol or use illegal drugs. Alcohol and drugs can increase your risk for high blood pressure, diabetes, and coronary artery disease.      •Eat heart-healthy foods. Heart-healthy foods include fruits, vegetables, lean meat (such as beef, chicken, or pork), and low-fat dairy products. Fatty fish such as salmon and tuna are also heart healthy. Other heart-healthy foods include walnuts, whole-grain breads, beans, and cooked beans. Replace butter and margarine with heart-healthy oils such as olive oil or canola oil. Your provider or a dietitian can help you create heart-healthy meal plans.             •Manage any chronic health conditions you have. These include high blood pressure, diabetes, obesity, high cholesterol, metabolic syndrome, and COPD. You will have fewer symptoms if you manage these health conditions. Follow your healthcare provider's recommendations and follow up with him or her regularly.      •Maintain a healthy weight. Being overweight can increase your risk for high blood pressure, diabetes, and coronary artery disease. These conditions can make your symptoms worse. Ask your healthcare provider how much you should weigh. Ask him or her to help you create a weight loss plan if you are overweight.      •Stay active. Activity can help keep your symptoms from getting worse. Walking is a type of physical activity that helps maintain your strength and improve your mood. Physical activity also helps you manage your weight. Work with your healthcare provider to create an exercise plan that is right for you.    Family Walking for Exercise           •Get vaccines as directed. The flu and pneumonia can be severe for a person who has heart failure. Vaccines protect you from these infections. Get a flu shot every year as soon as it is recommended, usually in September or October. You may also need the pneumonia vaccine. Your healthcare provider can tell you if you need other vaccines, and when to get them.      Follow up with your doctor within 7 days or as directed: You may need to return for other tests. You may need home health care. A healthcare provider will monitor your vital signs, weight, and make sure your medicines are working. Write down your questions so you remember to ask them during your visits.    Join a support group: Heart failure can be difficult to manage. It may be helpful to talk with others who have heart failure. You may learn how to better manage your condition or get emotional support. For more information:   •[employer]  [address] [place] [id number]   Phone: 1-116.213.4084  Web Address: http://www.heart.org            © Copyright [employer] 2021           back to top                          © Copyright [employer] 2021

## 2021-08-03 NOTE — CONSULT NOTE ADULT - ASSESSMENT
87 yo F PMHx Permanent Atrial Fibrillation, PSVT, presenting with shortness of breath likely CHF exacerbation.     #SOB  - likely in the setting of CHF exacerbation, progression ~ 2 months   - last TTE on records from 2017 shows EF 45% Moderate global left ventricular dysfunction. Moderate to severe mitral regurgitation. Moderate aortic regurgitation. Mildly elevated pulmonary pressures. IVC is dilated at 2.3 cm and collapses with respiration.  -  s/p IV lasix 40 mg X 1     #Chronic Atrial Fibrillation  - not at AC, or rate control   - EKG shows A fib at 92 bpm  85 yo F PMHx Permanent Atrial Fibrillation, PSVT, presenting with shortness of breath likely CHF exacerbation.     #SOB  - likely in the setting of CHF exacerbation, progression ~ 2 months   - last TTE on records from 2017 shows EF 45% Moderate global left ventricular dysfunction. Moderate to severe mitral regurgitation. Moderate aortic regurgitation. Mildly elevated pulmonary pressures. IVC is dilated at 2.3 cm and collapses with respiration.  -  s/p IV lasix 40 mg X 1 with good UO  - start PO lasix 40 mg daily   - dc home     #Chronic Atrial Fibrillation  - not at AC, or rate control   - EKG shows A fib at 92 bpm  87 yo F PMHx Chronic Atrial Fibrillation presenting with shortness of breath likely CHF exacerbation.     #SOB  - likely in the setting of CHF exacerbation, progression ~ 2 months  - elevated BNP    - last TTE on records from 2017 shows mild LV dysfunction   -  s/p IV lasix 40 mg X 1 with good UO  - start PO lasix 40 mg daily   - would ideally admit to start IV lasix however patient has autistic grandchild at home who is dependent on her for care and insists to leave   - will dc, however with close follow up outpatient cards     #Chronic Atrial Fibrillation  - not at AC, or rate control   - EKG shows A fib at 92 bpm   - tachy in the 110's  - start Toprol 25 mg daily for rate control   - CHADsVASc: 4; would ideally start anticoagulation, however patient has previously self dc'ed warfarin > 20 years ago, patient noted to be noncompliant with medications as per previously documented records   - dc home once stable    85 yo F PMHx Chronic Atrial Fibrillation presenting with shortness of breath likely CHF exacerbation.     #SOB  - likely in the setting of CHF exacerbation, progression ~ 2 months  - elevated BNP    - last TTE on records from 2017 shows mild LV dysfunction   -  s/p IV lasix 40 mg X 1 with good UO  - start PO lasix 40 mg daily   - would ideally admit to start IV lasix however patient has autistic grandchild at home who is dependent on her for care and insists to leave   - will dc, however with close follow up outpatient cards     #Chronic Atrial Fibrillation  - not at AC, or rate control   - EKG shows A fib at 92 bpm   - tachy in the 110's  - start Toprol 25 mg daily for rate control   - CHADsVASc: 4; would ideally start anticoagulation, however patient has previously self dc'ed warfarin > 20 years ago, patient noted to be noncompliant with medications as per previously documented records   - dc home once HR improves

## 2021-08-03 NOTE — CONSULT NOTE ADULT - SUBJECTIVE AND OBJECTIVE BOX
Patient is a 86y old  Female who presents with a chief complaint of shortness of breath     HPI:  85 yo F PMHx Permanent Atrial Fibrillation, PSVT presenting with shortness of breath and elevated blood pressure. Patient admits to shortness of breath X 2 days, she is typically mobile and independent, requires her walker occasionally. She admits to generalized chest pressure to which she thought was indigestion. She also noted worsening LE edema X 2 months. (she cannot fit into her shoes and wears her son's 10 1/2 sized slippers). Also endorses fatigued, low appetite, dosing off in the middle of the day. She went to her pharmacy yesterday and the pharmacist noted that she appeared unwell. She took her BP which was 168/90s at the time. Pharmacist offered to call for an ambulance to which she refused and went home. She admits that her BP is typically low to normal in the 110s/80s.   For the past two months, she has been sleeping in her recliner at home due to not being able to sleep flat, which causes chest pressure and shoulder pain.   She takes lasix occasionally prn for LE swelling.     ED Vitals:   135/66  T 98.4 O2 98% RA   Labs:   BNP  Lactate:  D dimer:   Troponin:    PAST MEDICAL & SURGICAL HISTORY:  S/P colon resection    Diverticulitis    Glaucoma    PSVT (paroxysmal supraventricular tachycardia)    Atrial fibrillation    Breast cancer    Gall stones    C. difficile diarrhea    S/P colon resection  march 11 2014    Status post appendectomy    H/O mastectomy, bilateral    H/O hernia repair    S/P ORIF (open reduction internal fixation) fracture  L lower extremity    H/O: hysterectomy              ECHO  FINDINGS:  TTE     MEDICATIONS  (STANDING):    MEDICATIONS  (PRN):      FAMILY HISTORY:  Family history of heart valve abnormality      Denies Family history of CAD or early MI      Constitutional: denies fever, chills  HEENT: denies blurry vision, difficulty hearing  Respiratory: denies SOB, RANGEL, cough  Cardiovascular: denies CP, palpitations, orthopnea, PND, LE edema  Gastrointestinal: denies nausea, vomiting, abdominal pain  Genitourinary: denies urinary changes  Skin: Denies rashes, itching  Neurologic: denies headache, weakness, dizziness  Hematology/Oncology: denies bleeding, easy bruising  ROS negative except as noted above      SOCIAL HISTORY:    No tobacco, Alcohol or Ddrug use    Vital Signs Last 24 Hrs  T(C): 36.9 (03 Aug 2021 11:06), Max: 36.9 (03 Aug 2021 11:06)  T(F): 98.4 (03 Aug 2021 11:06), Max: 98.4 (03 Aug 2021 11:06)  HR: 116 (03 Aug 2021 11:06) (116 - 116)  BP: 135/66 (03 Aug 2021 11:06) (135/66 - 135/66)  BP(mean): --  RR: 22 (03 Aug 2021 11:06) (22 - 22)  SpO2: 98% (03 Aug 2021 11:06) (98% - 98%)    Physical Exam:  General: Well developed, well nourished, NAD  HEENT: NCAT, PERRLA, EOMI bl, moist mucous membranes   Neck: Supple, nontender, no mass  Neurology: A&Ox3, nonfocal, sensation intact   Respiratory: CTA B/L, No W/R/R  CV: RRR, +S1/S2, no murmurs, rubs or gallops  Abdominal: Soft, NT, ND +BSx4, no palpable masses  Extremities: No C/C/E, + peripheral pulses  MSK: Normal ROM, no joint erythema or warmth, no joint swelling   Heme: No obvious ecchymosis or petechiae   Skin: warm, dry, normal color      ECG:    I&O's Detail      LABS:                        13.6   6.76  )-----------( 224      ( 03 Aug 2021 12:26 )             41.4                   I&O's Summary    BNP  RADIOLOGY & ADDITIONAL STUDIES: Patient is a 86y old  Female who presents with a chief complaint of shortness of breath     HPI:  85 yo F PMHx Permanent Atrial Fibrillation, PSVT presenting with shortness of breath and elevated blood pressure. Patient admits to shortness of breath X 2 days, she is typically mobile and independent, requires her walker occasionally. She admits to generalized chest pressure to which she thought was indigestion. She also noted worsening LE edema X 2 months. (she cannot fit into her shoes and wears her son's 10 1/2 sized slippers). Also endorses fatigued, low appetite, dosing off in the middle of the day. She went to her pharmacy yesterday and the pharmacist noted that she appeared unwell. She took her BP which was 168/90s at the time. Pharmacist offered to call for an ambulance to which she refused and went home. She admits that her BP is typically low to normal in the 110s/80s.   For the past two months, she has been sleeping in her recliner at home due to not being able to sleep flat, which causes chest pressure and shoulder pain.   She takes lasix occasionally prn for LE swelling.     Last saw Cardiologist 8-9 months ago; Dr. Morales, last echo > 1 year; due to see him 1 week     Takes "water pill" last took few days ago. (lowest dose)     ED Vitals:   135/66  T 98.4 O2 98% RA   Labs:   BNP 2140   Lactate:  D dimer:   Troponin: < 0.015     PAST MEDICAL & SURGICAL HISTORY:  S/P colon resection    Diverticulitis    Glaucoma    PSVT (paroxysmal supraventricular tachycardia)    Atrial fibrillation    Breast cancer    Gall stones    C. difficile diarrhea    S/P colon resection  march 11 2014    Status post appendectomy    H/O mastectomy, bilateral    H/O hernia repair    S/P ORIF (open reduction internal fixation) fracture  L lower extremity    H/O: hysterectomy              ECHO  FINDINGS:  TTE     MEDICATIONS  (STANDING):    MEDICATIONS  (PRN):      FAMILY HISTORY:  Family history of heart valve abnormality      Denies Family history of CAD or early MI      Constitutional: denies fever, chills  HEENT: denies blurry vision, difficulty hearing  Respiratory: + SOB, + RANGEL, denies cough  Cardiovascular: + CP, palpitations, + orthopnea, PND, + LE edema  Gastrointestinal: denies nausea, vomiting, abdominal pain  Genitourinary: denies urinary changes  Skin: Denies rashes, itching  Neurologic: + weakness, +fatigue denies headache, dizziness  ROS negative except as noted above      SOCIAL HISTORY:    Former smoker (last smoked > 60 years ago)  ETOH occasional  No drug use     Vital Signs Last 24 Hrs  T(C): 36.9 (03 Aug 2021 11:06), Max: 36.9 (03 Aug 2021 11:06)  T(F): 98.4 (03 Aug 2021 11:06), Max: 98.4 (03 Aug 2021 11:06)  HR: 116 (03 Aug 2021 11:06) (116 - 116)  BP: 135/66 (03 Aug 2021 11:06) (135/66 - 135/66)  BP(mean): --  RR: 22 (03 Aug 2021 11:06) (22 - 22)  SpO2: 98% (03 Aug 2021 11:06) (98% - 98%)    Physical Exam:  General: Well developed, well nourished, NAD  HEENT: moist mucous membranes   Neck: Supple, nontender, no mass, JVD  Neurology: A&Ox3   Respiratory: CTA B/L, No W/R/R  CV: + irregular rhythm, +S1/S2, no murmurs, rubs or gallops  Abdominal: Soft, NT, ND +BSx4, no palpable masses  Extremities: 1+ nonpitting edema   Skin: warm, dry, normal color      ECG: Atrial Fibrillation @ 92 bpm     LABS:                        13.6   6.76  )-----------( 224      ( 03 Aug 2021 12:26 )             41.4         BNP: 2140   Troponin: negative   RADIOLOGY & ADDITIONAL STUDIES:  CXR: + pulmonary findings suggestive of congestion  Patient is a 86y old  Female who presents with a chief complaint of shortness of breath     HPI:  85 yo F PMHx Permanent Atrial Fibrillation, PSVT presenting with shortness of breath and elevated blood pressure. Patient admits to shortness of breath X 2 days, she is typically mobile and independent, requires her walker occasionally. She admits to generalized chest pressure to which she thought was indigestion. She also noted worsening LE edema X 2 months. (she cannot fit into her shoes and wears her son's 10 1/2 sized slippers). Also endorses fatigued, low appetite, dosing off in the middle of the day. She went to her pharmacy yesterday and the pharmacist noted that she appeared unwell. She took her BP which was 168/90s at the time. Pharmacist offered to call for an ambulance to which she refused and went home. She admits that her BP is typically low to normal in the 110s/80s.   For the past two months, she has been sleeping in her recliner at home due to not being able to sleep flat, which causes chest pressure and shoulder pain.   She takes lasix occasionally prn for LE swelling.     Last saw Cardiologist 8-9 months ago; Dr. Morales, last echo > 1 year; due to see him 1 week     Takes "water pill" last took few days ago. (lowest dose)     ED Vitals:   135/66  T 98.4 O2 98% RA   Labs:   BNP 2140   Lactate:  D dimer:   Troponin: < 0.015     PAST MEDICAL & SURGICAL HISTORY:  S/P colon resection    Diverticulitis    Glaucoma    PSVT (paroxysmal supraventricular tachycardia)    Atrial fibrillation    Breast cancer    Gall stones    C. difficile diarrhea    S/P colon resection  march 11 2014    Status post appendectomy    H/O mastectomy, bilateral    H/O hernia repair    S/P ORIF (open reduction internal fixation) fracture  L lower extremity    H/O: hysterectomy              ECHO  FINDINGS:  TTE     MEDICATIONS  (STANDING):    MEDICATIONS  (PRN):      FAMILY HISTORY:  Family history of heart valve abnormality      Denies Family history of CAD or early MI      Constitutional: denies fever, chills  HEENT: denies blurry vision, difficulty hearing  Respiratory: + SOB, + RANGEL, denies cough  Cardiovascular: + CP, palpitations, + orthopnea, + LE edema  Gastrointestinal: denies nausea, vomiting, abdominal pain  Genitourinary: denies urinary changes  Skin: Denies rashes, itching  Neurologic: + weakness, +fatigue denies headache, dizziness  ROS negative except as noted above      SOCIAL HISTORY:    Former smoker (last smoked > 60 years ago)  ETOH occasional  No drug use     Vital Signs Last 24 Hrs  T(C): 36.9 (03 Aug 2021 11:06), Max: 36.9 (03 Aug 2021 11:06)  T(F): 98.4 (03 Aug 2021 11:06), Max: 98.4 (03 Aug 2021 11:06)  HR: 116 (03 Aug 2021 11:06) (116 - 116)  BP: 135/66 (03 Aug 2021 11:06) (135/66 - 135/66)  BP(mean): --  RR: 22 (03 Aug 2021 11:06) (22 - 22)  SpO2: 98% (03 Aug 2021 11:06) (98% - 98%)    Physical Exam:  General: Well developed, well nourished, NAD  HEENT: moist mucous membranes   Neck: Supple, nontender, no mass, no JVD  Neurology: A&Ox3   Respiratory: CTA B/L, No W/R/R  CV: + irregular rhythm, +S1/S2, no murmurs, rubs or gallops, no JVD, +peripheral pulses   Abdominal: Soft, NT, ND +BSx4, no palpable masses  Extremities: 1+ nonpitting edema   Skin: warm, dry, normal color      ECG: Atrial Fibrillation @ 92 bpm     LABS:                        13.6   6.76  )-----------( 224      ( 03 Aug 2021 12:26 )             41.4         BNP: 2140   Troponin: negative   RADIOLOGY & ADDITIONAL STUDIES:  CXR: + pulmonary findings suggestive of congestion  Patient is a 86y old  Female who presents with a chief complaint of shortness of breath     CHARTING IN PROGRESS     HPI:  85 yo F PMHx Permanent Atrial Fibrillation, PSVT presenting with shortness of breath and elevated blood pressure. Patient admits to shortness of breath X 2 days, she is typically mobile and independent, requires her walker occasionally. She admits to generalized chest pressure to which she thought was indigestion. She also noted worsening LE edema X 2 months. (she cannot fit into her shoes and wears her son's 10 1/2 sized slippers). Also endorses fatigued, low appetite, dosing off in the middle of the day. She went to her pharmacy yesterday and the pharmacist noted that she appeared unwell. She took her BP which was 168/90s at the time. Pharmacist offered to call for an ambulance to which she refused and went home. She admits that her BP is typically low to normal in the 110s/80s.   For the past two months, she has been sleeping in her recliner at home due to not being able to sleep flat, which causes chest pressure and shoulder pain.   She takes lasix occasionally prn for LE swelling.     Last saw Cardiologist 8-9 months ago; Dr. Morales, last echo > 1 year; due to see him 1 week     Takes "water pill" last took few days ago. (lowest dose)     ED Vitals:   135/66  T 98.4 O2 98% RA   Labs:   BNP 2140   Lactate:  D dimer:   Troponin: < 0.015     PAST MEDICAL & SURGICAL HISTORY:  S/P colon resection    Diverticulitis    Glaucoma    PSVT (paroxysmal supraventricular tachycardia)    Atrial fibrillation    Breast cancer    Gall stones    C. difficile diarrhea    S/P colon resection  march 11 2014    Status post appendectomy    H/O mastectomy, bilateral    H/O hernia repair    S/P ORIF (open reduction internal fixation) fracture  L lower extremity    H/O: hysterectomy              ECHO  FINDINGS:  TTE     MEDICATIONS  (STANDING):    MEDICATIONS  (PRN):      FAMILY HISTORY:  Family history of heart valve abnormality      Denies Family history of CAD or early MI      Constitutional: denies fever, chills  HEENT: denies blurry vision, difficulty hearing  Respiratory: + SOB, + RANGEL, denies cough  Cardiovascular: + CP, palpitations, + orthopnea, + LE edema  Gastrointestinal: denies nausea, vomiting, abdominal pain  Genitourinary: denies urinary changes  Skin: Denies rashes, itching  Neurologic: + weakness, +fatigue denies headache, dizziness  ROS negative except as noted above      SOCIAL HISTORY:    Former smoker (last smoked > 60 years ago)  ETOH occasional  No drug use     Vital Signs Last 24 Hrs  T(C): 36.9 (03 Aug 2021 11:06), Max: 36.9 (03 Aug 2021 11:06)  T(F): 98.4 (03 Aug 2021 11:06), Max: 98.4 (03 Aug 2021 11:06)  HR: 116 (03 Aug 2021 11:06) (116 - 116)  BP: 135/66 (03 Aug 2021 11:06) (135/66 - 135/66)  BP(mean): --  RR: 22 (03 Aug 2021 11:06) (22 - 22)  SpO2: 98% (03 Aug 2021 11:06) (98% - 98%)    Physical Exam:  General: Well developed, well nourished, NAD  HEENT: moist mucous membranes   Neck: Supple, nontender, no mass, no JVD  Neurology: A&Ox3   Respiratory: CTA B/L, No W/R/R  CV: + irregular rhythm, +S1/S2, no murmurs, rubs or gallops, no JVD, +peripheral pulses   Abdominal: Soft, NT, ND +BSx4, no palpable masses  Extremities: 1+ nonpitting edema   Skin: warm, dry, normal color      ECG: Atrial Fibrillation @ 92 bpm     LABS:                        13.6   6.76  )-----------( 224      ( 03 Aug 2021 12:26 )             41.4         BNP: 2140   Troponin: negative   RADIOLOGY & ADDITIONAL STUDIES:  CXR: + pulmonary findings suggestive of congestion  Patient is a 86y old  Female who presents with a chief complaint of shortness of breath     CHARTING IN PROGRESS     HPI:  87 yo F PMHx Permanent Atrial Fibrillation, PSVT presenting with shortness of breath and elevated blood pressure. Patient admits to shortness of breath X 2 days, she is typically mobile and independent, requires her walker occasionally. She admits to generalized chest pressure to which she thought was indigestion. She also noted worsening LE edema X 2 months. (she cannot fit into her shoes and wears her son's 10 1/2 sized slippers). Also endorses fatigued, low appetite, dosing off in the middle of the day. She went to her pharmacy yesterday and the pharmacist noted that she appeared unwell. She took her BP which was 168/90s at the time. Pharmacist offered to call for an ambulance to which she refused and went home. She admits that her BP is typically low to normal in the 110s/80s.   For the past two months, she has been sleeping in her recliner at home due to not being able to sleep flat, which causes chest pressure and shoulder pain.   She takes lasix occasionally prn for LE swelling.     Last saw Cardiologist 8-9 months ago; Dr. Morales, last echo > 1 year; due to see him 1 week     Takes "water pill" last took few days ago. (lowest dose)     ED Vitals:   135/66  T 98.4 O2 98% RA   Labs:   BNP 2140   Lactate:  D dimer:   Troponin: < 0.015     PAST MEDICAL & SURGICAL HISTORY:  S/P colon resection    Diverticulitis    Glaucoma    PSVT (paroxysmal supraventricular tachycardia)    Atrial fibrillation    Breast cancer    Gall stones    C. difficile diarrhea    S/P colon resection  march 11 2014    Status post appendectomy    H/O mastectomy, bilateral    H/O hernia repair    S/P ORIF (open reduction internal fixation) fracture  L lower extremity    H/O: hysterectomy      MEDICATIONS  (STANDING):    MEDICATIONS  (PRN):      FAMILY HISTORY:  Family history of heart valve abnormality      Denies Family history of CAD or early MI      Constitutional: denies fever, chills  HEENT: denies blurry vision, difficulty hearing  Respiratory: + SOB, + RANGEL, denies cough  Cardiovascular: + CP, palpitations, + orthopnea, + LE edema  Gastrointestinal: denies nausea, vomiting, abdominal pain  Genitourinary: denies urinary changes  Skin: Denies rashes, itching  Neurologic: + weakness, +fatigue denies headache, dizziness  ROS negative except as noted above      SOCIAL HISTORY:    Former smoker (last smoked > 60 years ago)  ETOH occasional  No drug use     Vital Signs Last 24 Hrs  T(C): 36.9 (03 Aug 2021 11:06), Max: 36.9 (03 Aug 2021 11:06)  T(F): 98.4 (03 Aug 2021 11:06), Max: 98.4 (03 Aug 2021 11:06)  HR: 116 (03 Aug 2021 11:06) (116 - 116)  BP: 135/66 (03 Aug 2021 11:06) (135/66 - 135/66)  BP(mean): --  RR: 22 (03 Aug 2021 11:06) (22 - 22)  SpO2: 98% (03 Aug 2021 11:06) (98% - 98%)    Physical Exam:  General: Well developed, well nourished, NAD  HEENT: moist mucous membranes   Neck: Supple, nontender, no mass, no JVD  Neurology: A&Ox3   Respiratory: CTA B/L, No W/R/R  CV: + irregular rhythm, +S1/S2, + murmurs, no rubs or gallops, no JVD, +peripheral pulses, no carotid bruit   Abdominal: Soft, NT, ND +BSx4, no palpable masses  Extremities: 1+ nonpitting edema   Skin: warm, dry, normal color      ECG: Atrial Fibrillation @ 92 bpm     LABS:                        13.6   6.76  )-----------( 224      ( 03 Aug 2021 12:26 )             41.4         BNP: 2140   Troponin: negative   RADIOLOGY & ADDITIONAL STUDIES:  CXR: + pulmonary findings suggestive of congestion  Patient is a 86y old  Female who presents with a chief complaint of shortness of breath     HPI:  85 yo F PMHx Permanent Atrial Fibrillation, PSVT presenting with shortness of breath and elevated blood pressure. Patient admits to shortness of breath X 2 days, she is typically mobile and independent, requires her walker occasionally. She admits to generalized chest pressure to which she thought was indigestion. She also noted worsening LE edema X 2 months. (she cannot fit into her shoes and wears her son's 10 1/2 sized slippers). Also endorses fatigued, low appetite, dosing off in the middle of the day. She went to her pharmacy yesterday and the pharmacist noted that she appeared unwell. She took her BP which was 168/90s at the time. Pharmacist offered to call for an ambulance to which she refused and went home. She admits that her BP is typically low to normal in the 110s/80s.   For the past two months, she has been sleeping in her recliner at home due to not being able to sleep flat, which causes chest pressure and shoulder pain.   She takes lasix occasionally prn for LE swelling.     Last saw Cardiologist 8-9 months ago; Dr. Morales, last echo > 1 year; due to see him 1 week     Takes "water pill" last took few days ago. (lowest dose)     ED Vitals:   135/66  T 98.4 O2 98% RA   Labs:   BNP 2140   Lactate:  D dimer:   Troponin: < 0.015     PAST MEDICAL & SURGICAL HISTORY:  S/P colon resection    Diverticulitis    Glaucoma    PSVT (paroxysmal supraventricular tachycardia)    Atrial fibrillation    Breast cancer    Gall stones    C. difficile diarrhea    S/P colon resection  march 11 2014    Status post appendectomy    H/O mastectomy, bilateral    H/O hernia repair    S/P ORIF (open reduction internal fixation) fracture  L lower extremity    H/O: hysterectomy      MEDICATIONS  (STANDING):    MEDICATIONS  (PRN):      FAMILY HISTORY:  Family history of heart valve abnormality      Denies Family history of CAD or early MI      Constitutional: denies fever, chills  HEENT: denies blurry vision, difficulty hearing  Respiratory: + SOB, + RANGEL, denies cough  Cardiovascular: + CP, palpitations, + orthopnea, + LE edema  Gastrointestinal: denies nausea, vomiting, abdominal pain  Genitourinary: denies urinary changes  Skin: Denies rashes, itching  Neurologic: + weakness, +fatigue denies headache, dizziness  ROS negative except as noted above      SOCIAL HISTORY:    Former smoker (last smoked > 60 years ago)  ETOH occasional  No drug use     Vital Signs Last 24 Hrs  T(C): 36.9 (03 Aug 2021 11:06), Max: 36.9 (03 Aug 2021 11:06)  T(F): 98.4 (03 Aug 2021 11:06), Max: 98.4 (03 Aug 2021 11:06)  HR: 116 (03 Aug 2021 11:06) (116 - 116)  BP: 135/66 (03 Aug 2021 11:06) (135/66 - 135/66)  BP(mean): --  RR: 22 (03 Aug 2021 11:06) (22 - 22)  SpO2: 98% (03 Aug 2021 11:06) (98% - 98%)    Physical Exam:  General: Well developed, well nourished, NAD  HEENT: moist mucous membranes   Neck: Supple, nontender, no mass, no JVD  Neurology: A&Ox3   Respiratory: CTA B/L, No W/R/R  CV: + irregular rhythm, +S1/S2, + murmurs, no rubs or gallops, no JVD, +peripheral pulses, no carotid bruit   Abdominal: Soft, NT, ND +BSx4, no palpable masses  Extremities: 1+ nonpitting edema   Skin: warm, dry, normal color      ECG: Atrial Fibrillation @ 92 bpm     LABS:                        13.6   6.76  )-----------( 224      ( 03 Aug 2021 12:26 )             41.4         BNP: 2140   Troponin: negative   RADIOLOGY & ADDITIONAL STUDIES:  CXR: + pulmonary findings suggestive of congestion  Patient is a 86y old  Female who presents with a chief complaint of shortness of breath     HPI:  87 yo F PMHx Permanent Atrial Fibrillation, PSVT presenting with shortness of breath and elevated blood pressure. Patient admits to shortness of breath X 2 days, she is typically mobile and independent, requires her walker occasionally. She admits to generalized chest pressure to which she thought was indigestion. She also noted worsening LE edema X 2 months. (she cannot fit into her shoes and wears her son's 10 1/2 sized slippers). Also endorses fatigued, low appetite, dosing off in the middle of the day. She went to her pharmacy yesterday and the pharmacist noted that she appeared unwell. She took her BP which was 168/90s at the time. Pharmacist offered to call for an ambulance to which she refused and went home. She admits that her BP is typically low to normal in the 110s/80s.   For the past two months, she has been sleeping in her recliner at home due to not being able to sleep flat, which causes chest pressure and shoulder pain.   She takes lasix occasionally prn for LE swelling.     Last saw Cardiologist 8-9 months ago; Dr. Morales, last echo > 1 year; due to see him 1 week     Takes "water pill" last took few days ago. (lowest dose)     ED Vitals:   135/66  T 98.4 O2 98% RA   Labs:   BNP 2140   Lactate:  D dimer:   Troponin: < 0.015     PAST MEDICAL & SURGICAL HISTORY:  S/P colon resection    Diverticulitis    Glaucoma    PSVT (paroxysmal supraventricular tachycardia)    Atrial fibrillation    Breast cancer    Gall stones    C. difficile diarrhea    S/P colon resection  march 11 2014    Status post appendectomy    H/O mastectomy, bilateral    H/O hernia repair    S/P ORIF (open reduction internal fixation) fracture  L lower extremity    H/O: hysterectomy      MEDICATIONS  (STANDING):    MEDICATIONS  (PRN):      FAMILY HISTORY:  Family history of heart valve abnormality      Denies Family history of CAD or early MI      Constitutional: denies fever, chills  HEENT: denies blurry vision, difficulty hearing  Respiratory: + SOB, + RANGEL, denies cough  Cardiovascular: + CP, palpitations, + orthopnea, + LE edema  Gastrointestinal: denies nausea, vomiting, abdominal pain  Genitourinary: denies urinary changes  Skin: Denies rashes, itching  Neurologic: + weakness, +fatigue denies headache, dizziness  ROS negative except as noted above      SOCIAL HISTORY:    Former smoker (last smoked > 60 years ago)  ETOH occasional  No drug use     Vital Signs Last 24 Hrs  T(C): 36.9 (03 Aug 2021 11:06), Max: 36.9 (03 Aug 2021 11:06)  T(F): 98.4 (03 Aug 2021 11:06), Max: 98.4 (03 Aug 2021 11:06)  HR: 116 (03 Aug 2021 11:06) (116 - 116)  BP: 135/66 (03 Aug 2021 11:06) (135/66 - 135/66)  BP(mean): --  RR: 22 (03 Aug 2021 11:06) (22 - 22)  SpO2: 98% (03 Aug 2021 11:06) (98% - 98%)    Physical Exam:  General: Well developed, well nourished, NAD  HEENT: moist mucous membranes   Neck: Supple, nontender, no mass, no JVD  Neurology: A&Ox3   Respiratory: CTA B/L, No W/R/R  CV: + irregular rhythm, +S1/S2, + murmurs, no rubs or gallops, no JVD, +peripheral pulses, no carotid bruit   Abdominal: Soft, NT, ND +BSx4, no palpable masses  Extremities: 1+ nonpitting edema   Skin: warm, dry, normal color      ECG: Atrial Fibrillation @ 92 bpm     LABS:                        13.6   6.76  )-----------( 224      ( 03 Aug 2021 12:26 )             41.4         BNP: 2140   Troponin: negative   RADIOLOGY & ADDITIONAL STUDIES:  CXR: clear lungs, enlarged heart

## 2021-08-03 NOTE — CONSULT NOTE ADULT - ATTENDING COMMENTS
Seen/examined. agree with above.  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

## 2021-08-03 NOTE — ED ADULT TRIAGE NOTE - BP NONINVASIVE DIASTOLIC (MM HG)
Subjective:   History of Present Illness:  Ian Meyer is a 18 y.o. male patient new to [facility] who presents today for routine sports physical:    Acne vulgaris  Chronic, uncontrolled, but improving.  Patient states he sometimes forgets to use the topical retinoid cream but, when he does so consistently, he is noticing great improvement.      Sports Physical Information:   (adapted from AAP Pre-participation Physical Form 2010 ed)   General:     - Patient is trying out for: [employer] & cross country     - Patient has never been denied or restricted from sports participation by a doctor (or other health provider) for any reason.     - Known medical diagnoses:     -- Acute conditions (including but not limited to Mono, rashes, skin infections [MRSA, herpes]):     -- Chronic conditions: Acne    -- Missing organs (eye, tonsils, kidney, testicle, spleen, gallbladder, appendix): NONE    -- Hernias: NONE    -- Eating disorders: NONE    -- Anxiety/Depression: NONE    -- Bleeding disorders: NONE    -- Previous infections: NONE    -- Vision/hearing difficulties: NONE     - Medications (including inhalers, OTC meds, supplements): Retin-A     - Past hospitalizations/surgeries:    -- Hospitalizations: Dehydration (8yo)    -- Surgeries: NONE      Heart Health:     - Signs/symptoms of heart disease during or after exercise:    -- Generalized weakness/fatigue: NONE    -- Passing out: NONE    -- Palpitations, Chest pain/pressure/tightness: NONE    -- Dyspnea, wheezing, chest congestion: NONE    -- Vomiting, Muscle cramps: NONE     - Previous diagnostic workup (EKG, Echocardiogram, Stress test): NONE     - Previous or current diagnosis of heart disease (including CAD, HTN, HLD, heart murmur, heart infections, Kawasaki disease, arrhythmias): NONE     - Family medical history of early heart disease, CAD, MI, enlarged heart: NONE     - Family medical history of arrhythmias, sudden  death (arlet if <51yo, cardiac or otherwise), pacemaker,  implanted cardiac defibrillator: NONE     Pulmonary Health:     - Signs/symptoms of pulmonary disease during or after exercise:    -- Cough: NONE    -- Wheezing, dyspnea, tachypnea: NONE    -- Lightheadness, dizziness: NONE    -- Cyanotic skin changes: NONE     - Previous diagnostic workup (PFTs, Spirometry): NONE     - Previous or current diagnosis of pulmonary disease (including but not limited to asthma, COPD [Emphysema, Chronic Bronchitis], Cystic Fibrosis): NONE     - Family medical history of pulmonary disease (see list as above): NONE      Neurological Health:     - Signs/symptoms of neurological disease during or after exercise:    -- Passing out, HA with exercise: NONE    -- Uncontrolled limb movements, tongue biting, bowel or bladder incontinence, confusion: NONE    -- Paresthesias, numbness, paralysis: NONE     - Previous or current diagnosis of neurological disease (seizure, head injuries, concussion, hemiplegia, paraplegia, paralysis): NONE      Musculoskeletal Health:     - Current pains in muscles, bones, joints, tendons/ligaments, swelling/warmth/erythema: NONE     - Recent injuries, traumas, accidents (bikes, MVA, etc), falls: NONE     - MSK history:      -- Diagnostic imaging (XRays, MRIs, CTs): NONE    -- Fractures (including type [simple, complex, avulsion, stress, etc] & surgery if any): NONE      -- Dislocations: NONE    -- Instability (cervical, thoracic, lumbar, sacral): NONE    -- Treatments (PT, OT, injections, braces, casts, crutches, orthotics, assistive devices): NONE     - Previous medical history (juvenile arthritis, OA, RA, other rheumatologic disease particular connective tissue diseases): NONE      Patient Active Problem List    Diagnosis Date Noted   • Acne vulgaris 05/30/2018       Additional History:   Allergies:    Patient has no known allergies.     Medications:     Current Outpatient Prescriptions Ordered in [employer]   Medication Sig Dispense Refill   • tretinoin (RETIN-A) 0.05 %  "cream Apply 1 Application to affected area(s) every bedtime. 45 g 3     No current Epic-ordered facility-administered medications on file.         Past Medical History:   No past medical history on file.     Past Surgical History:   No past surgical history on file.     Social History:     Social History   Substance Use Topics   • Smoking status: Never Smoker   • Smokeless tobacco: Never Used   • Alcohol use No        Family History:     No family status information on file.      No family history on file.    ROS:     - Constitutional: Negative for fever, chills, unexpected weight change, and fatigue/generalized weakness.     - Respiratory: Negative for cough, sputum production, chest congestion, dyspnea, wheezing, and crackles.      - Cardiovascular: Negative for chest pain, palpitations, orthopnea, and bilateral lower extremity edema.     - NOTE: All other systems reviewed and are negative, except as in HPI.     Objective:   Physical Exam:    Vitals: Blood pressure (!) 90/62, pulse (!) 51, temperature 37.3 °C (99.2 °F), height 1.676 m (5' 6\"), weight 59 kg (130 lb), SpO2 94 %.   BMI: Body mass index is 20.98 kg/m².   General/Constitutional: Vitals as above, Well nourished, well developed male in no acute distress   Head/Eyes:  - Head is grossly normal & atraumatic  - Bilateral conjunctivae clear and not injected, bilateral EOMI, bilateral PERRL   ENT:   - Bilateral external ears grossly normal in appearance, external auditory canals clear & bilateral TMs visualized with appropriate cone of light reflex, hearing grossly intact  - External nares normal in appearance and without discharge/bleeding, bilateral turbinates non-erythematous/non-edematous and without discharge/bleeding  - Good dentition ,  posterior oropharynx without erythema/edema/exudates  Neck: Neck supple, no masses, neck non-tender to palpation, no thyromegaly/goiter   Respiratory: No respiratory distress, bilateral lungs are clear to auscultation in " all lung fields (anterior/lateral/posterior), no wheezing/rhonchi/rales   Cardiovascular: Regular rate and rhythm without murmur/gallops/rubs, distal pulses equal and 2+ bilaterally (radial, posterior tibial), no bilateral lower extremity edema   Gastrointestinal: Abdomen resonant to percussion, Bowel sounds present in all 4 quadrants, abdomen non-tender to palpation, hepatosplenomegaly , ventral/umbilical hernias absent   MSK:     - Back, Gait: Gait grossly normal & not antalgic, no tenderness to percussion of vertebral processes, no CVAT, no bilateral SI joint tenderness    - Extremities:      -- Strength testing in upper and lower extremities is 5/5 and equal bilaterally, Gross motor movement intact in all 4 extremities, Gross sensation intact to extremities and trunk     -- Full active & Passive ROM of BUE & BLE including at major joints without pain/crepitus noted   Integumentary: No apparent rashes   Neuro: CN 2-12 tested and grossly intact, deep tendon reflexes tested and appropriate (biceps, brachioradialis, triceps, patellar, Achilles), Gait grossly normal and not antalgic, no dysmetria on gross testing (finger to nose, heel to shin), no diadochokinesia on gross testing of rapid alternating movements (hands, feet)   Psych: Judgment grossly appropriate, no apparent depression/anxiety    Health Maintenance:   Pt to talk to father about Meninogococcal B vaccine.    Imaging/Labs: No new labs to review.    Assessment and Plan:   1. Sports physical  Patient in good health, cleared for sports participation, form filled out, will be mailed.    2. Acne vulgaris  Continue use of topical retinoid      PLEASE NOTE: This dictation was created using voice recognition software. I have made every reasonable attempt to correct obvious errors, but I expect that there are errors of grammar and possibly content that I did not discover before finalizing the note.   66 No

## 2021-08-03 NOTE — ED PROVIDER NOTE - PATIENT PORTAL LINK FT
You can access the FollowMyHealth Patient Portal offered by [facility] by registering at the following website: [email or web address]. By joining "[employer]’s FollowMyHealth portal, you will also be able to view your health information using other applications (apps) compatible with our system.

## 2021-08-03 NOTE — ED PROVIDER NOTE - CLINICAL SUMMARY MEDICAL DECISION MAKING FREE TEXT BOX
86 female with SOB with exertion, lower extremity edema, f/u labs, chest xray, ekg, lasix, cardio consult

## 2021-08-03 NOTE — ED ADULT NURSE NOTE - OBJECTIVE STATEMENT
Pt A&Ox4, ambulatory to ED c/o shortness of breath.  Pt states that over the past 2 days she has had gradual worsening of shortness of breath and a "heaviness" to her chest.  Pt denies fever, chills, N/V, dizziness.  Pt states she has needed to sit up for relief.  Pt seems out of breath while speaking.

## 2021-08-03 NOTE — ED PROVIDER NOTE - CARE PROVIDERS DIRECT ADDRESSES
[email or web address]

## 2021-08-03 NOTE — ED PROVIDER NOTE - PROGRESS NOTE DETAILS
paged cardiology Dr. Fajrado for consult Talked to patient, states she is not amenable to staying or be admitted, breathing better, however she is aware that something is going on with her and would like to get to the bottom of her medical issues.    Age adjusted D- dimer is 840 Talked to patient, states she is not amenable to staying or be admitted, breathing better, however she is aware that something is going on with her and would like to get to the bottom of her medical issues.    Age adjusted D- dimer for patient is 840 ng/mL, patient's D-dimer is 350 ng/mL Talked to patient, states she is not amenable to staying or be admitted, breathing better, however she is aware that something is going on with her and would like to get to the bottom of her medical issues, states she will f/u with her cardiologist as outpatient    Age adjusted D- dimer for patient is 840 ng/mL, patient's D-dimer is 350 ng/mL

## 2021-08-03 NOTE — ED ADULT NURSE REASSESSMENT NOTE - NS ED NURSE REASSESS COMMENT FT1
Pt refuses rectal temperature.  Also states that she will only allow a male nurse to attempt to start IV.

## 2021-08-03 NOTE — ED PROVIDER NOTE - DISPOSITION TYPE
Marcy Grewal is a 57 y.o. female  Chief Complaint   Patient presents with    New Patient     elevated liver enzymes         Visit Vitals  /87 (BP 1 Location: Left arm, BP Patient Position: Sitting)   Pulse 81   Temp 98.2 °F (36.8 °C) (Tympanic)   Ht 5' 2\" (1.575 m)   Wt 200 lb 12.8 oz (91.1 kg)   SpO2 96%   BMI 36.73 kg/m²     3 most recent PHQ Screens 1/9/2020   Little interest or pleasure in doing things Not at all   Feeling down, depressed, irritable, or hopeless Not at all   Total Score PHQ 2 0     Learning Assessment 1/9/2020   PRIMARY LEARNER Patient   HIGHEST LEVEL OF EDUCATION - PRIMARY LEARNER  2 YEARS Layla PRIMARY LEARNER NONE   CO-LEARNER CAREGIVER No   PRIMARY LANGUAGE ENGLISH   LEARNER PREFERENCE PRIMARY DEMONSTRATION   ANSWERED BY patient   RELATIONSHIP SELF     Abuse Screening Questionnaire 1/9/2020   Do you ever feel afraid of your partner? N   Are you in a relationship with someone who physically or mentally threatens you? N   Is it safe for you to go home?  [place]ak Sanchez DISCHARGE

## 2021-08-03 NOTE — ED PROVIDER NOTE - CARE PROVIDER_API CALL
Rich Hogan (DO)  Cardiovascular Disease; Internal Medicine  [facility], [address] [place] [address], [place] [address]  Phone: [phone]  Fax: [phone]  Follow Up Time:

## 2021-08-03 NOTE — ED PROVIDER NOTE - ATTENDING CONTRIBUTION TO CARE
86 female presents to ER c/o shortness of breath with exertion, bilateral lower extremity swelling for past 2 days, takes "water pill" occasionally, last took 4 days ago, patient alert and oriented, decreased breath sounds at bases, heart rate irregular, tachycardic, noted bilateral lower extremity edema, f/u labs, ekg, chest xray, lasix, cardio consult.

## 2021-08-04 LAB
CULTURE RESULTS: SIGNIFICANT CHANGE UP
SPECIMEN SOURCE: SIGNIFICANT CHANGE UP

## 2021-12-06 ENCOUNTER — EMERGENCY (EMERGENCY)
Facility: HOSPITAL | Age: 86
LOS: 1 days | Discharge: HOME | End: 2021-12-06
Attending: EMERGENCY MEDICINE | Admitting: EMERGENCY MEDICINE
Payer: COMMERCIAL

## 2021-12-06 VITALS
HEART RATE: 106 BPM | WEIGHT: 179.9 LBS | TEMPERATURE: 98 F | SYSTOLIC BLOOD PRESSURE: 112 MMHG | OXYGEN SATURATION: 94 % | DIASTOLIC BLOOD PRESSURE: 87 MMHG | HEIGHT: 62 IN

## 2021-12-06 VITALS
SYSTOLIC BLOOD PRESSURE: 111 MMHG | OXYGEN SATURATION: 94 % | HEART RATE: 114 BPM | TEMPERATURE: 98 F | DIASTOLIC BLOOD PRESSURE: 81 MMHG | RESPIRATION RATE: 20 BRPM

## 2021-12-06 DIAGNOSIS — Z90.710 ACQUIRED ABSENCE OF BOTH CERVIX AND UTERUS: Chronic | ICD-10-CM

## 2021-12-06 DIAGNOSIS — Z98.89 OTHER SPECIFIED POSTPROCEDURAL STATES: Chronic | ICD-10-CM

## 2021-12-06 DIAGNOSIS — Z96.7 PRESENCE OF OTHER BONE AND TENDON IMPLANTS: Chronic | ICD-10-CM

## 2021-12-06 DIAGNOSIS — Z90.13 ACQUIRED ABSENCE OF BILATERAL BREASTS AND NIPPLES: Chronic | ICD-10-CM

## 2021-12-06 DIAGNOSIS — Z98.890 OTHER SPECIFIED POSTPROCEDURAL STATES: Chronic | ICD-10-CM

## 2021-12-06 LAB
ALBUMIN SERPL ELPH-MCNC: 2.7 G/DL — LOW (ref 3.3–5)
ALP SERPL-CCNC: 50 U/L — SIGNIFICANT CHANGE UP (ref 40–120)
ALT FLD-CCNC: 21 U/L — SIGNIFICANT CHANGE UP (ref 12–78)
ANION GAP SERPL CALC-SCNC: 7 MMOL/L — SIGNIFICANT CHANGE UP (ref 5–17)
APTT BLD: 33 SEC — SIGNIFICANT CHANGE UP (ref 27.5–35.5)
AST SERPL-CCNC: 30 U/L — SIGNIFICANT CHANGE UP (ref 15–37)
BASOPHILS # BLD AUTO: 0.02 K/UL — SIGNIFICANT CHANGE UP (ref 0–0.2)
BASOPHILS NFR BLD AUTO: 0.4 % — SIGNIFICANT CHANGE UP (ref 0–2)
BILIRUB SERPL-MCNC: 0.4 MG/DL — SIGNIFICANT CHANGE UP (ref 0.2–1.2)
BUN SERPL-MCNC: 14 MG/DL — SIGNIFICANT CHANGE UP (ref 7–23)
CALCIUM SERPL-MCNC: 8.9 MG/DL — SIGNIFICANT CHANGE UP (ref 8.5–10.1)
CHLORIDE SERPL-SCNC: 105 MMOL/L — SIGNIFICANT CHANGE UP (ref 96–108)
CK MB BLD-MCNC: 2.5 % — SIGNIFICANT CHANGE UP (ref 0–3.5)
CK MB CFR SERPL CALC: 1.9 NG/ML — SIGNIFICANT CHANGE UP (ref 0–3.6)
CK SERPL-CCNC: 76 U/L — SIGNIFICANT CHANGE UP (ref 26–192)
CO2 SERPL-SCNC: 28 MMOL/L — SIGNIFICANT CHANGE UP (ref 22–31)
CREAT SERPL-MCNC: 1.1 MG/DL — SIGNIFICANT CHANGE UP (ref 0.5–1.3)
EOSINOPHIL # BLD AUTO: 0.18 K/UL — SIGNIFICANT CHANGE UP (ref 0–0.5)
EOSINOPHIL NFR BLD AUTO: 3.2 % — SIGNIFICANT CHANGE UP (ref 0–6)
GLUCOSE SERPL-MCNC: 105 MG/DL — HIGH (ref 70–99)
HCT VFR BLD CALC: 33.1 % — LOW (ref 34.5–45)
HGB BLD-MCNC: 11 G/DL — LOW (ref 11.5–15.5)
IMM GRANULOCYTES NFR BLD AUTO: 1.2 % — SIGNIFICANT CHANGE UP (ref 0–1.5)
INR BLD: 1.16 RATIO — SIGNIFICANT CHANGE UP (ref 0.88–1.16)
LACTATE SERPL-SCNC: 1.9 MMOL/L — SIGNIFICANT CHANGE UP (ref 0.7–2)
LYMPHOCYTES # BLD AUTO: 1.35 K/UL — SIGNIFICANT CHANGE UP (ref 1–3.3)
LYMPHOCYTES # BLD AUTO: 24.1 % — SIGNIFICANT CHANGE UP (ref 13–44)
MCHC RBC-ENTMCNC: 31.7 PG — SIGNIFICANT CHANGE UP (ref 27–34)
MCHC RBC-ENTMCNC: 33.2 GM/DL — SIGNIFICANT CHANGE UP (ref 32–36)
MCV RBC AUTO: 95.4 FL — SIGNIFICANT CHANGE UP (ref 80–100)
MONOCYTES # BLD AUTO: 0.58 K/UL — SIGNIFICANT CHANGE UP (ref 0–0.9)
MONOCYTES NFR BLD AUTO: 10.3 % — SIGNIFICANT CHANGE UP (ref 2–14)
NEUTROPHILS # BLD AUTO: 3.41 K/UL — SIGNIFICANT CHANGE UP (ref 1.8–7.4)
NEUTROPHILS NFR BLD AUTO: 60.8 % — SIGNIFICANT CHANGE UP (ref 43–77)
NRBC # BLD: 0 /100 WBCS — SIGNIFICANT CHANGE UP (ref 0–0)
PLATELET # BLD AUTO: 396 K/UL — SIGNIFICANT CHANGE UP (ref 150–400)
POTASSIUM SERPL-MCNC: 3.9 MMOL/L — SIGNIFICANT CHANGE UP (ref 3.5–5.3)
POTASSIUM SERPL-SCNC: 3.9 MMOL/L — SIGNIFICANT CHANGE UP (ref 3.5–5.3)
PROT SERPL-MCNC: 7.8 G/DL — SIGNIFICANT CHANGE UP (ref 6–8.3)
PROTHROM AB SERPL-ACNC: 13.5 SEC — SIGNIFICANT CHANGE UP (ref 10.6–13.6)
RAPID RVP RESULT: DETECTED
RBC # BLD: 3.47 M/UL — LOW (ref 3.8–5.2)
RBC # FLD: 13.4 % — SIGNIFICANT CHANGE UP (ref 10.3–14.5)
SARS-COV-2 RNA SPEC QL NAA+PROBE: DETECTED
SODIUM SERPL-SCNC: 140 MMOL/L — SIGNIFICANT CHANGE UP (ref 135–145)
TROPONIN I, HIGH SENSITIVITY RESULT: 14.7 NG/L — SIGNIFICANT CHANGE UP
WBC # BLD: 5.61 K/UL — SIGNIFICANT CHANGE UP (ref 3.8–10.5)
WBC # FLD AUTO: 5.61 K/UL — SIGNIFICANT CHANGE UP (ref 3.8–10.5)

## 2021-12-06 PROCEDURE — 94640 AIRWAY INHALATION TREATMENT: CPT

## 2021-12-06 PROCEDURE — 85730 THROMBOPLASTIN TIME PARTIAL: CPT

## 2021-12-06 PROCEDURE — 93005 ELECTROCARDIOGRAM TRACING: CPT

## 2021-12-06 PROCEDURE — 85610 PROTHROMBIN TIME: CPT

## 2021-12-06 PROCEDURE — 83605 ASSAY OF LACTIC ACID: CPT

## 2021-12-06 PROCEDURE — 71250 CT THORAX DX C-: CPT | Mod: MA

## 2021-12-06 PROCEDURE — 71250 CT THORAX DX C-: CPT | Mod: 26,MA

## 2021-12-06 PROCEDURE — 0225U NFCT DS DNA&RNA 21 SARSCOV2: CPT

## 2021-12-06 PROCEDURE — 71046 X-RAY EXAM CHEST 2 VIEWS: CPT | Mod: 26

## 2021-12-06 PROCEDURE — 80053 COMPREHEN METABOLIC PANEL: CPT

## 2021-12-06 PROCEDURE — 99285 EMERGENCY DEPT VISIT HI MDM: CPT

## 2021-12-06 PROCEDURE — 93010 ELECTROCARDIOGRAM REPORT: CPT

## 2021-12-06 PROCEDURE — 36415 COLL VENOUS BLD VENIPUNCTURE: CPT

## 2021-12-06 PROCEDURE — 82553 CREATINE MB FRACTION: CPT

## 2021-12-06 PROCEDURE — 85025 COMPLETE CBC W/AUTO DIFF WBC: CPT

## 2021-12-06 PROCEDURE — 82550 ASSAY OF CK (CPK): CPT

## 2021-12-06 PROCEDURE — 99284 EMERGENCY DEPT VISIT MOD MDM: CPT | Mod: 25

## 2021-12-06 PROCEDURE — 87040 BLOOD CULTURE FOR BACTERIA: CPT

## 2021-12-06 PROCEDURE — 71046 X-RAY EXAM CHEST 2 VIEWS: CPT

## 2021-12-06 PROCEDURE — 84484 ASSAY OF TROPONIN QUANT: CPT

## 2021-12-06 RX ORDER — SODIUM CHLORIDE 9 MG/ML
1550 INJECTION INTRAMUSCULAR; INTRAVENOUS; SUBCUTANEOUS ONCE
Refills: 0 | Status: COMPLETED | OUTPATIENT
Start: 2021-12-06 | End: 2021-12-06

## 2021-12-06 RX ORDER — ALBUTEROL 90 UG/1
2 AEROSOL, METERED ORAL EVERY 6 HOURS
Refills: 0 | Status: DISCONTINUED | OUTPATIENT
Start: 2021-12-06 | End: 2021-12-10

## 2021-12-06 RX ADMIN — ALBUTEROL 2 PUFF(S): 90 AEROSOL, METERED ORAL at 22:15

## 2021-12-06 RX ADMIN — SODIUM CHLORIDE 1550 MILLILITER(S): 9 INJECTION INTRAMUSCULAR; INTRAVENOUS; SUBCUTANEOUS at 16:00

## 2021-12-06 NOTE — ED PROVIDER NOTE - CLINICAL SUMMARY MEDICAL DECISION MAKING FREE TEXT BOX
pt with 2 weeks cough, congestion uri sx.  tx already by pmd with amoxicillin. today with fatigue and sob. pt states feels better now that she coughed out mucous. check labs, cxr, ro pn,  covid +,  pt out of window for antibodies, sats good, no distress, will d/c on mdi, fu pmd, return for sat less than 94%

## 2021-12-06 NOTE — ED ADULT NURSE NOTE - CHIEF COMPLAINT QUOTE
Patient is a 87yo female complaining of upper respiratory infection patient is on amoxicillin and DM cough medication. Patient states that she has been coughing up sputum

## 2021-12-06 NOTE — ED ADULT NURSE NOTE - OBJECTIVE STATEMENT
85 y/o female received in the ER, awake and alert, c/o SOB, cough and bilateral ear pain. Pt denies any fever/chills, chest pain. pt was evaluated by MD, labs are sent, pending reevaluation and disposition. Will continue to monitor.

## 2021-12-06 NOTE — ED PROVIDER NOTE - CARE PROVIDER_API CALL
Josiane Noel (DO)  Family Medicine  [facility]  [facility], [facility],    Phone: ()-  Fax: ()-  Follow Up Time: 4-6 Days

## 2021-12-06 NOTE — ED ADULT TRIAGE NOTE - LOCATION:
ART THERAPY GROUP PROGRESS NOTE PATIENT SCHEDULED FOR GROUP AT: 13:40 ATTENDANCE: Full PARTICIPATION LEVEL: Participates in the art process and needed some minimal encouragement. ATTENTION LEVEL: Able to focus on task. FOCUS: Self-awareness SYMBOLIC & THEMATIC CONTENT AS NOTED IN IMAGERY: Leathas mood and affect were euthymic and congruent. Pete Hill approached the task slowly and would frequently pause, then come back to the task with a little encouragement. Pete Hill appropriately identified the year she was born was \"the year of the horse,\" and that her philosophy for life is to [employer], loving thyself. \" 
 
 Right arm;

## 2021-12-06 NOTE — ED PROVIDER NOTE - PATIENT PORTAL LINK FT
You can access the FollowMyHealth Patient Portal offered by [facility] by registering at the following website: [email or web address]. By joining [employer]’s FollowMyHealth portal, you will also be able to view your health information using other applications (apps) compatible with our system.

## 2021-12-06 NOTE — ED PROVIDER NOTE - OBJECTIVE STATEMENT
Pt is an 85 yo female hx afib.  pt vaccinated x 2 against covid , no booster. pt p/w 2 weeks of cough, chest congestion. pt states 2 weeks ago chest hurt, then began to cough, but couldn't get up mucous without gagging herself with tongue blade, cp had resolved. pt states she began  with ear pain and called pmd and started on amoxicillin and took robitussin dm.  pt states over past 2 days, very weak and decreased pos.  states sob, and cough improving no further ear pain. sputum clear

## 2021-12-06 NOTE — ED PROVIDER NOTE - NSFOLLOWUPINSTRUCTIONS_ED_ALL_ED_FT
take proair 2 puffs every 4 hours for 5 days then as needed,  check pulse ox frequently, return for oxygen saturation 93% or less.  call dr camargo for follow up as directed by her.   return for worsening symptoms,  drink lots of fluids

## 2021-12-06 NOTE — ED PROVIDER NOTE - NSICDXPASTMEDICALHX_GEN_ALL_CORE_FT
PAST MEDICAL HISTORY:  Atrial fibrillation     Breast cancer     C. difficile diarrhea     Diverticulitis     Gall stones     Glaucoma     PSVT (paroxysmal supraventricular tachycardia)     S/P colon resection

## 2021-12-06 NOTE — ED PROVIDER NOTE - NSICDXPASTSURGICALHX_GEN_ALL_CORE_FT
PAST SURGICAL HISTORY:  H/O hernia repair     H/O mastectomy, bilateral     H/O: hysterectomy     S/P colon resection march 11 2014    S/P ORIF (open reduction internal fixation) fracture L lower extremity    Status post appendectomy

## 2021-12-06 NOTE — ED ADULT TRIAGE NOTE - CHIEF COMPLAINT QUOTE
Patient is a 85yo female complaining of upper respiratory infection patient is on amoxicillin and DM cough medication. Patient states that she has been coughing up sputum

## 2021-12-06 NOTE — ED PROVIDER NOTE - CONSTITUTIONAL, MLM
elderly, awake, alert, oriented to person, place, time/situation and in no apparent distress. poor historian normal...

## 2021-12-07 ENCOUNTER — EMERGENCY (EMERGENCY)
Facility: HOSPITAL | Age: 86
LOS: 1 days | Discharge: HOME | End: 2021-12-07
Attending: STUDENT IN AN ORGANIZED HEALTH CARE EDUCATION/TRAINING PROGRAM | Admitting: STUDENT IN AN ORGANIZED HEALTH CARE EDUCATION/TRAINING PROGRAM
Payer: COMMERCIAL

## 2021-12-07 VITALS
OXYGEN SATURATION: 93 % | RESPIRATION RATE: 18 BRPM | TEMPERATURE: 98 F | SYSTOLIC BLOOD PRESSURE: 129 MMHG | WEIGHT: 149.91 LBS | HEART RATE: 110 BPM | DIASTOLIC BLOOD PRESSURE: 89 MMHG | HEIGHT: 62 IN

## 2021-12-07 VITALS
RESPIRATION RATE: 18 BRPM | DIASTOLIC BLOOD PRESSURE: 81 MMHG | SYSTOLIC BLOOD PRESSURE: 122 MMHG | HEART RATE: 90 BPM | TEMPERATURE: 98 F | OXYGEN SATURATION: 95 %

## 2021-12-07 DIAGNOSIS — Z90.13 ACQUIRED ABSENCE OF BILATERAL BREASTS AND NIPPLES: Chronic | ICD-10-CM

## 2021-12-07 DIAGNOSIS — Z98.89 OTHER SPECIFIED POSTPROCEDURAL STATES: Chronic | ICD-10-CM

## 2021-12-07 DIAGNOSIS — Z98.890 OTHER SPECIFIED POSTPROCEDURAL STATES: Chronic | ICD-10-CM

## 2021-12-07 DIAGNOSIS — Z90.710 ACQUIRED ABSENCE OF BOTH CERVIX AND UTERUS: Chronic | ICD-10-CM

## 2021-12-07 DIAGNOSIS — Z96.7 PRESENCE OF OTHER BONE AND TENDON IMPLANTS: Chronic | ICD-10-CM

## 2021-12-07 PROCEDURE — 99283 EMERGENCY DEPT VISIT LOW MDM: CPT

## 2021-12-07 PROCEDURE — 99282 EMERGENCY DEPT VISIT SF MDM: CPT

## 2021-12-07 NOTE — ED PROVIDER NOTE - PATIENT PORTAL LINK FT
You can access the FollowMyHealth Patient Portal offered by [facility] by registering at the following website: [email or web address]. By joining [employer]’s FollowMyHealth portal, you will also be able to view your health information using other applications (apps) compatible with our system.

## 2021-12-07 NOTE — ED PROVIDER NOTE - CLINICAL SUMMARY MEDICAL DECISION MAKING FREE TEXT BOX
87 yo F covid+ here for eval after pulse ox read 89% at home earlier today but otherwise pt reports >94%- pt sating 94-96% in ED after exertion; pt denies acute complaint; reports symptomatic relief-- symptom onset 2 weeks, not a MAB candidate

## 2021-12-07 NOTE — ED ADULT NURSE NOTE - OBJECTIVE STATEMENT
Pt. received alert and oriented x3 with chief complaint of hypoxia post covid diagnosis. Pt. denies any symptoms at current time.

## 2021-12-07 NOTE — ED PROVIDER NOTE - NSFOLLOWUPINSTRUCTIONS_ED_ALL_ED_FT
Continue medications as prescribed  Follow up with your PCP within 2-3 days (via telehealth visit)  Return to ED immediately for new or worsening symptoms as we discussed        COVID-19 (CORONAVIRUS DISEASE 2019) - General Information     COVID-19 (Coronavirus Disease 2019)    WHAT YOU NEED TO KNOW:    What do I need to know about COVID-19? COVID-19 is the disease caused by a coronavirus first discovered in December 2019. Coronaviruses generally cause upper respiratory (nose, throat, and lung) infections, such as a cold. The 2019 virus spreads quickly and easily. It can be spread starting 2 to 3 days before symptoms even begin.    What do I need to know about variants? The virus has changed into several new forms (called variants) since it was discovered. The variants may be more contagious (easily spread) than the original form. Some may also cause more severe illness than others.    What are the signs and symptoms of COVID-19? You may not develop any signs or symptoms. Signs and symptoms usually start about 5 days after infection but can take 2 to 14 days. You may feel like you have the flu or a bad cold. Some signs and symptoms go away in a few days. Others can last weeks, months, or possibly years. You may have any of the following:   •A cough      •Shortness of breath or trouble breathing that may become severe      •A fever      •Chills that might include shaking      •Muscle pain, body aches, or a headache      •A sore throat      •Sudden changes or loss of your taste or smell      •Feeling mentally and physically tired (fatigue)      •Congestion (stuffy head and nose), or a runny nose      •Diarrhea, nausea, or vomiting      How is COVID-19 diagnosed? Testing is offered at many sites. You may need to quarantine until you get your results. Any of the following tests may be used:   •A viral PCR test shows if you have a current infection. A sample is taken from your nose or throat with a swab. You may need to wait 1 or more days to get the test results.       •An antigen test shows if you have a protein from the COVID-19 virus. This test is often called a rapid test because the results can be available in 30 minutes or less.      •An antibody test shows if you had a recent or past infection. Blood samples are used for this test. Antibodies are made by your immune system to fight the virus that causes COVID-19. Antibodies form 1 to 3 weeks after you are infected. This test is not used to show if you are immune to the virus.       •A CT, MRI, ultrasound, or x-ray may be used to check for complications of COVID-19. These may include pneumonia, blood clots, or other complications.      How is COVID-19 treated?   •Mild symptoms may get better on their own. Some treatments have emergency use authorization (EUA). Examples include monoclonal antibodies and convalescent plasma. These may be given to help prevent worsening of your symptoms. You may also need any of the following:?Decongestants help reduce nasal congestion and help you breathe more easily. If you take decongestant pills, they may make you feel restless or cause problems with your sleep. Do not use decongestant sprays for more than a few days.      ?Cough suppressants help reduce coughing. Ask your healthcare provider which type of cough medicine is best for you.      ?To soothe a sore throat, gargle with warm salt water, or use throat lozenges or a throat spray. Drink more liquids to thin and loosen mucus and to prevent dehydration.      ?NSAIDs or acetaminophen can help lower a fever and relieve body aches or a headache. Follow directions. If not taken correctly, NSAIDs can cause kidney damage and acetaminophen can cause liver damage.      •Severe or life-threatening symptoms are treated in the hospital. You may need any of the following: ?Medicines may be given to fight the virus or treat inflammation.      ?Blood thinners help prevent or treat blood clots. If you have a deep vein thrombosis (DVT) or pulmonary embolism (PE), you may need to use blood thinners for at least 3 months.      ?Extra oxygen may be given if you have respiratory failure. This means your lungs cannot get enough oxygen into your blood and out to your organs.      ?A ventilator may be used to help you breathe.        What do I need to know about health problems the virus may cause? You may develop long-term health problems caused by the virus. Your risk is higher if you are 65 or older. A weak immune system, obesity, diabetes, chronic kidney disease, or a heart or lung condition can also increase your risk. Your risk is also higher if you are a current or former cigarette smoker. COVID-19 can lead to any of the following:  •Multisymptom inflammatory syndrome in adults (MIS-A) or in children (MIS-C), causing inflammation in the heart, digestive system, skin, or brain      •Shortness of breath, serious lower respiratory conditions, such as pneumonia or acute respiratory distress syndrome (ARDS)      •Blood clots or blood vessel damage      •Organ damage from a lack of oxygen or from blood clots      •Sleep problems      •Problems thinking clearly, remembering information, or concentrating      •Mood changes, depression, or anxiety      •Long-term problems tasting or smelling      •Loss of appetite and weight loss      •Nerve pain      •Fatigue (feeling mentally and physically tired)      How does the 2019 coronavirus spread?   •Droplets are the main way all coronaviruses spread. The virus travels in droplets that form when a person talks, sings, coughs, or sneezes. The droplets can also float in the air for minutes or hours. Infection happens when you breathe in the droplets or get them in your eyes or nose. Close personal contact with an infected person increases your risk for infection. This means being within 6 feet (2 meters) of the person for at least 15 minutes over 24 hours.      •Person-to-person contact can spread the virus. For example, a person with the virus on his or her hands can spread it by shaking hands with someone.      •The virus can stay on objects and surfaces for up to 3 days. You may become infected by touching the object or surface and then touching your eyes or mouth.      What do I need to know about COVID-19 vaccines? Healthcare providers recommend a COVID-19 vaccine, even if you have already had COVID-19. You are considered fully vaccinated against COVID-19 two weeks after the final dose of any COVID-19 vaccine. Let your healthcare provider know when you have received the final dose of the vaccine. Make a copy of your vaccination card. Keep the original with you in case you need to show it. Keep the copy in a safe place.  •COVID-19 vaccines are given as a shot in 1 or 2 doses. The vaccine is recommended for everyone 12 years or older.      •A third dose is recommended for adults with a weakened immune system who get a 2-dose vaccine. The third dose is given at least 28 days after the second.      •A booster (additional) dose is being recommended for other people as well. This is usually given 6 months after the initial doses are complete. Continue social distancing and other measures, even after you get the vaccine. Although it is not common, you can become infected after you get the vaccine. You may also be able to pass the virus to others without knowing you are infected.      •After you get the vaccine, check local, national, and international travel rules. You may need to be tested before you travel. Some countries require proof of a negative test before you travel. You may also need to quarantine after you return.      How can I help lower the risk for COVID-19?   •Wash your hands often throughout the day. Use soap and water. Rub your soapy hands together, lacing your fingers, for at least 20 seconds. Rinse with warm, running water. Dry your hands with a clean towel or paper towel. Use hand  that contains alcohol if soap and water are not available. Teach children how to wash their hands and use hand .  Handwashing           •Cover sneezes and coughs. Turn your face away and cover your mouth and nose with a tissue. Throw the tissue away. Use the bend of your arm if a tissue is not available. Then wash your hands well with soap and water or use hand . Teach children how to cover a cough or sneeze.      •Wear a face covering (mask) when needed. Use a cloth covering with at least 2 layers. You can also create layers by putting a cloth covering over a disposable non-medical mask. Cover your mouth and your nose.  How to Wear a Face Covering (Mask)           •Follow worldwide, national, and local social distancing guidelines. Keep at least 6 feet (2 meters) between you and others.      •Try not to touch your face. If you get the virus on your hands, you can transfer it to your eyes, nose, or mouth and become infected. You can also transfer it to objects, surfaces, or people.      •Clean and disinfect high-touch surfaces and objects often. Use disinfecting wipes, or make a solution of 4 teaspoons of bleach in 1 quart (4 cups) of water.      •Ask about other vaccines you may need. Get the influenza (flu) vaccine as soon as recommended each year, usually starting in September or October. Get the pneumonia vaccine if recommended. Your healthcare provider can tell you if you should also get other vaccines, and when to get them.    Prevent COVID-19 Infection         How do I follow social distancing guidelines to help lower the risk for COVID-19? National and local social distancing rules vary. Rules and restrictions may change over time as restrictions are lifted. The following are general guidelines:   •Stay home if you are sick or think you may have COVID-19. It is important to stay home if you are waiting for a testing appointment or for test results.      •Avoid close physical contact with anyone who does not live in your home. Do not shake hands with, hug, or kiss a person as a greeting. If you must use public transportation (such as a bus or subway), try to sit or stand away from others. Wear your face covering.      •Avoid in-person gatherings and crowds. Attend virtually if possible.      Where can I find more information?   •Centers for Disease Control and Prevention  [address] [place] [address]  Phone: 1-104.414.2724  Web Address: http://www.cdc.gov        Call your local emergency number (911 in the ) if:   •You have trouble breathing or shortness of breath at rest.      •You have chest pain or pressure that lasts longer than 5 minutes.      •You become confused or hard to wake.      •Your lips or face are blue.      When should I seek immediate care?   •You have a fever of 104°F (40°C) or higher.          When should I call my doctor?   •You have symptoms of COVID-19.      •You have questions or concerns about your condition or care.      CARE AGREEMENT:    You have the right to help plan your care. Learn about your health condition and how it may be treated. Discuss treatment options with your healthcare providers to decide what care you want to receive. You always have the right to refuse treatment.        © Copyright [employer] 2021           back to top                          © Copyright [employer] 2021

## 2021-12-07 NOTE — ED PROVIDER NOTE - OBJECTIVE STATEMENT
85 yo F PMHx A fib, +vaccinated against covid x2 (no booster) presents to ED for evaluation of hypoxia x earlier today. Pt states she noticed that her pulse ox read 89% earlier today, but was otherwise was consistently >94% -- pt states she spoke to her doctor who recommended she come to the ED for MAB treatment. Pt states she feels better and did not want to return to the ED. Pt reports 2 weeks of cough, chest congestion-- PCP initially started her on amoxicillin which she took with improvement. Pt found to be covid+ yesterday. Pt states she has been taking Robitussin DM with improvement. Contrary to triage note pt denies SOB. Pt denies chest pain, dizziness, abd pain, N/V, fever/chills.

## 2021-12-07 NOTE — ED PROVIDER NOTE - ATTENDING CONTRIBUTION TO CARE
85 yo F PMHx A fib, +vaccinated against covid x2 (no booster) presents to ED for evaluation of hypoxia x earlier today. Pt states she noticed that her pulse ox read 89% earlier today, but was otherwise was consistently >94% -- pt states she spoke to her doctor who recommended she come to the ED for MAB treatment. Pt states she feels better and did not want to return to the ED. Pt reports 2 weeks of cough, chest congestion-- PCP initially started her on amoxicillin which she took with improvement. Pt found to be covid+ yesterday. Pt states she has been taking Robitussin DM with improvement. Contrary to triage note pt denies SOB. Pt denies chest pain, dizziness, abd pain, N/V, fever/chills.  pt well appearing pulse ox consistently >94 in ED amb sat 93% no tachypnea or other sx at present, pt out of window for MAB, will dc with strict return precuations

## 2021-12-07 NOTE — ED PROVIDER NOTE - PROGRESS NOTE DETAILS
pt well appearing HR 95, exertional O2 Sat 94-96% -- pt talking without any evidence of resp distress; pt adamantly requesting dc at this time. Discussed the importance of prompt, close medical follow-up. ED return precautions discussed at length.  Pt verbalizes agreement and understanding of plan and ED return precautions. Pt well appearing, stable for DC home. No emergent concerns at this time.

## 2022-05-06 NOTE — PROGRESS NOTE ADULT - PROBLEM/PLAN-5
Call placed to patient. Left message asking for a returned call.    Please advise of Dinorah's message.         DISPLAY PLAN FREE TEXT

## 2022-05-20 ENCOUNTER — APPOINTMENT (OUTPATIENT)
Dept: GASTROENTEROLOGY | Facility: CLINIC | Age: 87
End: 2022-05-20
Payer: MEDICARE

## 2022-05-20 VITALS
WEIGHT: 178 LBS | OXYGEN SATURATION: 97 % | BODY MASS INDEX: 32.76 KG/M2 | SYSTOLIC BLOOD PRESSURE: 110 MMHG | DIASTOLIC BLOOD PRESSURE: 65 MMHG | HEART RATE: 80 BPM | HEIGHT: 62 IN | TEMPERATURE: 96.9 F

## 2022-05-20 DIAGNOSIS — K62.5 HEMORRHAGE OF ANUS AND RECTUM: ICD-10-CM

## 2022-05-20 DIAGNOSIS — K92.1 MELENA: ICD-10-CM

## 2022-05-20 DIAGNOSIS — D50.0 IRON DEFICIENCY ANEMIA SECONDARY TO BLOOD LOSS (CHRONIC): ICD-10-CM

## 2022-05-20 PROCEDURE — 99204 OFFICE O/P NEW MOD 45 MIN: CPT

## 2022-05-20 NOTE — PHYSICAL EXAM
[General Appearance - Alert] : alert [General Appearance - In No Acute Distress] : in no acute distress [Sclera] : the sclera and conjunctiva were normal [Neck Appearance] : the appearance of the neck was normal [Neck Cervical Mass (___cm)] : no neck mass was observed [Jugular Venous Distention Increased] : there was no jugular-venous distention [Auscultation Breath Sounds / Voice Sounds] : lungs were clear to auscultation bilaterally [Apical Impulse] : the apical impulse was normal [Full Pulse] : the pedal pulses are present [Edema] : there was no peripheral edema [Bowel Sounds] : normal bowel sounds [Abdomen Soft] : soft [Abdomen Tenderness] : non-tender [] : no hepato-splenomegaly [Abdomen Mass (___ Cm)] : no abdominal mass palpated [Normal Sphincter Tone] : normal sphincter tone [No Rectal Mass] : no rectal mass [External Hemorrhoid] : external hemorrhoids [Cervical Lymph Nodes Enlarged Posterior Bilaterally] : posterior cervical [Cervical Lymph Nodes Enlarged Anterior Bilaterally] : anterior cervical [Supraclavicular Lymph Nodes Enlarged Bilaterally] : supraclavicular [Axillary Lymph Nodes Enlarged Bilaterally] : axillary [Femoral Lymph Nodes Enlarged Bilaterally] : femoral [Inguinal Lymph Nodes Enlarged Bilaterally] : inguinal [No CVA Tenderness] : no ~M costovertebral angle tenderness [No Spinal Tenderness] : no spinal tenderness [Abnormal Walk] : normal gait [Nail Clubbing] : no clubbing  or cyanosis of the fingernails [Musculoskeletal - Swelling] : no joint swelling seen [Motor Tone] : muscle strength and tone were normal [Skin Color & Pigmentation] : normal skin color and pigmentation [Skin Turgor] : normal skin turgor [Oriented To Time, Place, And Person] : oriented to person, place, and time [Impaired Insight] : insight and judgment were intact [Affect] : the affect was normal [Occult Blood Positive] : stool was negative for occult blood [FreeTextEntry1] : External hemorrhoids, loose anal sphincter, no blood, no mass, no stool

## 2022-05-20 NOTE — CONSULT LETTER
[Dear  ___] : Dear ~RASHAAD, [Consult Letter:] : I had the pleasure of evaluating your patient, [unfilled]. [Please see my note below.] : Please see my note below. [Consult Closing:] : Thank you very much for allowing me to participate in the care of this patient.  If you have any questions, please do not hesitate to contact me. [Sincerely,] : Sincerely, [FreeTextEntry2] : Elvira Carmichael NP\par [address] [facility]\par [address]\[place] [address] [FreeTextEntry3] : Gavin Richmodn MD\par  [DrJamie  ___] : Dr. KEITA

## 2022-05-20 NOTE — HISTORY OF PRESENT ILLNESS
[de-identified] : Elvira Carmichael, JILL\par [address]\par [address]\[place] [address]\par \par Pleasant 86-year-old female with some cardiac issues\par \par Iron deficiency anemia followed by Dr. Hilario of hematology\par \par Blood per rectum, hemorrhoids\par \par History of diverticulitis with perforation and left hemicolectomy and appendectomy\par \par She clearly states she does not want any invasive procedures done

## 2022-05-20 NOTE — REVIEW OF SYSTEMS
[As Noted in HPI] : as noted in HPI [Negative] : Heme/Lymph [FreeTextEntry2] : Elderly obese female, pleasant, no acute distress

## 2022-05-20 NOTE — ASSESSMENT
[FreeTextEntry1] : Impression\par \par Iron deficiency anemia according to the patient\par \par Followed by Dr. Hilario of hematology\par \par Rectal bleeding\par \par Hemorrhoids\par \par History of diverticulitis with perforation and left hemicolectomy and appendectomy\par \par Cardiac issues\par \par Cheeks clearly states he does not want invasive procedures\par \par Suggest\par \par We had a lengthy conversation\par \par It would seem reasonable to do an upper GI series and a CAT scan virtual colonoscopy\par \par She agrees to this\par \par We will call her with results\par \par She will follow-up with Dr. Hilario and her primary care provider and certainly me anytime as needed

## 2022-05-20 NOTE — REASON FOR VISIT
[Initial Evaluation] : an initial evaluation [FreeTextEntry1] : Iron deficiency anemia, rectal bleeding, hemorrhoids

## 2022-05-29 NOTE — CONSULT NOTE ADULT - PROBLEM SELECTOR PROBLEM 3
Cough and shortness of breath for 5 days, used MDI with no relief today.  
Other osteomyelitis of right foot
CKD (chronic kidney disease), stage 2 (mild)

## 2022-12-19 NOTE — ED PROVIDER NOTE - CONSTITUTIONAL, MLM
Well appearing, awake, alert, oriented to person, place, time/situation and in no apparent distress. normal... Erivedge Counseling- I discussed with the patient the risks of Erivedge including but not limited to nausea, vomiting, diarrhea, constipation, weight loss, changes in the sense of taste, decreased appetite, muscle spasms, and hair loss.  The patient verbalized understanding of the proper use and possible adverse effects of Erivedge.  All of the patient's questions and concerns were addressed.
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
## 2023-07-27 NOTE — ED ADULT NURSE NOTE - ED COMFORT CARE
6/14/2023 - decreased levothyroxine to 112 mcg 6 days per week  Recheck labs to see if this dose is more appropriate   Patient informed

## 2023-08-09 NOTE — ED ADULT TRIAGE NOTE - TEMPERATURE IN FAHRENHEIT (DEGREES F)
98.4 Cosentyx Pregnancy And Lactation Text: This medication is Pregnancy Category B and is considered safe during pregnancy. It is unknown if this medication is excreted in breast milk.

## 2023-10-06 ENCOUNTER — INPATIENT (INPATIENT)
Facility: HOSPITAL | Age: 88
LOS: 9 days | Discharge: HOSPICE | DRG: 375 | End: 2023-10-16
Attending: STUDENT IN AN ORGANIZED HEALTH CARE EDUCATION/TRAINING PROGRAM | Admitting: STUDENT IN AN ORGANIZED HEALTH CARE EDUCATION/TRAINING PROGRAM
Payer: COMMERCIAL

## 2023-10-06 VITALS
TEMPERATURE: 97 F | DIASTOLIC BLOOD PRESSURE: 69 MMHG | OXYGEN SATURATION: 94 % | HEIGHT: 62 IN | WEIGHT: 169.98 LBS | SYSTOLIC BLOOD PRESSURE: 118 MMHG | RESPIRATION RATE: 18 BRPM | HEART RATE: 65 BPM

## 2023-10-06 DIAGNOSIS — Z90.710 ACQUIRED ABSENCE OF BOTH CERVIX AND UTERUS: Chronic | ICD-10-CM

## 2023-10-06 DIAGNOSIS — I48.91 UNSPECIFIED ATRIAL FIBRILLATION: ICD-10-CM

## 2023-10-06 DIAGNOSIS — R93.89 ABNORMAL FINDINGS ON DIAGNOSTIC IMAGING OF OTHER SPECIFIED BODY STRUCTURES: ICD-10-CM

## 2023-10-06 DIAGNOSIS — R10.9 UNSPECIFIED ABDOMINAL PAIN: ICD-10-CM

## 2023-10-06 DIAGNOSIS — Z29.9 ENCOUNTER FOR PROPHYLACTIC MEASURES, UNSPECIFIED: ICD-10-CM

## 2023-10-06 DIAGNOSIS — Z98.890 OTHER SPECIFIED POSTPROCEDURAL STATES: Chronic | ICD-10-CM

## 2023-10-06 DIAGNOSIS — Z98.89 OTHER SPECIFIED POSTPROCEDURAL STATES: Chronic | ICD-10-CM

## 2023-10-06 DIAGNOSIS — K22.89 OTHER SPECIFIED DISEASE OF ESOPHAGUS: ICD-10-CM

## 2023-10-06 DIAGNOSIS — H40.9 UNSPECIFIED GLAUCOMA: ICD-10-CM

## 2023-10-06 DIAGNOSIS — Z90.13 ACQUIRED ABSENCE OF BILATERAL BREASTS AND NIPPLES: Chronic | ICD-10-CM

## 2023-10-06 DIAGNOSIS — C79.9 SECONDARY MALIGNANT NEOPLASM OF UNSPECIFIED SITE: ICD-10-CM

## 2023-10-06 DIAGNOSIS — Z96.7 PRESENCE OF OTHER BONE AND TENDON IMPLANTS: Chronic | ICD-10-CM

## 2023-10-06 LAB
ALBUMIN SERPL ELPH-MCNC: 3.7 G/DL — SIGNIFICANT CHANGE UP (ref 3.3–5)
ALP SERPL-CCNC: 69 U/L — SIGNIFICANT CHANGE UP (ref 40–120)
ALT FLD-CCNC: 23 U/L — SIGNIFICANT CHANGE UP (ref 12–78)
ANION GAP SERPL CALC-SCNC: 5 MMOL/L — SIGNIFICANT CHANGE UP (ref 5–17)
APPEARANCE UR: CLEAR — SIGNIFICANT CHANGE UP
AST SERPL-CCNC: 23 U/L — SIGNIFICANT CHANGE UP (ref 15–37)
BASOPHILS # BLD AUTO: 0.07 K/UL — SIGNIFICANT CHANGE UP (ref 0–0.2)
BASOPHILS NFR BLD AUTO: 0.8 % — SIGNIFICANT CHANGE UP (ref 0–2)
BILIRUB SERPL-MCNC: 0.6 MG/DL — SIGNIFICANT CHANGE UP (ref 0.2–1.2)
BILIRUB UR-MCNC: NEGATIVE — SIGNIFICANT CHANGE UP
BUN SERPL-MCNC: 21 MG/DL — SIGNIFICANT CHANGE UP (ref 7–23)
CALCIUM SERPL-MCNC: 10.1 MG/DL — SIGNIFICANT CHANGE UP (ref 8.5–10.1)
CHLORIDE SERPL-SCNC: 106 MMOL/L — SIGNIFICANT CHANGE UP (ref 96–108)
CO2 SERPL-SCNC: 28 MMOL/L — SIGNIFICANT CHANGE UP (ref 22–31)
COLOR SPEC: YELLOW — SIGNIFICANT CHANGE UP
CREAT SERPL-MCNC: 1.2 MG/DL — SIGNIFICANT CHANGE UP (ref 0.5–1.3)
DIFF PNL FLD: NEGATIVE — SIGNIFICANT CHANGE UP
EGFR: 44 ML/MIN/1.73M2 — LOW
EOSINOPHIL # BLD AUTO: 0.23 K/UL — SIGNIFICANT CHANGE UP (ref 0–0.5)
EOSINOPHIL NFR BLD AUTO: 2.7 % — SIGNIFICANT CHANGE UP (ref 0–6)
GLUCOSE SERPL-MCNC: 102 MG/DL — HIGH (ref 70–99)
GLUCOSE UR QL: NEGATIVE MG/DL — SIGNIFICANT CHANGE UP
HCT VFR BLD CALC: 41.9 % — SIGNIFICANT CHANGE UP (ref 34.5–45)
HGB BLD-MCNC: 14.2 G/DL — SIGNIFICANT CHANGE UP (ref 11.5–15.5)
IMM GRANULOCYTES NFR BLD AUTO: 0.2 % — SIGNIFICANT CHANGE UP (ref 0–0.9)
KETONES UR-MCNC: ABNORMAL MG/DL
LEUKOCYTE ESTERASE UR-ACNC: ABNORMAL
LIDOCAIN IGE QN: 185 U/L — HIGH (ref 13–75)
LYMPHOCYTES # BLD AUTO: 2 K/UL — SIGNIFICANT CHANGE UP (ref 1–3.3)
LYMPHOCYTES # BLD AUTO: 23.1 % — SIGNIFICANT CHANGE UP (ref 13–44)
MCHC RBC-ENTMCNC: 33.4 PG — SIGNIFICANT CHANGE UP (ref 27–34)
MCHC RBC-ENTMCNC: 33.9 GM/DL — SIGNIFICANT CHANGE UP (ref 32–36)
MCV RBC AUTO: 98.6 FL — SIGNIFICANT CHANGE UP (ref 80–100)
MONOCYTES # BLD AUTO: 0.76 K/UL — SIGNIFICANT CHANGE UP (ref 0–0.9)
MONOCYTES NFR BLD AUTO: 8.8 % — SIGNIFICANT CHANGE UP (ref 2–14)
NEUTROPHILS # BLD AUTO: 5.58 K/UL — SIGNIFICANT CHANGE UP (ref 1.8–7.4)
NEUTROPHILS NFR BLD AUTO: 64.4 % — SIGNIFICANT CHANGE UP (ref 43–77)
NITRITE UR-MCNC: NEGATIVE — SIGNIFICANT CHANGE UP
NRBC # BLD: 0 /100 WBCS — SIGNIFICANT CHANGE UP (ref 0–0)
PH UR: 5.5 — SIGNIFICANT CHANGE UP (ref 5–8)
PLATELET # BLD AUTO: 270 K/UL — SIGNIFICANT CHANGE UP (ref 150–400)
POTASSIUM SERPL-MCNC: 5.2 MMOL/L — SIGNIFICANT CHANGE UP (ref 3.5–5.3)
POTASSIUM SERPL-SCNC: 5.2 MMOL/L — SIGNIFICANT CHANGE UP (ref 3.5–5.3)
PROT SERPL-MCNC: 7.9 G/DL — SIGNIFICANT CHANGE UP (ref 6–8.3)
PROT UR-MCNC: NEGATIVE MG/DL — SIGNIFICANT CHANGE UP
RBC # BLD: 4.25 M/UL — SIGNIFICANT CHANGE UP (ref 3.8–5.2)
RBC # FLD: 12.6 % — SIGNIFICANT CHANGE UP (ref 10.3–14.5)
SODIUM SERPL-SCNC: 139 MMOL/L — SIGNIFICANT CHANGE UP (ref 135–145)
SP GR SPEC: 1.02 — SIGNIFICANT CHANGE UP (ref 1–1.03)
TROPONIN I, HIGH SENSITIVITY RESULT: 18.4 NG/L — SIGNIFICANT CHANGE UP
UROBILINOGEN FLD QL: 0.2 MG/DL — SIGNIFICANT CHANGE UP (ref 0.2–1)
WBC # BLD: 8.66 K/UL — SIGNIFICANT CHANGE UP (ref 3.8–10.5)
WBC # FLD AUTO: 8.66 K/UL — SIGNIFICANT CHANGE UP (ref 3.8–10.5)

## 2023-10-06 PROCEDURE — 71260 CT THORAX DX C+: CPT | Mod: 26

## 2023-10-06 PROCEDURE — 99285 EMERGENCY DEPT VISIT HI MDM: CPT

## 2023-10-06 PROCEDURE — 74177 CT ABD & PELVIS W/CONTRAST: CPT | Mod: 26

## 2023-10-06 PROCEDURE — 93010 ELECTROCARDIOGRAM REPORT: CPT

## 2023-10-06 PROCEDURE — 99223 1ST HOSP IP/OBS HIGH 75: CPT | Mod: GC

## 2023-10-06 PROCEDURE — 74176 CT ABD & PELVIS W/O CONTRAST: CPT | Mod: 26,59,MA

## 2023-10-06 RX ORDER — LANOLIN ALCOHOL/MO/W.PET/CERES
3 CREAM (GRAM) TOPICAL AT BEDTIME
Refills: 0 | Status: DISCONTINUED | OUTPATIENT
Start: 2023-10-06 | End: 2023-10-07

## 2023-10-06 RX ORDER — LATANOPROST 0.05 MG/ML
1 SOLUTION/ DROPS OPHTHALMIC; TOPICAL AT BEDTIME
Refills: 0 | Status: DISCONTINUED | OUTPATIENT
Start: 2023-10-06 | End: 2023-10-16

## 2023-10-06 RX ORDER — ACETAMINOPHEN 500 MG
650 TABLET ORAL EVERY 6 HOURS
Refills: 0 | Status: DISCONTINUED | OUTPATIENT
Start: 2023-10-06 | End: 2023-10-07

## 2023-10-06 RX ORDER — NALOXONE HYDROCHLORIDE 4 MG/.1ML
0.4 SPRAY NASAL ONCE
Refills: 0 | Status: DISCONTINUED | OUTPATIENT
Start: 2023-10-06 | End: 2023-10-16

## 2023-10-06 RX ORDER — ENOXAPARIN SODIUM 100 MG/ML
40 INJECTION SUBCUTANEOUS EVERY 24 HOURS
Refills: 0 | Status: DISCONTINUED | OUTPATIENT
Start: 2023-10-06 | End: 2023-10-08

## 2023-10-06 RX ORDER — MORPHINE SULFATE 50 MG/1
4 CAPSULE, EXTENDED RELEASE ORAL EVERY 4 HOURS
Refills: 0 | Status: DISCONTINUED | OUTPATIENT
Start: 2023-10-06 | End: 2023-10-13

## 2023-10-06 RX ORDER — MORPHINE SULFATE 50 MG/1
2 CAPSULE, EXTENDED RELEASE ORAL EVERY 4 HOURS
Refills: 0 | Status: DISCONTINUED | OUTPATIENT
Start: 2023-10-06 | End: 2023-10-10

## 2023-10-06 RX ORDER — SODIUM CHLORIDE 9 MG/ML
1000 INJECTION INTRAMUSCULAR; INTRAVENOUS; SUBCUTANEOUS
Refills: 0 | Status: DISCONTINUED | OUTPATIENT
Start: 2023-10-06 | End: 2023-10-14

## 2023-10-06 RX ORDER — POLYETHYLENE GLYCOL 3350 17 G/17G
17 POWDER, FOR SOLUTION ORAL DAILY
Refills: 0 | Status: DISCONTINUED | OUTPATIENT
Start: 2023-10-06 | End: 2023-10-07

## 2023-10-06 RX ORDER — ONDANSETRON 8 MG/1
4 TABLET, FILM COATED ORAL EVERY 8 HOURS
Refills: 0 | Status: DISCONTINUED | OUTPATIENT
Start: 2023-10-06 | End: 2023-10-16

## 2023-10-06 RX ORDER — BRIMONIDINE TARTRATE 2 MG/MG
1 SOLUTION/ DROPS OPHTHALMIC DAILY
Refills: 0 | Status: DISCONTINUED | OUTPATIENT
Start: 2023-10-06 | End: 2023-10-08

## 2023-10-06 RX ORDER — SENNA PLUS 8.6 MG/1
2 TABLET ORAL AT BEDTIME
Refills: 0 | Status: DISCONTINUED | OUTPATIENT
Start: 2023-10-06 | End: 2023-10-07

## 2023-10-06 RX ORDER — ONDANSETRON 8 MG/1
4 TABLET, FILM COATED ORAL ONCE
Refills: 0 | Status: COMPLETED | OUTPATIENT
Start: 2023-10-06 | End: 2023-10-06

## 2023-10-06 RX ORDER — SODIUM CHLORIDE 9 MG/ML
1000 INJECTION INTRAMUSCULAR; INTRAVENOUS; SUBCUTANEOUS ONCE
Refills: 0 | Status: COMPLETED | OUTPATIENT
Start: 2023-10-06 | End: 2023-10-06

## 2023-10-06 RX ORDER — MORPHINE SULFATE 50 MG/1
4 CAPSULE, EXTENDED RELEASE ORAL ONCE
Refills: 0 | Status: DISCONTINUED | OUTPATIENT
Start: 2023-10-06 | End: 2023-10-06

## 2023-10-06 RX ORDER — DORZOLAMIDE HYDROCHLORIDE TIMOLOL MALEATE 20; 5 MG/ML; MG/ML
1 SOLUTION/ DROPS OPHTHALMIC
Refills: 0 | Status: DISCONTINUED | OUTPATIENT
Start: 2023-10-06 | End: 2023-10-16

## 2023-10-06 RX ADMIN — SODIUM CHLORIDE 75 MILLILITER(S): 9 INJECTION INTRAMUSCULAR; INTRAVENOUS; SUBCUTANEOUS at 23:48

## 2023-10-06 RX ADMIN — MORPHINE SULFATE 4 MILLIGRAM(S): 50 CAPSULE, EXTENDED RELEASE ORAL at 18:46

## 2023-10-06 RX ADMIN — SODIUM CHLORIDE 1000 MILLILITER(S): 9 INJECTION INTRAMUSCULAR; INTRAVENOUS; SUBCUTANEOUS at 18:47

## 2023-10-06 RX ADMIN — ONDANSETRON 4 MILLIGRAM(S): 8 TABLET, FILM COATED ORAL at 18:46

## 2023-10-06 NOTE — ED PROVIDER NOTE - NS ED ATTENDING STATEMENT MOD
This was a shared visit with the CASSI. I reviewed and verified the documentation and independently performed the documented:

## 2023-10-06 NOTE — ED PROVIDER NOTE - DIFFERENTIAL DIAGNOSIS
Differential Diagnosis Differentials include but not limited to kidney stone, urinary tract infection, pyelonephritis, obstruction, mass, metastasis

## 2023-10-06 NOTE — ED ADULT NURSE NOTE - OBJECTIVE STATEMENT
87y/o female A&ox4, ambulatory received in rm17b. pt c/o b/l flank pain, some nausea x2 weeks. pmhx kidney stones, b/l mastectomy (R arm precautions). denies vomiting, diarrhea, dizziness, lightheadedness, cp. abd soft nontender nondistended. respirations even and unlabored, completing full sentences. pt repositioned for comfort at this time. 22g iv placed in LAC, labs sent. md at bedside for eval. bed in lowest position, side rails up, call bell in hand, safety maintained. awaiting further orders. will continue to monitor.

## 2023-10-06 NOTE — H&P ADULT - PROBLEM SELECTOR PLAN 2
Patient complaining of diffuse abdominal and flank pain  - CT Renal Stone: New abdominal gastrohepatic and para-aortic lymphadenopathy as well as 3 cm irregular left para-aortic soft tissue. Bilateral nonobstructive renal calculi. No hydronephrosis  - UA: Trace ketone, trace leuk esterase, occasional bacteria   - Lipase 185  - Pain not likely due to kidney stones, as patient has no blood, and no obstructing stones   - S/p Morphine x1  - Continue analgesics PRN   - Clear liquid diet   - Continue IVF   - Monitor CMP  - GI Dr. Allen consulted, f/u recs Patient complaining of diffuse abdominal and flank pain. suspect metastatic disease   - CT Renal Stone: New abdominal gastrohepatic and para-aortic lymphadenopathy as well as 3 cm irregular left para-aortic soft tissue. Bilateral nonobstructive renal calculi. No hydronephrosis  - UA: Trace ketone, trace leuk esterase, occasional bacteria   - Lipase 185  - Pain not likely due to kidney stones, as patient has no blood, and no obstructing stones   - S/p Morphine x1  - Continue analgesics PRN   - Clear liquid diet   - Continue IVF   - Monitor CMP  - fu CT A/P with IV contrast   - GI Dr. Allen consulted, f/u recs

## 2023-10-06 NOTE — H&P ADULT - NSHPSOCIALHISTORY_GEN_ALL_CORE
Lives: at home   ADLs: completely independent, still driving, walks with the help of a cane   Diet: Recently only able to tolerate carnation supplement drinks BID and carbonated beverages   Tobacco use: none

## 2023-10-06 NOTE — ED ADULT NURSE NOTE - NSFALLUNIVINTERV_ED_ALL_ED
Bed/Stretcher in lowest position, wheels locked, appropriate side rails in place/Call bell, personal items and telephone in reach/Instruct patient to call for assistance before getting out of bed/chair/stretcher/Non-slip footwear applied when patient is off stretcher/[place] to call system/Physically safe environment - no spills, clutter or unnecessary equipment/Purposeful proactive rounding/Room/bathroom lighting operational, light cord in reach

## 2023-10-06 NOTE — H&P ADULT - NSHPREVIEWOFSYSTEMS_GEN_ALL_CORE
GENERAL: No fever or chills  EYES: no change in vision   HEENT: +trouble swallowing, +mucous in back of throat   CARDIAC: no chest pain   PULMONARY: no cough or SOB  GI: +abdominal pain, +nausea, +decreased appetite  : +flank pain, No changes in urination   SKIN: no rashes   NEURO: no headache   MSK: +pain under right breast     All other ROS negative unless otherwise specified in HPI.

## 2023-10-06 NOTE — ED PROVIDER NOTE - PROGRESS NOTE DETAILS
Pain overall improved after morphine.  CAT scan results discussed with patient.  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  Patient having hard time eating and drinking.  We will plan to admit.  Spoke with Dr. Kennedy, kindly accepts patient for admission

## 2023-10-06 NOTE — ED PROVIDER NOTE - CONSIDERATION OF ADMISSION OBSERVATION
Consideration of Admission/Observation Consider admission pending abnormal CAT scan results, significant lab abnormalities, her pain continues

## 2023-10-06 NOTE — H&P ADULT - PROBLEM SELECTOR PLAN 4
Patient has a hx of Afib, formerly on Metoprolol and Warfarin, but stopped all meds due to non compliance   - Follows outpatient Cardio Dr. Beach   - EKbpm, afib  - Not complaining of any debora pain or palpitations, patient expressed that she does not feel the need to take any medications  - Cardio Dr. Fajardo consulted, f/u recs

## 2023-10-06 NOTE — H&P ADULT - HISTORY OF PRESENT ILLNESS
Patient ia an 88-year-old female with PMH of CAD, gallstones, breast cancer (9 years ago status post radiation, reports never took oral medications because "she does not like to take pills"), atrial fibrillation, glaucoma, diverticulitis who presents to emergency room with bilateral flank pain, weakness, increased fatigue, dysphagia, and loss of appetite. Patient has a hx of kidney stones in the past, and states that over the past 4-5 days, she has been having persistent abdominal and flank pain. Also over the last 3 months, patient noticed that she is constantly spiting up a clear, jelly like mucous She has no know sick contacts, no fevers, chills, SOB. She feels like the mucous is coming form the back of her throat. Patient also admits that over the last year, she has been having decreasing appetite and increasing fatigue. Recently, over the course of the last month, the patient has been experiencing severe dysphagia. Anytime she tries to eat solid food, she feels a sharp pain in her epigastric region, and she spits the food back up. Also, any time she tries to drink water, she swallows, but the liquid comes right back up. Patient has only been able to tolerate [place] supplement drinks BID and carbonated beverages. She is also having pain under her right breast. She had a double mastectomy 9yrs ago after her breast cancer diagnosis and recently has felt pain again. Patient presented to her heme/onc (unknown name) a few months ago and was recommended to come to the ED for imaging. However, patient did not follow up. Currently denies fever, chills, chest pain, palpitations, SOB, urinary frequency, urgency, dysuria, hematuria, headaches, changes in vision, dizziness, numbness, tingling. No other complaints at this time.      ED course:  Vitals: /69 -->98/64  T 97 HR 65 RR 18 SpO2 94% on RA  Labs significant for: Lipase 185  UA: UA: Trace ketone, trace leuk esterase, occasional bacteria   EKbpm, afib  In ED Given: Zofran x1, Morphine 4mg x1, NaCl bolus x1    Imaging  CT Chest: New 7.2 cm distal esophageal mass. Findings may represent a true mass versus abnormal lymphadenopathy.  CT Renal Stone: New abdominal gastrohepatic and para-aortic lymphadenopathy as well as 3 cm irregular left para-aortic soft tissue. Bilateral nonobstructive renal calculi. No hydronephrosis. 88-year-old female with PMH of CAD, gallstones, breast cancer (9 years ago status post radiation, reports never took oral medications because "she does not like to take pills"), atrial fibrillation, glaucoma, diverticulitis who presents to emergency room with bilateral flank pain, weakness, increased fatigue, dysphagia, and loss of appetite. Patient has a hx of kidney stones in the past, and states that over the past 4-5 days, she has been having persistent abdominal and flank pain. Also over the last 3 months, patient noticed that she is constantly spiting up a clear, jelly like mucous She has no know sick contacts, no fevers, chills, SOB. She feels like the mucous is coming form the back of her throat. Patient also admits that over the last year, she has been having decreasing appetite and increasing fatigue. Recently, over the course of the last month, the patient has been experiencing severe dysphagia. Anytime she tries to eat solid food, she feels a sharp pain in her epigastric region, and she spits the food back up. Also, any time she tries to drink water, she swallows, but the liquid comes right back up. Patient has only been able to tolerate [place] supplement drinks BID and carbonated beverages. She is also having pain under her right breast. She had a double mastectomy 9yrs ago after her breast cancer diagnosis and recently has felt pain again. Patient presented to her heme/onc (unknown name) a few months ago and was recommended to come to the ED for imaging. However, patient did not follow up. Currently denies fever, chills, chest pain, palpitations, SOB, urinary frequency, urgency, dysuria, hematuria, headaches, changes in vision, dizziness, numbness, tingling. No other complaints at this time.    ED course:  Vitals: /69 -->98/64  T 97 HR 65 RR 18 SpO2 94% on RA  Labs significant for: Lipase 185  UA: UA: Trace ketone, trace leuk esterase, occasional bacteria   EKbpm, afib  In ED Given: Zofran x1, Morphine 4mg x1, NaCl bolus x1    Imaging  CT Chest: New 7.2 cm distal esophageal mass. Findings may represent a true mass versus abnormal lymphadenopathy.  CT Renal Stone: New abdominal gastrohepatic and para-aortic lymphadenopathy as well as 3 cm irregular left para-aortic soft tissue. Bilateral nonobstructive renal calculi. No hydronephrosis.

## 2023-10-06 NOTE — H&P ADULT - ASSESSMENT
Patient ia an 88-year-old female with PMH of CAD, gallstones, breast cancer (9 years ago status post radiation, reports never took oral medications because "she does not like to take pills"), atrial fibrillation, glaucoma, diverticulitis who presents to emergency room with bilateral flank pain, weakness, increased fatigue, dysphagia, and loss of appetite, found to have possible metastatic cancer on imaging.

## 2023-10-06 NOTE — H&P ADULT - NSHPPHYSICALEXAM_GEN_ALL_CORE
T(C): 36.8 (10-06-23 @ 20:54), Max: 36.8 (10-06-23 @ 20:54)  HR: 90 (10-06-23 @ 20:54) (65 - 90)  BP: 98/64 (10-06-23 @ 20:54) (98/64 - 118/69)  RR: 18 (10-06-23 @ 20:54) (18 - 18)  SpO2: 96% (10-06-23 @ 20:54) (94% - 96%)  Wt(kg): --    Physical Exam:   GENERAL: well-groomed, well-developed, NAD  HEENT: head NC/AT; EOM intact, PERRLA, conjunctiva & sclera clear; hearing grossly intact, moist mucous membranes  NECK: supple, no JVD  RESPIRATORY: CTA B/L, no wheezing, rales, rhonchi or rubs  CARDIOVASCULAR: S1&S2, RRR, no murmurs or gallops  ABDOMEN: Diffuse TTP in all 4 quadrants, + Bowel sounds x4 quadrant  MUSCULOSKELETAL:  +TTP under left breast, no clubbing, cyanosis or edema of all 4 extremities  LYMPH: no cervical lymphadenopathy  VASCULAR: Radial pulses 2+ bilaterally, no varicose veins   SKIN: warm and dry, color normal  NEUROLOGIC: AA&O X3, CN2-12 intact w/ no focal deficits, no sensory loss, motor Strength 5/5 in UE & LE B/L  Psych: Normal mood and affect, normal behavior T(C): 36.8 (10-06-23 @ 20:54), Max: 36.8 (10-06-23 @ 20:54)  HR: 90 (10-06-23 @ 20:54) (65 - 90)  BP: 98/64 (10-06-23 @ 20:54) (98/64 - 118/69)  RR: 18 (10-06-23 @ 20:54) (18 - 18)  SpO2: 96% (10-06-23 @ 20:54) (94% - 96%)  Wt(kg): --  Physical Exam:   GENERAL: well-groomed, well-developed, NAD  HEENT: head NC/AT; EOM intact, PERRLA, conjunctiva & sclera clear; hearing grossly intact, moist mucous membranes  NECK: supple, no JVD  RESPIRATORY: CTA B/L, no wheezing, rales, rhonchi or rubs  CARDIOVASCULAR: S1&S2, RRR, no murmurs or gallops  ABDOMEN: Diffuse TTP in all 4 quadrants, + Bowel sounds x4 quadrant  MUSCULOSKELETAL:  +TTP under left breast, no clubbing, cyanosis or edema of all 4 extremities  LYMPH: no cervical lymphadenopathy  VASCULAR: Radial pulses 2+ bilaterally, no varicose veins   SKIN: warm and dry, color normal  NEUROLOGIC: AA&O X3, CN2-12 intact w/ no focal deficits, no sensory loss, motor Strength 5/5 in UE & LE B/L  Psych: Normal mood and affect, normal behavior

## 2023-10-06 NOTE — ED PROVIDER NOTE - CLINICAL SUMMARY MEDICAL DECISION MAKING FREE TEXT BOX
88-year-old female with a history of CAD, gallstones, breast cancer, atrial fibrillation, who was poorly compliant with medical care, does not generally see her physician presents with has been having some bilateral flank pain over past 4 to 5 days.  Patient also has difficulty eating, over the past several months has had difficulty with loss of appetite, and difficulty eating, with early satiety and some difficulty keeping down food and sometimes.  No fever or chills.  No cough/URI.  No known COVID exposure.  No change in activity or behavior.  No vomiting or diarrhea.  No other aggravating or alleviating factors otherwise noted.  Exam: Nontoxic, well-appearing.  Diffuse upper abdominal tenderness.  No HSM.  No CVAT.  CTA BL, no W/R/R.  Normal cardiac exam.  Nonfocal neurologic exam..  No other acute findings on exam.  Acute abdominal discomfort with difficulty keeping down food/fluid swallowing.  Will check labs, CT chest and abdomen, IV fluids, possible admission

## 2023-10-06 NOTE — H&P ADULT - PROBLEM SELECTOR PLAN 3
Patient has a history of Glaucoma on Brimonidine, Latanoprost, Rhopressa and Dorzolamide-Timolol eye drops daily  - Continue home eye drops

## 2023-10-06 NOTE — ED PROVIDER NOTE - OBJECTIVE STATEMENT
88-year-old female with history of CAD, history of gallstones, history of breast cancer (9 years ago status post radiation, reports never took oral medications because "she does not like to take pills"), atrial fibrillation, glaucoma, history of diverticulitis presents to emergency room with bilateral flank pain for the past 4 to 5 days.  Pain progressively getting worse.  History of kidney stones in the past.  Patient reports 7 stones removed 30 years ago.  States pain somewhat similar in quality and character as previous kidney stones.  No nausea or vomiting.  No diarrhea.  No fevers.  No urinary complaints.  Patient also reports loss of appetite for the past couple months.  Also reports postnasal drip the past couple months and not drinking a lot because of the postnasal drip and it makes her gag.  Patient also noticed some discomfort below her right breast the past couple months.  Was seen by her oncologist a couple months ago and was recommended to come to emergency room for further evaluation but patient states she never did.  States pain is not localized over breast but seems below her breast.  States area occasionally will feel swollen. Also reports generalized fatigue past couple months. no chest pain or shortness of breath.   Previous abdominal surgeries include appendectomy and history of colon resection  PCP Scarlett Jimenez  Cards [facility]  Oncology "forgot name, [facility]" 88-year-old female with history of CAD, history of gallstones, history of breast cancer (9 years ago status post radiation, reports never took oral medications because "she does not like to take pills"), atrial fibrillation, glaucoma, history of diverticulitis presents to emergency room with bilateral flank pain for the past 4 to 5 days.  Pain progressively getting worse.  History of kidney stones in the past.  Patient reports 7 stones removed 30 years ago.  States pain somewhat similar in quality and character as previous kidney stones.  No nausea or vomiting.  No diarrhea.  No fevers.  No urinary complaints.  Patient also reports loss of appetite for the past couple months.  Also reports postnasal drip the past couple months and not drinking a lot because of the postnasal drip and it makes her gag.  Patient also noticed some discomfort below her right breast the past couple months.  Was seen by her oncologist a couple months ago and was recommended to come to emergency room for further evaluation but patient states she never did.  States pain is not localized over breast but seems below her breast.  States area occasionally will feel swollen. Also reports generalized fatigue past couple months. no chest pain or shortness of breath.   Previous abdominal surgeries include appendectomy and history of colon resection. reports hard time eating and drinking. last drank small amount of carnation drink this am and feels "like she spits some of it out"  PCP Scarlett Jimenez  Cards [facility]  Oncology "forgot name, [facility]"

## 2023-10-06 NOTE — ED ADULT NURSE NOTE - CCCP TRG CHIEF CMPLNT
ON TREATMENT NOTE  RADIATION ONCOLOGY DEPARTMENT    Patient name:  Surya Major    Primary Physician:  Pcp Pt States None MRN: [id number]  CSN: [id number]   Referring physician:  No ref. provider found : 1953, 68 y.o.     ENCOUNTER DATE:  22    DIAGNOSIS:    Prostate cancer (HCC)  Staging form: Prostate, AJCC 8th Edition  - Pathologic: Stage IIB (pT2, pN0, cM0, PSA: 12, Grade Group: 2) - Signed by Jarret Smith M.D. on 9/15/2022  Prostate specific antigen (PSA) range: 10 to 19  [place] score: 7  Histologic grading system: 5 grade system      TREATMENT SUMMARY:  Aria Treatment Information          Some values may be hidden. Unless noted otherwise, only the newest values recorded on each date are displayed.           Aria Treatment Summary 22   Course First Treatment Date 10/31/2022   Course Last Treatment Date 2022   Prostate Plan from Course C1_Pelvis   Fraction 3 of 28   Elapsed Course Days 2 @ [id number]   Prescribed Fraction Dose 180 cGy   Prescribed Total Dose 5,040 cGy   Prostate Reference Point from Course C1_Pelvis   Elapsed Course Days 2 @ [id number]   Session Dose 180 cGy   Total Dose 540 cGy   Prostate_CP Reference Point from Course C1_Pelvis   Elapsed Course Days 2 @ [id number]   Session Dose 185 cGy   Total Dose 556 cGy                SUBJECTIVE:   Patient is doing well.  He does not any changes he would attribute to his radiation.    VITAL SIGNS:    Encounter Vitals  Blood Pressure : 133/80  Pulse: 62  Pulse Oximetry: 98 %  Weight: 70.7 kg (155 lb 13.8 oz)  Pain Score: No pain  Pain Assessment 2022   Pain Score 0 0   Some recent data might be hidden          PHYSICAL EXAM:  Physical Exam  Genitourinary:     Comments: Normal       TOXICITY  Toxicity Assessment 2022   Toxicity Assessment Male Pelvis   Fatigue (lethargy, malaise, asthenia) None   Radiation Dermatitis None   Anorexia None   Colitis None   Constipation None   Dehydration None    Diarrhea w/o Colostomy None   Flatulence None   Nausea None   Proctitis None   Vomiting None   RT - Pain due to RT None   Tumor Pain (onset or exacerbation of tumor pain due to treatment) None   Dysuria (painful urination) None   Urinary Frequency Normal   Urinary Urgency None   Bladder Spasms Absent   Incontinence None   Urinary Retention Normal         IMPRESSION:  Cancer Staging  Prostate cancer (HCC)  Staging form: Prostate, AJCC 8th Edition  - Clinical: No stage assigned - Unsigned  - Pathologic: Stage IIB (pT2, pN0, cM0, PSA: 12, Grade Group: 2) - Signed by Jarret Smith M.D. on 9/15/2022      PLAN:  No change in treatment plan    Disposition:  Treatment plan reviewed. Questions answered. Continue therapy outlined.     Jarret Smith M.D.    No orders of the defined types were placed in this encounter.                         flank pain

## 2023-10-06 NOTE — H&P ADULT - ATTENDING COMMENTS
88-year-old female with PMH of CAD, gallstones, breast cancer (9 years ago status post radiation, reports never took oral medications because "she does not like to take pills"), atrial fibrillation, glaucoma, diverticulitis who presents to emergency room with bilateral flank pain, weakness, increased fatigue, dysphagia, and loss of appetite, found to have possible metastatic cancer on imaging.    Agree with above. Edited where appropriate

## 2023-10-06 NOTE — H&P ADULT - PROBLEM SELECTOR PLAN 1
Patient has been having severe dysphagia and mucous production for the last 1-3 months. Hx of breast cancer s/p double mastectomy 9yr ago    - S/p Zofran x1, NaCl bolus x1  - CT Chest non con: New 7.2 cm distal esophageal mass. Findings may represent a true mass versus abnormal lymphadenopathy  - Follows outpatient Heme/Onc (Formerly Dr. Hilario) at the [facility], unknown new doctor name  - F/u CT chest with IV contrast   - Clear liquid diet   - Rehydrate with gentle IVF for12 hrs --> reassess volume status   - Continue Zofran PRN   - GI Dr. Allen consulted, f/u recs   - Heme/Onc Dr. Tang consulted, f/u recs Patient has been having severe dysphagia and mucous production for the last 1-3 months. Hx of breast cancer s/p double mastectomy 9yr ago    - S/p Zofran x1, NaCl bolus x1  - CT Chest non con: New 7.2 cm distal esophageal mass. Findings may represent a true mass versus abnormal lymphadenopathy  - Follows outpatient Heme/Onc (Formerly Dr. Hilario) at the [facility], unknown new doctor name  - F/u CT chest with IV contrast to further evaluate   - Clear liquid diet   - Rehydrate with gentle IVF for12 hrs --> reassess volume status   - Continue Zofran PRN   - GI Dr. Allen consulted, f/u recs   - Heme/Onc Dr. Tang consulted, f/u recs

## 2023-10-07 LAB
ALBUMIN SERPL ELPH-MCNC: 3.4 G/DL — SIGNIFICANT CHANGE UP (ref 3.3–5)
ALP SERPL-CCNC: 63 U/L — SIGNIFICANT CHANGE UP (ref 40–120)
ALT FLD-CCNC: 18 U/L — SIGNIFICANT CHANGE UP (ref 12–78)
ANION GAP SERPL CALC-SCNC: 4 MMOL/L — LOW (ref 5–17)
AST SERPL-CCNC: 21 U/L — SIGNIFICANT CHANGE UP (ref 15–37)
BILIRUB SERPL-MCNC: 0.7 MG/DL — SIGNIFICANT CHANGE UP (ref 0.2–1.2)
BUN SERPL-MCNC: 20 MG/DL — SIGNIFICANT CHANGE UP (ref 7–23)
CALCIUM SERPL-MCNC: 9.1 MG/DL — SIGNIFICANT CHANGE UP (ref 8.5–10.1)
CHLORIDE SERPL-SCNC: 107 MMOL/L — SIGNIFICANT CHANGE UP (ref 96–108)
CHOLEST SERPL-MCNC: 135 MG/DL — SIGNIFICANT CHANGE UP
CO2 SERPL-SCNC: 26 MMOL/L — SIGNIFICANT CHANGE UP (ref 22–31)
CREAT SERPL-MCNC: 0.99 MG/DL — SIGNIFICANT CHANGE UP (ref 0.5–1.3)
EGFR: 56 ML/MIN/1.73M2 — LOW
GLUCOSE SERPL-MCNC: 92 MG/DL — SIGNIFICANT CHANGE UP (ref 70–99)
HCT VFR BLD CALC: 38.2 % — SIGNIFICANT CHANGE UP (ref 34.5–45)
HDLC SERPL-MCNC: 43 MG/DL — LOW
HGB BLD-MCNC: 12.6 G/DL — SIGNIFICANT CHANGE UP (ref 11.5–15.5)
LIPID PNL WITH DIRECT LDL SERPL: 74 MG/DL — SIGNIFICANT CHANGE UP
MCHC RBC-ENTMCNC: 32.8 PG — SIGNIFICANT CHANGE UP (ref 27–34)
MCHC RBC-ENTMCNC: 33 GM/DL — SIGNIFICANT CHANGE UP (ref 32–36)
MCV RBC AUTO: 99.5 FL — SIGNIFICANT CHANGE UP (ref 80–100)
NON HDL CHOLESTEROL: 92 MG/DL — SIGNIFICANT CHANGE UP
NRBC # BLD: 0 /100 WBCS — SIGNIFICANT CHANGE UP (ref 0–0)
PLATELET # BLD AUTO: SIGNIFICANT CHANGE UP (ref 150–400)
POTASSIUM SERPL-MCNC: 4.5 MMOL/L — SIGNIFICANT CHANGE UP (ref 3.5–5.3)
POTASSIUM SERPL-SCNC: 4.5 MMOL/L — SIGNIFICANT CHANGE UP (ref 3.5–5.3)
PROT SERPL-MCNC: 7 G/DL — SIGNIFICANT CHANGE UP (ref 6–8.3)
RBC # BLD: 3.84 M/UL — SIGNIFICANT CHANGE UP (ref 3.8–5.2)
RBC # FLD: 12.8 % — SIGNIFICANT CHANGE UP (ref 10.3–14.5)
SODIUM SERPL-SCNC: 137 MMOL/L — SIGNIFICANT CHANGE UP (ref 135–145)
TRIGL SERPL-MCNC: 96 MG/DL — SIGNIFICANT CHANGE UP
WBC # BLD: 5.88 K/UL — SIGNIFICANT CHANGE UP (ref 3.8–10.5)
WBC # FLD AUTO: 5.88 K/UL — SIGNIFICANT CHANGE UP (ref 3.8–10.5)

## 2023-10-07 PROCEDURE — 99223 1ST HOSP IP/OBS HIGH 75: CPT

## 2023-10-07 PROCEDURE — 99233 SBSQ HOSP IP/OBS HIGH 50: CPT | Mod: GC

## 2023-10-07 RX ORDER — ACETAMINOPHEN 500 MG
650 TABLET ORAL EVERY 6 HOURS
Refills: 0 | Status: DISCONTINUED | OUTPATIENT
Start: 2023-10-07 | End: 2023-10-10

## 2023-10-07 RX ORDER — INFLUENZA VIRUS VACCINE 15; 15; 15; 15 UG/.5ML; UG/.5ML; UG/.5ML; UG/.5ML
0.7 SUSPENSION INTRAMUSCULAR ONCE
Refills: 0 | Status: DISCONTINUED | OUTPATIENT
Start: 2023-10-07 | End: 2023-10-16

## 2023-10-07 RX ADMIN — BRIMONIDINE TARTRATE 1 DROP(S): 2 SOLUTION/ DROPS OPHTHALMIC at 14:15

## 2023-10-07 RX ADMIN — MORPHINE SULFATE 4 MILLIGRAM(S): 50 CAPSULE, EXTENDED RELEASE ORAL at 01:56

## 2023-10-07 RX ADMIN — MORPHINE SULFATE 2 MILLIGRAM(S): 50 CAPSULE, EXTENDED RELEASE ORAL at 13:25

## 2023-10-07 RX ADMIN — Medication 650 MILLIGRAM(S): at 04:10

## 2023-10-07 RX ADMIN — LATANOPROST 1 DROP(S): 0.05 SOLUTION/ DROPS OPHTHALMIC; TOPICAL at 21:51

## 2023-10-07 RX ADMIN — DORZOLAMIDE HYDROCHLORIDE TIMOLOL MALEATE 1 DROP(S): 20; 5 SOLUTION/ DROPS OPHTHALMIC at 06:28

## 2023-10-07 RX ADMIN — MORPHINE SULFATE 4 MILLIGRAM(S): 50 CAPSULE, EXTENDED RELEASE ORAL at 01:07

## 2023-10-07 RX ADMIN — SODIUM CHLORIDE 75 MILLILITER(S): 9 INJECTION INTRAMUSCULAR; INTRAVENOUS; SUBCUTANEOUS at 21:52

## 2023-10-07 RX ADMIN — MORPHINE SULFATE 2 MILLIGRAM(S): 50 CAPSULE, EXTENDED RELEASE ORAL at 09:42

## 2023-10-07 RX ADMIN — DORZOLAMIDE HYDROCHLORIDE TIMOLOL MALEATE 1 DROP(S): 20; 5 SOLUTION/ DROPS OPHTHALMIC at 21:51

## 2023-10-07 RX ADMIN — SODIUM CHLORIDE 75 MILLILITER(S): 9 INJECTION INTRAMUSCULAR; INTRAVENOUS; SUBCUTANEOUS at 23:08

## 2023-10-07 RX ADMIN — Medication 650 MILLIGRAM(S): at 03:43

## 2023-10-07 NOTE — ED ADULT NURSE REASSESSMENT NOTE - NS ED NURSE REASSESS COMMENT FT1
Patient received from TRE August.  Patient awaiting placement to floor.
0720: report received from previous shift RN. pt a&o x3, laying in stretcher, verbally denies any new/worsening complaints, reports that pain remains, but improved since arrival. pt requested ensure as she's only drank an ensure + carnation milk combo x1 month with no solids-- RN requested ensure for clear liquids diet per orders... respirations even/unlabored, no acute distress noted. comfort/safety maintained. will continue to monitor.
Pt resting comfortably in bed at this time.  Pt to be admitted to medical floor.  Denies any chest pain or SOB.  No n/v/d.  Pending urine sample.  Pt states she has no appetite.  c/o back pain.  Maintain comfort and safety.

## 2023-10-07 NOTE — CONSULT NOTE ADULT - ASSESSMENT
88-year-old female with PMH of CAD, gallstones, breast cancer (9 years ago status post radiation, reports never took oral medications because "she does not like to take pills"), atrial fibrillation no AC, glaucoma, diverticulitis who presents with abd pain and found to have a new esophageal mass.     - her chest pain is nonanginal and likely from her esophageal mass  - ekg is nonischemic  - trop is neg  - check echo    - appears to have persistent AF  - has not wanted any AC for years and at this point with a large esophageal mass I would be reluctant to start it.   - HR is controlled   - cont to monitor    - GI eval  - heme/onc eval    - Monitor and replete electrolytes. Keep K>4.0 and Mg>2.0.  - Further cardiac workup will depend on clinical course.   - All other workup per primary team. Will followup.

## 2023-10-07 NOTE — PROGRESS NOTE ADULT - PROBLEM SELECTOR PLAN 1
Patient has been having severe dysphagia and mucous production for the last 1-3 months. Hx of breast cancer s/p double mastectomy 9yr ago    - CT Chest non con: New 7.2 cm distal esophageal mass. Findings may represent a true mass versus abnormal lymphadenopathy  - CT C/A/P w/ IV contrast: Large distal esophageal mass with extensive posterior mediastinal and upper abdominal lymphadenopathy, compatible with primary esophageal carcinoma. New patchy sclerosis of the T8 vertebral body, suspicious for metastatic disease.  - Clear liquid diet; maintenance fluids with NS @ 75cc/hr  - Plan for EGD Monday (?), will keep pt NPO after midnight day prior; f/u GI recs   - Continue Zofran PRN   - GI Dr. Allen consulted, f/u recs   - Heme/Onc Dr. Jacob consulted, f/u recs Patient has been having severe dysphagia and mucous production for the last 1-3 months. Hx of breast cancer s/p double mastectomy 9yr ago    - CT Chest non con: New 7.2 cm distal esophageal mass. Findings may represent a true mass versus abnormal lymphadenopathy  - CT C/A/P w/ IV contrast: Large distal esophageal mass with extensive posterior mediastinal and upper abdominal lymphadenopathy, compatible with primary esophageal carcinoma. New patchy sclerosis of the T8 vertebral body, suspicious for metastatic disease.  - Clear liquid diet; maintenance fluids with NS @ 75cc/hr  - Will keep NPO after midnight if any procedural evaluated expected, based on GI recs.   - Continue Zofran PRN   - GI Dr. Allen consulted, f/u recs   - Heme/Onc Dr. Jacob consulted, f/u recs

## 2023-10-07 NOTE — CONSULT NOTE ADULT - ASSESSMENT
esophageal mass  dysphagia    CT scan noted  suspect intrinsic esophageal mass  clear liquid diet only   upper gastrointestinal endoscopy monday  d/w patient

## 2023-10-07 NOTE — PROGRESS NOTE ADULT - ATTENDING COMMENTS
patient seen at bedside.   awake reports unable to keeping down solids and some liquids.   + headache    A/P:  esophageal mass    - seen by GI for EGD on Mon    - Clears if tolerated, cont IVF    - pain control prn    - hematology consulted    afib    - patient reports she used to see Dr Esquivel and Dr Hogan     - not on medications at home.     DVT proph: lovenox 40mg daily    Patient does not want me to call family at this time. She reports she spoke with 4 family members herself.

## 2023-10-07 NOTE — CONSULT NOTE ADULT - ASSESSMENT
[ASSESSMENT and  PLAN]  Esophageal cancer  Mediastinal metastasis  Bone metastasis  Hx of breast cancer in remission    89yo F with hx of BL breast cancer s/p XRT, no adjuvant chemo, no hormonal tx  admitted with dsyphagia  Imaging with esophageal mass with mediastinal CELESTINA, probable bone metastasis  CT Scan CAP:   IMPRESSION: Large distal esophageal mass with extensive posterior mediastinal and upper abdominal lymphadenopathy, compatible with primary esophageal carcinoma. New patchy sclerosis of the T8 vertebral body, suspicious for metastatic disease.    seen by GI  planned EGD Mon    RECOMMENDATIONS  Transfuse PRBC as clinically indicated.   Transfuse PRBC if Hgb <7.0 or if symptomatic.   Follow CBC    Check Anemia studies.      Ferritin, Iron studies     B12, Folate     ESR, CRP    DVT Prophylaxis  SQ Lovenox or SQ heparin    Discussed plan of care with patient and or family in detail.   Pt/Family expressed understanding of the treatment plan.   Risks, benefits and alternatives discussed in detail.   Opportunity given for questions and discussion.   Questions or concerns all addressed and answered to their satisfaction, and in lay terms.     Discussed with  xxxxxxx.    Thank you for consulting us.     > xxxxxx minutes spent in direct patient care, examining and counseling patient,  reviewing  the notes, lab data/ imaging , discussion with multidisciplinary team.      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 New patchy sclerosis of the T8 vertebral body, suspicious for metastatic disease.    Seen by GI  planned EGD Mon      RECOMMENDATIONS    Transfuse PRBC as clinically indicated.   Transfuse PRBC if Hgb <7.0 or if symptomatic.   Follow CBC    Check Anemia studies.      Ferritin, Iron studies     B12, Folate     ESR, CRP    DVT Prophylaxis  SQ Lovenox or SQ heparin    Discussed plan of care with patient.   Pt/Family expressed understanding of the treatment plan.   Risks, benefits and alternatives discussed in detail.   Opportunity given for questions and discussion.   Questions or concerns all addressed and answered to their satisfaction, and in lay terms.     Discussed with Dr Winter.    Thank you for consulting us.    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 New patchy sclerosis of the T8 vertebral body, suspicious for metastatic disease.    Seen by GI  planned EGD Mon      RECOMMENDATIONS    Await EGD  Await path    Transfuse PRBC as clinically indicated.   Transfuse PRBC if Hgb <7.0 or if symptomatic.   Follow CBC    DVT Prophylaxis  SQ Lovenox or SQ heparin    Discussed plan of care with patient.   Pt expressed understanding of the treatment plan.   Risks, benefits and alternatives discussed in detail.   Opportunity given for questions and discussion.   Questions or concerns all addressed and answered to their satisfaction, and in lay terms.     Discussed with Dr Winter.    Thank you for consulting us.

## 2023-10-07 NOTE — CONSULT NOTE ADULT - CONSULT REASON
Esophageal Cancer C15.8   Esophageal cancer  C77.9   Mediastinal metastasis  C79.51  Bone metastasis  Z85.3    Hx of breast cancer in remission

## 2023-10-07 NOTE — PATIENT PROFILE ADULT - FALL HARM RISK - HARM RISK INTERVENTIONS

## 2023-10-07 NOTE — PROGRESS NOTE ADULT - SUBJECTIVE AND OBJECTIVE BOX
Patient is a 88y old  Female who presents with a chief complaint of esophageal mass (06 Oct 2023 22:23)      INTERVAL HPI/OVERNIGHT EVENTS: No acute overnight events. Pt was seen and examined at bedside. Pt continues to endorse B/L flank pain that is improved with the morphine that she received. Continues to have severe dysphagia with regurgitation of contents and epigastric pain. Denies any fevers or chills, chest pain, shortness of pain, lower abdominal pain, diarrhea, or constipation.     MEDICATIONS  (STANDING):  brimonidine 0.2% Ophthalmic Solution 1 Drop(s) Both EYES daily  dorzolamide 2%/timolol 0.5% Ophthalmic Solution 1 Drop(s) Both EYES two times a day  enoxaparin Injectable 40 milliGRAM(s) SubCutaneous every 24 hours  latanoprost 0.005% Ophthalmic Solution 1 Drop(s) Both EYES at bedtime  naloxone Injectable 0.4 milliGRAM(s) IV Push once  sodium chloride 0.9%. 1000 milliLiter(s) (75 mL/Hr) IV Continuous <Continuous>    MEDICATIONS  (PRN):  acetaminophen  Suppository .. 650 milliGRAM(s) Rectal every 6 hours PRN Temp greater or equal to 38C (100.4F), Mild Pain (1 - 3)  bisacodyl Suppository 10 milliGRAM(s) Rectal daily PRN Constipation  morphine  - Injectable 4 milliGRAM(s) IV Push every 4 hours PRN Severe Pain (7 - 10)  morphine  - Injectable 2 milliGRAM(s) IV Push every 4 hours PRN Moderate Pain (4 - 6)  ondansetron Injectable 4 milliGRAM(s) IV Push every 8 hours PRN Nausea and/or Vomiting      Allergies    No Known Allergies    Intolerances        REVIEW OF SYSTEMS:  CONSTITUTIONAL: No fever or chills  HEENT:  No headache, no sore throat  RESPIRATORY: No cough, wheezing, or shortness of breath  CARDIOVASCULAR: No chest pain, palpitations  GASTROINTESTINAL: Epigastric abdominal pain, dysphagia with regurgitation; no diarrhea or constipation   GENITOURINARY: No dysuria, frequency, or hematuria  NEUROLOGICAL: no focal weakness or dizziness  MUSCULOSKELETAL: Endorses B/L flank pain     Vital Signs Last 24 Hrs  T(C): 36.5 (07 Oct 2023 12:41), Max: 36.8 (06 Oct 2023 20:54)  T(F): 97.7 (07 Oct 2023 12:41), Max: 98.3 (06 Oct 2023 20:54)  HR: 86 (07 Oct 2023 12:41) (65 - 109)  BP: 104/60 (07 Oct 2023 12:41) (98/64 - 118/69)  BP(mean): --  RR: 18 (07 Oct 2023 12:41) (18 - 18)  SpO2: 94% (07 Oct 2023 12:41) (94% - 96%)    Parameters below as of 07 Oct 2023 06:00  Patient On (Oxygen Delivery Method): room air        PHYSICAL EXAM:  GENERAL: NAD  HEENT:  anicteric, moist mucous membranes  CHEST/LUNG:  CTA b/l, no rales, wheezes, or rhonchi  HEART:  irregularly irregular, tachycardic, no MRG   ABDOMEN: TTP at epigastric, + Bowel sounds x4 quadrant  EXTREMITIES: no edema, cyanosis, or calf tenderness  MUSCULOSKELETAL: TTP under R breast and B/L CVA   NERVOUS SYSTEM: AOx4, answers questions and follows commands appropriately      LABS:                        12.6   5.88  )-----------( Not reported    ( 07 Oct 2023 05:38 )             38.2     CBC Full  -  ( 07 Oct 2023 05:38 )  WBC Count : 5.88 K/uL  Hemoglobin : 12.6 g/dL  Hematocrit : 38.2 %  Platelet Count - Automated : Not reported  Mean Cell Volume : 99.5 fl  Mean Cell Hemoglobin : 32.8 pg  Mean Cell Hemoglobin Concentration : 33.0 gm/dL  Auto Neutrophil # : x  Auto Lymphocyte # : x  Auto Monocyte # : x  Auto Eosinophil # : x  Auto Basophil # : x  Auto Neutrophil % : x  Auto Lymphocyte % : x  Auto Monocyte % : x  Auto Eosinophil % : x  Auto Basophil % : x    07 Oct 2023 05:38    137    |  107    |  20     ----------------------------<  92     4.5     |  26     |  0.99     Ca    9.1        07 Oct 2023 05:38    TPro  7.0    /  Alb  3.4    /  TBili  0.7    /  DBili  x      /  AST  21     /  ALT  18     /  AlkPhos  63     07 Oct 2023 05:38      Urinalysis Basic - ( 07 Oct 2023 05:38 )    Color: x / Appearance: x / SG: x / pH: x  Gluc: 92 mg/dL / Ketone: x  / Bili: x / Urobili: x   Blood: x / Protein: x / Nitrite: x   Leuk Esterase: x / RBC: x / WBC x   Sq Epi: x / Non Sq Epi: x / Bacteria: x      CAPILLARY BLOOD GLUCOSE              RADIOLOGY & ADDITIONAL TESTS: < from: CT Abdomen and Pelvis w/ IV Cont (10.06.23 @ 23:48) >  ACC: [id number] EXAM:  CT CHEST IC   ORDERED BY: ARLENE WINSLOW     ACC: [id number] EXAM:  CT ABDOMEN AND PELVIS IC   ORDERED BY: ARLENE WINSLOW     PROCEDURE DATE:  10/06/2023          INTERPRETATION:  CLINICAL INFORMATION: Posterior mediastinal mass.   History of breast cancer.    COMPARISON: Noncontrast CT of the thorax, abdomen, and pelvis performed   the same day, noncontrast CT of the thorax December 6, 2021.    CONTRAST/COMPLICATIONS:  IV Contrast: IV contrast documented in unlinked concurrent exam   (accession [id number]), Omnipaque 350 (accession [id number])  90 cc   administered   10 cc discarded  Oral Contrast: NONE  Complications: None reported at time of study completion    PROCEDURE:  CT of the Chest, Abdomen and Pelvis was performed.  Sagittal and coronal reformats were performed.    FINDINGS:  CHEST:  LUNGS AND LARGE AIRWAYS: Patent central airways. No pulmonary nodules.  PLEURA: No pleural effusion.  VESSELS: Within normal limits.  HEART: Heart size is normal. No pericardial effusion.  MEDIASTINUM AND MARA: There is a 7.3 x 6.0 x 8.1 cm heterogeneously   enhancing soft tissue mass in the posterior inferior mediastinum   inseparable from the esophagus with adjacent prominently enhancing lymph   nodes extending caudally.  CHEST WALL AND LOWER NECK: Bilateral mastectomy.    ABDOMEN AND PELVIS:  LIVER: Within normal limits.  BILE DUCTS: Normal caliber.  GALLBLADDER: Small faceted gallstones..  SPLEEN: Within normal limits.  PANCREAS: Within normal limits.  ADRENALS: Within normal limits.  KIDNEYS/URETERS: 8 cm exophytic cyst arising from the posterior upper   pole of the right kidney. 6 mm curvilinear stone in the midpole of the   right kidney. No evidence of hydronephrosis or perinephric stranding   bilaterally.    BLADDER: Within normal limits.  REPRODUCTIVE ORGANS: The uterus and ovaries have been removed.    BOWEL: There are pathologically enlarged hyperenhancing centrally   necrotic irregular gastrohepatic ligament and celiac axis lymph nodes.   Rectosigmoid resection with reanastomosis. Diverticulosis of the sigmoid   colon. Distal small bowel resection with reanastomosis. No bowel   obstruction. Appendix has been removed.  PERITONEUM: No ascites.  VESSELS: Extensive atherosclerotic disease of the nonaneurysmal abdominal   aorta with severe stenosis at the origins of the mesenteric arteries.  RETROPERITONEUM/LYMPH NODES: No lymphadenopathy.  ABDOMINAL WALL: 1.9 cm fat-containing right supraumbilical hernia, 2.1 cm   fat-containing left supraumbilical hernia, 1.4 cm midline fat-containing   supraumbilical hernia. 3 cm supraumbilical hernia containing a knuckle of   mid transverse colon..  BONES: Healed right lateral rib fractures. New patchy sclerosis in the T8   vertebral body, suspicious for metastatic disease.    IMPRESSION: Large distal esophageal mass with extensive posterior   mediastinal and upper abdominal lymphadenopathy, compatible with primary   esophageal carcinoma. New patchy sclerosis of the T8 vertebral body,   suspicious for metastatic disease.    --- End of Report ---    < from: CT Chest No Cont (10.06.23 @ 19:35) >  ACC: [id number] EXAM:  CT RENAL STONE HUNT   ORDERED BY: ZENA ALVARADO     ACC: [id number] EXAM:  CT CHEST   ORDERED BY: ZENA ALVARADO     PROCEDURE DATE:  10/06/2023          INTERPRETATION:  CLINICAL INFORMATION: Pain below right breast. History   of breast cancer. Bilateral flank pain.    COMPARISON: Multiple prior exams, with most recent CT chest 12/6/21, CT   abdomen/pelvis 7/31/18    CONTRAST/COMPLICATIONS:  IV Contrast: NONE  Oral Contrast: NONE  Complications: None reported at time of study completion    PROCEDURE:  CT of the Chest, Abdomen and Pelvis was performed.  Sagittal and coronal reformats were performed.    FINDINGS:  CHEST:  LUNGS AND LARGE AIRWAYS: Patent central airways. No pulmonary nodules.  PLEURA: No pleural effusion.  VESSELS: Atherosclerotic changes of the aorta and coronary arteries.   Ascending aorta is dilated measuring 4.2 cm in diameter, stable.  HEART: Cardiomegaly. No pericardial effusion.  MEDIASTINUM AND MARA: No lymphadenopathy. Distal esophageal mass  measuring 7.2 x 5.3 cm (2:53), new since 12/6/2021.  CHEST WALL AND LOWER NECK: Bilateral mastectomy.    ABDOMEN AND PELVIS:  LIVER: Within normal limits.  BILE DUCTS: Normal caliber.  GALLBLADDER: Cholelithiasis.  SPLEEN: Within normal limits.  PANCREAS: Within normal limits.  ADRENALS: Within normal limits.  KIDNEYS/URETERS: 8 cm right renal cyst. Bilateral nonobstructive renal   calculi. No hydronephrosis.    BLADDER: Within normal limits.  REPRODUCTIVE ORGANS: Hysterectomy.    BOWEL: No bowel obstruction. Small bowel anastomosis in the right lower   quadrant. Distal colorectal anastomosis. Sigmoid diverticulosis without   evidence of acute diverticulitis.  PERITONEUM: No ascites.  VESSELS: Atherosclerotic changes.  RETROPERITONEUM/LYMPHNODES: New gastrohepatic and para-aortic   lymphadenopathy measuring up to 1.3 cm in short axis (2:60). Irregular   left para-aortic soft tissue measuring 3.0 x 2.2 cm (2:74).  ABDOMINAL WALL: Complex midline incisional hernia containing loops of   nonobstructed small bowel and colon.  BONES: Degenerative changes.    IMPRESSION:  1.  New 7.2 cm distal esophageal mass. Findings may represent a true mass   versus abnormal lymphadenopathy. Recommend further evaluation with   contrast-enhanced CT.    2.  New abdominal gastrohepatic and para-aortic lymphadenopathy as well   as 3 cm irregular left para-aortic soft tissue. Given findings in the   chest as well as history of breast cancer, metastatic disease is highly   considered.    3.   Bilateral nonobstructive renal calculi. No hydronephrosis.        --- End of Report ---        Personally reviewed.     Consultant(s) Notes Reviewed:  [x] YES  [ ] NO

## 2023-10-07 NOTE — ED ADULT NURSE REASSESSMENT NOTE - NSFALLHARMRISKINTERV_ED_ALL_ED
Assistance OOB with selected safe patient handling equipment if applicable/Assistance with ambulation/Communicate risk of Fall with Harm to all staff, patient, and family/Provide patient with walking aids/Provide visual cue: red socks, yellow wristband, yellow gown, etc/Reinforce activity limits and safety measures with patient and family/Bed in lowest position, wheels locked, appropriate side rails in place/Call bell, personal items and telephone in reach/Instruct patient to call for assistance before getting out of bed/chair/stretcher/Non-slip footwear applied when patient is off stretcher/[place] to call system/Physically safe environment - no spills, clutter or unnecessary equipment/Purposeful Proactive Rounding/Room/bathroom lighting operational, light cord in reach
Assistance OOB with selected safe patient handling equipment if applicable/Assistance with ambulation/Communicate risk of Fall with Harm to all staff, patient, and family/Monitor gait and stability/Provide patient with walking aids/Provide visual cue: red socks, yellow wristband, yellow gown, etc/Reinforce activity limits and safety measures with patient and family/Bed in lowest position, wheels locked, appropriate side rails in place/Call bell, personal items and telephone in reach/Instruct patient to call for assistance before getting out of bed/chair/stretcher/Non-slip footwear applied when patient is off stretcher/[place] to call system/Physically safe environment - no spills, clutter or unnecessary equipment/Purposeful Proactive Rounding/Room/bathroom lighting operational, light cord in reach

## 2023-10-08 ENCOUNTER — TRANSCRIPTION ENCOUNTER (OUTPATIENT)
Age: 88
End: 2023-10-08

## 2023-10-08 LAB
A1C WITH ESTIMATED AVERAGE GLUCOSE RESULT: 5.6 % — SIGNIFICANT CHANGE UP (ref 4–5.6)
ANION GAP SERPL CALC-SCNC: 7 MMOL/L — SIGNIFICANT CHANGE UP (ref 5–17)
BUN SERPL-MCNC: 16 MG/DL — SIGNIFICANT CHANGE UP (ref 7–23)
CALCIUM SERPL-MCNC: 9.3 MG/DL — SIGNIFICANT CHANGE UP (ref 8.5–10.1)
CHLORIDE SERPL-SCNC: 108 MMOL/L — SIGNIFICANT CHANGE UP (ref 96–108)
CO2 SERPL-SCNC: 27 MMOL/L — SIGNIFICANT CHANGE UP (ref 22–31)
CREAT SERPL-MCNC: 0.92 MG/DL — SIGNIFICANT CHANGE UP (ref 0.5–1.3)
CULTURE RESULTS: SIGNIFICANT CHANGE UP
EGFR: 61 ML/MIN/1.73M2 — SIGNIFICANT CHANGE UP
ESTIMATED AVERAGE GLUCOSE: 114 MG/DL — SIGNIFICANT CHANGE UP (ref 68–114)
GLUCOSE SERPL-MCNC: 82 MG/DL — SIGNIFICANT CHANGE UP (ref 70–99)
HCT VFR BLD CALC: 41.8 % — SIGNIFICANT CHANGE UP (ref 34.5–45)
HGB BLD-MCNC: 13.5 G/DL — SIGNIFICANT CHANGE UP (ref 11.5–15.5)
MCHC RBC-ENTMCNC: 32.3 GM/DL — SIGNIFICANT CHANGE UP (ref 32–36)
MCHC RBC-ENTMCNC: 32.8 PG — SIGNIFICANT CHANGE UP (ref 27–34)
MCV RBC AUTO: 101.5 FL — HIGH (ref 80–100)
NRBC # BLD: 0 /100 WBCS — SIGNIFICANT CHANGE UP (ref 0–0)
PLATELET # BLD AUTO: 246 K/UL — SIGNIFICANT CHANGE UP (ref 150–400)
POTASSIUM SERPL-MCNC: 4.6 MMOL/L — SIGNIFICANT CHANGE UP (ref 3.5–5.3)
POTASSIUM SERPL-SCNC: 4.6 MMOL/L — SIGNIFICANT CHANGE UP (ref 3.5–5.3)
RBC # BLD: 4.12 M/UL — SIGNIFICANT CHANGE UP (ref 3.8–5.2)
RBC # FLD: 12.5 % — SIGNIFICANT CHANGE UP (ref 10.3–14.5)
SODIUM SERPL-SCNC: 142 MMOL/L — SIGNIFICANT CHANGE UP (ref 135–145)
SPECIMEN SOURCE: SIGNIFICANT CHANGE UP
WBC # BLD: 6.96 K/UL — SIGNIFICANT CHANGE UP (ref 3.8–10.5)
WBC # FLD AUTO: 6.96 K/UL — SIGNIFICANT CHANGE UP (ref 3.8–10.5)

## 2023-10-08 PROCEDURE — 99232 SBSQ HOSP IP/OBS MODERATE 35: CPT | Mod: GC

## 2023-10-08 PROCEDURE — 99232 SBSQ HOSP IP/OBS MODERATE 35: CPT

## 2023-10-08 RX ORDER — BRIMONIDINE TARTRATE 2 MG/MG
1 SOLUTION/ DROPS OPHTHALMIC
Refills: 0 | Status: DISCONTINUED | OUTPATIENT
Start: 2023-10-08 | End: 2023-10-16

## 2023-10-08 RX ADMIN — MORPHINE SULFATE 4 MILLIGRAM(S): 50 CAPSULE, EXTENDED RELEASE ORAL at 20:44

## 2023-10-08 RX ADMIN — MORPHINE SULFATE 4 MILLIGRAM(S): 50 CAPSULE, EXTENDED RELEASE ORAL at 11:30

## 2023-10-08 RX ADMIN — LATANOPROST 1 DROP(S): 0.05 SOLUTION/ DROPS OPHTHALMIC; TOPICAL at 21:17

## 2023-10-08 RX ADMIN — DORZOLAMIDE HYDROCHLORIDE TIMOLOL MALEATE 1 DROP(S): 20; 5 SOLUTION/ DROPS OPHTHALMIC at 06:12

## 2023-10-08 RX ADMIN — SODIUM CHLORIDE 75 MILLILITER(S): 9 INJECTION INTRAMUSCULAR; INTRAVENOUS; SUBCUTANEOUS at 11:13

## 2023-10-08 RX ADMIN — SODIUM CHLORIDE 75 MILLILITER(S): 9 INJECTION INTRAMUSCULAR; INTRAVENOUS; SUBCUTANEOUS at 20:45

## 2023-10-08 RX ADMIN — MORPHINE SULFATE 4 MILLIGRAM(S): 50 CAPSULE, EXTENDED RELEASE ORAL at 11:12

## 2023-10-08 RX ADMIN — MORPHINE SULFATE 4 MILLIGRAM(S): 50 CAPSULE, EXTENDED RELEASE ORAL at 21:14

## 2023-10-08 RX ADMIN — BRIMONIDINE TARTRATE 1 DROP(S): 2 SOLUTION/ DROPS OPHTHALMIC at 11:13

## 2023-10-08 RX ADMIN — DORZOLAMIDE HYDROCHLORIDE TIMOLOL MALEATE 1 DROP(S): 20; 5 SOLUTION/ DROPS OPHTHALMIC at 19:06

## 2023-10-08 NOTE — PROGRESS NOTE ADULT - SUBJECTIVE AND OBJECTIVE BOX
[INTERVAL HX: ]  Patient seen and examined;  Chart reviewed and events noted;     no CP, no SOB.         [MEDICATIONS]  MEDICATIONS  (STANDING):  brimonidine 0.2% Ophthalmic Solution 1 Drop(s) Both EYES daily  dorzolamide 2%/timolol 0.5% Ophthalmic Solution 1 Drop(s) Both EYES two times a day  influenza  Vaccine (HIGH DOSE) 0.7 milliLiter(s) IntraMuscular once  latanoprost 0.005% Ophthalmic Solution 1 Drop(s) Both EYES at bedtime  naloxone Injectable 0.4 milliGRAM(s) IV Push once  sodium chloride 0.9%. 1000 milliLiter(s) (75 mL/Hr) IV Continuous <Continuous>    MEDICATIONS  (PRN):  acetaminophen  Suppository .. 650 milliGRAM(s) Rectal every 6 hours PRN Temp greater or equal to 38C (100.4F), Mild Pain (1 - 3)  bisacodyl Suppository 10 milliGRAM(s) Rectal daily PRN Constipation  morphine  - Injectable 4 milliGRAM(s) IV Push every 4 hours PRN Severe Pain (7 - 10)  morphine  - Injectable 2 milliGRAM(s) IV Push every 4 hours PRN Moderate Pain (4 - 6)  ondansetron Injectable 4 milliGRAM(s) IV Push every 8 hours PRN Nausea and/or Vomiting      [VITALS]  Vital Signs Last 24 Hrs  T(C): 36.5 (08 Oct 2023 12:19), Max: 36.9 (08 Oct 2023 06:13)  T(F): 97.7 (08 Oct 2023 12:19), Max: 98.5 (08 Oct 2023 06:13)  HR: 82 (08 Oct 2023 12:19) (80 - 94)  BP: 109/69 (08 Oct 2023 12:19) (103/64 - 133/84)  BP(mean): --  RR: 18 (08 Oct 2023 12:19) (18 - 18)  SpO2: 92% (08 Oct 2023 12:19) (92% - 97%)    Parameters below as of 08 Oct 2023 12:19  Patient On (Oxygen Delivery Method): room air      [WT/HT]  Daily     Daily   [VENT]      [PHYSICAL EXAM]  GEN: NAD  HEENT: normocephalic and atraumatic. EOMI. PERRL.    NECK: Supple.  No lymphadenopathy   LUNGS: Clear to auscultation.  HEART: Regular rate and rhythm,  no MRG  ABDOMEN: Soft, nontender, and nondistended.  Positive bowel sounds.    : No CVA tenderness  EXTREMITIES: Without edema.  NEUROLOGIC: grossly intact.  PSYCHIATRIC: Appropriate affect .  SKIN: No rash     [LABS:]                        13.5   6.96  )-----------( 246      ( 08 Oct 2023 08:36 )             41.8     10-08    142  |  108  |  16  ----------------------------<  82  4.6   |  27  |  0.92    Ca    9.3      08 Oct 2023 08:36    TPro  7.0  /  Alb  3.4  /  TBili  0.7  /  DBili  x   /  AST  21  /  ALT  18  /  AlkPhos  63  10-07            Urinalysis Basic - ( 08 Oct 2023 08:36 )    Color: x / Appearance: x / SG: x / pH: x  Gluc: 82 mg/dL / Ketone: x  / Bili: x / Urobili: x   Blood: x / Protein: x / Nitrite: x   Leuk Esterase: x / RBC: x / WBC x   Sq Epi: x / Non Sq Epi: x / Bacteria: x        Culture - Urine (collected 06 Oct 2023 21:10)  Source: Clean Catch Clean Catch (Midstream)  Final Report (08 Oct 2023 12:28):    >=3 organisms. Probable collection contamination.            Culture - Urine (collected 06 Oct 2023 21:10)  Source: Clean Catch Clean Catch (Midstream)  Final Report (08 Oct 2023 12:28):    >=3 organisms. Probable collection contamination.        [RADIOLOGY STUDIES:]

## 2023-10-08 NOTE — DISCHARGE NOTE PROVIDER - NSDCCPCAREPLAN_GEN_ALL_CORE_FT
PRINCIPAL DISCHARGE DIAGNOSIS  Diagnosis: Esophageal mass  Assessment and Plan of Treatment: You presented to the hospital with complaint of difficulty swallowing. You were found with an esophageal mass. We had the gastroenterologist see you. An attempt was made to have an edoscopic evaluation, the probe could not be passed due to the size of the mass. Your wishes are to go home with home hospice.      SECONDARY DISCHARGE DIAGNOSES  Diagnosis: Abdominal pain  Assessment and Plan of Treatment: Your symptoms have improved. Please follow outpatient.

## 2023-10-08 NOTE — DISCHARGE NOTE PROVIDER - HOSPITAL COURSE
ADMISSION DATE:  10-06-23    ---  FROM ADMISSION H+P:   HPI:  88-year-old female with PMH of CAD, gallstones, breast cancer (9 years ago status post radiation, reports never took oral medications because "she does not like to take pills"), atrial fibrillation, glaucoma, diverticulitis who presents to emergency room with bilateral flank pain, weakness, increased fatigue, dysphagia, and loss of appetite. Patient has a hx of kidney stones in the past, and states that over the past 4-5 days, she has been having persistent abdominal and flank pain. Also over the last 3 months, patient noticed that she is constantly spiting up a clear, jelly like mucous She has no know sick contacts, no fevers, chills, SOB. She feels like the mucous is coming form the back of her throat. Patient also admits that over the last year, she has been having decreasing appetite and increasing fatigue. Recently, over the course of the last month, the patient has been experiencing severe dysphagia. Anytime she tries to eat solid food, she feels a sharp pain in her epigastric region, and she spits the food back up. Also, any time she tries to drink water, she swallows, but the liquid comes right back up. Patient has only been able to tolerate [place] supplement drinks BID and carbonated beverages. She is also having pain under her right breast. She had a double mastectomy 9yrs ago after her breast cancer diagnosis and recently has felt pain again. Patient presented to her heme/onc (unknown name) a few months ago and was recommended to come to the ED for imaging. However, patient did not follow up. Currently denies fever, chills, chest pain, palpitations, SOB, urinary frequency, urgency, dysuria, hematuria, headaches, changes in vision, dizziness, numbness, tingling. No other complaints at this time.    ED course:  Vitals: /69 -->98/64  T 97 HR 65 RR 18 SpO2 94% on RA  Labs significant for: Lipase 185  UA: UA: Trace ketone, trace leuk esterase, occasional bacteria   EKbpm, afib  In ED Given: Zofran x1, Morphine 4mg x1, NaCl bolus x1    Imaging  CT Chest: New 7.2 cm distal esophageal mass. Findings may represent a true mass versus abnormal lymphadenopathy.  CT Renal Stone: New abdominal gastrohepatic and para-aortic lymphadenopathy as well as 3 cm irregular left para-aortic soft tissue. Bilateral nonobstructive renal calculi. No hydronephrosis. (06 Oct 2023 22:23)      ---  HOSPITAL COURSE/PERTINENT LABS/PROCEDURES PERFORMED/PENDING TESTS:   Pt was admitted for dysphagia and bilateral flank pain. CT C/A/P w/ IV Contrast showed large distal esophageal mass with extensive posterior mediastinal and upper abdominal lymphadenopathy, compatible with primary esophageal carcinoma. New patchy sclerosis of the T8 vertebral body, suspicious for metastatic disease.      Patient is stable for discharge as per primary medical team and consultants.    PT consulted, recommends discharge ______    Patient showed improvement throughout hospitalization. Patient was seen and examined on day of discharge. Patient was medically optimized for discharge with close outpatient follow up.    ---  PATIENT CONDITION:  - stable    --  VITALS:   T(C): 36.5 (10-08-23 @ 12:19), Max: 36.9 (10-08-23 @ 06:13)  HR: 82 (10-08-23 @ 12:19) (80 - 94)  BP: 109/69 (10-08-23 @ 12:19) (109/69 - 133/84)  RR: 18 (10-08-23 @ 12:19) (18 - 18)  SpO2: 92% (10-08-23 @ 12:19) (92% - 97%)    PHYSICAL EXAM ON DAY OF DISCHARGE:    ---  CONSULTANTS:   -    ---  ADVANCED CARE PLANNING:  - Code status:      [facility]      [  ] NO     [  ] YES    ---  TIME SPENT:  I, the attending physician, was physically present for the key portions of the evaluation and management (E/M) service provided. The total amount of time spent reviewing the hospital notes, laboratory values, imaging findings, assessing/counseling the patient, discussing with consultant physicians, social work, nursing staff was -- minutes       ADMISSION DATE:  10-06-23    ---  FROM ADMISSION H+P:   HPI:  88-year-old female with PMH of CAD, gallstones, breast cancer (9 years ago status post radiation, reports never took oral medications because "she does not like to take pills"), atrial fibrillation, glaucoma, diverticulitis who presents to emergency room with bilateral flank pain, weakness, increased fatigue, dysphagia, and loss of appetite. Patient has a hx of kidney stones in the past, and states that over the past 4-5 days, she has been having persistent abdominal and flank pain. Also over the last 3 months, patient noticed that she is constantly spiting up a clear, jelly like mucous She has no know sick contacts, no fevers, chills, SOB. She feels like the mucous is coming form the back of her throat. Patient also admits that over the last year, she has been having decreasing appetite and increasing fatigue. Recently, over the course of the last month, the patient has been experiencing severe dysphagia. Anytime she tries to eat solid food, she feels a sharp pain in her epigastric region, and she spits the food back up. Also, any time she tries to drink water, she swallows, but the liquid comes right back up. Patient has only been able to tolerate [place] supplement drinks BID and carbonated beverages. She is also having pain under her right breast. She had a double mastectomy 9yrs ago after her breast cancer diagnosis and recently has felt pain again. Patient presented to her heme/onc (unknown name) a few months ago and was recommended to come to the ED for imaging. However, patient did not follow up. Currently denies fever, chills, chest pain, palpitations, SOB, urinary frequency, urgency, dysuria, hematuria, headaches, changes in vision, dizziness, numbness, tingling. No other complaints at this time.    ED course:  Vitals: /69 -->98/64  T 97 HR 65 RR 18 SpO2 94% on RA  Labs significant for: Lipase 185  UA: UA: Trace ketone, trace leuk esterase, occasional bacteria   EKbpm, afib  In ED Given: Zofran x1, Morphine 4mg x1, NaCl bolus x1    Imaging  CT Chest: New 7.2 cm distal esophageal mass. Findings may represent a true mass versus abnormal lymphadenopathy.  CT Renal Stone: New abdominal gastrohepatic and para-aortic lymphadenopathy as well as 3 cm irregular left para-aortic soft tissue. Bilateral nonobstructive renal calculi. No hydronephrosis. (06 Oct 2023 22:23)      ---  HOSPITAL COURSE/PERTINENT LABS/PROCEDURES PERFORMED/PENDING TESTS:   Pt was admitted for dysphagia and bilateral flank pain. CT C/A/P w/ IV Contrast showed large distal esophageal mass with extensive posterior mediastinal and upper abdominal lymphadenopathy, compatible with primary esophageal carcinoma. New patchy sclerosis of the T8 vertebral body, suspicious for metastatic disease. Pt was started on clear liquid diet and placed on maintenance fluids. GI was consulted, with plans for EGD w/biopsy Monday. Cardiology was consulted for cardiovascular clearance. Heme/onc was consulted      Patient is stable for discharge as per primary medical team and consultants.    PT consulted, recommends discharge ______    Patient showed improvement throughout hospitalization. Patient was seen and examined on day of discharge. Patient was medically optimized for discharge with close outpatient follow up.    ---  PATIENT CONDITION:  - stable    --  VITALS:   T(C): 36.5 (10-08-23 @ 12:19), Max: 36.9 (10-08-23 @ 06:13)  HR: 82 (10-08-23 @ 12:19) (80 - 94)  BP: 109/69 (10-08-23 @ 12:19) (109/69 - 133/84)  RR: 18 (10-08-23 @ 12:19) (18 - 18)  SpO2: 92% (10-08-23 @ 12:19) (92% - 97%)    PHYSICAL EXAM ON DAY OF DISCHARGE:    ---  CONSULTANTS:   - GI (Dr. Allen)  - Heme/Onc (Dr. Jacob)   - Cardiology (Dr. Parra)    ---  ADVANCED CARE PLANNING:  - Code status:       - [facility] completed:      [  ] NO     [  ] YES    ---  TIME SPENT:  I, the attending physician, was physically present for the key portions of the evaluation and management (E/M) service provided. The total amount of time spent reviewing the hospital notes, laboratory values, imaging findings, assessing/counseling the patient, discussing with consultant physicians, social work, nursing staff was -- minutes       ADMISSION DATE:  10-06-23    ---  FROM ADMISSION H+P:   HPI:  88-year-old female with PMH of CAD, gallstones, breast cancer (9 years ago status post radiation, reports never took oral medications because "she does not like to take pills"), atrial fibrillation, glaucoma, diverticulitis who presents to emergency room with bilateral flank pain, weakness, increased fatigue, dysphagia, and loss of appetite. Patient has a hx of kidney stones in the past, and states that over the past 4-5 days, she has been having persistent abdominal and flank pain. Also over the last 3 months, patient noticed that she is constantly spiting up a clear, jelly like mucous She has no know sick contacts, no fevers, chills, SOB. She feels like the mucous is coming form the back of her throat. Patient also admits that over the last year, she has been having decreasing appetite and increasing fatigue. Recently, over the course of the last month, the patient has been experiencing severe dysphagia. Anytime she tries to eat solid food, she feels a sharp pain in her epigastric region, and she spits the food back up. Also, any time she tries to drink water, she swallows, but the liquid comes right back up. Patient has only been able to tolerate [place] supplement drinks BID and carbonated beverages. She is also having pain under her right breast. She had a double mastectomy 9yrs ago after her breast cancer diagnosis and recently has felt pain again. Patient presented to her heme/onc (unknown name) a few months ago and was recommended to come to the ED for imaging. However, patient did not follow up. Currently denies fever, chills, chest pain, palpitations, SOB, urinary frequency, urgency, dysuria, hematuria, headaches, changes in vision, dizziness, numbness, tingling. No other complaints at this time.    ED course:  Vitals: /69 -->98/64  T 97 HR 65 RR 18 SpO2 94% on RA  Labs significant for: Lipase 185  UA: UA: Trace ketone, trace leuk esterase, occasional bacteria   EKbpm, afib  In ED Given: Zofran x1, Morphine 4mg x1, NaCl bolus x1    Imaging  CT Chest: New 7.2 cm distal esophageal mass. Findings may represent a true mass versus abnormal lymphadenopathy.  CT Renal Stone: New abdominal gastrohepatic and para-aortic lymphadenopathy as well as 3 cm irregular left para-aortic soft tissue. Bilateral nonobstructive renal calculi. No hydronephrosis. (06 Oct 2023 22:23)      ---  HOSPITAL COURSE/PERTINENT LABS/PROCEDURES PERFORMED/PENDING TESTS:   Pt was admitted for dysphagia and bilateral flank pain. CT C/A/P w/ IV Contrast showed large distal esophageal mass with extensive posterior mediastinal and upper abdominal lymphadenopathy, compatible with primary esophageal carcinoma. New patchy sclerosis of the T8 vertebral body, suspicious for metastatic disease. Pt was started on clear liquid diet and placed on maintenance fluids. GI was consulted, with plans for EGD w/biopsy 10/9. Cardiology was consulted for cardiovascular clearance. Heme/onc was consulted      Patient is stable for discharge as per primary medical team and consultants.    PT consulted, recommends discharge ______    Patient showed improvement throughout hospitalization. Patient was seen and examined on day of discharge. Patient was medically optimized for discharge with close outpatient follow up.    ---  PATIENT CONDITION:  - stable    --  VITALS:   T(C): 36.5 (10-08-23 @ 12:19), Max: 36.9 (10-08-23 @ 06:13)  HR: 82 (10-08-23 @ 12:19) (80 - 94)  BP: 109/69 (10-08-23 @ 12:19) (109/69 - 133/84)  RR: 18 (10-08-23 @ 12:19) (18 - 18)  SpO2: 92% (10-08-23 @ 12:19) (92% - 97%)    PHYSICAL EXAM ON DAY OF DISCHARGE:    ---  CONSULTANTS:   - GI (Dr. Allen)  - Heme/Onc (Dr. Jacob)   - Cardiology (Dr. Parra)    ---  ADVANCED CARE PLANNING:  - Code status:       - MOLST completed:      [  ] NO     [  ] YES    ---  TIME SPENT:  I, the attending physician, was physically present for the key portions of the evaluation and management (E/M) service provided. The total amount of time spent reviewing the hospital notes, laboratory values, imaging findings, assessing/counseling the patient, discussing with consultant physicians, social work, nursing staff was -- minutes       ADMISSION DATE:  10-06-23    ---  FROM ADMISSION H+P:   HPI:  88-year-old female with PMH of CAD, gallstones, breast cancer (9 years ago status post radiation, reports never took oral medications because "she does not like to take pills"), atrial fibrillation, glaucoma, diverticulitis who presents to emergency room with bilateral flank pain, weakness, increased fatigue, dysphagia, and loss of appetite. Patient has a hx of kidney stones in the past, and states that over the past 4-5 days, she has been having persistent abdominal and flank pain. Also over the last 3 months, patient noticed that she is constantly spiting up a clear, jelly like mucous She has no know sick contacts, no fevers, chills, SOB. She feels like the mucous is coming form the back of her throat. Patient also admits that over the last year, she has been having decreasing appetite and increasing fatigue. Recently, over the course of the last month, the patient has been experiencing severe dysphagia. Anytime she tries to eat solid food, she feels a sharp pain in her epigastric region, and she spits the food back up. Also, any time she tries to drink water, she swallows, but the liquid comes right back up. Patient has only been able to tolerate [place] supplement drinks BID and carbonated beverages. She is also having pain under her right breast. She had a double mastectomy 9yrs ago after her breast cancer diagnosis and recently has felt pain again. Patient presented to her heme/onc (unknown name) a few months ago and was recommended to come to the ED for imaging. However, patient did not follow up. Currently denies fever, chills, chest pain, palpitations, SOB, urinary frequency, urgency, dysuria, hematuria, headaches, changes in vision, dizziness, numbness, tingling. No other complaints at this time.    ED course:  Vitals: /69 -->98/64  T 97 HR 65 RR 18 SpO2 94% on RA  Labs significant for: Lipase 185  UA: UA: Trace ketone, trace leuk esterase, occasional bacteria   EKbpm, afib  In ED Given: Zofran x1, Morphine 4mg x1, NaCl bolus x1    Imaging  CT Chest: New 7.2 cm distal esophageal mass. Findings may represent a true mass versus abnormal lymphadenopathy.  CT Renal Stone: New abdominal gastrohepatic and para-aortic lymphadenopathy as well as 3 cm irregular left para-aortic soft tissue. Bilateral nonobstructive renal calculi. No hydronephrosis. (06 Oct 2023 22:23)      ---  HOSPITAL COURSE/PERTINENT LABS/PROCEDURES PERFORMED/PENDING TESTS:   Pt was admitted for dysphagia and bilateral flank pain. CT C/A/P w/ IV Contrast showed large distal esophageal mass with extensive posterior mediastinal and upper abdominal lymphadenopathy, compatible with primary esophageal carcinoma. New patchy sclerosis of the T8 vertebral body, suspicious for metastatic disease. Pt was started on clear liquid diet and placed on maintenance fluids. GI was consulted, patient offered transfer for esophageal stent but patient refused. GOC discussion took place with patient and family. Plan for discharge to [facility].     Patient is stable for discharge as per primary medical team and consultants.    Patient showed improvement throughout hospitalization. Patient was seen and examined on day of discharge. Patient was medically optimized for discharge with close outpatient follow up.    ---  PATIENT CONDITION:  - stable    --  VITALS:   T(C): 36.6 (10-16-23 @ 04:42), Max: 36.6 (10-16-23 @ 04:42)  HR: 100 (10-16-23 @ 04:56) (85 - 102)  BP: 127/82 (10-16-23 @ 04:42) (127/82 - 128/86)  RR: 18 (10-16-23 @ 04:42) (17 - 18)  SpO2: 94% (10-16-23 @ 04:56) (91% - 94%)    GENERAL: patient appears well, no acute distress, appropriate, pleasant  EYES: sclera clear, no exudates  ENMT: oropharynx clear without erythema, no exudates, moist mucous membranes  NECK: supple, soft, no thyromegaly noted  LUNGS: good air entry bilaterally, clear to auscultation, symmetric breath sounds, no wheezing or rhonchi appreciated  HEART: soft S1/S2, regular rate and rhythm, no murmurs noted, no lower extremity edema  GASTROINTESTINAL: abdomen is soft, nontender, nondistended, normoactive bowel sounds, no palpable masses  INTEGUMENT: good skin turgor, no lesions noted  MUSCULOSKELETAL: no clubbing or cyanosis, no obvious deformity  NEUROLOGIC: awake, alert     ---  CONSULTANTS:   - GI (Dr. Allen)  - Heme/Onc (Dr. Jacob)   - Cardiology (Dr. Parra)    ---  TIME SPENT:  I, the attending physician, was physically present for the key portions of the evaluation and management (E/M) service provided. The total amount of time spent reviewing the hospital notes, laboratory values, imaging findings, assessing/counseling the patient, discussing with consultant physicians, social work, nursing staff was35 minutes

## 2023-10-08 NOTE — PROGRESS NOTE ADULT - PROBLEM SELECTOR PLAN 1
Patient has been having severe dysphagia and mucous production for the last 1-3 months. Hx of breast cancer s/p double mastectomy 9yr ago    - CT Chest non con: New 7.2 cm distal esophageal mass. Findings may represent a true mass versus abnormal lymphadenopathy  - CT C/A/P w/ IV contrast: Large distal esophageal mass with extensive posterior mediastinal and upper abdominal lymphadenopathy, compatible with primary esophageal carcinoma. New patchy sclerosis of the T8 vertebral body, suspicious for metastatic disease.  - Clear liquid diet w/Ensure; maintenance fluids with NS @ 75cc/hr  - Zofran PRN   - NPO after midnight for UGI Endoscopy w/ biopsy   - Cardiology consulted for clearance; pt is optimized from cardiovascular standpoint for procedure  - Will hold any AC prior to procedure   - GI Dr. Allen consulted, f/u recs   - Heme/Onc Dr. Jacob consulted, f/u recs  - Cardiology Dr. Parra consulted, recs appreciated Patient has been having severe dysphagia and mucous production for the last 1-3 months. Hx of breast cancer s/p double mastectomy 9yr ago    - CT Chest non con: New 7.2 cm distal esophageal mass. Findings may represent a true mass versus abnormal lymphadenopathy  - CT C/A/P w/ IV contrast: Large distal esophageal mass with extensive posterior mediastinal and upper abdominal lymphadenopathy, compatible with primary esophageal carcinoma. New patchy sclerosis of the T8 vertebral body, suspicious for metastatic disease.  - Clear liquid diet w/Ensure; maintenance fluids with NS @ 75cc/hr  - Zofran PRN   - NPO after midnight for UGI Endoscopy w/ biopsy   - Will hold any AC prior to procedure   - GI Dr. Allen consulted, f/u recs   - Heme/Onc Dr. Jacob consulted, f/u recs  - Cardiology Dr. Parra consulted for cardiac clearance, f/u recs

## 2023-10-08 NOTE — DISCHARGE NOTE PROVIDER - CARE PROVIDER_API CALL
Scarlett Jimenez NP in Family Health  [address] [place] [address]  Phone: [phone]  Fax: [phone]  Follow Up Time:

## 2023-10-08 NOTE — DISCHARGE NOTE PROVIDER - NSDCQMCOGNITION_NEU_ALL_CORE
Nutrition: Pt stated she wants a CHOPPED DIET once it advances d/t her Lt arm
needing therapy and her teeth.
RECOMMEND CHOPPED DIET ONCE DIET ADVANCES PAST CLEARS. No difficulties

## 2023-10-08 NOTE — PROGRESS NOTE ADULT - ASSESSMENT
88-year-old female with PMH of CAD, gallstones, breast cancer (9 years ago status post radiation, reports never took oral medications because "she does not like to take pills"), atrial fibrillation no AC, glaucoma, diverticulitis who presents with abd pain and found to have a new esophageal mass.     CAD, A fib  - her chest pain is nonanginal and likely from her esophageal mass  - ekg is nonischemic  - trop is neg  - check echo    - appears to have persistent AF  - Rate controlled per flow sheets 80-90s   - has not wanted any AC for years and at this point with a large esophageal mass I would be reluctant to start it.     - GI eval- plan for EGD  - Heme/onc following  - Pt has no active ischemia, decompensated heart failure, unstable arrythmia, or severe stenotic valvular disease. Patient is optimized from cardiovascular standpoint to proceed with planned procedure with routine hemodynamic monitoring.     - Monitor and replete lytes, keep K>4, Mg>2.  - Will continue to follow.    Mary Ann Chi, MS FNP, AGACNP  Nurse Practitioner- Cardiology   Please call on TEAMS

## 2023-10-08 NOTE — PROGRESS NOTE ADULT - SUBJECTIVE AND OBJECTIVE BOX
[facility]  Delbert Person PA-C  [address] [place] [address]  [phone]      INTERVAL HPI/OVERNIGHT EVENTS:  Seen and examined   reports unable to tolerate any PO  plan for EGD tomorrow     MEDICATIONS  (STANDING):  brimonidine 0.2% Ophthalmic Solution 1 Drop(s) Both EYES daily  dorzolamide 2%/timolol 0.5% Ophthalmic Solution 1 Drop(s) Both EYES two times a day  enoxaparin Injectable 40 milliGRAM(s) SubCutaneous every 24 hours  influenza  Vaccine (HIGH DOSE) 0.7 milliLiter(s) IntraMuscular once  latanoprost 0.005% Ophthalmic Solution 1 Drop(s) Both EYES at bedtime  naloxone Injectable 0.4 milliGRAM(s) IV Push once  sodium chloride 0.9%. 1000 milliLiter(s) (75 mL/Hr) IV Continuous <Continuous>    MEDICATIONS  (PRN):  acetaminophen  Suppository .. 650 milliGRAM(s) Rectal every 6 hours PRN Temp greater or equal to 38C (100.4F), Mild Pain (1 - 3)  bisacodyl Suppository 10 milliGRAM(s) Rectal daily PRN Constipation  morphine  - Injectable 4 milliGRAM(s) IV Push every 4 hours PRN Severe Pain (7 - 10)  morphine  - Injectable 2 milliGRAM(s) IV Push every 4 hours PRN Moderate Pain (4 - 6)  ondansetron Injectable 4 milliGRAM(s) IV Push every 8 hours PRN Nausea and/or Vomiting      Allergies    No Known Allergies    Intolerances      PHYSICAL EXAM:   Vital Signs:  Vital Signs Last 24 Hrs  T(C): 36.9 (08 Oct 2023 06:13), Max: 36.9 (08 Oct 2023 06:13)  T(F): 98.5 (08 Oct 2023 06:13), Max: 98.5 (08 Oct 2023 06:13)  HR: 80 (08 Oct 2023 06:13) (80 - 94)  BP: 133/84 (08 Oct 2023 06:13) (103/64 - 133/84)  BP(mean): --  RR: 18 (08 Oct 2023 06:13) (18 - 18)  SpO2: 94% (08 Oct 2023 06:13) (92% - 97%)    Parameters below as of 08 Oct 2023 06:13  Patient On (Oxygen Delivery Method): room air      Daily     Daily         GENERAL:  Appears stated age  HEENT:  NC/AT  CHEST:  Full & symmetric excursion  HEART:  Regular rhythm  ABDOMEN:  Soft, non tender non distended   EXTEREMITIES:  no cyanosis  SKIN:  No rash  NEURO:  Alert          LABS:                        13.5   6.96  )-----------( 246      ( 08 Oct 2023 08:36 )             41.8     10-08    142  |  108  |  16  ----------------------------<  82  4.6   |  27  |  0.92    Ca    9.3      08 Oct 2023 08:36    TPro  7.0  /  Alb  3.4  /  TBili  0.7  /  DBili  x   /  AST  21  /  ALT  18  /  AlkPhos  63  10-07      Urinalysis Basic - ( 08 Oct 2023 08:36 )    Color: x / Appearance: x / SG: x / pH: x  Gluc: 82 mg/dL / Ketone: x  / Bili: x / Urobili: x   Blood: x / Protein: x / Nitrite: x   Leuk Esterase: x / RBC: x / WBC x   Sq Epi: x / Non Sq Epi: x / Bacteria: x        RADIOLOGY & ADDITIONAL TESTS:

## 2023-10-08 NOTE — DISCHARGE NOTE PROVIDER - DETAILS OF MALNUTRITION DIAGNOSIS/DIAGNOSES
This patient has been assessed with a concern for Malnutrition and was treated during this hospitalization for the following Nutrition diagnosis/diagnoses:     -  10/09/2023: Moderate protein-calorie malnutrition

## 2023-10-08 NOTE — DISCHARGE NOTE PROVIDER - NSDCMRMEDTOKEN_GEN_ALL_CORE_FT
brimonidine 0.2% ophthalmic solution: 1 drop(s) in each affected eye once a day  dorzolamide-timolol 2%-0.5% preservative-free ophthalmic solution: 1 drop(s) to each affected eye 2 times a day  latanoprost 0.005% ophthalmic solution: 1 drop(s) to each affected eye once a day (at bedtime)  Rhopressa 0.02% ophthalmic solution: 1 drop(s) in each eye once a day   brimonidine 0.2% ophthalmic solution: 1 drop(s) in each affected eye once a day  dorzolamide-timolol 2%-0.5% preservative-free ophthalmic solution: 1 drop(s) to each affected eye 2 times a day  fentaNYL 25 mcg/hr transdermal film, extended release: 1 patch transdermal every 72 hours  latanoprost 0.005% ophthalmic solution: 1 drop(s) to each affected eye once a day (at bedtime)  Rhopressa 0.02% ophthalmic solution: 1 drop(s) in each eye once a day   bisacodyl 10 mg rectal suppository: 1 suppository(ies) rectal once a day As needed Constipation  brimonidine 0.2% ophthalmic solution: 1 drop(s) in each affected eye 2 times a day  dorzolamide-timolol 2%-0.5% preservative-free ophthalmic solution: 1 drop(s) to each affected eye 2 times a day  fentaNYL 25 mcg/hr transdermal film, extended release: 1 patch transdermal every 72 hours  HYDROmorphone: 0.5 milligram(s) intravenous every 90 minutes as needed for  moderate pain  HYDROmorphone: 1 milligram(s) intravenous every 15 minutes as needed for  severe pain  ipratropium-albuterol 0.5 mg-2.5 mg/3 mL inhalation solution: 3 milliliter(s) inhaled every 6 hours As needed Shortness of Breath and/or Wheezing  latanoprost 0.005% ophthalmic solution: 1 drop(s) to each affected eye once a day (at bedtime)  naloxone: 0.4 milligram(s) intravenous once  ondansetron 2 mg/mL injectable solution: 4 milligram(s) injectable every 8 hours as needed for  nausea

## 2023-10-08 NOTE — CARE COORDINATION ASSESSMENT. - NSDCPLANSERVICES_GEN_ALL_CORE
88-year-old female with PMH of CAD, gallstones, breast cancer (9 years ago status post radiation, atrial fibrillation, glaucoma, diverticulitis who presents to emergency room with bilateral flank pain, weakness, increased fatigue, dysphagia, and loss of appetite. New Esophageal mass on CT, pt for endoscopy Monday. CM met patient at bedside and introduced self, explained role through out hospital stay and dc planning, patient stated understanding. Patient is alert and oriented x4, pt lives alone in a private house. Patient is ind in adls with rolling walker and cane. Patient denies any services at home at his time. Patient declined to name caregiver as she stated she cares for her self. Patient drove her self to the hospital and plans to drive her self home. Anticipated needs not known at this time, CM will be available.  pcp: Tony Pantoja [phone]  dme at home : rolling walker, cane, shower chair  pharmacy: [facility]/Anticipated Needs Unclear at Present

## 2023-10-08 NOTE — PROGRESS NOTE ADULT - ASSESSMENT
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 New patchy sclerosis of the T8 vertebral body, suspicious for metastatic disease.    Seen by GI  planned EGD Mon      RECOMMENDATIONS    Await EGD  Await path    Follow CBC  Hgb 13.5    DVT Prophylaxis  SQ Lovenox or SQ heparin  HOLD for procedure tomorrow.     Discussed plan of care with patient.   Pt expressed understanding of the treatment plan.   Risks, benefits and alternatives discussed in detail.   Opportunity given for questions and discussion.   Questions or concerns all addressed and answered to their satisfaction, and in lay terms.     Thank you for consulting us.

## 2023-10-08 NOTE — CARE COORDINATION ASSESSMENT. - NSCAREPROVIDERS_GEN_ALL_CORE_FT
CARE PROVIDERS:  Accepting Physician: Venita Kennedy  Administration: Gelacio Fox  Admitting: Venita Kennedy  Attending: Venita Kennedy  Consultant: Hon Cecil  Consultant: Todd Allen  Consultant: Laura Parra  Covering Team: Kayla Boyd  ED ACP: Almita Carr  ED Attending: Omid Saleh  ED Nurse: Dat Canseco  Nurse: Brenda Villalta  Nurse: Adrienne Camargo  Nurse: Lauren Raymond  Ordered: Doctor, Unknown  Ordered: ADM, User  Outpatient Provider: Cruz   Override: Adrienne Camargo  Override: John, Francheska  PCA/Nursing Assistant: Jere Hutson  PCA/Nursing Assistant: Gilma Stephens  Primary Team: Kari Askew  Primary Team: Rip Garcia  Primary Team: Rip Leblanc  Primary Team: Domonique Winter  Primary Team: Venita Kennedy  Primary Team: Grace Lezama  Registered Dietitian: Salma Silvestre

## 2023-10-08 NOTE — PROGRESS NOTE ADULT - ATTENDING COMMENTS
patient seen at bedside.   awake  poor appetite   + back pain    A/P:  esophageal mass    - seen by GI for EGD on Mon    - Clears if tolerated, cont IVF    - pain control prn    - hematology consulted    afib    - patient reports she used to see Dr Esquivel and Dr Hogan     - not on medications at home.     - cardio following.     - ECHO ordered     DVT proph: lovenox held     Patient does not want me to call family at this time. She reports she will discuss with her family if needed.  NPO after midnight for EGD. Medically optimized for planned procedure

## 2023-10-08 NOTE — PROGRESS NOTE ADULT - SUBJECTIVE AND OBJECTIVE BOX
Patient is a 88y old  Female who presents with a chief complaint of esophageal mass (08 Oct 2023 11:47)      INTERVAL HPI/OVERNIGHT EVENTS: No acute overnight events. Pt was seen and examined at bedside. Continues to endorse dysphagia, diffuse abdominal pain, b/l back/flank pain.     MEDICATIONS  (STANDING):  brimonidine 0.2% Ophthalmic Solution 1 Drop(s) Both EYES daily  dorzolamide 2%/timolol 0.5% Ophthalmic Solution 1 Drop(s) Both EYES two times a day  enoxaparin Injectable 40 milliGRAM(s) SubCutaneous every 24 hours  influenza  Vaccine (HIGH DOSE) 0.7 milliLiter(s) IntraMuscular once  latanoprost 0.005% Ophthalmic Solution 1 Drop(s) Both EYES at bedtime  naloxone Injectable 0.4 milliGRAM(s) IV Push once  sodium chloride 0.9%. 1000 milliLiter(s) (75 mL/Hr) IV Continuous <Continuous>    MEDICATIONS  (PRN):  acetaminophen  Suppository .. 650 milliGRAM(s) Rectal every 6 hours PRN Temp greater or equal to 38C (100.4F), Mild Pain (1 - 3)  bisacodyl Suppository 10 milliGRAM(s) Rectal daily PRN Constipation  morphine  - Injectable 4 milliGRAM(s) IV Push every 4 hours PRN Severe Pain (7 - 10)  morphine  - Injectable 2 milliGRAM(s) IV Push every 4 hours PRN Moderate Pain (4 - 6)  ondansetron Injectable 4 milliGRAM(s) IV Push every 8 hours PRN Nausea and/or Vomiting      Allergies    No Known Allergies    Intolerances        REVIEW OF SYSTEMS:  CONSTITUTIONAL: No fever or chills  HEENT:  No headache, no sore throat  RESPIRATORY: No cough, wheezing, or shortness of breath  CARDIOVASCULAR: No chest pain, palpitations  GASTROINTESTINAL: Diffuse abdominal pain, dysphagia with regurgitation; no diarrhea or constipation   GENITOURINARY: No dysuria, frequency, or hematuria  NEUROLOGICAL: no focal weakness or dizziness  MUSCULOSKELETAL: Endorses B/L flank pain, back pain     Vital Signs Last 24 Hrs  T(C): 36.9 (08 Oct 2023 06:13), Max: 36.9 (08 Oct 2023 06:13)  T(F): 98.5 (08 Oct 2023 06:13), Max: 98.5 (08 Oct 2023 06:13)  HR: 80 (08 Oct 2023 06:13) (80 - 94)  BP: 133/84 (08 Oct 2023 06:13) (103/64 - 133/84)  BP(mean): --  RR: 18 (08 Oct 2023 06:13) (18 - 18)  SpO2: 94% (08 Oct 2023 06:13) (92% - 97%)    Parameters below as of 08 Oct 2023 06:13  Patient On (Oxygen Delivery Method): room air        PHYSICAL EXAM:  GENERAL: NAD  HEENT:  anicteric, moist mucous membranes  CHEST/LUNG:  CTA b/l, no rales, wheezes, or rhonchi  HEART:  irregularly irregular, tachycardic, no MRG   ABDOMEN: TTP diffusely, + Bowel sounds x4 quadrant  EXTREMITIES: no edema, cyanosis, or calf tenderness  MUSCULOSKELETAL: TTP under R breast and B/L CVA  NERVOUS SYSTEM: AOx4, answers questions and follows commands appropriately    LABS:                        13.5   6.96  )-----------( 246      ( 08 Oct 2023 08:36 )             41.8     CBC Full  -  ( 08 Oct 2023 08:36 )  WBC Count : 6.96 K/uL  Hemoglobin : 13.5 g/dL  Hematocrit : 41.8 %  Platelet Count - Automated : 246 K/uL  Mean Cell Volume : 101.5 fl  Mean Cell Hemoglobin : 32.8 pg  Mean Cell Hemoglobin Concentration : 32.3 gm/dL  Auto Neutrophil # : x  Auto Lymphocyte # : x  Auto Monocyte # : x  Auto Eosinophil # : x  Auto Basophil # : x  Auto Neutrophil % : x  Auto Lymphocyte % : x  Auto Monocyte % : x  Auto Eosinophil % : x  Auto Basophil % : x    08 Oct 2023 08:36    142    |  108    |  16     ----------------------------<  82     4.6     |  27     |  0.92     Ca    9.3        08 Oct 2023 08:36        Urinalysis Basic - ( 08 Oct 2023 08:36 )    Color: x / Appearance: x / SG: x / pH: x  Gluc: 82 mg/dL / Ketone: x  / Bili: x / Urobili: x   Blood: x / Protein: x / Nitrite: x   Leuk Esterase: x / RBC: x / WBC x   Sq Epi: x / Non Sq Epi: x / Bacteria: x      CAPILLARY BLOOD GLUCOSE              RADIOLOGY & ADDITIONAL TESTS: N/A    Personally reviewed.     Consultant(s) Notes Reviewed:  [x] YES  [ ] NO

## 2023-10-08 NOTE — CARE COORDINATION ASSESSMENT. - LIVING ARRANGEMENTS/HOME SAFETY CONCERNS
Cardiology NP    66 year old female with h/o Afib on eliquis with lovenox bridge, s/p acute saddle PE with c/o SOB. For RHC.  No change in medical history since PST. no concerns

## 2023-10-08 NOTE — PROGRESS NOTE ADULT - SUBJECTIVE AND OBJECTIVE BOX
[facility] -- Batool Jeter, Fidel Richardson Savella, Alvin Dyson: Office # [phone]    Follow Up: Esophageal mass     Subjective/Observations: Patient awake, alert, resting in bed. Denies chest pain, palpitations and dizziness. Denies any difficulty breathing. No orthopnea and PND. Tolerating room air. C/o chest discomfort and back pain. Reports poor appetite, IVF @ 75    REVIEW OF SYSTEMS: All other review of systems are negative unless indicated above    PAST MEDICAL & SURGICAL HISTORY:  S/P colon resection      Diverticulitis      Glaucoma      PSVT (paroxysmal supraventricular tachycardia)      Atrial fibrillation      Breast cancer      Gall stones      C. difficile diarrhea      S/P colon resection  march 11 2014      Status post appendectomy      H/O mastectomy, bilateral      H/O hernia repair      S/P ORIF (open reduction internal fixation) fracture  L lower extremity      H/O: hysterectomy          MEDICATIONS  (STANDING):  brimonidine 0.2% Ophthalmic Solution 1 Drop(s) Both EYES daily  dorzolamide 2%/timolol 0.5% Ophthalmic Solution 1 Drop(s) Both EYES two times a day  enoxaparin Injectable 40 milliGRAM(s) SubCutaneous every 24 hours  influenza  Vaccine (HIGH DOSE) 0.7 milliLiter(s) IntraMuscular once  latanoprost 0.005% Ophthalmic Solution 1 Drop(s) Both EYES at bedtime  naloxone Injectable 0.4 milliGRAM(s) IV Push once  sodium chloride 0.9%. 1000 milliLiter(s) (75 mL/Hr) IV Continuous <Continuous>    MEDICATIONS  (PRN):  acetaminophen  Suppository .. 650 milliGRAM(s) Rectal every 6 hours PRN Temp greater or equal to 38C (100.4F), Mild Pain (1 - 3)  bisacodyl Suppository 10 milliGRAM(s) Rectal daily PRN Constipation  morphine  - Injectable 4 milliGRAM(s) IV Push every 4 hours PRN Severe Pain (7 - 10)  morphine  - Injectable 2 milliGRAM(s) IV Push every 4 hours PRN Moderate Pain (4 - 6)  ondansetron Injectable 4 milliGRAM(s) IV Push every 8 hours PRN Nausea and/or Vomiting    Allergies    No Known Allergies    Intolerances      Vital Signs Last 24 Hrs  T(C): 36.9 (08 Oct 2023 06:13), Max: 36.9 (08 Oct 2023 06:13)  T(F): 98.5 (08 Oct 2023 06:13), Max: 98.5 (08 Oct 2023 06:13)  HR: 80 (08 Oct 2023 06:13) (80 - 94)  BP: 133/84 (08 Oct 2023 06:13) (103/64 - 133/84)  BP(mean): --  RR: 18 (08 Oct 2023 06:13) (18 - 18)  SpO2: 94% (08 Oct 2023 06:13) (92% - 97%)    Parameters below as of 08 Oct 2023 06:13  Patient On (Oxygen Delivery Method): room air      I&O's Summary        TELE: Not on telemetry   PHYSICAL EXAM:  Constitutional: NAD, awake and alert  HEENT: Moist Mucous Membranes, Anicteric  Pulmonary: Non-labored, breath sounds are clear bilaterally, No wheezing, rales or rhonchi  Cardiovascular: irregular, S1 and S2, No murmurs, No rubs, gallops or clicks  Gastrointestinal:  soft, nontender, nondistended   Lymph: No peripheral edema. No lymphadenopathy.   Skin: No visible rashes or ulcers.  Psych:  Mood & affect appropriate      LABS: All Labs Reviewed:                        13.5   6.96  )-----------( 246      ( 08 Oct 2023 08:36 )             41.8                         12.6   5.88  )-----------( Not reported    ( 07 Oct 2023 05:38 )             38.2                         14.2   8.66  )-----------( 270      ( 06 Oct 2023 18:00 )             41.9     08 Oct 2023 08:36    142    |  108    |  16     ----------------------------<  82     4.6     |  27     |  0.92   07 Oct 2023 05:38    137    |  107    |  20     ----------------------------<  92     4.5     |  26     |  0.99   06 Oct 2023 18:00    139    |  106    |  21     ----------------------------<  102    5.2     |  28     |  1.20     Ca    9.3        08 Oct 2023 08:36  Ca    9.1        07 Oct 2023 05:38  Ca    10.1       06 Oct 2023 18:00    TPro  7.0    /  Alb  3.4    /  TBili  0.7    /  DBili  x      /  AST  21     /  ALT  18     /  AlkPhos  63     07 Oct 2023 05:38  TPro  7.9    /  Alb  3.7    /  TBili  0.6    /  DBili  x      /  AST  23     /  ALT  23     /  AlkPhos  69     06 Oct 2023 18:00   LIVER FUNCTIONS - ( 07 Oct 2023 05:38 )  Alb: 3.4 g/dL / Pro: 7.0 g/dL / ALK PHOS: 63 U/L / ALT: 18 U/L / AST: 21 U/L / GGT: x           Troponin I, High Sensitivity Result: 18.4 ng/L (10-06-23 @ 18:00)  Cholesterol: 135 mg/dL (10-07-23 @ 05:38)  HDL Cholesterol: 43 mg/dL (10-07-23 @ 05:38)  Triglycerides, Serum: 96 mg/dL (10-07-23 @ 05:38)    12 Lead ECG:   Ventricular Rate 91 BPM    QRS Duration 82 ms    Q-T Interval 360 ms    QTC Calculation(Bazett) 442 ms    R Axis 11 degrees    T Axis 0 degrees    Diagnosis Line Atrial fibrillation  Abnormal ECG  When compared with ECG of 06-DEC-2021 18:15,  Criteria for Septal infarct are no longer present  Confirmed by TEVIN KENNEY ([id number]) on 10/7/2023 2:26:23 PM (10-06-23 @ 22:03)       EXAM:  ECHO TTE W/O CON COMP W/DOPPLR         PROCEDURE DATE:  07/28/2017        INTERPRETATION:  Ordering Physician: QUETA REYES [id number]    Indication: Atrial fibrillation    Study Quality: Technically good   A complete echocardiographic studywas performed utilizing standard   protocol including spectral and color Doppler in all echocardiographic   windows.    Height: 157 cm  Weight: 89kg  BSA: 1.89  Blood Pressure: 113/75    MEASUREMENTS  IVS: 1.1cm  PWT: 1.1cm  LA: 4.1cm  AO: 3.9cm  LVIDd: 5.4cm  LVIDs: 4.2cm    LVEF: 45%  RVSP: 38 mmHg    FINDINGS  Left Ventricle: Not well visualized. Moderate global Left ventricular   dysfunction. The septum, anteroseptum and inferior walls appear   hypokinetic.  Aortic Valve: Calcified trileafletaortic valve with normal opening.   Moderate aortic regurgitation  Mitral Valve: Moderate to severe mitral regurgitation  Tricuspid Valve: Mild to moderate TR  Pulmonic Valve: Not well visualized  Right Ventricle: Not well visualized. Likely normal RV function  Left atrium: mild left atrial enlargement  Pericardium/Pleura: Trace pericardial effusion      CONCLUSIONS:  Moderate global left ventricular dysfunction. Moderate to severe mitral   regurgitation. Moderate aortic regurgitation. Mildly elevated pulmonary   pressures. IVC is dilated at 2.3 cm and collapses with respiration.  CHANDLER BARDALES   This document has been electronically signed. Jul 28 2017  7:11PM

## 2023-10-08 NOTE — CARE COORDINATION ASSESSMENT. - NSPASTMEDSURGHISTORY_GEN_ALL_CORE_FT
PAST MEDICAL & SURGICAL HISTORY:  Glaucoma      Diverticulitis      S/P colon resection      Status post appendectomy      S/P colon resection  march 11 2014      PSVT (paroxysmal supraventricular tachycardia)      C. difficile diarrhea      Gall stones      Breast cancer      Atrial fibrillation      H/O: hysterectomy      S/P ORIF (open reduction internal fixation) fracture  L lower extremity      H/O hernia repair      H/O mastectomy, bilateral

## 2023-10-09 LAB
ANION GAP SERPL CALC-SCNC: 6 MMOL/L — SIGNIFICANT CHANGE UP (ref 5–17)
BUN SERPL-MCNC: 12 MG/DL — SIGNIFICANT CHANGE UP (ref 7–23)
CALCIUM SERPL-MCNC: 8.8 MG/DL — SIGNIFICANT CHANGE UP (ref 8.5–10.1)
CHLORIDE SERPL-SCNC: 110 MMOL/L — HIGH (ref 96–108)
CO2 SERPL-SCNC: 27 MMOL/L — SIGNIFICANT CHANGE UP (ref 22–31)
CREAT SERPL-MCNC: 0.9 MG/DL — SIGNIFICANT CHANGE UP (ref 0.5–1.3)
EGFR: 62 ML/MIN/1.73M2 — SIGNIFICANT CHANGE UP
GLUCOSE SERPL-MCNC: 91 MG/DL — SIGNIFICANT CHANGE UP (ref 70–99)
HCT VFR BLD CALC: 38.6 % — SIGNIFICANT CHANGE UP (ref 34.5–45)
HGB BLD-MCNC: 12.7 G/DL — SIGNIFICANT CHANGE UP (ref 11.5–15.5)
MCHC RBC-ENTMCNC: 32.9 GM/DL — SIGNIFICANT CHANGE UP (ref 32–36)
MCHC RBC-ENTMCNC: 33.2 PG — SIGNIFICANT CHANGE UP (ref 27–34)
MCV RBC AUTO: 101 FL — HIGH (ref 80–100)
NRBC # BLD: 0 /100 WBCS — SIGNIFICANT CHANGE UP (ref 0–0)
PLATELET # BLD AUTO: 224 K/UL — SIGNIFICANT CHANGE UP (ref 150–400)
POTASSIUM SERPL-MCNC: 4.5 MMOL/L — SIGNIFICANT CHANGE UP (ref 3.5–5.3)
POTASSIUM SERPL-SCNC: 4.5 MMOL/L — SIGNIFICANT CHANGE UP (ref 3.5–5.3)
RBC # BLD: 3.82 M/UL — SIGNIFICANT CHANGE UP (ref 3.8–5.2)
RBC # FLD: 12.5 % — SIGNIFICANT CHANGE UP (ref 10.3–14.5)
SODIUM SERPL-SCNC: 143 MMOL/L — SIGNIFICANT CHANGE UP (ref 135–145)
WBC # BLD: 8.02 K/UL — SIGNIFICANT CHANGE UP (ref 3.8–10.5)
WBC # FLD AUTO: 8.02 K/UL — SIGNIFICANT CHANGE UP (ref 3.8–10.5)

## 2023-10-09 PROCEDURE — 88313 SPECIAL STAINS GROUP 2: CPT | Mod: 26

## 2023-10-09 PROCEDURE — 99233 SBSQ HOSP IP/OBS HIGH 50: CPT | Mod: GC

## 2023-10-09 PROCEDURE — 93306 TTE W/DOPPLER COMPLETE: CPT | Mod: 26

## 2023-10-09 PROCEDURE — 99232 SBSQ HOSP IP/OBS MODERATE 35: CPT

## 2023-10-09 PROCEDURE — 88305 TISSUE EXAM BY PATHOLOGIST: CPT | Mod: 26

## 2023-10-09 DEVICE — ESOPHAGEAL BALLOON CATH CRE FIXED WIRE 6-7-8MM: Type: IMPLANTABLE DEVICE | Status: FUNCTIONAL

## 2023-10-09 DEVICE — ESOPHAGEAL BALLOON CATH CRE FIXED WIRE 8-9-10MM: Type: IMPLANTABLE DEVICE | Status: FUNCTIONAL

## 2023-10-09 DEVICE — ESOPHAGEAL BALLOON CATH CRE FIXED WIRE 10-11-12MM: Type: IMPLANTABLE DEVICE | Status: FUNCTIONAL

## 2023-10-09 DEVICE — ESOPHAGEAL BALLOON CATH CRE FIXED WIRE 15-16.5-18MM: Type: IMPLANTABLE DEVICE | Status: FUNCTIONAL

## 2023-10-09 DEVICE — HEMOSPRAY HEMOSTAT ENDO 7F: Type: IMPLANTABLE DEVICE | Status: FUNCTIONAL

## 2023-10-09 DEVICE — ESOPHAGEAL BALLOON CATH CRE FIXED WIRE 12-13.5-15MM: Type: IMPLANTABLE DEVICE | Status: FUNCTIONAL

## 2023-10-09 RX ADMIN — MORPHINE SULFATE 4 MILLIGRAM(S): 50 CAPSULE, EXTENDED RELEASE ORAL at 05:52

## 2023-10-09 RX ADMIN — BRIMONIDINE TARTRATE 1 DROP(S): 2 SOLUTION/ DROPS OPHTHALMIC at 05:26

## 2023-10-09 RX ADMIN — LATANOPROST 1 DROP(S): 0.05 SOLUTION/ DROPS OPHTHALMIC; TOPICAL at 21:54

## 2023-10-09 RX ADMIN — DORZOLAMIDE HYDROCHLORIDE TIMOLOL MALEATE 1 DROP(S): 20; 5 SOLUTION/ DROPS OPHTHALMIC at 17:33

## 2023-10-09 RX ADMIN — MORPHINE SULFATE 4 MILLIGRAM(S): 50 CAPSULE, EXTENDED RELEASE ORAL at 12:10

## 2023-10-09 RX ADMIN — MORPHINE SULFATE 4 MILLIGRAM(S): 50 CAPSULE, EXTENDED RELEASE ORAL at 17:30

## 2023-10-09 RX ADMIN — SODIUM CHLORIDE 75 MILLILITER(S): 9 INJECTION INTRAMUSCULAR; INTRAVENOUS; SUBCUTANEOUS at 21:54

## 2023-10-09 RX ADMIN — MORPHINE SULFATE 4 MILLIGRAM(S): 50 CAPSULE, EXTENDED RELEASE ORAL at 05:22

## 2023-10-09 RX ADMIN — DORZOLAMIDE HYDROCHLORIDE TIMOLOL MALEATE 1 DROP(S): 20; 5 SOLUTION/ DROPS OPHTHALMIC at 05:26

## 2023-10-09 RX ADMIN — BRIMONIDINE TARTRATE 1 DROP(S): 2 SOLUTION/ DROPS OPHTHALMIC at 17:33

## 2023-10-09 NOTE — DIETITIAN INITIAL EVALUATION ADULT - PERTINENT LABORATORY DATA
10-09    143  |  110<H>  |  12  ----------------------------<  91  4.5   |  27  |  0.90    Ca    8.8      09 Oct 2023 06:56    A1C with Estimated Average Glucose Result: 5.6 % (10-07-23 @ 05:38)

## 2023-10-09 NOTE — PROGRESS NOTE ADULT - ASSESSMENT
88-year-old female with PMH of CAD, gallstones, breast cancer (9 years ago status post radiation, reports never took oral medications because "she does not like to take pills"), atrial fibrillation no AC, glaucoma, diverticulitis who presents with abd pain and found to have a new esophageal mass.     CAD, Persistent Afib  - Her chest pain is nonanginal and likely from her esophageal mass  - EKG is nonischemic  - Trop is neg, no need to trend  - Had a TTE in 7/2017 showing EF 45%.  Would repeat    - Appears to have persistent AFib.  Rate-controlled  - Has not wanted any AC for years and at this point with a large esophageal mass, would be reluctant to start it.     - GI eval- plan for EGD today, 10/9  - Heme/onc following  - Pt has no active ischemia, decompensated heart failure, unstable arrythmia, or severe stenotic valvular disease.  Patient remains optimized from cardiovascular standpoint to proceed with planned procedure with routine hemodynamic monitoring.     - Monitor and replete lytes, keep K>4, Mg>2.  - Will continue to follow.    Carolin Milian DNP, NP-C, AGACNP-C  Cardiology   Call TEAMS

## 2023-10-09 NOTE — PROGRESS NOTE ADULT - SUBJECTIVE AND OBJECTIVE BOX
[facility] -- Batool Jeter, Debra, Tana Parra, , Kiel Esquivel  Office # [phone]    Follow Up:  Awake and alert.  Denies chest pain or palpitations.  Comfortable on RA.  No overnight events    Subjective/Observations:     REVIEW OF SYSTEMS: All other review of systems is negative unless indicated above  PAST MEDICAL & SURGICAL HISTORY:  S/P colon resection  Diverticulitis  Glaucoma  PSVT (paroxysmal supraventricular tachycardia)  Atrial fibrillation  Breast cancer  Gall stones  C. difficile diarrhea  S/P colon resection  march 11 2014  Status post appendectomy  H/O mastectomy, bilateral  H/O hernia repair  S/P ORIF (open reduction internal fixation) fracture  L lower extremity  H/O: hysterectomy    MEDICATIONS  (STANDING):  brimonidine 0.2% Ophthalmic Solution 1 Drop(s) Both EYES two times a day  dorzolamide 2%/timolol 0.5% Ophthalmic Solution 1 Drop(s) Both EYES two times a day  influenza  Vaccine (HIGH DOSE) 0.7 milliLiter(s) IntraMuscular once  latanoprost 0.005% Ophthalmic Solution 1 Drop(s) Both EYES at bedtime  naloxone Injectable 0.4 milliGRAM(s) IV Push once  sodium chloride 0.9%. 1000 milliLiter(s) (75 mL/Hr) IV Continuous <Continuous>    MEDICATIONS  (PRN):  acetaminophen  Suppository .. 650 milliGRAM(s) Rectal every 6 hours PRN Temp greater or equal to 38C (100.4F), Mild Pain (1 - 3)  bisacodyl Suppository 10 milliGRAM(s) Rectal daily PRN Constipation  morphine  - Injectable 4 milliGRAM(s) IV Push every 4 hours PRN Severe Pain (7 - 10)  morphine  - Injectable 2 milliGRAM(s) IV Push every 4 hours PRN Moderate Pain (4 - 6)  ondansetron Injectable 4 milliGRAM(s) IV Push every 8 hours PRN Nausea and/or Vomiting    Allergies    No Known Allergies    Intolerances    Vital Signs Last 24 Hrs  T(C): 37.3 (09 Oct 2023 13:33), Max: 37.3 (09 Oct 2023 13:33)  T(F): 99.1 (09 Oct 2023 13:33), Max: 99.1 (09 Oct 2023 13:33)  HR: 123 (09 Oct 2023 13:33) (82 - 123)  BP: 145/87 (09 Oct 2023 13:33) (118/82 - 145/87)  BP(mean): --  RR: 17 (09 Oct 2023 13:33) (16 - 18)  SpO2: 94% (09 Oct 2023 13:33) (90% - 94%)    Parameters below as of 09 Oct 2023 13:33  Patient On (Oxygen Delivery Method): room air  I&O's Summary    08 Oct 2023 07:01  -  09 Oct 2023 07:00  --------------------------------------------------------  IN: 900 mL / OUT: 1700 mL / NET: -800 mL    PHYSICAL EXAM:  TELE:   Constitutional: NAD, awake and alert, well-developed  HEENT: Moist Mucous Membranes, Anicteric  Pulmonary: Non-labored, breath sounds are clear bilaterally, No wheezing, rales or rhonchi  Cardiovascular: IRRR, S1 and S2, +murmurs, no rubs, gallops or clicks  Gastrointestinal: Bowel Sounds present, soft, nontender.   Lymph: No peripheral edema. No lymphadenopathy.  Skin: No visible rashes or ulcers.  Psych:  Mood & affect appropriate  LABS: All Labs Reviewed:                        12.7   8.02  )-----------( 224      ( 09 Oct 2023 06:56 )             38.6                         13.5   6.96  )-----------( 246      ( 08 Oct 2023 08:36 )             41.8                         12.6   5.88  )-----------( Not reported    ( 07 Oct 2023 05:38 )             38.2     09 Oct 2023 06:56    143    |  110    |  12     ----------------------------<  91     4.5     |  27     |  0.90   08 Oct 2023 08:36    142    |  108    |  16     ----------------------------<  82     4.6     |  27     |  0.92   07 Oct 2023 05:38    137    |  107    |  20     ----------------------------<  92     4.5     |  26     |  0.99     Ca    8.8        09 Oct 2023 06:56  Ca    9.3        08 Oct 2023 08:36  Ca    9.1        07 Oct 2023 05:38    TPro  7.0    /  Alb  3.4    /  TBili  0.7    /  DBili  x      /  AST  21     /  ALT  18     /  AlkPhos  63     07 Oct 2023 05:38  TPro  7.9    /  Alb  3.7    /  TBili  0.6    /  DBili  x      /  AST  23     /  ALT  23     /  AlkPhos  69     06 Oct 2023 18:00    12 Lead ECG:   Ventricular Rate 91 BPM    QRS Duration 82 ms    Q-T Interval 360 ms    QTC Calculation(Bazett) 442 ms    R Axis 11 degrees    T Axis 0 degrees    Diagnosis Line Atrial fibrillation  Abnormal ECG  When compared with ECG of 06-DEC-2021 18:15,  Criteria for Septal infarct are no longer present  Confirmed by TEVIN PARRA ([id number]) on 10/7/2023 2:26:23 PM (10-06-23 @ 22:03)  EXAM:  ECHO TTE W/O CON COMP W/DOPPLR     PROCEDURE DATE:  07/28/2017    INTERPRETATION:  Ordering Physician: QUETA REYES [id number]    Indication: Atrial fibrillation    Study Quality: Technically good   A complete echocardiographic studywas performed utilizing standard   protocol including spectral and color Doppler in all echocardiographic   windows.    Height: 157 cm  Weight: 89kg  BSA: 1.89  Blood Pressure: 113/75    MEASUREMENTS  IVS: 1.1cm  PWT: 1.1cm  LA: 4.1cm  AO: 3.9cm  LVIDd: 5.4cm  LVIDs: 4.2cm    LVEF: 45%  RVSP: 38 mmHg    FINDINGS  Left Ventricle: Not well visualized. Moderate global Left ventricular   dysfunction. The septum, anteroseptum and inferior walls appear   hypokinetic.  Aortic Valve: Calcified trileafletaortic valve with normal opening.   Moderate aortic regurgitation  Mitral Valve: Moderate to severe mitral regurgitation  Tricuspid Valve: Mild to moderate TR  Pulmonic Valve: Not well visualized  Right Ventricle: Not well visualized. Likely normal RV function  Left atrium: mild left atrial enlargement  Pericardium/Pleura: Trace pericardial effusion    CONCLUSIONS:  Moderate global left ventricular dysfunction. Moderate to severe mitral   regurgitation. Moderate aortic regurgitation. Mildly elevated pulmonary   pressures. IVC is dilated at 2.3 cm and collapses with respiration.    CHANDLER BARDALES   This document has been electronically signed. Jul 28 2017  7:11PM

## 2023-10-09 NOTE — DIETITIAN INITIAL EVALUATION ADULT - HEIGHT FOR BMI (CENTIMETERS)
Head,  normocephalic,  atraumatic,  Face,  Face within normal limits,  Ears,  External ears within normal limits,  Nose/Nasopharynx,  External nose  normal appearance, no nasal discharge,  Mouth and Throat,  Oral cavity appearance normal Lips,  Appearance normal 157.48

## 2023-10-09 NOTE — DIETITIAN INITIAL EVALUATION ADULT - PHYSCIAL ASSESSMENT
limited nutrition focused exam but no observed loss other than temple muscle loss BMI 31/other (specify)

## 2023-10-09 NOTE — PROGRESS NOTE ADULT - PROBLEM SELECTOR PLAN 1
Patient has been having severe dysphagia and mucous production for the last 1-3 months. Hx of breast cancer s/p double mastectomy 9yr ago    - CT Chest non con: New 7.2 cm distal esophageal mass. Findings may represent a true mass versus abnormal lymphadenopathy  - CT C/A/P w/ IV contrast: Large distal esophageal mass with extensive posterior mediastinal and upper abdominal lymphadenopathy, compatible with primary esophageal carcinoma. New patchy sclerosis of the T8 vertebral body, suspicious for metastatic disease.  - Clear liquid diet w/Ensure; maintenance fluids with NS @ 75cc/hr  - Zofran PRN   - NPO after midnight for UGI Endoscopy w/ biopsy   - Will hold any AC prior to procedure   - GI Dr. Allen consulted, f/u recs   - Heme/Onc Dr. Jacob consulted, f/u recs  - Cardiology Dr. Parra consulted for cardiac clearance, f/u recs Patient has been having severe dysphagia and mucous production for the last 1-3 months. Hx of breast cancer s/p double mastectomy 9yr ago    - CT Chest non con: New 7.2 cm distal esophageal mass. Findings may represent a true mass versus abnormal lymphadenopathy  - CT C/A/P w/ IV contrast: Large distal esophageal mass with extensive posterior mediastinal and upper abdominal lymphadenopathy, compatible with primary esophageal carcinoma. New patchy sclerosis of the T8 vertebral body, suspicious for metastatic disease.  - Clear liquid diet w/Ensure; maintenance fluids with NS @ 75cc/hr  - Zofran PRN   - NPO for UGI Endoscopy w/ biopsy 10/9  - Will hold any AC prior to procedure   - GI Dr. Allen consulted, f/u recs   - Heme/Onc Dr. Jacob consulted, f/u recs  - Cardiology Dr. Parra consulted for cardiac clearance, f/u recs

## 2023-10-09 NOTE — DIETITIAN INITIAL EVALUATION ADULT - ORAL NUTRITION SUPPLEMENTS
recommend ensure clear TID on clear liquids and ensure plus HP TID 350kcals and 20 gms protein per serving

## 2023-10-09 NOTE — PROGRESS NOTE ADULT - SUBJECTIVE AND OBJECTIVE BOX
Patient is a 88y old  Female who presents with a chief complaint of esophageal mass (08 Oct 2023 18:52)      FROM ADMISSION H+P:   HPI:  88-year-old female with PMH of CAD, gallstones, breast cancer (9 years ago status post radiation, reports never took oral medications because "she does not like to take pills"), atrial fibrillation, glaucoma, diverticulitis who presents to emergency room with bilateral flank pain, weakness, increased fatigue, dysphagia, and loss of appetite. Patient has a hx of kidney stones in the past, and states that over the past 4-5 days, she has been having persistent abdominal and flank pain. Also over the last 3 months, patient noticed that she is constantly spiting up a clear, jelly like mucous She has no know sick contacts, no fevers, chills, SOB. She feels like the mucous is coming form the back of her throat. Patient also admits that over the last year, she has been having decreasing appetite and increasing fatigue. Recently, over the course of the last month, the patient has been experiencing severe dysphagia. Anytime she tries to eat solid food, she feels a sharp pain in her epigastric region, and she spits the food back up. Also, any time she tries to drink water, she swallows, but the liquid comes right back up. Patient has only been able to tolerate [employer] supplement drinks BID and carbonated beverages. She is also having pain under her right breast. She had a double mastectomy 9yrs ago after her breast cancer diagnosis and recently has felt pain again. Patient presented to her heme/onc (unknown name) a few months ago and was recommended to come to the ED for imaging. However, patient did not follow up. Currently denies fever, chills, chest pain, palpitations, SOB, urinary frequency, urgency, dysuria, hematuria, headaches, changes in vision, dizziness, numbness, tingling. No other complaints at this time.    ED course:  Vitals: /69 -->98/64  T 97 HR 65 RR 18 SpO2 94% on RA  Labs significant for: Lipase 185  UA: UA: Trace ketone, trace leuk esterase, occasional bacteria   EKbpm, afib  In ED Given: Zofran x1, Morphine 4mg x1, NaCl bolus x1    Imaging  CT Chest: New 7.2 cm distal esophageal mass. Findings may represent a true mass versus abnormal lymphadenopathy.  CT Renal Stone: New abdominal gastrohepatic and para-aortic lymphadenopathy as well as 3 cm irregular left para-aortic soft tissue. Bilateral nonobstructive renal calculi. No hydronephrosis. (06 Oct 2023 22:23)      ----  INTERVAL HPI/OVERNIGHT EVENTS: Pt seen and evaluated at the bedside. No acute overnight events occurred. The patient denies fevers, chills, nausea, vomiting, chest pain, palpitations or shortness of breath.     ----  PAST MEDICAL & SURGICAL HISTORY:  S/P colon resection      Diverticulitis      Glaucoma      PSVT (paroxysmal supraventricular tachycardia)      Atrial fibrillation      Breast cancer      Gall stones      C. difficile diarrhea      S/P colon resection  2014      Status post appendectomy      H/O mastectomy, bilateral      H/O hernia repair      S/P ORIF (open reduction internal fixation) fracture  L lower extremity      H/O: hysterectomy          FAMILY HISTORY:  Family history of heart valve abnormality        Home Medications:  brimonidine 0.2% ophthalmic solution: 1 drop(s) in each affected eye once a day (06 Oct 2023 22:15)  dorzolamide-timolol 2%-0.5% preservative-free ophthalmic solution: 1 drop(s) to each affected eye 2 times a day (06 Oct 2023 22:15)  latanoprost 0.005% ophthalmic solution: 1 drop(s) to each affected eye once a day (at bedtime) (06 Oct 2023 22:15)  Rhopressa 0.02% ophthalmic solution: 1 drop(s) in each eye once a day (06 Oct 2023 22:15)      Allergies    No Known Allergies    Intolerances        ----  MEDICATIONS  (STANDING):  brimonidine 0.2% Ophthalmic Solution 1 Drop(s) Both EYES two times a day  dorzolamide 2%/timolol 0.5% Ophthalmic Solution 1 Drop(s) Both EYES two times a day  influenza  Vaccine (HIGH DOSE) 0.7 milliLiter(s) IntraMuscular once  latanoprost 0.005% Ophthalmic Solution 1 Drop(s) Both EYES at bedtime  naloxone Injectable 0.4 milliGRAM(s) IV Push once  sodium chloride 0.9%. 1000 milliLiter(s) (75 mL/Hr) IV Continuous <Continuous>    MEDICATIONS  (PRN):  acetaminophen  Suppository .. 650 milliGRAM(s) Rectal every 6 hours PRN Temp greater or equal to 38C (100.4F), Mild Pain (1 - 3)  bisacodyl Suppository 10 milliGRAM(s) Rectal daily PRN Constipation  morphine  - Injectable 4 milliGRAM(s) IV Push every 4 hours PRN Severe Pain (7 - 10)  morphine  - Injectable 2 milliGRAM(s) IV Push every 4 hours PRN Moderate Pain (4 - 6)  ondansetron Injectable 4 milliGRAM(s) IV Push every 8 hours PRN Nausea and/or Vomiting      ----  REVIEW OF SYSTEMS:  Constitutional: denies fever, chills, diaphoresis  HEENT: denies sore throat, runny nose, blurry vision, double vision    Respiratory: denies SOB, RANGEL, cough, wheezing, hemoptysis  Cardiovascular: denies CP, palpitations, LE edema  Gastrointestinal:  denies nausea, vomiting, diarrhea, constipation, abdominal pain, melena, hematochezia   Genitourinary: denies dysuria, hematuria  Skin/Breast: denies rash, hives, itching   Musculoskeletal: denies myalgias, arthritis, joint swelling, muscle weakness  Neurologic: denies syncope, LOC, headache, weakness, dizziness       ----  T(C): 37 (10-09-23 @ 04:42), Max: 37 (10-09-23 @ 04:42)  HR: 108 (10-09-23 @ 04:42) (82 - 108)  BP: 129/68 (10-09-23 @ 04:42) (109/69 - 129/68)  RR: 16 (10-09-23 @ 04:42) (16 - 18)  SpO2: 92% (10-09-23 @ 04:42) (92% - 94%)  Wt(kg): --    Physical Exam:   GENERAL: well-groomed, well-developed, NAD  HEENT: head NC/AT; EOM intact, PERRLA, conjunctiva & sclera clear; hearing grossly intact, moist mucous membranes  NECK: supple, no JVD  RESPIRATORY: CTA B/L, no wheezing, rales, rhonchi or rubs  CARDIOVASCULAR: S1&S2, RRR, no murmurs or gallops  ABDOMEN: soft, non-tender, non-distended, + Bowel sounds x4 quadrants, no guarding, rebound or rigidity  MUSCULOSKELETAL:  no clubbing, cyanosis or edema of all 4 extremities  LYMPH: no cervical lymphadenopathy  VASCULAR: Radial pulses 2+ bilaterally, no varicose veins   SKIN: warm and dry, color normal  NEUROLOGIC: AA&O X3, CN2-12 intact w/ no focal deficits, no sensory loss, motor Strength 5/5 in UE & LE B/L  Psych: Normal mood and affect, normal behavior         ----  I&O's Summary    08 Oct 2023 07:01  -  09 Oct 2023 07:00  --------------------------------------------------------  IN: 900 mL / OUT: 1700 mL / NET: -800 mL        LABS:                        13.5   6.96  )-----------( 246      ( 08 Oct 2023 08:36 )             41.8     10-08    142  |  108  |  16  ----------------------------<  82  4.6   |  27  |  0.92    Ca    9.3      08 Oct 2023 08:36        Urinalysis Basic - ( 08 Oct 2023 08:36 )    Color: x / Appearance: x / SG: x / pH: x  Gluc: 82 mg/dL / Ketone: x  / Bili: x / Urobili: x   Blood: x / Protein: x / Nitrite: x   Leuk Esterase: x / RBC: x / WBC x   Sq Epi: x / Non Sq Epi: x / Bacteria: x      CAPILLARY BLOOD GLUCOSE          10-06 @ 21:10   >=3 organisms. Probable collection contamination.  --  --                 Patient is a 88y old  Female who presents with a chief complaint of esophageal mass (08 Oct 2023 18:52)    Pt seen and examined at bedside. Continued epigastric discomfort radiating to the back. Denies any F/C, N/V, CP or SOB.    FROM ADMISSION H+P:   HPI:  88-year-old female with PMH of CAD, gallstones, breast cancer (9 years ago status post radiation, reports never took oral medications because "she does not like to take pills"), atrial fibrillation, glaucoma, diverticulitis who presents to emergency room with bilateral flank pain, weakness, increased fatigue, dysphagia, and loss of appetite. Patient has a hx of kidney stones in the past, and states that over the past 4-5 days, she has been having persistent abdominal and flank pain. Also over the last 3 months, patient noticed that she is constantly spiting up a clear, jelly like mucous She has no know sick contacts, no fevers, chills, SOB. She feels like the mucous is coming form the back of her throat. Patient also admits that over the last year, she has been having decreasing appetite and increasing fatigue. Recently, over the course of the last month, the patient has been experiencing severe dysphagia. Anytime she tries to eat solid food, she feels a sharp pain in her epigastric region, and she spits the food back up. Also, any time she tries to drink water, she swallows, but the liquid comes right back up. Patient has only been able to tolerate [employer] supplement drinks BID and carbonated beverages. She is also having pain under her right breast. She had a double mastectomy 9yrs ago after her breast cancer diagnosis and recently has felt pain again. Patient presented to her heme/onc (unknown name) a few months ago and was recommended to come to the ED for imaging. However, patient did not follow up. Currently denies fever, chills, chest pain, palpitations, SOB, urinary frequency, urgency, dysuria, hematuria, headaches, changes in vision, dizziness, numbness, tingling. No other complaints at this time.    ED course:  Vitals: /69 -->98/64  T 97 HR 65 RR 18 SpO2 94% on RA  Labs significant for: Lipase 185  UA: UA: Trace ketone, trace leuk esterase, occasional bacteria   EKbpm, afib  In ED Given: Zofran x1, Morphine 4mg x1, NaCl bolus x1    Imaging  CT Chest: New 7.2 cm distal esophageal mass. Findings may represent a true mass versus abnormal lymphadenopathy.  CT Renal Stone: New abdominal gastrohepatic and para-aortic lymphadenopathy as well as 3 cm irregular left para-aortic soft tissue. Bilateral nonobstructive renal calculi. No hydronephrosis. (06 Oct 2023 22:23)      ----  INTERVAL HPI/OVERNIGHT EVENTS: Pt seen and evaluated at the bedside. No acute overnight events occurred. The patient denies fevers, chills, nausea, vomiting, chest pain, palpitations or shortness of breath.     ----  PAST MEDICAL & SURGICAL HISTORY:  S/P colon resection      Diverticulitis      Glaucoma      PSVT (paroxysmal supraventricular tachycardia)      Atrial fibrillation      Breast cancer      Gall stones      C. difficile diarrhea      S/P colon resection  2014      Status post appendectomy      H/O mastectomy, bilateral      H/O hernia repair      S/P ORIF (open reduction internal fixation) fracture  L lower extremity      H/O: hysterectomy          FAMILY HISTORY:  Family history of heart valve abnormality        Home Medications:  brimonidine 0.2% ophthalmic solution: 1 drop(s) in each affected eye once a day (06 Oct 2023 22:15)  dorzolamide-timolol 2%-0.5% preservative-free ophthalmic solution: 1 drop(s) to each affected eye 2 times a day (06 Oct 2023 22:15)  latanoprost 0.005% ophthalmic solution: 1 drop(s) to each affected eye once a day (at bedtime) (06 Oct 2023 22:15)  Rhopressa 0.02% ophthalmic solution: 1 drop(s) in each eye once a day (06 Oct 2023 22:15)      Allergies    No Known Allergies    Intolerances        ----  MEDICATIONS  (STANDING):  brimonidine 0.2% Ophthalmic Solution 1 Drop(s) Both EYES two times a day  dorzolamide 2%/timolol 0.5% Ophthalmic Solution 1 Drop(s) Both EYES two times a day  influenza  Vaccine (HIGH DOSE) 0.7 milliLiter(s) IntraMuscular once  latanoprost 0.005% Ophthalmic Solution 1 Drop(s) Both EYES at bedtime  naloxone Injectable 0.4 milliGRAM(s) IV Push once  sodium chloride 0.9%. 1000 milliLiter(s) (75 mL/Hr) IV Continuous <Continuous>    MEDICATIONS  (PRN):  acetaminophen  Suppository .. 650 milliGRAM(s) Rectal every 6 hours PRN Temp greater or equal to 38C (100.4F), Mild Pain (1 - 3)  bisacodyl Suppository 10 milliGRAM(s) Rectal daily PRN Constipation  morphine  - Injectable 4 milliGRAM(s) IV Push every 4 hours PRN Severe Pain (7 - 10)  morphine  - Injectable 2 milliGRAM(s) IV Push every 4 hours PRN Moderate Pain (4 - 6)  ondansetron Injectable 4 milliGRAM(s) IV Push every 8 hours PRN Nausea and/or Vomiting      ----  REVIEW OF SYSTEMS:  Constitutional: denies fever, chills, diaphoresis  HEENT: denies sore throat, runny nose, blurry vision, double vision    Respiratory: denies SOB, RANGEL, cough, wheezing, hemoptysis  Cardiovascular: denies CP, palpitations, LE edema  Gastrointestinal:  denies nausea, vomiting, diarrhea, constipation, abdominal pain, melena, hematochezia   Genitourinary: denies dysuria, hematuria  Skin/Breast: denies rash, hives, itching   Musculoskeletal: denies myalgias, arthritis, joint swelling, muscle weakness  Neurologic: denies syncope, LOC, headache, weakness, dizziness       ----  T(C): 37 (10-09-23 @ 04:42), Max: 37 (10-09-23 @ 04:42)  HR: 108 (10-09-23 @ 04:42) (82 - 108)  BP: 129/68 (10-09-23 @ 04:42) (109/69 - 129/68)  RR: 16 (10-09-23 @ 04:42) (16 - 18)  SpO2: 92% (10-09-23 @ 04:42) (92% - 94%)  Wt(kg): --    Physical Exam:   GENERAL: well-groomed, well-developed, NAD  HEENT: head NC/AT; EOM intact, PERRLA, conjunctiva & sclera clear; hearing grossly intact, moist mucous membranes  NECK: supple, no JVD  RESPIRATORY: CTA B/L, no wheezing, rales, rhonchi or rubs  CARDIOVASCULAR: S1&S2, RRR, no murmurs or gallops  ABDOMEN: soft, non-tender, non-distended, + Bowel sounds x4 quadrants, no guarding, rebound or rigidity  MUSCULOSKELETAL:  no clubbing, cyanosis or edema of all 4 extremities  LYMPH: no cervical lymphadenopathy  VASCULAR: Radial pulses 2+ bilaterally, no varicose veins   SKIN: warm and dry, color normal  NEUROLOGIC: AA&O X3, CN2-12 intact w/ no focal deficits, no sensory loss, motor Strength 5/5 in UE & LE B/L  Psych: Normal mood and affect, normal behavior         ----  I&O's Summary    08 Oct 2023 07:01  -  09 Oct 2023 07:00  --------------------------------------------------------  IN: 900 mL / OUT: 1700 mL / NET: -800 mL        LABS:                        13.5   6.96  )-----------( 246      ( 08 Oct 2023 08:36 )             41.8     10-    142  |  108  |  16  ----------------------------<  82  4.6   |  27  |  0.92    Ca    9.3      08 Oct 2023 08:36        Urinalysis Basic - ( 08 Oct 2023 08:36 )    Color: x / Appearance: x / SG: x / pH: x  Gluc: 82 mg/dL / Ketone: x  / Bili: x / Urobili: x   Blood: x / Protein: x / Nitrite: x   Leuk Esterase: x / RBC: x / WBC x   Sq Epi: x / Non Sq Epi: x / Bacteria: x      CAPILLARY BLOOD GLUCOSE          10-06 @ 21:10   >=3 organisms. Probable collection contamination.  --  --

## 2023-10-09 NOTE — DIETITIAN INITIAL EVALUATION ADULT - SIGNS/SYMPTOMS
as evidenced by weight loss, inadequate oral intake < 50% greater than 5 days, and mild muscle loss as evidenced by weight loss, inadequate oral intake < 75 % > 1 month , and mild muscle loss

## 2023-10-09 NOTE — DIETITIAN INITIAL EVALUATION ADULT - PERTINENT MEDS FT
MEDICATIONS  (STANDING):  brimonidine 0.2% Ophthalmic Solution 1 Drop(s) Both EYES two times a day  dorzolamide 2%/timolol 0.5% Ophthalmic Solution 1 Drop(s) Both EYES two times a day  influenza  Vaccine (HIGH DOSE) 0.7 milliLiter(s) IntraMuscular once  latanoprost 0.005% Ophthalmic Solution 1 Drop(s) Both EYES at bedtime  naloxone Injectable 0.4 milliGRAM(s) IV Push once  sodium chloride 0.9%. 1000 milliLiter(s) (75 mL/Hr) IV Continuous <Continuous>    MEDICATIONS  (PRN):  acetaminophen  Suppository .. 650 milliGRAM(s) Rectal every 6 hours PRN Temp greater or equal to 38C (100.4F), Mild Pain (1 - 3)  bisacodyl Suppository 10 milliGRAM(s) Rectal daily PRN Constipation  morphine  - Injectable 4 milliGRAM(s) IV Push every 4 hours PRN Severe Pain (7 - 10)  morphine  - Injectable 2 milliGRAM(s) IV Push every 4 hours PRN Moderate Pain (4 - 6)  ondansetron Injectable 4 milliGRAM(s) IV Push every 8 hours PRN Nausea and/or Vomiting

## 2023-10-09 NOTE — DIETITIAN INITIAL EVALUATION ADULT - CONTINUE CURRENT NUTRITION CARE PLAN
follow EGD results, patient may benefit from speech swallow evaluation prior to diet advancement/yes

## 2023-10-09 NOTE — PROGRESS NOTE ADULT - SUBJECTIVE AND OBJECTIVE BOX
[INTERVAL HX: ]  Patient seen and examined;  Chart reviewed and events noted;     no CP, no SOB  some knee pain  s/p EGD with bx done  was advised by GI needs stent, txfer possible, decline by pt at current time.     [MEDICATIONS]  MEDICATIONS  (STANDING):  brimonidine 0.2% Ophthalmic Solution 1 Drop(s) Both EYES two times a day  dorzolamide 2%/timolol 0.5% Ophthalmic Solution 1 Drop(s) Both EYES two times a day  influenza  Vaccine (HIGH DOSE) 0.7 milliLiter(s) IntraMuscular once  latanoprost 0.005% Ophthalmic Solution 1 Drop(s) Both EYES at bedtime  naloxone Injectable 0.4 milliGRAM(s) IV Push once  sodium chloride 0.9%. 1000 milliLiter(s) (75 mL/Hr) IV Continuous <Continuous>    MEDICATIONS  (PRN):  acetaminophen  Suppository .. 650 milliGRAM(s) Rectal every 6 hours PRN Temp greater or equal to 38C (100.4F), Mild Pain (1 - 3)  bisacodyl Suppository 10 milliGRAM(s) Rectal daily PRN Constipation  morphine  - Injectable 4 milliGRAM(s) IV Push every 4 hours PRN Severe Pain (7 - 10)  morphine  - Injectable 2 milliGRAM(s) IV Push every 4 hours PRN Moderate Pain (4 - 6)  ondansetron Injectable 4 milliGRAM(s) IV Push every 8 hours PRN Nausea and/or Vomiting      [VITALS]  Vital Signs Last 24 Hrs  T(C): 37.3 (09 Oct 2023 13:33), Max: 37.3 (09 Oct 2023 13:33)  T(F): 99.1 (09 Oct 2023 13:33), Max: 99.1 (09 Oct 2023 13:33)  HR: 123 (09 Oct 2023 13:33) (108 - 123)  BP: 145/87 (09 Oct 2023 13:33) (121/77 - 145/87)  BP(mean): --  RR: 17 (09 Oct 2023 13:33) (16 - 17)  SpO2: 94% (09 Oct 2023 13:33) (90% - 94%)    Parameters below as of 09 Oct 2023 13:33  Patient On (Oxygen Delivery Method): room air      [WT/HT]  Daily     Daily   [VENT]      [PHYSICAL EXAM]  GEN: NAD  HEENT: normocephalic and atraumatic. EOMI. .    NECK: Supple.  No lymphadenopathy   LUNGS: Clear to auscultation.  HEART: Regular rate and rhythm,  no MRG  ABDOMEN: Soft, nontender, and nondistended.  Positive bowel sounds.    : No CVA tenderness  EXTREMITIES: Without edema.  NEUROLOGIC: grossly intact.  PSYCHIATRIC: Appropriate affect .  SKIN: No rash     [LABS:]                        12.7   8.02  )-----------( 224      ( 09 Oct 2023 06:56 )             38.6     10-09    143  |  110<H>  |  12  ----------------------------<  91  4.5   |  27  |  0.90    Ca    8.8      09 Oct 2023 06:56              Urinalysis Basic - ( 09 Oct 2023 06:56 )    Color: x / Appearance: x / SG: x / pH: x  Gluc: 91 mg/dL / Ketone: x  / Bili: x / Urobili: x   Blood: x / Protein: x / Nitrite: x   Leuk Esterase: x / RBC: x / WBC x   Sq Epi: x / Non Sq Epi: x / Bacteria: x        Culture - Urine (collected 06 Oct 2023 21:10)  Source: Clean Catch Clean Catch (Midstream)  Final Report (08 Oct 2023 12:28):    >=3 organisms. Probable collection contamination.            Culture - Urine (collected 06 Oct 2023 21:10)  Source: Clean Catch Clean Catch (Midstream)  Final Report (08 Oct 2023 12:28):    >=3 organisms. Probable collection contamination.        [RADIOLOGY STUDIES:]

## 2023-10-09 NOTE — PROGRESS NOTE ADULT - ASSESSMENT
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 New patchy sclerosis of the T8 vertebral body, suspicious for metastatic disease.    Seen by GI  s/p EGD   Scope cannot be passed post obx  stent being considered.         RECOMMENDATIONS    Await path  Await pt decision re stent and txfer    Follow CBC  Hgb 13.5    DVT Prophylaxis  SQ Lovenox or SQ heparin if no procedures.   HOLD for procedures    Pt post procedure,   Further discussion once anesthetic agents have cleared    Thank you for consulting us.

## 2023-10-09 NOTE — PROGRESS NOTE ADULT - SUBJECTIVE AND OBJECTIVE BOX
s/p  upper gastrointestinal endoscopy    large obstructing esophageal mass noted at 31cm from the incisors, biopsied    rec:   f/u path  clears  needs transfer to site that can perform esophageal stent

## 2023-10-09 NOTE — DIETITIAN NUTRITION RISK NOTIFICATION - TREATMENT: THE FOLLOWING DIET HAS BEEN RECOMMENDED
Diet, NPO after Midnight:      NPO Start Date: 08-Oct-2023,   NPO Start Time: 23:59 (10-08-23 @ 10:54) [Active]  Diet, Clear Liquid:   Supplement Feeding Modality:  Oral  Ensure Clear Cans or Servings Per Day:  1       Frequency:  Three Times a day (10-08-23 @ 10:54) [Active]

## 2023-10-09 NOTE — DIETITIAN INITIAL EVALUATION ADULT - ADD RECOMMEND
1. recommend MVI 2. follow for diet advancement 3. recommend speech evaluation prior to diet advancement 4. follow plan of care

## 2023-10-09 NOTE — DIETITIAN INITIAL EVALUATION ADULT - ORAL INTAKE PTA/DIET HISTORY
patient seen in bed reports with low PO for 3 months. reports over last few weeks was taking only 2X CIB per day. reports gags on liquids currently not tolerating or wanting any liquids . for EGD today. patietn reports with pain when swallowing at home.   no food allergies   patient states with weight loss but unknown amount. # per patient   education deferred at this time patient is NPO for procedure

## 2023-10-09 NOTE — DIETITIAN INITIAL EVALUATION ADULT - PROBLEM SELECTOR PLAN 2
Patient complaining of diffuse abdominal and flank pain. suspect metastatic disease   - CT Renal Stone: New abdominal gastrohepatic and para-aortic lymphadenopathy as well as 3 cm irregular left para-aortic soft tissue. Bilateral nonobstructive renal calculi. No hydronephrosis  - UA: Trace ketone, trace leuk esterase, occasional bacteria   - Lipase 185  - Pain not likely due to kidney stones, as patient has no blood, and no obstructing stones   - S/p Morphine x1  - Continue analgesics PRN   - Clear liquid diet   - Continue IVF   - Monitor CMP  - fu CT A/P with IV contrast   - GI Dr. Allen consulted, f/u recs

## 2023-10-09 NOTE — DIETITIAN INITIAL EVALUATION ADULT - PROBLEM SELECTOR PLAN 1
Patient has been having severe dysphagia and mucous production for the last 1-3 months. Hx of breast cancer s/p double mastectomy 9yr ago    - S/p Zofran x1, NaCl bolus x1  - CT Chest non con: New 7.2 cm distal esophageal mass. Findings may represent a true mass versus abnormal lymphadenopathy  - Follows outpatient Heme/Onc (Formerly Dr. Hilario) at the [facility], unknown new doctor name  - F/u CT chest with IV contrast to further evaluate   - Clear liquid diet   - Rehydrate with gentle IVF for12 hrs --> reassess volume status   - Continue Zofran PRN   - GI Dr. Allen consulted, f/u recs   - Heme/Onc Dr. Tang consulted, f/u recs

## 2023-10-09 NOTE — PROGRESS NOTE ADULT - ATTENDING COMMENTS
patient seen at bedside.   awake  poor appetite   + back pain    A/P:  esophageal mass    - seen by GI for EGD today     - NPO, on IVF     - pain control prn    - hematology following     afib    - patient reports she used to see Dr Esquivel and Dr Hogan     - not on medications at home.     - cardio following.     - ECHO performed      DVT proph: lovenox held   NPO for procedure today   medically optimized for EGD    Patient does not want me to contact family at this time. She reports she will discuss with her family if needed.   NPO after midnight for EGD. Medically optimized for planned procedure . patient seen at bedside.   awake  poor appetite   + back pain    A/P:  esophageal mass    - seen by GI for EGD today     - NPO, on IVF     - pain control prn    - hematology following     afib    - patient reports she used to see Dr Esquivel and Dr Hogan     - not on medications at home.     - cardio following.     - ECHO performed      DVT proph: lovenox held   NPO for procedure today   medically optimized for EGD    Patient does not want me to contact family at this time. She reports she will discuss with her family if needed.

## 2023-10-09 NOTE — DIETITIAN INITIAL EVALUATION ADULT - OTHER INFO
[Joint Pain] : joint pain [Negative] : Endocrine Reason for Admission: esophageal mass    History of Present Illness:   88-year-old female with PMH of CAD, gallstones, breast cancer (9 years ago status post radiation, reports never took oral medications because "she does not like to take pills"), atrial fibrillation, glaucoma, diverticulitis who presents to emergency room with bilateral flank pain, weakness, increased fatigue, dysphagia, and loss of appetite. Patient has a hx of kidney stones in the past, and states that over the past 4-5 days, she has been having persistent abdominal and flank pain. Also over the last 3 months, patient noticed that she is constantly spiting up a clear, jelly like mucous She has no know sick contacts, no fevers, chills, SOB. She feels like the mucous is coming form the back of her throat. Patient also admits that over the last year, she has been having decreasing appetite and increasing fatigue. Recently, over the course of the last month, the patient has been experiencing severe dysphagia. Anytime she tries to eat solid food, she feels a sharp pain in her epigastric region, and she spits the food back up. Also, any time she tries to drink water, she swallows, but the liquid comes right back up. Patient has only been able to tolerate [place] supplement drinks BID and carbonated beverages. She is also having pain under her right breast. She had a double mastectomy 9yrs ago after her breast cancer diagnosis and recently has felt pain again. Patient presented to her heme/onc (unknown name) a few months ago and was recommended to come to the ED for imaging. However, patient did not follow up.  Large distal esophageal mass with extensive posterior mediastinal and upper abdominal lymphadenopathy, compatible with primary esophageal carcinoma. New patchy sclerosis of the T8 vertebral body, suspicious for metastatic disease.  weight 170# this admit # per patient , patient states thinks current wt closer to 160#  10/7 BM

## 2023-10-10 LAB
ANION GAP SERPL CALC-SCNC: 8 MMOL/L — SIGNIFICANT CHANGE UP (ref 5–17)
BASOPHILS # BLD AUTO: 0.05 K/UL — SIGNIFICANT CHANGE UP (ref 0–0.2)
BASOPHILS NFR BLD AUTO: 0.6 % — SIGNIFICANT CHANGE UP (ref 0–2)
BUN SERPL-MCNC: 14 MG/DL — SIGNIFICANT CHANGE UP (ref 7–23)
CALCIUM SERPL-MCNC: 8.9 MG/DL — SIGNIFICANT CHANGE UP (ref 8.5–10.1)
CHLORIDE SERPL-SCNC: 107 MMOL/L — SIGNIFICANT CHANGE UP (ref 96–108)
CO2 SERPL-SCNC: 24 MMOL/L — SIGNIFICANT CHANGE UP (ref 22–31)
CREAT SERPL-MCNC: 0.82 MG/DL — SIGNIFICANT CHANGE UP (ref 0.5–1.3)
EGFR: 70 ML/MIN/1.73M2 — SIGNIFICANT CHANGE UP
EOSINOPHIL # BLD AUTO: 0.12 K/UL — SIGNIFICANT CHANGE UP (ref 0–0.5)
EOSINOPHIL NFR BLD AUTO: 1.5 % — SIGNIFICANT CHANGE UP (ref 0–6)
GLUCOSE SERPL-MCNC: 87 MG/DL — SIGNIFICANT CHANGE UP (ref 70–99)
HCT VFR BLD CALC: 38.4 % — SIGNIFICANT CHANGE UP (ref 34.5–45)
HGB BLD-MCNC: 12.8 G/DL — SIGNIFICANT CHANGE UP (ref 11.5–15.5)
IMM GRANULOCYTES NFR BLD AUTO: 0.4 % — SIGNIFICANT CHANGE UP (ref 0–0.9)
LYMPHOCYTES # BLD AUTO: 1.45 K/UL — SIGNIFICANT CHANGE UP (ref 1–3.3)
LYMPHOCYTES # BLD AUTO: 17.5 % — SIGNIFICANT CHANGE UP (ref 13–44)
MCHC RBC-ENTMCNC: 33.1 PG — SIGNIFICANT CHANGE UP (ref 27–34)
MCHC RBC-ENTMCNC: 33.3 GM/DL — SIGNIFICANT CHANGE UP (ref 32–36)
MCV RBC AUTO: 99.2 FL — SIGNIFICANT CHANGE UP (ref 80–100)
MONOCYTES # BLD AUTO: 0.91 K/UL — HIGH (ref 0–0.9)
MONOCYTES NFR BLD AUTO: 11 % — SIGNIFICANT CHANGE UP (ref 2–14)
NEUTROPHILS # BLD AUTO: 5.71 K/UL — SIGNIFICANT CHANGE UP (ref 1.8–7.4)
NEUTROPHILS NFR BLD AUTO: 69 % — SIGNIFICANT CHANGE UP (ref 43–77)
NRBC # BLD: 0 /100 WBCS — SIGNIFICANT CHANGE UP (ref 0–0)
PLATELET # BLD AUTO: 225 K/UL — SIGNIFICANT CHANGE UP (ref 150–400)
POTASSIUM SERPL-MCNC: 4 MMOL/L — SIGNIFICANT CHANGE UP (ref 3.5–5.3)
POTASSIUM SERPL-SCNC: 4 MMOL/L — SIGNIFICANT CHANGE UP (ref 3.5–5.3)
RBC # BLD: 3.87 M/UL — SIGNIFICANT CHANGE UP (ref 3.8–5.2)
RBC # FLD: 12.3 % — SIGNIFICANT CHANGE UP (ref 10.3–14.5)
SODIUM SERPL-SCNC: 139 MMOL/L — SIGNIFICANT CHANGE UP (ref 135–145)
WBC # BLD: 8.27 K/UL — SIGNIFICANT CHANGE UP (ref 3.8–10.5)
WBC # FLD AUTO: 8.27 K/UL — SIGNIFICANT CHANGE UP (ref 3.8–10.5)

## 2023-10-10 PROCEDURE — 99232 SBSQ HOSP IP/OBS MODERATE 35: CPT | Mod: GC

## 2023-10-10 PROCEDURE — 99497 ADVNCD CARE PLAN 30 MIN: CPT | Mod: 25

## 2023-10-10 RX ORDER — FENTANYL CITRATE 50 UG/ML
1 INJECTION INTRAVENOUS
Refills: 0 | Status: ACTIVE | OUTPATIENT
Start: 2023-10-10 | End: 2023-10-17

## 2023-10-10 RX ORDER — MORPHINE SULFATE 50 MG/1
10 CAPSULE, EXTENDED RELEASE ORAL EVERY 6 HOURS
Refills: 0 | Status: DISCONTINUED | OUTPATIENT
Start: 2023-10-10 | End: 2023-10-16

## 2023-10-10 RX ORDER — MORPHINE SULFATE 50 MG/1
5 CAPSULE, EXTENDED RELEASE ORAL
Refills: 0 | Status: DISCONTINUED | OUTPATIENT
Start: 2023-10-10 | End: 2023-10-16

## 2023-10-10 RX ADMIN — MORPHINE SULFATE 4 MILLIGRAM(S): 50 CAPSULE, EXTENDED RELEASE ORAL at 16:48

## 2023-10-10 RX ADMIN — DORZOLAMIDE HYDROCHLORIDE TIMOLOL MALEATE 1 DROP(S): 20; 5 SOLUTION/ DROPS OPHTHALMIC at 21:47

## 2023-10-10 RX ADMIN — MORPHINE SULFATE 4 MILLIGRAM(S): 50 CAPSULE, EXTENDED RELEASE ORAL at 22:25

## 2023-10-10 RX ADMIN — DORZOLAMIDE HYDROCHLORIDE TIMOLOL MALEATE 1 DROP(S): 20; 5 SOLUTION/ DROPS OPHTHALMIC at 05:07

## 2023-10-10 RX ADMIN — MORPHINE SULFATE 4 MILLIGRAM(S): 50 CAPSULE, EXTENDED RELEASE ORAL at 17:21

## 2023-10-10 RX ADMIN — MORPHINE SULFATE 4 MILLIGRAM(S): 50 CAPSULE, EXTENDED RELEASE ORAL at 21:55

## 2023-10-10 RX ADMIN — MORPHINE SULFATE 4 MILLIGRAM(S): 50 CAPSULE, EXTENDED RELEASE ORAL at 12:08

## 2023-10-10 RX ADMIN — MORPHINE SULFATE 4 MILLIGRAM(S): 50 CAPSULE, EXTENDED RELEASE ORAL at 12:20

## 2023-10-10 RX ADMIN — BRIMONIDINE TARTRATE 1 DROP(S): 2 SOLUTION/ DROPS OPHTHALMIC at 05:07

## 2023-10-10 RX ADMIN — BRIMONIDINE TARTRATE 1 DROP(S): 2 SOLUTION/ DROPS OPHTHALMIC at 21:47

## 2023-10-10 NOTE — PROGRESS NOTE ADULT - ATTENDING COMMENTS
patient seen at bedside.   awake  not eating much     A/P:  esophageal mass    - s/p EGD, large esophageal mass.     - recommended esophageal stenting which patient is currently declining.     - pain control prn    - hematology following     afib    - patient reports she used to see Dr Esquivel and Dr Hogan     - not on medications at home.     - cardio following.     - ECHO pending    DVT proph: lovenox held     Patient does not want to have esophageal stent. aware that she is currently not eating well and cannot take more than liquid. She is declining chemo and understands that she would pass in a few weeks to months. She is interested in home hospice and wants to go home. She lives alone. Does not want family contacted. Friend is HCP. Discharge planning pending. SW aware. Palliative aware. DNR/DNI.     Patient does not want me to contact family at this time. She reports she will discuss with her family if needed.

## 2023-10-10 NOTE — PROGRESS NOTE ADULT - PROBLEM SELECTOR PLAN 4
Patient has a hx of Afib, formerly on Metoprolol and Warfarin, but stopped all meds due to non compliance   - Follows outpatient Cardio Dr. Beach   - EKbpm, afib  - Not complaining of any chest pain or palpitations, patient expressed that she does not feel the need to take any medications  - Cardio Dr. Parra consulted, recs appreciated
Patient has a hx of Afib, formerly on Metoprolol and Warfarin, but stopped all meds due to non compliance   - Follows outpatient Cardio Dr. Beach   - EKbpm, afib  - Not complaining of any chest pain or palpitations, patient expressed that she does not feel the need to take any medications  - Cardio Dr. Parra consulted, recs appreciated
Patient has a hx of Afib, formerly on Metoprolol and Warfarin, but stopped all meds due to non compliance   - Follows outpatient Cardio Dr. Beach   - EKbpm, afib  - Not complaining of any chest pain or palpitations, patient expressed that she does not feel the need to take any medications  - Cardio Dr. Fajardo consulted, f/u recs
Patient has a hx of Afib, formerly on Metoprolol and Warfarin, but stopped all meds due to non compliance   - Follows outpatient Cardio Dr. Beach   - EKbpm, afib  - Not complaining of any chest pain or palpitations, patient expressed that she does not feel the need to take any medications  - Cardio Dr. Parra consulted, recs appreciated

## 2023-10-10 NOTE — PROGRESS NOTE ADULT - SUBJECTIVE AND OBJECTIVE BOX
Patient is a 88y old  Female who presents with a chief complaint of esophageal mass (09 Oct 2023 20:14)    ***Pt seen and examined overnight.     FROM ADMISSION H+P:   HPI:  88-year-old female with PMH of CAD, gallstones, breast cancer (9 years ago status post radiation, reports never took oral medications because "she does not like to take pills"), atrial fibrillation, glaucoma, diverticulitis who presents to emergency room with bilateral flank pain, weakness, increased fatigue, dysphagia, and loss of appetite. Patient has a hx of kidney stones in the past, and states that over the past 4-5 days, she has been having persistent abdominal and flank pain. Also over the last 3 months, patient noticed that she is constantly spiting up a clear, jelly like mucous She has no know sick contacts, no fevers, chills, SOB. She feels like the mucous is coming form the back of her throat. Patient also admits that over the last year, she has been having decreasing appetite and increasing fatigue. Recently, over the course of the last month, the patient has been experiencing severe dysphagia. Anytime she tries to eat solid food, she feels a sharp pain in her epigastric region, and she spits the food back up. Also, any time she tries to drink water, she swallows, but the liquid comes right back up. Patient has only been able to tolerate [place] supplement drinks BID and carbonated beverages. She is also having pain under her right breast. She had a double mastectomy 9yrs ago after her breast cancer diagnosis and recently has felt pain again. Patient presented to her heme/onc (unknown name) a few months ago and was recommended to come to the ED for imaging. However, patient did not follow up. Currently denies fever, chills, chest pain, palpitations, SOB, urinary frequency, urgency, dysuria, hematuria, headaches, changes in vision, dizziness, numbness, tingling. No other complaints at this time.    ED course:  Vitals: /69 -->98/64  T 97 HR 65 RR 18 SpO2 94% on RA  Labs significant for: Lipase 185  UA: UA: Trace ketone, trace leuk esterase, occasional bacteria   EKbpm, afib  In ED Given: Zofran x1, Morphine 4mg x1, NaCl bolus x1    Imaging  CT Chest: New 7.2 cm distal esophageal mass. Findings may represent a true mass versus abnormal lymphadenopathy.  CT Renal Stone: New abdominal gastrohepatic and para-aortic lymphadenopathy as well as 3 cm irregular left para-aortic soft tissue. Bilateral nonobstructive renal calculi. No hydronephrosis. (06 Oct 2023 22:23)      ----  INTERVAL HPI/OVERNIGHT EVENTS: Pt seen and evaluated at the bedside. No acute overnight events occurred. The patient denies fevers, chills, nausea, vomiting, chest pain, palpitations or shortness of breath.     ----  PAST MEDICAL & SURGICAL HISTORY:  S/P colon resection      Diverticulitis      Glaucoma      PSVT (paroxysmal supraventricular tachycardia)      Atrial fibrillation      Breast cancer      Gall stones      C. difficile diarrhea      S/P colon resection  2014      Status post appendectomy      H/O mastectomy, bilateral      H/O hernia repair      S/P ORIF (open reduction internal fixation) fracture  L lower extremity      H/O: hysterectomy          FAMILY HISTORY:  Family history of heart valve abnormality        Home Medications:  brimonidine 0.2% ophthalmic solution: 1 drop(s) in each affected eye once a day (06 Oct 2023 22:15)  dorzolamide-timolol 2%-0.5% preservative-free ophthalmic solution: 1 drop(s) to each affected eye 2 times a day (06 Oct 2023 22:15)  latanoprost 0.005% ophthalmic solution: 1 drop(s) to each affected eye once a day (at bedtime) (06 Oct 2023 22:15)  Rhopressa 0.02% ophthalmic solution: 1 drop(s) in each eye once a day (06 Oct 2023 22:15)      Allergies    No Known Allergies    Intolerances        ----  MEDICATIONS  (STANDING):  brimonidine 0.2% Ophthalmic Solution 1 Drop(s) Both EYES two times a day  dorzolamide 2%/timolol 0.5% Ophthalmic Solution 1 Drop(s) Both EYES two times a day  influenza  Vaccine (HIGH DOSE) 0.7 milliLiter(s) IntraMuscular once  latanoprost 0.005% Ophthalmic Solution 1 Drop(s) Both EYES at bedtime  naloxone Injectable 0.4 milliGRAM(s) IV Push once  sodium chloride 0.9%. 1000 milliLiter(s) (75 mL/Hr) IV Continuous <Continuous>    MEDICATIONS  (PRN):  acetaminophen  Suppository .. 650 milliGRAM(s) Rectal every 6 hours PRN Temp greater or equal to 38C (100.4F), Mild Pain (1 - 3)  bisacodyl Suppository 10 milliGRAM(s) Rectal daily PRN Constipation  morphine  - Injectable 4 milliGRAM(s) IV Push every 4 hours PRN Severe Pain (7 - 10)  morphine  - Injectable 2 milliGRAM(s) IV Push every 4 hours PRN Moderate Pain (4 - 6)  ondansetron Injectable 4 milliGRAM(s) IV Push every 8 hours PRN Nausea and/or Vomiting      ----  REVIEW OF SYSTEMS:  Constitutional: denies fever, chills, diaphoresis  HEENT: denies sore throat, runny nose, blurry vision, double vision    Respiratory: denies SOB, RANGEL, cough, wheezing, hemoptysis  Cardiovascular: denies CP, palpitations, LE edema  Gastrointestinal:  denies nausea, vomiting, diarrhea, constipation, abdominal pain, melena, hematochezia   Genitourinary: denies dysuria, hematuria  Skin/Breast: denies rash, hives, itching   Musculoskeletal: denies myalgias, arthritis, joint swelling, muscle weakness  Neurologic: denies syncope, LOC, headache, weakness, dizziness       ----  T(C): 36.8 (10-10-23 @ 05:12), Max: 37.3 (10-09-23 @ 13:33)  HR: 94 (10-10-23 @ 05:12) (74 - 123)  BP: 139/74 (10-10-23 @ 05:12) (121/77 - 145/87)  RR: 17 (10-10-23 @ 05:12) (17 - 17)  SpO2: 91% (10-10-23 @ 05:12) (90% - 94%)  Wt(kg): --    Physical Exam:   GENERAL: well-groomed, well-developed, NAD  HEENT: head NC/AT; EOM intact, PERRLA, conjunctiva & sclera clear; hearing grossly intact, moist mucous membranes  NECK: supple, no JVD  RESPIRATORY: CTA B/L, no wheezing, rales, rhonchi or rubs  CARDIOVASCULAR: S1&S2, RRR, no murmurs or gallops  ABDOMEN: soft, non-tender, non-distended, + Bowel sounds x4 quadrants, no guarding, rebound or rigidity  MUSCULOSKELETAL:  no clubbing, cyanosis or edema of all 4 extremities  LYMPH: no cervical lymphadenopathy  VASCULAR: Radial pulses 2+ bilaterally, no varicose veins   SKIN: warm and dry, color normal  NEUROLOGIC: AA&O X3, CN2-12 intact w/ no focal deficits, no sensory loss, motor Strength 5/5 in UE & LE B/L  Psych: Normal mood and affect, normal behavior         ----  I&O's Summary    09 Oct 2023 07:01  -  10 Oct 2023 07:00  --------------------------------------------------------  IN: 0 mL / OUT: 500 mL / NET: -500 mL        LABS:                        12.8   8.27  )-----------( 225      ( 10 Oct 2023 06:15 )             38.4     10-10    139  |  107  |  14  ----------------------------<  87  4.0   |  24  |  0.82    Ca    8.9      10 Oct 2023 06:15        Urinalysis Basic - ( 10 Oct 2023 06:15 )    Color: x / Appearance: x / SG: x / pH: x  Gluc: 87 mg/dL / Ketone: x  / Bili: x / Urobili: x   Blood: x / Protein: x / Nitrite: x   Leuk Esterase: x / RBC: x / WBC x   Sq Epi: x / Non Sq Epi: x / Bacteria: x      CAPILLARY BLOOD GLUCOSE          10-06 @ 21:10   >=3 organisms. Probable collection contamination.  --  --                 Patient is a 88y old  Female who presents with a chief complaint of esophageal mass (09 Oct 2023 20:14)    Pt seen and examined overnight. Complains of epigastric pain, which is slightly more since EGD and biopsy procedure yesterday. Denies any F/C, N/V, CP, SOB, or abdominal pain.    FROM ADMISSION H+P:   HPI:  88-year-old female with PMH of CAD, gallstones, breast cancer (9 years ago status post radiation, reports never took oral medications because "she does not like to take pills"), atrial fibrillation, glaucoma, diverticulitis who presents to emergency room with bilateral flank pain, weakness, increased fatigue, dysphagia, and loss of appetite. Patient has a hx of kidney stones in the past, and states that over the past 4-5 days, she has been having persistent abdominal and flank pain. Also over the last 3 months, patient noticed that she is constantly spiting up a clear, jelly like mucous She has no know sick contacts, no fevers, chills, SOB. She feels like the mucous is coming form the back of her throat. Patient also admits that over the last year, she has been having decreasing appetite and increasing fatigue. Recently, over the course of the last month, the patient has been experiencing severe dysphagia. Anytime she tries to eat solid food, she feels a sharp pain in her epigastric region, and she spits the food back up. Also, any time she tries to drink water, she swallows, but the liquid comes right back up. Patient has only been able to tolerate [place] supplement drinks BID and carbonated beverages. She is also having pain under her right breast. She had a double mastectomy 9yrs ago after her breast cancer diagnosis and recently has felt pain again. Patient presented to her heme/onc (unknown name) a few months ago and was recommended to come to the ED for imaging. However, patient did not follow up. Currently denies fever, chills, chest pain, palpitations, SOB, urinary frequency, urgency, dysuria, hematuria, headaches, changes in vision, dizziness, numbness, tingling. No other complaints at this time.    ED course:  Vitals: /69 -->98/64  T 97 HR 65 RR 18 SpO2 94% on RA  Labs significant for: Lipase 185  UA: UA: Trace ketone, trace leuk esterase, occasional bacteria   EKbpm, afib  In ED Given: Zofran x1, Morphine 4mg x1, NaCl bolus x1    Imaging  CT Chest: New 7.2 cm distal esophageal mass. Findings may represent a true mass versus abnormal lymphadenopathy.  CT Renal Stone: New abdominal gastrohepatic and para-aortic lymphadenopathy as well as 3 cm irregular left para-aortic soft tissue. Bilateral nonobstructive renal calculi. No hydronephrosis. (06 Oct 2023 22:23)      ----  INTERVAL HPI/OVERNIGHT EVENTS: Pt seen and evaluated at the bedside. No acute overnight events occurred. The patient denies fevers, chills, nausea, vomiting, chest pain, palpitations or shortness of breath.     ----  PAST MEDICAL & SURGICAL HISTORY:  S/P colon resection      Diverticulitis      Glaucoma      PSVT (paroxysmal supraventricular tachycardia)      Atrial fibrillation      Breast cancer      Gall stones      C. difficile diarrhea      S/P colon resection  2014      Status post appendectomy      H/O mastectomy, bilateral      H/O hernia repair      S/P ORIF (open reduction internal fixation) fracture  L lower extremity      H/O: hysterectomy          FAMILY HISTORY:  Family history of heart valve abnormality        Home Medications:  brimonidine 0.2% ophthalmic solution: 1 drop(s) in each affected eye once a day (06 Oct 2023 22:15)  dorzolamide-timolol 2%-0.5% preservative-free ophthalmic solution: 1 drop(s) to each affected eye 2 times a day (06 Oct 2023 22:15)  latanoprost 0.005% ophthalmic solution: 1 drop(s) to each affected eye once a day (at bedtime) (06 Oct 2023 22:15)  Rhopressa 0.02% ophthalmic solution: 1 drop(s) in each eye once a day (06 Oct 2023 22:15)      Allergies    No Known Allergies    Intolerances        ----  MEDICATIONS  (STANDING):  brimonidine 0.2% Ophthalmic Solution 1 Drop(s) Both EYES two times a day  dorzolamide 2%/timolol 0.5% Ophthalmic Solution 1 Drop(s) Both EYES two times a day  influenza  Vaccine (HIGH DOSE) 0.7 milliLiter(s) IntraMuscular once  latanoprost 0.005% Ophthalmic Solution 1 Drop(s) Both EYES at bedtime  naloxone Injectable 0.4 milliGRAM(s) IV Push once  sodium chloride 0.9%. 1000 milliLiter(s) (75 mL/Hr) IV Continuous <Continuous>    MEDICATIONS  (PRN):  acetaminophen  Suppository .. 650 milliGRAM(s) Rectal every 6 hours PRN Temp greater or equal to 38C (100.4F), Mild Pain (1 - 3)  bisacodyl Suppository 10 milliGRAM(s) Rectal daily PRN Constipation  morphine  - Injectable 4 milliGRAM(s) IV Push every 4 hours PRN Severe Pain (7 - 10)  morphine  - Injectable 2 milliGRAM(s) IV Push every 4 hours PRN Moderate Pain (4 - 6)  ondansetron Injectable 4 milliGRAM(s) IV Push every 8 hours PRN Nausea and/or Vomiting      ----  REVIEW OF SYSTEMS:  Constitutional: denies fever, chills, diaphoresis  HEENT: denies sore throat, runny nose, blurry vision, double vision    Respiratory: denies SOB, RANGEL, cough, wheezing, hemoptysis  Cardiovascular: denies CP, palpitations, LE edema  Gastrointestinal:  denies nausea, vomiting, diarrhea, constipation, abdominal pain, melena, hematochezia   Genitourinary: denies dysuria, hematuria  Skin/Breast: denies rash, hives, itching   Musculoskeletal: denies myalgias, arthritis, joint swelling, muscle weakness  Neurologic: denies syncope, LOC, headache, weakness, dizziness       ----  T(C): 36.8 (10-10-23 @ 05:12), Max: 37.3 (10-09-23 @ 13:33)  HR: 94 (10-10-23 @ 05:12) (74 - 123)  BP: 139/74 (10-10-23 @ 05:12) (121/77 - 145/87)  RR: 17 (10-10-23 @ 05:12) (17 - 17)  SpO2: 91% (10-10-23 @ 05:12) (90% - 94%)  Wt(kg): --    Physical Exam:   GENERAL: well-groomed, well-developed, NAD  HEENT: head NC/AT; EOM intact, PERRLA, conjunctiva & sclera clear; hearing grossly intact, moist mucous membranes  NECK: supple, no JVD  RESPIRATORY: CTA B/L, no wheezing, rales, rhonchi or rubs  CARDIOVASCULAR: S1&S2, RRR, no murmurs or gallops  ABDOMEN: soft, non-tender, non-distended, + Bowel sounds x4 quadrants, no guarding, rebound or rigidity  MUSCULOSKELETAL:  no clubbing, cyanosis or edema of all 4 extremities  LYMPH: no cervical lymphadenopathy  VASCULAR: Radial pulses 2+ bilaterally, no varicose veins   SKIN: warm and dry, color normal  NEUROLOGIC: AA&O X3, CN2-12 intact w/ no focal deficits, no sensory loss, motor Strength 5/5 in UE & LE B/L  Psych: Normal mood and affect, normal behavior         ----  I&O's Summary    09 Oct 2023 07:01  -  10 Oct 2023 07:00  --------------------------------------------------------  IN: 0 mL / OUT: 500 mL / NET: -500 mL        LABS:                        12.8   8.27  )-----------( 225      ( 10 Oct 2023 06:15 )             38.4     10-10    139  |  107  |  14  ----------------------------<  87  4.0   |  24  |  0.82    Ca    8.9      10 Oct 2023 06:15        Urinalysis Basic - ( 10 Oct 2023 06:15 )    Color: x / Appearance: x / SG: x / pH: x  Gluc: 87 mg/dL / Ketone: x  / Bili: x / Urobili: x   Blood: x / Protein: x / Nitrite: x   Leuk Esterase: x / RBC: x / WBC x   Sq Epi: x / Non Sq Epi: x / Bacteria: x      CAPILLARY BLOOD GLUCOSE          10-06 @ 21:10   >=3 organisms. Probable collection contamination.  --  --

## 2023-10-10 NOTE — PROGRESS NOTE ADULT - SUBJECTIVE AND OBJECTIVE BOX
[facility]  Delbert Person PA-C  [address] [place] [address]  [phone]      INTERVAL HPI/OVERNIGHT EVENTS:  Pt s/e  States she does not want esophageal stent    MEDICATIONS  (STANDING):  brimonidine 0.2% Ophthalmic Solution 1 Drop(s) Both EYES two times a day  dorzolamide 2%/timolol 0.5% Ophthalmic Solution 1 Drop(s) Both EYES two times a day  influenza  Vaccine (HIGH DOSE) 0.7 milliLiter(s) IntraMuscular once  latanoprost 0.005% Ophthalmic Solution 1 Drop(s) Both EYES at bedtime  naloxone Injectable 0.4 milliGRAM(s) IV Push once  sodium chloride 0.9%. 1000 milliLiter(s) (75 mL/Hr) IV Continuous <Continuous>    MEDICATIONS  (PRN):  acetaminophen  Suppository .. 650 milliGRAM(s) Rectal every 6 hours PRN Temp greater or equal to 38C (100.4F), Mild Pain (1 - 3)  bisacodyl Suppository 10 milliGRAM(s) Rectal daily PRN Constipation  morphine  - Injectable 4 milliGRAM(s) IV Push every 4 hours PRN Severe Pain (7 - 10)  morphine  - Injectable 2 milliGRAM(s) IV Push every 4 hours PRN Moderate Pain (4 - 6)  ondansetron Injectable 4 milliGRAM(s) IV Push every 8 hours PRN Nausea and/or Vomiting      Allergies  No Known Allergies      PHYSICAL EXAM:   Vital Signs:  Vital Signs Last 24 Hrs  T(C): 36.8 (10 Oct 2023 05:12), Max: 37.3 (09 Oct 2023 13:33)  T(F): 98.2 (10 Oct 2023 05:12), Max: 99.1 (09 Oct 2023 13:33)  HR: 94 (10 Oct 2023 05:12) (74 - 123)  BP: 139/74 (10 Oct 2023 05:12) (121/77 - 145/87)  BP(mean): --  RR: 17 (10 Oct 2023 05:12) (17 - 17)  SpO2: 91% (10 Oct 2023 05:12) (90% - 94%)    Parameters below as of 10 Oct 2023 05:12  Patient On (Oxygen Delivery Method): room air    GENERAL:  Appears stated age  HEENT:  NC/AT  CHEST:  Full & symmetric excursion  HEART:  Regular rhythm  ABDOMEN:  Soft, non-tender, non-distended  EXTEREMITIES:  no cyanosis  SKIN:  No rash  NEURO:  Alert      LABS:                        12.8   8.27  )-----------( 225      ( 10 Oct 2023 06:15 )             38.4     10-10    139  |  107  |  14  ----------------------------<  87  4.0   |  24  |  0.82    Ca    8.9      10 Oct 2023 06:15        Urinalysis Basic - ( 10 Oct 2023 06:15 )    Color: x / Appearance: x / SG: x / pH: x  Gluc: 87 mg/dL / Ketone: x  / Bili: x / Urobili: x   Blood: x / Protein: x / Nitrite: x   Leuk Esterase: x / RBC: x / WBC x   Sq Epi: x / Non Sq Epi: x / Bacteria: x

## 2023-10-10 NOTE — PROGRESS NOTE ADULT - PROBLEM SELECTOR PLAN 5
DVT Prophylaxis: Lovenox 40mg daily
DVT Prophylaxis: Lovenox 40mg daily  -Hold for EGD on 10/9

## 2023-10-10 NOTE — SOCIAL WORK PROGRESS NOTE - NSSWPROGRESSNOTE_GEN_ALL_CORE
SW met with this pt- sw consult noted for cancer. PT aware of her dx, wants to go home on hospice. SW discussed her returning home, she has refused to work with PT but was able ot get out of bed to the chair but required 2 people. SW explained that if she wants to return home she either needs to have family stay with her or stay with them vs hire an aide. Pt asked for the night to discuss with her family. SW to follow.

## 2023-10-10 NOTE — CONSULT NOTE ADULT - ASSESSMENT
88-year-old female with PMH of CAD, gallstones, breast cancer (9 years ago status post radiation, reports never took oral medications because "she does not like to take pills"), atrial fibrillation, glaucoma, diverticulitis who presents to emergency room with bilateral flank pain, weakness, increased fatigue, dysphagia, and loss of appetite    pain  acute pain - chr pain  mets ca  esoph ca  breast ca  cad  obesity  weakness  dysphagia    Roxanol SL - Fentanyl Patch low dose - in preparation for Home DC with Hospice  GOC documented  pt is DNR DNI  bowel rx regimen  IV Morphine for breakthrough severe pain  emotional support  assist with needs  oral hygiene  skin care  SW f/u noted  spoke with HCP -  Louise -

## 2023-10-10 NOTE — CONSULT NOTE ADULT - SUBJECTIVE AND OBJECTIVE BOX
Date/Time Patient Seen:  		  Referring MD:   Data Reviewed	       Patient is a 88y old  Female who presents with a chief complaint of esophageal mass (10 Oct 2023 13:16)      Subjective/HPI   88-year-old female with PMH of CAD, gallstones, breast cancer (9 years ago status post radiation, reports never took oral medications because "she does not like to take pills"), atrial fibrillation, glaucoma, diverticulitis who presents to emergency room with bilateral flank pain, weakness, increased fatigue, dysphagia, and loss of appetite. Patient has a hx of kidney stones in the past, and states that over the past 4-5 days, she has been having persistent abdominal and flank pain. Also over the last 3 months, patient noticed that she is constantly spiting up a clear, jelly like mucous She has no know sick contacts, no fevers, chills, SOB. She feels like the mucous is coming form the back of her throat. Patient also admits that over the last year, she has been having decreasing appetite and increasing fatigue. Recently, over the course of the last month, the patient has been experiencing severe dysphagia. Anytime she tries to eat solid food, she feels a sharp pain in her epigastric region, and she spits the food back up. Also, any time she tries to drink water, she swallows, but the liquid comes right back up. Patient has only been able to tolerate [place] supplement drinks BID and carbonated beverages. She is also having pain under her right breast. She had a double mastectomy 9yrs ago after her breast cancer diagnosis and recently has felt pain again. Patient presented to her heme/onc (unknown name) a few months ago and was recommended to come to the ED for imaging. However, patient did not follow up. Currently denies fever, chills, chest pain, palpitations, SOB, urinary frequency, urgency, dysuria, hematuria, headaches, changes in vision, dizziness, numbness, tingling. No other complaints at this time.  PAST MEDICAL & SURGICAL HISTORY:  S/P colon resection    Status post hysterectomy    Status post appendectomy    Diverticulitis    Glaucoma    PSVT (paroxysmal supraventricular tachycardia)    Atrial fibrillation    Breast cancer    Gall stones    C. difficile diarrhea    S/P colon resection  march 11 2014    Status post appendectomy    H/O mastectomy, bilateral    H/O hernia repair    S/P ORIF (open reduction internal fixation) fracture  L lower extremity    H/O: hysterectomy    FAMILY HISTORY:  Family history of heart valve abnormality.     Social History:  · Substance use	No  · Social History (marital status, living situation, occupation, and sexual history)	Lives: at home   ADLs: completely independent, still driving, walks with the help of a cane   Diet: Recently only able to tolerate [place] supplement drinks BID and carbonated beverages   Tobacco use: none     Tobacco Screening:  · Core Measure Site	Yes  · Has the patient used tobacco in the past 30 days?	No    Risk Assessment:    Present on Admission:  Deep Venous Thrombosis	no  Pulmonary Embolus	no     HIV Screening:  · In accordance with [place] State law, we offer every patient who comes to our ED an HIV test. Would you like to be tested today?	Opt out        Medication list         MEDICATIONS  (STANDING):  brimonidine 0.2% Ophthalmic Solution 1 Drop(s) Both EYES two times a day  dorzolamide 2%/timolol 0.5% Ophthalmic Solution 1 Drop(s) Both EYES two times a day  influenza  Vaccine (HIGH DOSE) 0.7 milliLiter(s) IntraMuscular once  latanoprost 0.005% Ophthalmic Solution 1 Drop(s) Both EYES at bedtime  naloxone Injectable 0.4 milliGRAM(s) IV Push once  sodium chloride 0.9%. 1000 milliLiter(s) (75 mL/Hr) IV Continuous <Continuous>    MEDICATIONS  (PRN):  acetaminophen  Suppository .. 650 milliGRAM(s) Rectal every 6 hours PRN Temp greater or equal to 38C (100.4F), Mild Pain (1 - 3)  bisacodyl Suppository 10 milliGRAM(s) Rectal daily PRN Constipation  morphine  - Injectable 4 milliGRAM(s) IV Push every 4 hours PRN Severe Pain (7 - 10)  morphine  - Injectable 2 milliGRAM(s) IV Push every 4 hours PRN Moderate Pain (4 - 6)  ondansetron Injectable 4 milliGRAM(s) IV Push every 8 hours PRN Nausea and/or Vomiting         Vitals log        ICU Vital Signs Last 24 Hrs  T(C): 36.8 (10 Oct 2023 12:53), Max: 36.9 (10 Oct 2023 03:36)  T(F): 98.2 (10 Oct 2023 12:53), Max: 98.5 (10 Oct 2023 03:36)  HR: 103 (10 Oct 2023 12:53) (74 - 103)  BP: 95/66 (10 Oct 2023 12:53) (95/66 - 139/74)  BP(mean): --  ABP: --  ABP(mean): --  RR: 16 (10 Oct 2023 12:53) (16 - 17)  SpO2: 92% (10 Oct 2023 12:53) (90% - 92%)    O2 Parameters below as of 10 Oct 2023 12:53  Patient On (Oxygen Delivery Method): room air                 Input and Output:  I&O's Detail    09 Oct 2023 07:01  -  10 Oct 2023 07:00  --------------------------------------------------------  IN:  Total IN: 0 mL    OUT:    Voided (mL): 500 mL  Total OUT: 500 mL    Total NET: -500 mL          Lab Data                        12.8   8.27  )-----------( 225      ( 10 Oct 2023 06:15 )             38.4     10-10    139  |  107  |  14  ----------------------------<  87  4.0   |  24  |  0.82    Ca    8.9      10 Oct 2023 06:15              Review of Systems	  pain  weakness      Objective     Physical Examination    heart s1s2  lung dc BS  head nc  verbal  alert  cn grossly int      Pertinent Lab findings & Imaging      Nlio:  NO   Adequate UO     I&O's Detail    09 Oct 2023 07:01  -  10 Oct 2023 07:00  --------------------------------------------------------  IN:  Total IN: 0 mL    OUT:    Voided (mL): 500 mL  Total OUT: 500 mL    Total NET: -500 mL               Discussed with:     Cultures:	        Radiology      ACC: [id number] EXAM:  CT CHEST IC   ORDERED BY: ARLENE WINSLOW     ACC: [id number] EXAM:  CT ABDOMEN AND PELVIS IC   ORDERED BY: ARLENE WINSLOW     PROCEDURE DATE:  10/06/2023          INTERPRETATION:  CLINICAL INFORMATION: Posterior mediastinal mass.   History of breast cancer.    COMPARISON: Noncontrast CT of the thorax, abdomen, and pelvis performed   the same day, noncontrast CT of the thorax December 6, 2021.    CONTRAST/COMPLICATIONS:  IV Contrast: IV contrast documented in unlinked concurrent exam   (accession [id number]), Omnipaque 350 (accession [id number])  90 cc   administered   10 cc discarded  Oral Contrast: NONE  Complications: None reported at time of study completion    PROCEDURE:  CT of the Chest, Abdomen and Pelvis was performed.  Sagittal and coronal reformats were performed.    FINDINGS:  CHEST:  LUNGS AND LARGE AIRWAYS: Patent central airways. No pulmonary nodules.  PLEURA: No pleural effusion.  VESSELS: Within normal limits.  HEART: Heart size is normal. No pericardial effusion.  MEDIASTINUM AND MARA: There is a 7.3 x 6.0 x 8.1 cm heterogeneously   enhancing soft tissue mass in the posterior inferior mediastinum   inseparable from the esophagus with adjacent prominently enhancing lymph   nodes extending caudally.  CHEST WALL AND LOWER NECK: Bilateral mastectomy.    ABDOMEN AND PELVIS:  LIVER: Within normal limits.  BILE DUCTS: Normal caliber.  GALLBLADDER: Small faceted gallstones..  SPLEEN: Within normal limits.  PANCREAS: Within normal limits.  ADRENALS: Within normal limits.  KIDNEYS/URETERS: 8 cm exophytic cyst arising from the posterior upper   pole of the right kidney. 6 mm curvilinear stone in the midpole of the   right kidney. No evidence of hydronephrosis or perinephric stranding   bilaterally.    BLADDER: Within normal limits.  REPRODUCTIVE ORGANS: The uterus and ovaries have been removed.    BOWEL: There are pathologically enlarged hyperenhancing centrally   necrotic irregular gastrohepatic ligament and celiac axis lymph nodes.   Rectosigmoid resection with reanastomosis. Diverticulosis of the sigmoid   colon. Distal small bowel resection with reanastomosis. No bowel   obstruction. Appendix has been removed.  PERITONEUM: No ascites.  VESSELS: Extensive atherosclerotic disease of the nonaneurysmal abdominal   aorta with severe stenosis at the origins of the mesenteric arteries.  RETROPERITONEUM/LYMPH NODES: No lymphadenopathy.  ABDOMINAL WALL: 1.9 cm fat-containing right supraumbilical hernia, 2.1 cm   fat-containing left supraumbilical hernia, 1.4 cm midline fat-containing   supraumbilical hernia. 3 cm supraumbilical hernia containing a knuckle of   mid transverse colon..  BONES: Healed right lateral rib fractures. New patchy sclerosis in the T8   vertebral body, suspicious for metastatic disease.    IMPRESSION: Large distal esophageal mass with extensive posterior   mediastinal and upper abdominal lymphadenopathy, compatible with primary   esophageal carcinoma. New patchy sclerosis of the T8 vertebral body,   suspicious for metastatic disease.    --- End of Report ---            POLINA MASON MD; Attending Radiologist  This document has been electronically signed. Oct  7 2023 11:24AM                        
  CHIEF COMPLAINT: Patient is a 88y old  Female who presents with a chief complaint of esophageal mass (07 Oct 2023 13:00)      HPI:  88-year-old female with PMH of CAD, gallstones, breast cancer (9 years ago status post radiation, reports never took oral medications because "she does not like to take pills"), atrial fibrillation, glaucoma, diverticulitis who presents to emergency room with bilateral flank pain, weakness, increased fatigue, dysphagia, and loss of appetite. Patient has a hx of kidney stones in the past, and states that over the past 4-5 days, she has been having persistent abdominal and flank pain. Also over the last 3 months, patient noticed that she is constantly spiting up a clear, jelly like mucous She has no know sick contacts, no fevers, chills, SOB. She feels like the mucous is coming form the back of her throat. Patient also admits that over the last year, she has been having decreasing appetite and increasing fatigue. Recently, over the course of the last month, the patient has been experiencing severe dysphagia. Anytime she tries to eat solid food, she feels a sharp pain in her epigastric region, and she spits the food back up. Also, any time she tries to drink water, she swallows, but the liquid comes right back up. Patient has only been able to tolerate [place] supplement drinks BID and carbonated beverages. She is also having pain under her right breast. She had a double mastectomy 9yrs ago after her breast cancer diagnosis and recently has felt pain again. Patient presented to her heme/onc (unknown name) a few months ago and was recommended to come to the ED for imaging. However, patient did not follow up. Currently denies fever, chills, chest pain, palpitations, SOB, urinary frequency, urgency, dysuria, hematuria, headaches, changes in vision, dizziness, numbness, tingling. No other complaints at this time.    ED course:  Vitals: /69 -->98/64  T 97 HR 65 RR 18 SpO2 94% on RA  Labs significant for: Lipase 185  UA: UA: Trace ketone, trace leuk esterase, occasional bacteria   EKbpm, afib  In ED Given: Zofran x1, Morphine 4mg x1, NaCl bolus x1    Imaging  CT Chest: New 7.2 cm distal esophageal mass. Findings may represent a true mass versus abnormal lymphadenopathy.  CT Renal Stone: New abdominal gastrohepatic and para-aortic lymphadenopathy as well as 3 cm irregular left para-aortic soft tissue. Bilateral nonobstructive renal calculi. No hydronephrosis. (06 Oct 2023 22:23)    Interval hx: Denies any   dyspnea, palpitations, PND, orthopnea, near syncope, syncope, lower extremity edema, stroke like symptoms. Having abd pain radiating to chest. +N/V     EKG: AF    REVIEW OF SYSTEMS:   All other review of systems are negative unless indicated above    PAST MEDICAL & SURGICAL HISTORY:  S/P colon resection      Diverticulitis      Glaucoma      PSVT (paroxysmal supraventricular tachycardia)      Atrial fibrillation      Breast cancer      Gall stones      C. difficile diarrhea      S/P colon resection  2014      Status post appendectomy      H/O mastectomy, bilateral      H/O hernia repair      S/P ORIF (open reduction internal fixation) fracture  L lower extremity      H/O: hysterectomy          SOCIAL HISTORY:  No tobacco, ethanol, or drug abuse.    FAMILY HISTORY:  Family history of heart valve abnormality      No family history of acute MI or sudden cardiac death.    MEDICATIONS  (STANDING):  brimonidine 0.2% Ophthalmic Solution 1 Drop(s) Both EYES daily  dorzolamide 2%/timolol 0.5% Ophthalmic Solution 1 Drop(s) Both EYES two times a day  enoxaparin Injectable 40 milliGRAM(s) SubCutaneous every 24 hours  latanoprost 0.005% Ophthalmic Solution 1 Drop(s) Both EYES at bedtime  naloxone Injectable 0.4 milliGRAM(s) IV Push once  sodium chloride 0.9%. 1000 milliLiter(s) (75 mL/Hr) IV Continuous <Continuous>    MEDICATIONS  (PRN):  acetaminophen  Suppository .. 650 milliGRAM(s) Rectal every 6 hours PRN Temp greater or equal to 38C (100.4F), Mild Pain (1 - 3)  bisacodyl Suppository 10 milliGRAM(s) Rectal daily PRN Constipation  morphine  - Injectable 4 milliGRAM(s) IV Push every 4 hours PRN Severe Pain (7 - 10)  morphine  - Injectable 2 milliGRAM(s) IV Push every 4 hours PRN Moderate Pain (4 - 6)  ondansetron Injectable 4 milliGRAM(s) IV Push every 8 hours PRN Nausea and/or Vomiting      Allergies    No Known Allergies    Intolerances        Home meds:  Home Medications:  brimonidine 0.2% ophthalmic solution: 1 drop(s) in each affected eye once a day (06 Oct 2023 22:15)  dorzolamide-timolol 2%-0.5% preservative-free ophthalmic solution: 1 drop(s) to each affected eye 2 times a day (06 Oct 2023 22:15)  latanoprost 0.005% ophthalmic solution: 1 drop(s) to each affected eye once a day (at bedtime) (06 Oct 2023 22:15)  Rhopressa 0.02% ophthalmic solution: 1 drop(s) in each eye once a day (06 Oct 2023 22:15)        VITAL SIGNS:   Vital Signs Last 24 Hrs  T(C): 36.5 (07 Oct 2023 12:41), Max: 36.8 (06 Oct 2023 20:54)  T(F): 97.7 (07 Oct 2023 12:41), Max: 98.3 (06 Oct 2023 20:54)  HR: 86 (07 Oct 2023 12:41) (65 - 109)  BP: 104/60 (07 Oct 2023 12:41) (98/64 - 118/69)  BP(mean): --  RR: 18 (07 Oct 2023 12:41) (18 - 18)  SpO2: 94% (07 Oct 2023 12:41) (94% - 96%)    Parameters below as of 07 Oct 2023 06:00  Patient On (Oxygen Delivery Method): room air        I&O's Summary      On Exam:    Constitutional: NAD, awake    HEENT: Moist Mucous Membranes, Anicteric  Pulmonary: Decreased breath sounds b/l. No rales, crackles or wheeze appreciated.   Cardiovascular: IRRR, S1 and S2, No murmurs, rubs, gallops or clicks  Gastrointestinal: Bowel Sounds present, soft, nontender.   Lymph: No peripheral edema. No lymphadenopathy.  Skin: No visible rashes or ulcers.  Psych:  Mood & affect appropriate    LABS: All Labs Reviewed:                        12.6   5.88  )-----------( Not reported    ( 07 Oct 2023 05:38 )             38.2                         14.2   8.66  )-----------( 270      ( 06 Oct 2023 18:00 )             41.9     07 Oct 2023 05:38    137    |  107    |  20     ----------------------------<  92     4.5     |  26     |  0.99   06 Oct 2023 18:00    139    |  106    |  21     ----------------------------<  102    5.2     |  28     |  1.20     Ca    9.1        07 Oct 2023 05:38  Ca    10.1       06 Oct 2023 18:00    TPro  7.0    /  Alb  3.4    /  TBili  0.7    /  DBili  x      /  AST  21     /  ALT  18     /  AlkPhos  63     07 Oct 2023 05:38  TPro  7.9    /  Alb  3.7    /  TBili  0.6    /  DBili  x      /  AST  23     /  ALT  23     /  AlkPhos  69     06 Oct 2023 18:00        - Troponin: <-18.4  Blood Culture:           RADIOLOGY:    < from: CT Abdomen and Pelvis w/ IV Cont (10.06.23 @ 23:48) >    ACC: [id number] EXAM:  CT CHEST IC   ORDERED BY: ARLENE WINSLOW     ACC: [id number] EXAM:  CT ABDOMEN AND PELVIS IC   ORDERED BY: ARLENE WINSLOW     PROCEDURE DATE:  10/06/2023          INTERPRETATION:  CLINICAL INFORMATION: Posterior mediastinal mass.   History of breast cancer.    COMPARISON: Noncontrast CT of the thorax, abdomen, and pelvis performed   the same day, noncontrast CT of the thorax 2021.    CONTRAST/COMPLICATIONS:  IV Contrast: IV contrast documented in unlinked concurrent exam   (accession [id number]), Omnipaque 350 (accession [id number])  90 cc   administered   10 cc discarded  Oral Contrast: NONE  Complications: None reported at time of study completion    PROCEDURE:  CT of the Chest, Abdomen and Pelvis was performed.  Sagittal and coronal reformats were performed.    FINDINGS:  CHEST:  LUNGS AND LARGE AIRWAYS: Patent central airways. No pulmonary nodules.  PLEURA: No pleural effusion.  VESSELS: Within normal limits.  HEART: Heart size is normal. No pericardial effusion.  MEDIASTINUM AND MARA: There is a 7.3 x 6.0 x 8.1 cm heterogeneously   enhancing soft tissue mass in the posterior inferior mediastinum   inseparable from the esophagus with adjacent prominently enhancing lymph   nodes extending caudally.  CHEST WALL AND LOWER NECK: Bilateral mastectomy.    ABDOMEN AND PELVIS:  LIVER: Within normal limits.  BILE DUCTS: Normal caliber.  GALLBLADDER: Small faceted gallstones..  SPLEEN: Within normal limits.  PANCREAS: Within normal limits.  ADRENALS: Within normal limits.  KIDNEYS/URETERS: 8 cm exophytic cyst arising from the posterior upper   pole of the right kidney. 6 mm curvilinear stone in the midpole of the   right kidney. No evidence of hydronephrosis or perinephric stranding   bilaterally.    BLADDER: Within normal limits.  REPRODUCTIVE ORGANS: The uterus and ovaries have been removed.    BOWEL: There are pathologically enlarged hyperenhancing centrally   necrotic irregular gastrohepatic ligament and celiac axis lymph nodes.   Rectosigmoid resection with reanastomosis. Diverticulosis of the sigmoid   colon. Distal small bowel resection with reanastomosis. No bowel   obstruction. Appendix has been removed.  PERITONEUM: No ascites.  VESSELS: Extensive atherosclerotic disease of the nonaneurysmal abdominal   aorta with severe stenosis at the origins of the mesenteric arteries.  RETROPERITONEUM/LYMPH NODES: No lymphadenopathy.  ABDOMINAL WALL: 1.9 cm fat-containing right supraumbilical hernia, 2.1 cm   fat-containing left supraumbilical hernia, 1.4 cm midline fat-containing   supraumbilical hernia. 3 cm supraumbilical hernia containing a knuckle of   mid transverse colon..  BONES: Healed right lateral rib fractures. New patchy sclerosis in the T8   vertebral body, suspicious for metastatic disease.    IMPRESSION: Large distal esophageal mass with extensive posterior   mediastinal and upper abdominal lymphadenopathy, compatible with primary   esophageal carcinoma. New patchy sclerosis of the T8 vertebral body,   suspicious for metastatic disease.    --- End of Report ---            POLINA MASON MD; Attending Radiologist  This document has been electronically signed. Oct  7 2023 11:24AM    < end of copied text >           
[facility]  Delbert Person PA-C  [address] [place] [id number]  [phone]      Chief Complaint:  Patient is a 88y old  Female who presents with a chief complaint of esophageal mass (07 Oct 2023 14:24)      HPI:    Allergies:  No Known Allergies      Medications:  acetaminophen  Suppository .. 650 milliGRAM(s) Rectal every 6 hours PRN  bisacodyl Suppository 10 milliGRAM(s) Rectal daily PRN  brimonidine 0.2% Ophthalmic Solution 1 Drop(s) Both EYES daily  dorzolamide 2%/timolol 0.5% Ophthalmic Solution 1 Drop(s) Both EYES two times a day  enoxaparin Injectable 40 milliGRAM(s) SubCutaneous every 24 hours  latanoprost 0.005% Ophthalmic Solution 1 Drop(s) Both EYES at bedtime  morphine  - Injectable 4 milliGRAM(s) IV Push every 4 hours PRN  morphine  - Injectable 2 milliGRAM(s) IV Push every 4 hours PRN  naloxone Injectable 0.4 milliGRAM(s) IV Push once  ondansetron Injectable 4 milliGRAM(s) IV Push every 8 hours PRN  sodium chloride 0.9%. 1000 milliLiter(s) IV Continuous <Continuous>      PMHX/PSHX:  S/P colon resection    Status post hysterectomy    Status post appendectomy    Diverticulitis    Glaucoma    PSVT (paroxysmal supraventricular tachycardia)    Atrial fibrillation    Breast cancer    Gall stones    C. difficile diarrhea    S/P colon resection    Status post appendectomy    H/O mastectomy, bilateral    H/O hernia repair    S/P ORIF (open reduction internal fixation) fracture    H/O: hysterectomy        Family history:  No pertinent family history in first degree relatives    Family history of heart valve abnormality        Social History:     ROS:     General:  no fevers, chills, night sweats, fatigue,   Eyes:  Good vision, no reported pain  ENT:  No sore throat, pain, runny nose, dysphagia  CV:  No pain, palpitations, hypo/hypertension  Resp:  No dyspnea, cough, tachypnea, wheezing  GI:  No pain, No nausea, No vomiting, No diarrhea, No constipation, No weight loss, No fever, No pruritis, No rectal bleeding, No tarry stools, No dysphagia,  :  No pain, bleeding, incontinence, nocturia  Muscle:  No pain, weakness  Neuro:  No weakness, tingling, memory problems  Psych:  No fatigue, insomnia, mood problems, depression  Endocrine:  No polyuria, polydipsia, cold/heat intolerance  Heme:  No petechiae, ecchymosis, easy bruisability  Skin:  No rash, tattoos, scars, edema      PHYSICAL EXAM:   Vital Signs:  Vital Signs Last 24 Hrs  T(C): 36.5 (07 Oct 2023 12:41), Max: 36.8 (06 Oct 2023 20:54)  T(F): 97.7 (07 Oct 2023 12:41), Max: 98.3 (06 Oct 2023 20:54)  HR: 86 (07 Oct 2023 12:41) (65 - 109)  BP: 104/60 (07 Oct 2023 12:41) (98/64 - 118/69)  BP(mean): --  RR: 18 (07 Oct 2023 12:41) (18 - 18)  SpO2: 94% (07 Oct 2023 12:41) (94% - 96%)    Parameters below as of 07 Oct 2023 06:00  Patient On (Oxygen Delivery Method): room air      Daily Height in cm: 157.48 (06 Oct 2023 16:25)    Daily     GENERAL:  Appears stated age,   HEENT:  NC/AT,    CHEST:  Full & symmetric excursion,   HEART:  Regular rhythm  ABDOMEN:  Soft, non-tender, non-distended,   EXTEREMITIES:  no cyanosis,clubbing or edema  SKIN:  No rash  NEURO:  Alert,    LABS:                        12.6   5.88  )-----------( Not reported    ( 07 Oct 2023 05:38 )             38.2     10-07    137  |  107  |  20  ----------------------------<  92  4.5   |  26  |  0.99    Ca    9.1      07 Oct 2023 05:38    TPro  7.0  /  Alb  3.4  /  TBili  0.7  /  DBili  x   /  AST  21  /  ALT  18  /  AlkPhos  63  10-07    LIVER FUNCTIONS - ( 07 Oct 2023 05:38 )  Alb: 3.4 g/dL / Pro: 7.0 g/dL / ALK PHOS: 63 U/L / ALT: 18 U/L / AST: 21 U/L / GGT: x             Urinalysis Basic - ( 07 Oct 2023 05:38 )    Color: x / Appearance: x / SG: x / pH: x  Gluc: 92 mg/dL / Ketone: x  / Bili: x / Urobili: x   Blood: x / Protein: x / Nitrite: x   Leuk Esterase: x / RBC: x / WBC x   Sq Epi: x / Non Sq Epi: x / Bacteria: x      Amylase Serum--      Lipase [id number]       Ammonia--      Imaging:          
Patient is a 88y old  Female who presents with a chief complaint of esophageal mass (07 Oct 2023 16:09)    HPI:  88-year-old female with PMH of CAD, gallstones, breast cancer (9 years ago status post radiation, reports never took oral medications because "she does not like to take pills"), atrial fibrillation, glaucoma, diverticulitis who presents to emergency room with bilateral flank pain, weakness, increased fatigue, dysphagia, and loss of appetite. Patient has a hx of kidney stones in the past, and states that over the past 4-5 days, she has been having persistent abdominal and flank pain. Also over the last 3 months, patient noticed that she is constantly spiting up a clear, jelly like mucous She has no know sick contacts, no fevers, chills, SOB. She feels like the mucous is coming form the back of her throat. Patient also admits that over the last year, she has been having decreasing appetite and increasing fatigue. Recently, over the course of the last month, the patient has been experiencing severe dysphagia. Anytime she tries to eat solid food, she feels a sharp pain in her epigastric region, and she spits the food back up. Also, any time she tries to drink water, she swallows, but the liquid comes right back up. Patient has only been able to tolerate [place] supplement drinks BID and carbonated beverages. She is also having pain under her right breast. She had a double mastectomy 9yrs ago after her breast cancer diagnosis and recently has felt pain again. Patient presented to her heme/onc (unknown name) a few months ago and was recommended to come to the ED for imaging. However, patient did not follow up. Currently denies fever, chills, chest pain, palpitations, SOB, urinary frequency, urgency, dysuria, hematuria, headaches, changes in vision, dizziness, numbness, tingling. No other complaints at this time.    ED course:  Vitals: /69 -->98/64  T 97 HR 65 RR 18 SpO2 94% on RA  Labs significant for: Lipase 185  UA: UA: Trace ketone, trace leuk esterase, occasional bacteria   EKbpm, afib  In ED Given: Zofran x1, Morphine 4mg x1, NaCl bolus x1    Imaging  CT Chest: New 7.2 cm distal esophageal mass. Findings may represent a true mass versus abnormal lymphadenopathy.  CT Renal Stone: New abdominal gastrohepatic and para-aortic lymphadenopathy as well as 3 cm irregular left para-aortic soft tissue. Bilateral nonobstructive renal calculi. No hydronephrosis. (06 Oct 2023 22:23)      PAST MEDICAL & SURGICAL HISTORY:  S/P colon resection  Diverticulitis  Glaucoma  PSVT (paroxysmal supraventricular tachycardia)  Atrial fibrillation  Breast cancer  Gall stones  C. difficile diarrhea  S/P colon resection 2014  Status post appendectomy  H/O mastectomy, bilateral  H/O hernia repair  S/P ORIF (open reduction internal fixation) fracture L lower extremity  H/O: hysterectomy       HEALTH ISSUES - PROBLEM Dx:  Esophageal mass  Glaucoma  Afib  Need for prophylactic measure  Abdominal pain    Other specified disease of esophagus [K22.89]  S/P colon resection [V45.89]  Status post hysterectomy [V88.01]  Status post appendectomy [V45.89]  Diverticulitis [562.11]  Glaucoma [365.9]  PSVT (paroxysmal supraventricular tachycardia) [I47.1]  Atrial fibrillation [I48.91]  Breast cancer [C50.919]  Gall stones [K80.20]  C. difficile diarrhea [A04.7]  S/P colon resection [V45.89]  Status post appendectomy [V45.89]  H/O mastectomy, bilateral [Z90.13]  H/O hernia repair [Z98.890]  S/P ORIF (open reduction internal fixation) fracture [Z96.7]  H/O: hysterectomy [Z90.710]  Abdominal pain [R10.9]      FAMILY HISTORY:  Family history of heart valve abnormality      [SOCIAL HISTORY: ]     smoking:  none currently. Smoked in HS socially     EtOH:  none currently     illicit drugs:  none currently     occupation:  Retired     marital :       Other: 7 children.   Takes cares of autistic grandchild      [ALLERGIES/INTOLERANCES:]  Allergies      No Known Allergies  Intolerances      [MEDICATIONS]  MEDICATIONS  (STANDING):  brimonidine 0.2% Ophthalmic Solution 1 Drop(s) Both EYES daily  dorzolamide 2%/timolol 0.5% Ophthalmic Solution 1 Drop(s) Both EYES two times a day  enoxaparin Injectable 40 milliGRAM(s) SubCutaneous every 24 hours  latanoprost 0.005% Ophthalmic Solution 1 Drop(s) Both EYES at bedtime  naloxone Injectable 0.4 milliGRAM(s) IV Push once  sodium chloride 0.9%. 1000 milliLiter(s) (75 mL/Hr) IV Continuous <Continuous>      MEDICATIONS  (PRN):  acetaminophen  Suppository .. 650 milliGRAM(s) Rectal every 6 hours PRN Temp greater or equal to 38C (100.4F), Mild Pain (1 - 3)  bisacodyl Suppository 10 milliGRAM(s) Rectal daily PRN Constipation  morphine  - Injectable 4 milliGRAM(s) IV Push every 4 hours PRN Severe Pain (7 - 10)  morphine  - Injectable 2 milliGRAM(s) IV Push every 4 hours PRN Moderate Pain (4 - 6)  ondansetron Injectable 4 milliGRAM(s) IV Push every 8 hours PRN Nausea and/or Vomiting      [REVIEW OF SYSTEMS: ]  CONSTITUTIONAL: normal, no fever, no shakes, no chills   EYES: No eye pain, no visual disturbances, no discharge  ENMT:  no discharge  NECK: No pain, no stiffness  BREASTS: No pain, no masses, no nipple discharge  RESPIRATORY: No cough, no wheezing, no chills, no hemoptysis; No shortness of breath  CARDIOVASCULAR: No chest pain, no palpitations, no dizziness, no leg swelling  GASTROINTESTINAL: No abdominal, no epigastric pain. No nausea, no vomiting, no hematemesis; No diarrhea , no constipation. No melena, no hematochezia.  GENITOURINARY: No dysuria, no frequency, no hematuria, no incontinence  NEUROLOGICAL: No headaches, no memory loss, no loss of strength, no numbness, no tremors  SKIN: No itching, no burning, no rashes, no lesions   LYMPH NODES: No enlarged glands  ENDOCRINE: No heat or cold intolerance; No hair loss  MUSCULOSKELETAL: No joint pain or swelling; No muscle, no back, no extremity pain  PSYCHIATRIC: No depression, no anxiety, no mood swings, no difficulty sleeping  HEME/LYMPH: No easy bruising, no bleeding gums      [VITALS SIGNS 24hrs]  Vital Signs Last 24 Hrs  T(C): 36.4 (07 Oct 2023 17:14), Max: 36.8 (06 Oct 2023 20:54)  T(F): 97.5 (07 Oct 2023 17:14), Max: 98.3 (06 Oct 2023 20:54)  HR: 83 (07 Oct 2023 17:14) (83 - 109)  BP: 103/64 (07 Oct 2023 17:14) (98/64 - 115/78)  BP(mean): --  RR: 18 (07 Oct 2023 17:14) (18 - 18)  SpO2: 96% (07 Oct 2023 17:14) (94% - 96%)    Parameters below as of 07 Oct 2023 17:14  Patient On (Oxygen Delivery Method): room air    Daily   I&O's Summary      [PHYSICAL EXAM]  GEN:   HEENT: normocephalic and atraumatic. EOMI. PERRL.    NECK: Supple.  No lymphadenopathy   LUNGS: Clear to auscultation.  HEART: S1S2 Regular rate and rhythm, no MRG  ABDOMEN: Soft, nontender, and nondistended.  Positive bowel sounds.    : No CVA tenderness  EXTREMITIES: Without edema.  NEUROLOGIC: grossly intact.  PSYCHIATRIC: Appropriate affect .  SKIN: No rash     [LABS: ]                        12.6   5.88  )-----------( Not reported    ( 07 Oct 2023 05:38 )             38.2     CBC Full  -  ( 07 Oct 2023 05:38 )  WBC Count : 5.88 K/uL  RBC Count : 3.84 M/uL  Hemoglobin : 12.6 g/dL  Hematocrit : 38.2 %  Platelet Count - Automated : Not reported  Mean Cell Volume : 99.5 fl  Mean Cell Hemoglobin : 32.8 pg  Mean Cell Hemoglobin Concentration : 33.0 gm/dL  Auto Neutrophil # : x  Auto Lymphocyte # : x  Auto Monocyte # : x  Auto Eosinophil # : x  Auto Basophil # : x  Auto Neutrophil % : x  Auto Lymphocyte % : x  Auto Monocyte % : x  Auto Eosinophil % : x  Auto Basophil % : x    10    137  |  107  |  20  ----------------------------<  92  4.5   |  26  |  0.99    Ca    9.1      07 Oct 2023 05:38    TPro  7.0  /  Alb  3.4  /  TBili  0.7  /  DBili  x   /  AST  21  /  ALT  18  /  AlkPhos  63  10-07      LIVER FUNCTIONS - ( 07 Oct 2023 05:38 )  Alb: 3.4 g/dL / Pro: 7.0 g/dL / ALK PHOS: 63 U/L / ALT: 18 U/L / AST: 21 U/L / GGT: x             Urinalysis Basic - ( 07 Oct 2023 05:38 )  Color: x / Appearance: x / SG: x / pH: x  Gluc: 92 mg/dL / Ketone: x  / Bili: x / Urobili: x   Blood: x / Protein: x / Nitrite: x   Leuk Esterase: x / RBC: x / WBC x   Sq Epi: x / Non Sq Epi: x / Bacteria: x      CBC TREND (5 Days)  WBC Count: 5.88 K/uL (10-07 @ 05:38)  WBC Count: 8.66 K/uL (10-06 @ 18:00)    Hemoglobin: 12.6 g/dL (10-07 @ 05:38)  Hemoglobin: 14.2 g/dL (10-06 @ 18:00)    Hematocrit: 38.2 % (10-07 @ 05:)  Hematocrit: 41.9 % (10-06 @ 18:00)    Platelet Count - Automated: Not reported (10-07 @ 05:)  Platelet Count - Automated: 270 K/uL (10-06 @ 18:00)      [MICROBIOLOGY /  VIROLOGY:]        [PATHOLOGY]         [RADIOLOGY & ADDITIONAL STUDIES:]       CT Chest w/ IV Cont:   ACC: [id number] EXAM:  CT CHEST IC   ORDERED BY: ARLENE WINSLOW   ACC: [id number] EXAM:  CT ABDOMEN AND PELVIS IC   ORDERED BY: ARLENE WINSLOW   PROCEDURE DATE:  10/06/2023    INTERPRETATION:  CLINICAL INFORMATION: Posterior mediastinal mass. History of breast cancer.  COMPARISON: Noncontrast CT of the thorax, abdomen, and pelvis performed  the same day, noncontrast CT of the thorax 2021.  CONTRAST/COMPLICATIONS:  ·	IV Contrast: IV contrast documented in unlinked concurrent exam   ·	(accession [id number]), Omnipaque 350 (accession [id number])  90 cc administered   10 cc discarded  ·	Oral Contrast: NONE  ·	Complications: None reported at time of study completion  PROCEDURE:  CT of the Chest, Abdomen and Pelvis was performed.  Sagittal and coronal reformats were performed.  FINDINGS:  CHEST:  LUNGS AND LARGE AIRWAYS: Patent central airways. No pulmonary nodules.  PLEURA: No pleural effusion.  VESSELS: Within normal limits.  HEART: Heart size is normal. No pericardial effusion.  MEDIASTINUM AND MARA: There is a 7.3 x 6.0 x 8.1 cm heterogeneously enhancing soft tissue mass in the posterior inferior mediastinum inseparable from the esophagus with adjacent prominently enhancing lymph nodes extending caudally.  CHEST WALL AND LOWER NECK: Bilateral mastectomy.  ABDOMEN AND PELVIS:  LIVER: Within normal limits.  BILE DUCTS: Normal caliber.  GALLBLADDER: Small faceted gallstones..  SPLEEN: Within normal limits.  PANCREAS: Within normal limits.  ADRENALS: Within normal limits.  KIDNEYS/URETERS: 8 cm exophytic cyst arising from the posterior upper pole of the right kidney. 6 mm curvilinear stone in the midpole of the   right kidney. No evidence of hydronephrosis or perinephric stranding bilaterally.  BLADDER: Within normal limits.  REPRODUCTIVE ORGANS: The uterus and ovaries have been removed.  BOWEL: There are pathologically enlarged hyperenhancing centrally necrotic irregular gastrohepatic ligament and celiac axis lymph nodes.   Rectosigmoid resection with reanastomosis. Diverticulosis of the sigmoid colon. Distal small bowel resection with reanastomosis. No bowel obstruction. Appendix has been removed.  PERITONEUM: No ascites.  VESSELS: Extensive atherosclerotic disease of the nonaneurysmal abdominal aorta with severe stenosis at the origins of the mesenteric arteries.  RETROPERITONEUM/LYMPH NODES: No lymphadenopathy.  ABDOMINAL WALL: 1.9 cm fat-containing right supraumbilical hernia, 2.1 cm fat-containing left supraumbilical hernia, 1.4 cm midline fat-containing supraumbilical hernia. 3 cm supraumbilical hernia containing a knuckle of mid transverse colon..  BONES: Healed right lateral rib fractures. New patchy sclerosis in the T8 vertebral body, suspicious for metastatic disease.  IMPRESSION: Large distal esophageal mass with extensive posterior mediastinal and upper abdominal lymphadenopathy, compatible with primary esophageal carcinoma. New patchy sclerosis of the T8 vertebral body, suspicious for metastatic disease.  --- End of Report ---  POLINA MASON MD; Attending Radiologist  This document has been electronically signed. Oct  7 2023 11:24AM (10-06-23 @ 23:47)                  POLINA MASON MD; Attending Radiologist  This document has been electronically signed. Oct  7 2023 11:24AM (10-06-23 @ 23:48)

## 2023-10-10 NOTE — PROGRESS NOTE ADULT - PROBLEM SELECTOR PLAN 2
Patient complaining of diffuse abdominal and flank pain. suspect metastatic disease   - CT Renal Stone: New abdominal gastrohepatic and para-aortic lymphadenopathy as well as 3 cm irregular left para-aortic soft tissue. Bilateral nonobstructive renal calculi. No hydronephrosis  - Pain not likely due to kidney stones, as patient has no blood, and no obstructing stones   - CT C/A/P w/ IV contrast: Large distal esophageal mass with extensive posterior mediastinal and upper abdominal lymphadenopathy, compatible with primary esophageal carcinoma. New patchy sclerosis of the T8 vertebral body, suspicious for metastatic disease  - Continue PRN analgesic regimen   - Clear liquid diet; maintenance fluids with NS @ 75cc/hr   - Monitor CMP  - GI Dr. Allen consulted, f/u recs  - Heme/Onc Dr. Jacob consulted, f/u recs
Patient complaining of diffuse abdominal and flank pain. suspect metastatic disease   - CT Renal Stone: New abdominal gastrohepatic and para-aortic lymphadenopathy as well as 3 cm irregular left para-aortic soft tissue. Bilateral nonobstructive renal calculi. No hydronephrosis  - Pain not likely due to kidney stones, as patient has no blood, and no obstructing stones   - CT C/A/P w/ IV contrast: Large distal esophageal mass with extensive posterior mediastinal and upper abdominal lymphadenopathy, compatible with primary esophageal carcinoma. New patchy sclerosis of the T8 vertebral body, suspicious for metastatic disease  - Continue PRN analgesic regimen   - Clear liquid diet w/Ensure; maintenance fluids with NS @ 75cc/hr   - Monitor CMP  - GI Dr. Allen consulted, f/u recs  - Heme/Onc Dr. Jacob consulted, f/u recs

## 2023-10-10 NOTE — PROGRESS NOTE ADULT - SUBJECTIVE AND OBJECTIVE BOX
[facility] -- Batool Jeter Pannella, Patel, [facility]  Office # [phone]      Follow Up:  Cad    Subjective/Observations:   Seen at bedside, sleeping in no acute distress on room air         PAST MEDICAL & SURGICAL HISTORY:  S/P colon resection      Diverticulitis      Glaucoma      PSVT (paroxysmal supraventricular tachycardia)      Atrial fibrillation      Breast cancer      Gall stones      C. difficile diarrhea      S/P colon resection  2014      Status post appendectomy      H/O mastectomy, bilateral      H/O hernia repair      S/P ORIF (open reduction internal fixation) fracture  L lower extremity      H/O: hysterectomy          MEDICATIONS  (STANDING):  brimonidine 0.2% Ophthalmic Solution 1 Drop(s) Both EYES two times a day  dorzolamide 2%/timolol 0.5% Ophthalmic Solution 1 Drop(s) Both EYES two times a day  influenza  Vaccine (HIGH DOSE) 0.7 milliLiter(s) IntraMuscular once  latanoprost 0.005% Ophthalmic Solution 1 Drop(s) Both EYES at bedtime  naloxone Injectable 0.4 milliGRAM(s) IV Push once  sodium chloride 0.9%. 1000 milliLiter(s) (75 mL/Hr) IV Continuous <Continuous>    MEDICATIONS  (PRN):  acetaminophen  Suppository .. 650 milliGRAM(s) Rectal every 6 hours PRN Temp greater or equal to 38C (100.4F), Mild Pain (1 - 3)  bisacodyl Suppository 10 milliGRAM(s) Rectal daily PRN Constipation  morphine  - Injectable 4 milliGRAM(s) IV Push every 4 hours PRN Severe Pain (7 - 10)  morphine  - Injectable 2 milliGRAM(s) IV Push every 4 hours PRN Moderate Pain (4 - 6)  ondansetron Injectable 4 milliGRAM(s) IV Push every 8 hours PRN Nausea and/or Vomiting      Allergies    No Known Allergies    Intolerances        Vital Signs Last 24 Hrs  T(C): 36.8 (10 Oct 2023 05:12), Max: 37.3 (09 Oct 2023 13:33)  T(F): 98.2 (10 Oct 2023 05:12), Max: 99.1 (09 Oct 2023 13:33)  HR: 94 (10 Oct 2023 05:12) (74 - 123)  BP: 139/74 (10 Oct 2023 05:12) (121/77 - 145/87)  BP(mean): --  RR: 17 (10 Oct 2023 05:12) (17 - 17)  SpO2: 91% (10 Oct 2023 05:12) (90% - 94%)    Parameters below as of 10 Oct 2023 05:12  Patient On (Oxygen Delivery Method): room air        I&O's Summary    09 Oct 2023 07:01  -  10 Oct 2023 07:00  --------------------------------------------------------  IN: 0 mL / OUT: 500 mL / NET: -500 mL          PHYSICAL EXAM:  TELE: not on tele   Constitutional: NAD, well-developed, obese   HEENT: Moist Mucous Membranes, Anicteric  Pulmonary: Non-labored, breath sounds are clear bilaterally, No wheezing, crackles or rhonchi  Cardiovascular: IRRegular, S1 and S2 nl, murmur   Gastrointestinal: Bowel Sounds present, soft, nontender.   Lymph: No lymphadenopathy. No peripheral edema.  Skin: No visible rashes or ulcers.    LABS: All Labs Reviewed:                        12.8   8.27  )-----------( 225      ( 10 Oct 2023 06:15 )             38.4                         12.7   8.02  )-----------( 224      ( 09 Oct 2023 06:56 )             38.6                         13.5   6.96  )-----------( 246      ( 08 Oct 2023 08:36 )             41.8     10 Oct 2023 06:15    139    |  107    |  14     ----------------------------<  87     4.0     |  24     |  0.82   09 Oct 2023 06:56    143    |  110    |  12     ----------------------------<  91     4.5     |  27     |  0.90   08 Oct 2023 08:36    142    |  108    |  16     ----------------------------<  82     4.6     |  27     |  0.92     Ca    8.9        10 Oct 2023 06:15  Ca    8.8        09 Oct 2023 06:56  Ca    9.3        08 Oct 2023 08:36               EC Lead ECG:   Ventricular Rate 91 BPM    QRS Duration 82 ms    Q-T Interval 360 ms    QTC Calculation(Bazett) 442 ms    R Axis 11 degrees    T Axis 0 degrees    Diagnosis Line Atrial fibrillation  Abnormal ECG  When compared with ECG of 06-DEC-2021 18:15,  Criteria for Septal infarct are no longer present  Confirmed by TEVIN KENNEY ([id number]) on 10/7/2023 2:26:23 PM (10-06-23 @ 22:03)       EXAM:  ECHO TTE W/O CON COMP W/DOPPLR         PROCEDURE DATE:  2017        INTERPRETATION:  Ordering Physician: QUETA REYES [id number]    Indication: Atrial fibrillation    Study Quality: Technically good   A complete echocardiographic studywas performed utilizing standard   protocol including spectral and color Doppler in all echocardiographic   windows.    Height: 157 cm  Weight: 89kg  BSA: 1.89  Blood Pressure: 113/75    MEASUREMENTS  IVS: 1.1cm  PWT: 1.1cm  LA: 4.1cm  AO: 3.9cm  LVIDd: 5.4cm  LVIDs: 4.2cm    LVEF: 45%  RVSP: 38 mmHg    FINDINGS  Left Ventricle: Not well visualized. Moderate global Left ventricular   dysfunction. The septum, anteroseptum and inferior walls appear   hypokinetic.  Aortic Valve: Calcified trileafletaortic valve with normal opening.   Moderate aortic regurgitation  Mitral Valve: Moderate to severe mitral regurgitation  Tricuspid Valve: Mild to moderate TR  Pulmonic Valve: Not well visualized  Right Ventricle: Not well visualized. Likely normal RV function  Left atrium: mild left atrial enlargement  Pericardium/Pleura: Trace pericardial effusion      CONCLUSIONS:  Moderate global left ventricular dysfunction. Moderate to severe mitral   regurgitation. Moderate aortic regurgitation. Mildly elevated pulmonary   pressures. IVC is dilated at 2.3 cm and collapses with respiration.                    CHANDLER BARDALES   This document has been electronically signed. 2017  7:11PM                  Radiology:

## 2023-10-10 NOTE — PROGRESS NOTE ADULT - ASSESSMENT
esophageal mass  dysphagia    CT scan noted  suspect intrinsic esophageal mass  clear liquid diet only   upper gastrointestinal endoscopy monday  d/w patient    I reviewed the overnight course of events on the unit, re-confirming the patient history. I discussed the care with the patient and their family  Differential diagnosis and plan of care discussed with patient after the evaluation  40 minutes spent on total encounter of which more than fifty percent of the encounter was spent counseling and/or coordinating care by the attending physician.  Advanced care planning was discussed with patient and family.  Advanced care planning forms were reviewed and discussed.  Risks, benefits and alternatives of gastroenterologic procedures were discussed in detail and all questions were answered. esophageal mass  dysphagia    s/p egd; obstructing esophageal mass  offered transfer for esophageal stent pt is currently refusing and wishes to be discharged  pt understands prognosis is poor  will d/w primary  d/w patient    I reviewed the overnight course of events on the unit, re-confirming the patient history. I discussed the care with the patient and their family  Differential diagnosis and plan of care discussed with patient after the evaluation  40 minutes spent on total encounter of which more than fifty percent of the encounter was spent counseling and/or coordinating care by the attending physician.  Advanced care planning was discussed with patient and family.  Advanced care planning forms were reviewed and discussed.  Risks, benefits and alternatives of gastroenterologic procedures were discussed in detail and all questions were answered.

## 2023-10-10 NOTE — PROGRESS NOTE ADULT - PROBLEM SELECTOR PLAN 1
Patient has been having severe dysphagia and mucous production for the last 1-3 months. Hx of breast cancer s/p double mastectomy 9yr ago    - CT Chest non con: New 7.2 cm distal esophageal mass. Findings may represent a true mass versus abnormal lymphadenopathy  - CT C/A/P w/ IV contrast: Large distal esophageal mass with extensive posterior mediastinal and upper abdominal lymphadenopathy, compatible with primary esophageal carcinoma. New patchy sclerosis of the T8 vertebral body, suspicious for metastatic disease.  - Clear liquid diet w/Ensure; maintenance fluids with NS @ 75cc/hr  - Zofran PRN   - UGI Endoscopy w/ biopsy 10/9: Large obstructing esophageal mass noted at 31cm from the incisors, biopsied  - FU Path   - Per GI needs transfer to site that can perform esophageal stent  - GI Dr. Allen consulted, f/u recs   - Heme/Onc Dr. Jacob consulted, f/u recs  - Cardiology Dr. Parra consulted for cardiac clearance, f/u recs Patient has been having severe dysphagia and mucous production for the last 1-3 months. Hx of breast cancer s/p double mastectomy 9yr ago    - CT Chest non con: New 7.2 cm distal esophageal mass. Findings may represent a true mass versus abnormal lymphadenopathy  - CT C/A/P w/ IV contrast: Large distal esophageal mass with extensive posterior mediastinal and upper abdominal lymphadenopathy, compatible with primary esophageal carcinoma. New patchy sclerosis of the T8 vertebral body, suspicious for metastatic disease.  - Clear liquid diet w/Ensure; maintenance fluids with NS @ 75cc/hr  - UGI Endoscopy w/ biopsy 10/9: Large obstructing esophageal mass noted at 31cm from the incisors, biopsied  - FU Path   - Per GI needs transfer to site that can perform esophageal stent  - Per patient, currently refusing stent placement  - FU Palliative C/s  - GI Dr. Allen consulted, f/u recs   - Heme/Onc Dr. Jacob consulted, f/u recs  - Cardiology Dr. Parra consulted for cardiac clearance, f/u recs

## 2023-10-10 NOTE — PROGRESS NOTE ADULT - SUBJECTIVE AND OBJECTIVE BOX
All interim records and events noted.    resting in bed      MEDICATIONS  (STANDING):  brimonidine 0.2% Ophthalmic Solution 1 Drop(s) Both EYES two times a day  dorzolamide 2%/timolol 0.5% Ophthalmic Solution 1 Drop(s) Both EYES two times a day  influenza  Vaccine (HIGH DOSE) 0.7 milliLiter(s) IntraMuscular once  latanoprost 0.005% Ophthalmic Solution 1 Drop(s) Both EYES at bedtime  naloxone Injectable 0.4 milliGRAM(s) IV Push once  sodium chloride 0.9%. 1000 milliLiter(s) (75 mL/Hr) IV Continuous <Continuous>    MEDICATIONS  (PRN):  acetaminophen  Suppository .. 650 milliGRAM(s) Rectal every 6 hours PRN Temp greater or equal to 38C (100.4F), Mild Pain (1 - 3)  bisacodyl Suppository 10 milliGRAM(s) Rectal daily PRN Constipation  morphine  - Injectable 4 milliGRAM(s) IV Push every 4 hours PRN Severe Pain (7 - 10)  morphine  - Injectable 2 milliGRAM(s) IV Push every 4 hours PRN Moderate Pain (4 - 6)  ondansetron Injectable 4 milliGRAM(s) IV Push every 8 hours PRN Nausea and/or Vomiting      Vital Signs Last 24 Hrs  T(C): 36.8 (10 Oct 2023 12:53), Max: 37.3 (09 Oct 2023 13:33)  T(F): 98.2 (10 Oct 2023 12:53), Max: 99.1 (09 Oct 2023 13:33)  HR: 103 (10 Oct 2023 12:53) (74 - 123)  BP: 95/66 (10 Oct 2023 12:53) (95/66 - 145/87)  BP(mean): --  RR: 16 (10 Oct 2023 12:53) (16 - 17)  SpO2: 92% (10 Oct 2023 12:53) (90% - 94%)    Parameters below as of 10 Oct 2023 12:53  Patient On (Oxygen Delivery Method): room air        PHYSICAL EXAM  General: in no acute distress  Head: atraumatic, normocephalic  ENT: sclera anicteric, buccal mucosa moist  Neck: supple, trachea midline  CV: S1 S2, regular rate and rhythm  Lungs: clear to auscultation, no wheezes/rhonchi  Abdomen: soft, nontender, bowel sounds present  Skin: no significant increased ecchymosis/petechiae        LABS:             12.8   8.27  )-----------( 225      ( 10-10 @ 06:15 )             38.4                12.7   8.02  )-----------( 224      ( 10-09 @ 06:56 )             38.6                13.5   6.96  )-----------( 246      ( 10-08 @ 08:36 )             41.8       10-10    139  |  107  |  14  ----------------------------<  87  4.0   |  24  |  0.82    Ca    8.9      10 Oct 2023 06:15          RADIOLOGY & ADDITIONAL STUDIES:    IMPRESSION/RECOMMENDATIONS:

## 2023-10-10 NOTE — PROGRESS NOTE ADULT - ASSESSMENT
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 New patchy sclerosis of the T8 vertebral body, suspicious for metastatic disease.    Seen by GI  s/p EGD   Scope cannot be passed post obx  stent being considered.         RECOMMENDATIONS    -post endoscopy yesterday, found w esophageal mass, biopsied  -pt indicates does not want any treatments, including stent  -agree w planned discharge, palliative/Hospice referral

## 2023-10-10 NOTE — PROGRESS NOTE ADULT - ASSESSMENT
88-year-old female with PMH of CAD, gallstones, breast cancer (9 years ago status post radiation, reports never took oral medications because "she does not like to take pills"), atrial fibrillation no AC, glaucoma, diverticulitis who presents with abd pain and found to have a new esophageal mass.     CAD, Persistent Afib  - EKG is nonischemic  - Trop is neg, no need to trend  - Had a TTE in 7/2017 showing EF 45%.  Would repeat    - has hx afib   - Has not wanted any AC for years and at this point with a large esophageal mass, would be reluctant to start it.     - sp egd fu pathology gi recs, for transfer for esophageal stent   - Heme/onc following    - Monitor and replete lytes, keep K>4, Mg>2.  - Will continue to follow.

## 2023-10-11 LAB
ANION GAP SERPL CALC-SCNC: 7 MMOL/L — SIGNIFICANT CHANGE UP (ref 5–17)
BASOPHILS # BLD AUTO: 0.04 K/UL — SIGNIFICANT CHANGE UP (ref 0–0.2)
BASOPHILS NFR BLD AUTO: 0.5 % — SIGNIFICANT CHANGE UP (ref 0–2)
BUN SERPL-MCNC: 16 MG/DL — SIGNIFICANT CHANGE UP (ref 7–23)
CALCIUM SERPL-MCNC: 8.9 MG/DL — SIGNIFICANT CHANGE UP (ref 8.5–10.1)
CHLORIDE SERPL-SCNC: 108 MMOL/L — SIGNIFICANT CHANGE UP (ref 96–108)
CO2 SERPL-SCNC: 24 MMOL/L — SIGNIFICANT CHANGE UP (ref 22–31)
CREAT SERPL-MCNC: 0.83 MG/DL — SIGNIFICANT CHANGE UP (ref 0.5–1.3)
EGFR: 69 ML/MIN/1.73M2 — SIGNIFICANT CHANGE UP
EOSINOPHIL # BLD AUTO: 0.28 K/UL — SIGNIFICANT CHANGE UP (ref 0–0.5)
EOSINOPHIL NFR BLD AUTO: 3.3 % — SIGNIFICANT CHANGE UP (ref 0–6)
GLUCOSE SERPL-MCNC: 100 MG/DL — HIGH (ref 70–99)
HCT VFR BLD CALC: 37.3 % — SIGNIFICANT CHANGE UP (ref 34.5–45)
HGB BLD-MCNC: 12.5 G/DL — SIGNIFICANT CHANGE UP (ref 11.5–15.5)
IMM GRANULOCYTES NFR BLD AUTO: 0.2 % — SIGNIFICANT CHANGE UP (ref 0–0.9)
LYMPHOCYTES # BLD AUTO: 1.26 K/UL — SIGNIFICANT CHANGE UP (ref 1–3.3)
LYMPHOCYTES # BLD AUTO: 15 % — SIGNIFICANT CHANGE UP (ref 13–44)
MCHC RBC-ENTMCNC: 33.2 PG — SIGNIFICANT CHANGE UP (ref 27–34)
MCHC RBC-ENTMCNC: 33.5 GM/DL — SIGNIFICANT CHANGE UP (ref 32–36)
MCV RBC AUTO: 98.9 FL — SIGNIFICANT CHANGE UP (ref 80–100)
MONOCYTES # BLD AUTO: 1.18 K/UL — HIGH (ref 0–0.9)
MONOCYTES NFR BLD AUTO: 14 % — SIGNIFICANT CHANGE UP (ref 2–14)
NEUTROPHILS # BLD AUTO: 5.64 K/UL — SIGNIFICANT CHANGE UP (ref 1.8–7.4)
NEUTROPHILS NFR BLD AUTO: 67 % — SIGNIFICANT CHANGE UP (ref 43–77)
NRBC # BLD: 0 /100 WBCS — SIGNIFICANT CHANGE UP (ref 0–0)
PLATELET # BLD AUTO: 219 K/UL — SIGNIFICANT CHANGE UP (ref 150–400)
POTASSIUM SERPL-MCNC: 3.8 MMOL/L — SIGNIFICANT CHANGE UP (ref 3.5–5.3)
POTASSIUM SERPL-SCNC: 3.8 MMOL/L — SIGNIFICANT CHANGE UP (ref 3.5–5.3)
RBC # BLD: 3.77 M/UL — LOW (ref 3.8–5.2)
RBC # FLD: 12.5 % — SIGNIFICANT CHANGE UP (ref 10.3–14.5)
SODIUM SERPL-SCNC: 139 MMOL/L — SIGNIFICANT CHANGE UP (ref 135–145)
WBC # BLD: 8.42 K/UL — SIGNIFICANT CHANGE UP (ref 3.8–10.5)
WBC # FLD AUTO: 8.42 K/UL — SIGNIFICANT CHANGE UP (ref 3.8–10.5)

## 2023-10-11 PROCEDURE — 99232 SBSQ HOSP IP/OBS MODERATE 35: CPT

## 2023-10-11 RX ADMIN — BRIMONIDINE TARTRATE 1 DROP(S): 2 SOLUTION/ DROPS OPHTHALMIC at 05:58

## 2023-10-11 RX ADMIN — MORPHINE SULFATE 4 MILLIGRAM(S): 50 CAPSULE, EXTENDED RELEASE ORAL at 12:30

## 2023-10-11 RX ADMIN — FENTANYL CITRATE 1 PATCH: 50 INJECTION INTRAVENOUS at 07:15

## 2023-10-11 RX ADMIN — SODIUM CHLORIDE 75 MILLILITER(S): 9 INJECTION INTRAMUSCULAR; INTRAVENOUS; SUBCUTANEOUS at 21:29

## 2023-10-11 RX ADMIN — FENTANYL CITRATE 1 PATCH: 50 INJECTION INTRAVENOUS at 19:54

## 2023-10-11 RX ADMIN — MORPHINE SULFATE 4 MILLIGRAM(S): 50 CAPSULE, EXTENDED RELEASE ORAL at 04:34

## 2023-10-11 RX ADMIN — BRIMONIDINE TARTRATE 1 DROP(S): 2 SOLUTION/ DROPS OPHTHALMIC at 17:03

## 2023-10-11 RX ADMIN — MORPHINE SULFATE 4 MILLIGRAM(S): 50 CAPSULE, EXTENDED RELEASE ORAL at 11:30

## 2023-10-11 RX ADMIN — MORPHINE SULFATE 4 MILLIGRAM(S): 50 CAPSULE, EXTENDED RELEASE ORAL at 04:04

## 2023-10-11 RX ADMIN — DORZOLAMIDE HYDROCHLORIDE TIMOLOL MALEATE 1 DROP(S): 20; 5 SOLUTION/ DROPS OPHTHALMIC at 17:05

## 2023-10-11 RX ADMIN — LATANOPROST 1 DROP(S): 0.05 SOLUTION/ DROPS OPHTHALMIC; TOPICAL at 21:29

## 2023-10-11 RX ADMIN — MORPHINE SULFATE 4 MILLIGRAM(S): 50 CAPSULE, EXTENDED RELEASE ORAL at 23:38

## 2023-10-11 RX ADMIN — SODIUM CHLORIDE 75 MILLILITER(S): 9 INJECTION INTRAMUSCULAR; INTRAVENOUS; SUBCUTANEOUS at 05:58

## 2023-10-11 RX ADMIN — DORZOLAMIDE HYDROCHLORIDE TIMOLOL MALEATE 1 DROP(S): 20; 5 SOLUTION/ DROPS OPHTHALMIC at 05:58

## 2023-10-11 NOTE — PROGRESS NOTE ADULT - SUBJECTIVE AND OBJECTIVE BOX
[facility] -- Batool Jeter Pannella, Patel, Savella [facility]  Office # [phone]      Follow Up:    Cad afib     Subjective/Observations:   No events overnight resting comfortably in bed.  No complaints of chest pain, dyspnea, or palpitations reported. No signs of orthopnea or PND.      REVIEW OF SYSTEMS: All other review of systems is negative unless indicated above    PAST MEDICAL & SURGICAL HISTORY:  S/P colon resection      Diverticulitis      Glaucoma      PSVT (paroxysmal supraventricular tachycardia)      Atrial fibrillation      Breast cancer      Gall stones      C. difficile diarrhea      S/P colon resection  2014      Status post appendectomy      H/O mastectomy, bilateral      H/O hernia repair      S/P ORIF (open reduction internal fixation) fracture  L lower extremity      H/O: hysterectomy          MEDICATIONS  (STANDING):  brimonidine 0.2% Ophthalmic Solution 1 Drop(s) Both EYES two times a day  dorzolamide 2%/timolol 0.5% Ophthalmic Solution 1 Drop(s) Both EYES two times a day  fentaNYL   Patch  12 MICROgram(s)/Hr 1 Patch Transdermal every 72 hours  influenza  Vaccine (HIGH DOSE) 0.7 milliLiter(s) IntraMuscular once  latanoprost 0.005% Ophthalmic Solution 1 Drop(s) Both EYES at bedtime  naloxone Injectable 0.4 milliGRAM(s) IV Push once  sodium chloride 0.9%. 1000 milliLiter(s) (75 mL/Hr) IV Continuous <Continuous>    MEDICATIONS  (PRN):  bisacodyl Suppository 10 milliGRAM(s) Rectal daily PRN Constipation  LORazepam    Concentrate 1 milliGRAM(s) SubLingual every 2 hours PRN Anxiety  morphine  - Injectable 4 milliGRAM(s) IV Push every 4 hours PRN Severe Pain (7 - 10)  morphine Concentrate 5 milliGRAM(s) SubLingual every 3 hours PRN Mild Pain (1 - 3)  morphine Concentrate 10 milliGRAM(s) SubLingual every 6 hours PRN Moderate Pain (4 - 6)  ondansetron Injectable 4 milliGRAM(s) IV Push every 8 hours PRN Nausea and/or Vomiting      Allergies    No Known Allergies    Intolerances        Vital Signs Last 24 Hrs  T(C): 37.1 (11 Oct 2023 04:33), Max: 37.2 (10 Oct 2023 20:35)  T(F): 98.7 (11 Oct 2023 04:33), Max: 98.9 (10 Oct 2023 20:35)  HR: 103 (11 Oct 2023 04:33) (85 - 103)  BP: 116/77 (11 Oct 2023 04:33) (95/66 - 116/77)  BP(mean): --  RR: 17 (11 Oct 2023 04:33) (16 - 17)  SpO2: 94% (11 Oct 2023 04:33) (92% - 94%)    Parameters below as of 11 Oct 2023 04:33  Patient On (Oxygen Delivery Method): room air        I&O's Summary    10 Oct 2023 07:01  -  11 Oct 2023 07:00  --------------------------------------------------------  IN: 0 mL / OUT: 400 mL / NET: -400 mL          PHYSICAL EXAM:  TELE: not on tele  Constitutional: NAD, awake and alert, obese  HEENT: Moist Mucous Membranes, Anicteric  Pulmonary: Non-labored, breath sounds are clear bilaterally, No wheezing, crackles or rhonchi  Cardiovascular: IRRegular, S1 and S2 nl, murmur   Gastrointestinal: Bowel Sounds present, soft, nontender.   Lymph: No lymphadenopathy. No peripheral edema.  Skin: No visible rashes or ulcers.  Psych:  Mood & affect appropriate    LABS: All Labs Reviewed:                        12.8   827  )-----------( 225      ( 10 Oct 2023 06:15 )             38.4                         12.7   8.02  )-----------( 224      ( 09 Oct 2023 06:56 )             38.6     10 Oct 2023 06:15    139    |  107    |  14     ----------------------------<  87     4.0     |  24     |  0.82   09 Oct 2023 06:56    143    |  110    |  12     ----------------------------<  91     4.5     |  27     |  0.90     Ca    8.9        10 Oct 2023 06:15  Ca    8.8        09 Oct 2023 06:56               EC Lead ECG:   Ventricular Rate 91 BPM    QRS Duration 82 ms    Q-T Interval 360 ms    QTC Calculation(Bazett) 442 ms    R Axis 11 degrees    T Axis 0 degrees    Diagnosis Line Atrial fibrillation  Abnormal ECG  When compared with ECG of 06-DEC-2021 18:15,  Criteria for Septal infarct are no longer present  Confirmed by TEVIN KENNEY ([id number]) on 10/7/2023 2:26:23 PM (10-06-23 @ 22:03)       EXAM:  ECHO TTE W/O CON COMP W/DOPPLR         PROCEDURE DATE:  2017        INTERPRETATION:  Ordering Physician: QUETA REYES [id number]    Indication: Atrial fibrillation    Study Quality: Technically good   A complete echocardiographic studywas performed utilizing standard   protocol including spectral and color Doppler in all echocardiographic   windows.    Height: 157 cm  Weight: 89kg  BSA: 1.89  Blood Pressure: 113/75    MEASUREMENTS  IVS: 1.1cm  PWT: 1.1cm  LA: 4.1cm  AO: 3.9cm  LVIDd: 5.4cm  LVIDs: 4.2cm    LVEF: 45%  RVSP: 38 mmHg    FINDINGS  Left Ventricle: Not well visualized. Moderate global Left ventricular   dysfunction. The septum, anteroseptum and inferior walls appear   hypokinetic.  Aortic Valve: Calcified trileafletaortic valve with normal opening.   Moderate aortic regurgitation  Mitral Valve: Moderate to severe mitral regurgitation  Tricuspid Valve: Mild to moderate TR  Pulmonic Valve: Not well visualized  Right Ventricle: Not well visualized. Likely normal RV function  Left atrium: mild left atrial enlargement  Pericardium/Pleura: Trace pericardial effusion      CONCLUSIONS:  Moderate global left ventricular dysfunction. Moderate to severe mitral   regurgitation. Moderate aortic regurgitation. Mildly elevated pulmonary   pressures. IVC is dilated at 2.3 cm and collapses with respiration.                    CHANDLER BARDALES   This document has been electronically signed. 2017  7:11PM                  Radiology:         [facility] -- Batool Jeter Pannella, Patel, Savella [facility]  Office # [phone]      Follow Up:    Cad afib     Subjective/Observations:   No events overnight resting comfortably in bed.  No complaints of chest pain, dyspnea, or palpitations reported. No signs of orthopnea or PND.  laying flat on room air     REVIEW OF SYSTEMS: All other review of systems is negative unless indicated above    PAST MEDICAL & SURGICAL HISTORY:  S/P colon resection      Diverticulitis      Glaucoma      PSVT (paroxysmal supraventricular tachycardia)      Atrial fibrillation      Breast cancer      Gall stones      C. difficile diarrhea      S/P colon resection  2014      Status post appendectomy      H/O mastectomy, bilateral      H/O hernia repair      S/P ORIF (open reduction internal fixation) fracture  L lower extremity      H/O: hysterectomy          MEDICATIONS  (STANDING):  brimonidine 0.2% Ophthalmic Solution 1 Drop(s) Both EYES two times a day  dorzolamide 2%/timolol 0.5% Ophthalmic Solution 1 Drop(s) Both EYES two times a day  fentaNYL   Patch  12 MICROgram(s)/Hr 1 Patch Transdermal every 72 hours  influenza  Vaccine (HIGH DOSE) 0.7 milliLiter(s) IntraMuscular once  latanoprost 0.005% Ophthalmic Solution 1 Drop(s) Both EYES at bedtime  naloxone Injectable 0.4 milliGRAM(s) IV Push once  sodium chloride 0.9%. 1000 milliLiter(s) (75 mL/Hr) IV Continuous <Continuous>    MEDICATIONS  (PRN):  bisacodyl Suppository 10 milliGRAM(s) Rectal daily PRN Constipation  LORazepam    Concentrate 1 milliGRAM(s) SubLingual every 2 hours PRN Anxiety  morphine  - Injectable 4 milliGRAM(s) IV Push every 4 hours PRN Severe Pain (7 - 10)  morphine Concentrate 5 milliGRAM(s) SubLingual every 3 hours PRN Mild Pain (1 - 3)  morphine Concentrate 10 milliGRAM(s) SubLingual every 6 hours PRN Moderate Pain (4 - 6)  ondansetron Injectable 4 milliGRAM(s) IV Push every 8 hours PRN Nausea and/or Vomiting      Allergies    No Known Allergies    Intolerances        Vital Signs Last 24 Hrs  T(C): 37.1 (11 Oct 2023 04:33), Max: 37.2 (10 Oct 2023 20:35)  T(F): 98.7 (11 Oct 2023 04:33), Max: 98.9 (10 Oct 2023 20:35)  HR: 103 (11 Oct 2023 04:33) (85 - 103)  BP: 116/77 (11 Oct 2023 04:33) (95/66 - 116/77)  BP(mean): --  RR: 17 (11 Oct 2023 04:33) (16 - 17)  SpO2: 94% (11 Oct 2023 04:33) (92% - 94%)    Parameters below as of 11 Oct 2023 04:33  Patient On (Oxygen Delivery Method): room air        I&O's Summary    10 Oct 2023 07:01  -  11 Oct 2023 07:00  --------------------------------------------------------  IN: 0 mL / OUT: 400 mL / NET: -400 mL          PHYSICAL EXAM:  TELE: not on tele  Constitutional: NAD, awake and alert, obese  HEENT: Moist Mucous Membranes, Anicteric  Pulmonary: Non-labored, breath sounds are clear bilaterally, No wheezing, crackles or rhonchi  Cardiovascular: IRRegular, S1 and S2 nl, murmur   Gastrointestinal: Bowel Sounds present, soft, nontender.   Lymph: No lymphadenopathy. No peripheral edema.  Skin: No visible rashes or ulcers.  Psych:  Mood & affect appropriate    LABS: All Labs Reviewed:                        12.8   8.27  )-----------( 225      ( 10 Oct 2023 06:15 )             38.4                         12.7   8.02  )-----------( 224      ( 09 Oct 2023 06:56 )             38.6     10 Oct 2023 06:15    139    |  107    |  14     ----------------------------<  87     4.0     |  24     |  0.82   09 Oct 2023 06:56    143    |  110    |  12     ----------------------------<  91     4.5     |  27     |  0.90     Ca    8.9        10 Oct 2023 06:15  Ca    8.8        09 Oct 2023 06:56               EC Lead ECG:   Ventricular Rate 91 BPM    QRS Duration 82 ms    Q-T Interval 360 ms    QTC Calculation(Bazett) 442 ms    R Axis 11 degrees    T Axis 0 degrees    Diagnosis Line Atrial fibrillation  Abnormal ECG  When compared with ECG of 06-DEC-2021 18:15,  Criteria for Septal infarct are no longer present  Confirmed by TEVIN KENNEY ([id number]) on 10/7/2023 2:26:23 PM (10-06-23 @ 22:03)       EXAM:  ECHO TTE W/O CON COMP W/DOPPLR         PROCEDURE DATE:  2017        INTERPRETATION:  Ordering Physician: QUETA REYES [id number]    Indication: Atrial fibrillation    Study Quality: Technically good   A complete echocardiographic studywas performed utilizing standard   protocol including spectral and color Doppler in all echocardiographic   windows.    Height: 157 cm  Weight: 89kg  BSA: 1.89  Blood Pressure: 113/75    MEASUREMENTS  IVS: 1.1cm  PWT: 1.1cm  LA: 4.1cm  AO: 3.9cm  LVIDd: 5.4cm  LVIDs: 4.2cm    LVEF: 45%  RVSP: 38 mmHg    FINDINGS  Left Ventricle: Not well visualized. Moderate global Left ventricular   dysfunction. The septum, anteroseptum and inferior walls appear   hypokinetic.  Aortic Valve: Calcified trileafletaortic valve with normal opening.   Moderate aortic regurgitation  < from: TTE W or WO Ultrasound Enhancing Agent (10.09.23 @ 07:54) >  CONCLUSIONS:      1. Technically difficult image quality.   2. Left ventricular endocardium is not well visualized; however, the left ventricular systolic function appears mildly reduced.   3. Left ventricular systolic function is mildly decreased with an ejection fraction of 50 % by Drake's method of disks. Global left ventricular hypokinesis.   4. Normal right ventricular cavity size, wall thickness and systolic function.   5. The left atrium is severely dilated in size.   6. The right atrium is dilated in size.   7. There is calcification of the mitral valve annulus.   8. Mitral valve leaflets are diffusely calcified.   9. Symmetric mitral valve leaflet tethering.  10. Moderate mitral regurgitation.  11. Trileaflet aortic valve with visually mildly reduced systolic excursion.  12. Moderate aortic regurgitation.  13. Structurally normal tricuspid valve with normal leaflet excursion. Moderate tricuspid regurgitation.  14. Estimated pulmonary artery systolic pressure is 35 mmHg, consistent with normal pulmonary artery pressure.    < end of copied text >  Mitral Valve: Moderate to severe mitral regurgitation  Tricuspid Valve: Mild to moderate TR  Pulmonic Valve: Not well visualized  Right Ventricle: Not well visualized. Likely normal RV function  Left atrium: mild left atrial enlargement  Pericardium/Pleura: Trace pericardial effusion      CONCLUSIONS:  Moderate global left ventricular dysfunction. Moderate to severe mitral   regurgitation. Moderate aortic regurgitation. Mildly elevated pulmonary   pressures. IVC is dilated at 2.3 cm and collapses with respiration.                                Radiology:

## 2023-10-11 NOTE — PROGRESS NOTE ADULT - SUBJECTIVE AND OBJECTIVE BOX
Date/Time Patient Seen:  		  Referring MD:   Data Reviewed	       Patient is a 88y old  Female who presents with a chief complaint of esophageal mass (10 Oct 2023 17:42)      Subjective/HPI     PAST MEDICAL & SURGICAL HISTORY:  S/P colon resection    Status post hysterectomy    Status post appendectomy    Diverticulitis    Glaucoma    PSVT (paroxysmal supraventricular tachycardia)    Atrial fibrillation    Breast cancer    Gall stones    C. difficile diarrhea    S/P colon resection  march 11 2014    Status post appendectomy    H/O mastectomy, bilateral    H/O hernia repair    S/P ORIF (open reduction internal fixation) fracture  L lower extremity    H/O: hysterectomy          Medication list         MEDICATIONS  (STANDING):  brimonidine 0.2% Ophthalmic Solution 1 Drop(s) Both EYES two times a day  dorzolamide 2%/timolol 0.5% Ophthalmic Solution 1 Drop(s) Both EYES two times a day  fentaNYL   Patch  12 MICROgram(s)/Hr 1 Patch Transdermal every 72 hours  influenza  Vaccine (HIGH DOSE) 0.7 milliLiter(s) IntraMuscular once  latanoprost 0.005% Ophthalmic Solution 1 Drop(s) Both EYES at bedtime  naloxone Injectable 0.4 milliGRAM(s) IV Push once  sodium chloride 0.9%. 1000 milliLiter(s) (75 mL/Hr) IV Continuous <Continuous>    MEDICATIONS  (PRN):  bisacodyl Suppository 10 milliGRAM(s) Rectal daily PRN Constipation  LORazepam    Concentrate 1 milliGRAM(s) SubLingual every 2 hours PRN Anxiety  morphine  - Injectable 4 milliGRAM(s) IV Push every 4 hours PRN Severe Pain (7 - 10)  morphine Concentrate 5 milliGRAM(s) SubLingual every 3 hours PRN Mild Pain (1 - 3)  morphine Concentrate 10 milliGRAM(s) SubLingual every 6 hours PRN Moderate Pain (4 - 6)  ondansetron Injectable 4 milliGRAM(s) IV Push every 8 hours PRN Nausea and/or Vomiting         Vitals log        ICU Vital Signs Last 24 Hrs  T(C): 37.1 (11 Oct 2023 04:33), Max: 37.2 (10 Oct 2023 20:35)  T(F): 98.7 (11 Oct 2023 04:33), Max: 98.9 (10 Oct 2023 20:35)  HR: 103 (11 Oct 2023 04:33) (85 - 103)  BP: 116/77 (11 Oct 2023 04:33) (95/66 - 116/77)  BP(mean): --  ABP: --  ABP(mean): --  RR: 17 (11 Oct 2023 04:33) (16 - 17)  SpO2: 94% (11 Oct 2023 04:33) (92% - 94%)    O2 Parameters below as of 11 Oct 2023 04:33  Patient On (Oxygen Delivery Method): room air                 Input and Output:  I&O's Detail    09 Oct 2023 07:01  -  10 Oct 2023 07:00  --------------------------------------------------------  IN:  Total IN: 0 mL    OUT:    Voided (mL): 500 mL  Total OUT: 500 mL    Total NET: -500 mL          Lab Data                        12.8   8.27  )-----------( 225      ( 10 Oct 2023 06:15 )             38.4     10-10    139  |  107  |  14  ----------------------------<  87  4.0   |  24  |  0.82    Ca    8.9      10 Oct 2023 06:15              Review of Systems	      Objective     Physical Examination    heart s1s2  lung dc BS      Pertinent Lab findings & Imaging      Nilo:  NO   Adequate UO     I&O's Detail    09 Oct 2023 07:01  -  10 Oct 2023 07:00  --------------------------------------------------------  IN:  Total IN: 0 mL    OUT:    Voided (mL): 500 mL  Total OUT: 500 mL    Total NET: -500 mL               Discussed with:     Cultures:	        Radiology

## 2023-10-11 NOTE — PROGRESS NOTE ADULT - SUBJECTIVE AND OBJECTIVE BOX
Pt reported generalized pain   Deliberating regarding pursuing   Aware of her terminal diagnosis       ED course:  Vitals: /69 -->98/64  T 97 HR 65 RR 18 SpO2 94% on RA  Labs significant for: Lipase 185  UA: UA: Trace ketone, trace leuk esterase, occasional bacteria   EKbpm, afib  In ED Given: Zofran x1, Morphine 4mg x1, NaCl bolus x1    Imaging  CT Chest: New 7.2 cm distal esophageal mass. Findings may represent a true mass versus abnormal lymphadenopathy.  CT Renal Stone: New abdominal gastrohepatic and para-aortic lymphadenopathy as well as 3 cm irregular left para-aortic soft tissue. Bilateral nonobstructive renal calculi. No hydronephrosis. (06 Oct 2023 22:23)      ----  INTERVAL HPI/OVERNIGHT EVENTS: Pt seen and evaluated at the bedside. No acute overnight events occurred. The patient denies fevers, chills, nausea, vomiting, chest pain, palpitations or shortness of breath.     ----  PAST MEDICAL & SURGICAL HISTORY:  S/P colon resection      Diverticulitis      Glaucoma      PSVT (paroxysmal supraventricular tachycardia)      Atrial fibrillation      Breast cancer      Gall stones      C. difficile diarrhea      S/P colon resection  2014      Status post appendectomy      H/O mastectomy, bilateral      H/O hernia repair      S/P ORIF (open reduction internal fixation) fracture  L lower extremity      H/O: hysterectomy          FAMILY HISTORY:  Family history of heart valve abnormality        Home Medications:  brimonidine 0.2% ophthalmic solution: 1 drop(s) in each affected eye once a day (06 Oct 2023 22:15)  dorzolamide-timolol 2%-0.5% preservative-free ophthalmic solution: 1 drop(s) to each affected eye 2 times a day (06 Oct 2023 22:15)  latanoprost 0.005% ophthalmic solution: 1 drop(s) to each affected eye once a day (at bedtime) (06 Oct 2023 22:15)  Rhopressa 0.02% ophthalmic solution: 1 drop(s) in each eye once a day (06 Oct 2023 22:15)      Allergies    No Known Allergies    Intolerances        ----  MEDICATIONS  (STANDING):  brimonidine 0.2% Ophthalmic Solution 1 Drop(s) Both EYES two times a day  dorzolamide 2%/timolol 0.5% Ophthalmic Solution 1 Drop(s) Both EYES two times a day  influenza  Vaccine (HIGH DOSE) 0.7 milliLiter(s) IntraMuscular once  latanoprost 0.005% Ophthalmic Solution 1 Drop(s) Both EYES at bedtime  naloxone Injectable 0.4 milliGRAM(s) IV Push once  sodium chloride 0.9%. 1000 milliLiter(s) (75 mL/Hr) IV Continuous <Continuous>    MEDICATIONS  (PRN):  acetaminophen  Suppository .. 650 milliGRAM(s) Rectal every 6 hours PRN Temp greater or equal to 38C (100.4F), Mild Pain (1 - 3)  bisacodyl Suppository 10 milliGRAM(s) Rectal daily PRN Constipation  morphine  - Injectable 4 milliGRAM(s) IV Push every 4 hours PRN Severe Pain (7 - 10)  morphine  - Injectable 2 milliGRAM(s) IV Push every 4 hours PRN Moderate Pain (4 - 6)  ondansetron Injectable 4 milliGRAM(s) IV Push every 8 hours PRN Nausea and/or Vomiting      ----  REVIEW OF SYSTEMS:  Constitutional: denies fever, chills, diaphoresis  HEENT: denies sore throat, runny nose, blurry vision, double vision    Respiratory: denies SOB, RANGEL, cough, wheezing, hemoptysis  Cardiovascular: denies CP, palpitations, LE edema  Gastrointestinal:  denies nausea, vomiting, diarrhea, constipation, abdominal pain, melena, hematochezia   Genitourinary: denies dysuria, hematuria  Skin/Breast: denies rash, hives, itching   Musculoskeletal: denies myalgias, arthritis, joint swelling, muscle weakness  Neurologic: denies syncope, LOC, headache, weakness, dizziness       ----  T(C): 36.8 (10-10-23 @ 05:12), Max: 37.3 (10-09-23 @ 13:33)  HR: 94 (10-10-23 @ 05:12) (74 - 123)  BP: 139/74 (10-10-23 @ 05:12) (121/77 - 145/87)  RR: 17 (10-10-23 @ 05:12) (17 - 17)  SpO2: 91% (10-10-23 @ 05:12) (90% - 94%)  Wt(kg): --    Physical Exam:   GENERAL: well-groomed, well-developed, NAD  HEENT: head NC/AT; EOM intact, PERRLA, conjunctiva & sclera clear; hearing grossly intact, moist mucous membranes  NECK: supple, no JVD  RESPIRATORY: CTA B/L, no wheezing, rales, rhonchi or rubs  CARDIOVASCULAR: S1&S2, RRR, no murmurs or gallops  ABDOMEN: soft, non-tender, non-distended, + Bowel sounds x4 quadrants, no guarding, rebound or rigidity  MUSCULOSKELETAL:  no clubbing, cyanosis or edema of all 4 extremities  LYMPH: no cervical lymphadenopathy  VASCULAR: Radial pulses 2+ bilaterally, no varicose veins   SKIN: warm and dry, color normal  NEUROLOGIC: AA&O X3, CN2-12 intact w/ no focal deficits, no sensory loss, motor Strength 5/5 in UE & LE B/L  Psych: Normal mood and affect, normal behavior         ----  I&O's Summary    09 Oct 2023 07:01  -  10 Oct 2023 07:00  --------------------------------------------------------  IN: 0 mL / OUT: 500 mL / NET: -500 mL        LABS:                        12.8   8.27  )-----------( 225      ( 10 Oct 2023 06:15 )             38.4     10-10    139  |  107  |  14  ----------------------------<  87  4.0   |  24  |  0.82    Ca    8.9      10 Oct 2023 06:15        Urinalysis Basic - ( 10 Oct 2023 06:15 )    Color: x / Appearance: x / SG: x / pH: x  Gluc: 87 mg/dL / Ketone: x  / Bili: x / Urobili: x   Blood: x / Protein: x / Nitrite: x   Leuk Esterase: x / RBC: x / WBC x   Sq Epi: x / Non Sq Epi: x / Bacteria: x      CAPILLARY BLOOD GLUCOSE          10-06 @ 21:10   >=3 organisms. Probable collection contamination.  --  --

## 2023-10-11 NOTE — PROGRESS NOTE ADULT - ASSESSMENT
88-year-old female with PMH of CAD, gallstones, breast cancer (9 years ago status post radiation, reports never took oral medications because "she does not like to take pills"), atrial fibrillation no AC, glaucoma, diverticulitis who presents with abd pain and found to have a new esophageal mass.     CAD, Persistent Afib  - EKG is nonischemic  - Trop is neg, no need to trend  - Had a TTE in 7/2017 showing EF 45%.  Would repeat    - has hx afib   - Has not wanted any AC for years and at this point with a large esophageal mass, would be reluctant to start it.     - sp egd fu pathology gi recs, for transfer for esophageal stent   - Heme/onc following    - Monitor and replete lytes, keep K>4, Mg>2.  - Will continue to follow. 88-year-old female with PMH of CAD, gallstones, breast cancer (9 years ago status post radiation, reports never took oral medications because "she does not like to take pills"), atrial fibrillation no AC, glaucoma, diverticulitis who presents with abd pain and found to have a new esophageal mass.     CAD, Persistent Afib  - EKG is nonischemic  - Trop is neg, no need to trend, no anginal complaints on exam   - echo as above lv ef 50% moderate to severe mr, moderate ar, mildly elevated pulmonary pressures,     - has hx afib   - Has not wanted any AC for years and at this point with a large esophageal mass, would be reluctant to start it.     - Seen by GI offered transfer for esophageal stent was refused by patient per notes   - Heme/onc following    - Monitor and replete lytes, keep K>4, Mg>2.  - Will continue to follow.

## 2023-10-11 NOTE — PROGRESS NOTE ADULT - ASSESSMENT
88F with CAD, gallstones,  atrial fibrillation, glaucoma, diverticulitis presented for dysphagia and abdominal pain found to have esophageal mass with mediastinal LN and probable bone mets     Esophageal mass   s/p EGD scope cannot be passed post obstruction   Pt declined esophageal stent placement and understands terminal diagnosis   Continue to optimize pain regimen (on fentanyl patch, morphine), continue bowel regimen, dvt ppx, spiritual support   Pt wishes to go home. Discharge planning once pt confirms hospice and reliable assistance can be arranged at home  Palliative care on board

## 2023-10-11 NOTE — SOCIAL WORK PROGRESS NOTE - NSSWPROGRESSNOTE_GEN_ALL_CORE
SW met with this pt and her HCP- she continues to refuse to get out of bed even with PT and much encouragement. Discussed pt returning home with hospice and hiring an aide, HCP and pt asking for another day to discuss while pts pain is being managed better. SW to follow.

## 2023-10-11 NOTE — SOCIAL WORK PROGRESS NOTE - NSSWPROGRESSNOTE_GEN_ALL_CORE
SW spoke with this pt at bedside again- she reports increased pain all over, says she just needs to get up and walk and then can go home. SW explained that she needed 2 people just to get from bed to chair yesterday, that she requires more pain medication as well. Pt reports that she doesn't want to have help at home, discussed with her that she really needs to speak with her family about care at home. Pt reports her HCP will be here later today will follow up with her to discuss plan. SW to follow.

## 2023-10-12 PROCEDURE — 99232 SBSQ HOSP IP/OBS MODERATE 35: CPT

## 2023-10-12 RX ORDER — FENTANYL CITRATE 50 UG/ML
1 INJECTION INTRAVENOUS
Refills: 0 | Status: DISCONTINUED | OUTPATIENT
Start: 2023-10-12 | End: 2023-10-16

## 2023-10-12 RX ADMIN — MORPHINE SULFATE 5 MILLIGRAM(S): 50 CAPSULE, EXTENDED RELEASE ORAL at 06:28

## 2023-10-12 RX ADMIN — FENTANYL CITRATE 1 PATCH: 50 INJECTION INTRAVENOUS at 19:26

## 2023-10-12 RX ADMIN — FENTANYL CITRATE 1 PATCH: 50 INJECTION INTRAVENOUS at 12:59

## 2023-10-12 RX ADMIN — BRIMONIDINE TARTRATE 1 DROP(S): 2 SOLUTION/ DROPS OPHTHALMIC at 06:29

## 2023-10-12 RX ADMIN — MORPHINE SULFATE 4 MILLIGRAM(S): 50 CAPSULE, EXTENDED RELEASE ORAL at 00:08

## 2023-10-12 RX ADMIN — FENTANYL CITRATE 1 PATCH: 50 INJECTION INTRAVENOUS at 19:06

## 2023-10-12 RX ADMIN — LATANOPROST 1 DROP(S): 0.05 SOLUTION/ DROPS OPHTHALMIC; TOPICAL at 20:55

## 2023-10-12 RX ADMIN — MORPHINE SULFATE 4 MILLIGRAM(S): 50 CAPSULE, EXTENDED RELEASE ORAL at 20:58

## 2023-10-12 RX ADMIN — MORPHINE SULFATE 4 MILLIGRAM(S): 50 CAPSULE, EXTENDED RELEASE ORAL at 07:54

## 2023-10-12 RX ADMIN — DORZOLAMIDE HYDROCHLORIDE TIMOLOL MALEATE 1 DROP(S): 20; 5 SOLUTION/ DROPS OPHTHALMIC at 20:55

## 2023-10-12 RX ADMIN — MORPHINE SULFATE 4 MILLIGRAM(S): 50 CAPSULE, EXTENDED RELEASE ORAL at 14:52

## 2023-10-12 RX ADMIN — MORPHINE SULFATE 4 MILLIGRAM(S): 50 CAPSULE, EXTENDED RELEASE ORAL at 15:20

## 2023-10-12 RX ADMIN — DORZOLAMIDE HYDROCHLORIDE TIMOLOL MALEATE 1 DROP(S): 20; 5 SOLUTION/ DROPS OPHTHALMIC at 06:29

## 2023-10-12 RX ADMIN — MORPHINE SULFATE 4 MILLIGRAM(S): 50 CAPSULE, EXTENDED RELEASE ORAL at 21:52

## 2023-10-12 RX ADMIN — BRIMONIDINE TARTRATE 1 DROP(S): 2 SOLUTION/ DROPS OPHTHALMIC at 20:55

## 2023-10-12 RX ADMIN — FENTANYL CITRATE 1 PATCH: 50 INJECTION INTRAVENOUS at 18:21

## 2023-10-12 RX ADMIN — MORPHINE SULFATE 4 MILLIGRAM(S): 50 CAPSULE, EXTENDED RELEASE ORAL at 08:05

## 2023-10-12 RX ADMIN — MORPHINE SULFATE 5 MILLIGRAM(S): 50 CAPSULE, EXTENDED RELEASE ORAL at 06:58

## 2023-10-12 NOTE — PROGRESS NOTE ADULT - SUBJECTIVE AND OBJECTIVE BOX
Reported generalized pain       Imaging  CT Chest: New 7.2 cm distal esophageal mass. Findings may represent a true mass versus abnormal lymphadenopathy.  CT Renal Stone: New abdominal gastrohepatic and para-aortic lymphadenopathy as well as 3 cm irregular left para-aortic soft tissue. Bilateral nonobstructive renal calculi. No hydronephrosis. (06 Oct 2023 22:23)      ----  INTERVAL HPI/OVERNIGHT EVENTS: Pt seen and evaluated at the bedside. No acute overnight events occurred. The patient denies fevers, chills, nausea, vomiting, chest pain, palpitations or shortness of breath.     ----  PAST MEDICAL & SURGICAL HISTORY:  S/P colon resection      Diverticulitis      Glaucoma      PSVT (paroxysmal supraventricular tachycardia)      Atrial fibrillation      Breast cancer      Gall stones      C. difficile diarrhea      S/P colon resection  march 11 2014      Status post appendectomy      H/O mastectomy, bilateral      H/O hernia repair      S/P ORIF (open reduction internal fixation) fracture  L lower extremity      H/O: hysterectomy          FAMILY HISTORY:  Family history of heart valve abnormality        Home Medications:  brimonidine 0.2% ophthalmic solution: 1 drop(s) in each affected eye once a day (06 Oct 2023 22:15)  dorzolamide-timolol 2%-0.5% preservative-free ophthalmic solution: 1 drop(s) to each affected eye 2 times a day (06 Oct 2023 22:15)  latanoprost 0.005% ophthalmic solution: 1 drop(s) to each affected eye once a day (at bedtime) (06 Oct 2023 22:15)  Rhopressa 0.02% ophthalmic solution: 1 drop(s) in each eye once a day (06 Oct 2023 22:15)      Allergies    No Known Allergies    Intolerances        ----  MEDICATIONS  (STANDING):  brimonidine 0.2% Ophthalmic Solution 1 Drop(s) Both EYES two times a day  dorzolamide 2%/timolol 0.5% Ophthalmic Solution 1 Drop(s) Both EYES two times a day  influenza  Vaccine (HIGH DOSE) 0.7 milliLiter(s) IntraMuscular once  latanoprost 0.005% Ophthalmic Solution 1 Drop(s) Both EYES at bedtime  naloxone Injectable 0.4 milliGRAM(s) IV Push once  sodium chloride 0.9%. 1000 milliLiter(s) (75 mL/Hr) IV Continuous <Continuous>    MEDICATIONS  (PRN):  acetaminophen  Suppository .. 650 milliGRAM(s) Rectal every 6 hours PRN Temp greater or equal to 38C (100.4F), Mild Pain (1 - 3)  bisacodyl Suppository 10 milliGRAM(s) Rectal daily PRN Constipation  morphine  - Injectable 4 milliGRAM(s) IV Push every 4 hours PRN Severe Pain (7 - 10)  morphine  - Injectable 2 milliGRAM(s) IV Push every 4 hours PRN Moderate Pain (4 - 6)  ondansetron Injectable 4 milliGRAM(s) IV Push every 8 hours PRN Nausea and/or Vomiting      ----  REVIEW OF SYSTEMS:  Negative other than aforementioned     ----  T(C): 36.8 (10-10-23 @ 05:12), Max: 37.3 (10-09-23 @ 13:33)  HR: 94 (10-10-23 @ 05:12) (74 - 123)  BP: 139/74 (10-10-23 @ 05:12) (121/77 - 145/87)  RR: 17 (10-10-23 @ 05:12) (17 - 17)  SpO2: 91% (10-10-23 @ 05:12) (90% - 94%)  Wt(kg): --    Physical Exam:   GENERAL: well-groomed, well-developed, NAD  NECK: supple, no JVD  RESPIRATORY: CTA B/L, no wheezing, rales, rhonchi or rubs  CARDIOVASCULAR: S1&S2, RRR, no murmurs or gallops  ABDOMEN: soft, non-tender, non-distended, + Bowel sounds x4 quadrants, no guarding, rebound or rigidity  MUSCULOSKELETAL:  no clubbing, cyanosis or edema of all 4 extremities  VASCULAR: Radial pulses 2+ bilaterally, no varicose veins   SKIN: warm and dry, color normal  NEUROLOGIC: AA&O X3, CN2-12 intact w/ no focal deficits, no sensory loss, motor Strength 5/5 in UE & LE B/L  Psych: Normal mood and affect, normal behavior         ----  I&O's Summary    09 Oct 2023 07:01  -  10 Oct 2023 07:00  --------------------------------------------------------  IN: 0 mL / OUT: 500 mL / NET: -500 mL        LABS:                        12.8   8.27  )-----------( 225      ( 10 Oct 2023 06:15 )             38.4     10-10    139  |  107  |  14  ----------------------------<  87  4.0   |  24  |  0.82    Ca    8.9      10 Oct 2023 06:15        Urinalysis Basic - ( 10 Oct 2023 06:15 )    Color: x / Appearance: x / SG: x / pH: x  Gluc: 87 mg/dL / Ketone: x  / Bili: x / Urobili: x   Blood: x / Protein: x / Nitrite: x   Leuk Esterase: x / RBC: x / WBC x   Sq Epi: x / Non Sq Epi: x / Bacteria: x      CAPILLARY BLOOD GLUCOSE          10-06 @ 21:10   >=3 organisms. Probable collection contamination.  --  --

## 2023-10-12 NOTE — PROGRESS NOTE ADULT - ASSESSMENT
88-year-old female with PMH of CAD, gallstones, breast cancer (9 years ago status post radiation, reports never took oral medications because "she does not like to take pills"), atrial fibrillation no AC, glaucoma, diverticulitis who presents with abd pain and found to have a new esophageal mass.     CAD, Persistent Afib  - EKG is nonischemic  - Trop is neg, no need to trend, no anginal complaints on exam   - echo as above lv ef 50% moderate to severe mr, moderate ar, mildly elevated pulmonary pressures,     - has hx afib   - Has not wanted any AC for years and at this point with a large esophageal mass, would be reluctant to start it.     - Seen by GI offered transfer for esophageal stent was refused by patient per notes   - Heme/onc following    - Monitor and replete lytes, keep K>4, Mg>2.  for home dc with hospice per notes, will sign off re consult as needed

## 2023-10-12 NOTE — PROGRESS NOTE ADULT - SUBJECTIVE AND OBJECTIVE BOX
[facility] -- Batool Jeter Pannella, Patel, Savella Goodger, Cohen  Office # [phone]      Follow Up:  CAd Afib     Subjective/Observations:   Seen bedside, laying flat on room air   complaints of generalized weakness,   denies chest pain dizziness palpitations     REVIEW OF SYSTEMS: All other review of systems is negative unless indicated above    PAST MEDICAL & SURGICAL HISTORY:  S/P colon resection      Diverticulitis      Glaucoma      PSVT (paroxysmal supraventricular tachycardia)      Atrial fibrillation      Breast cancer      Gall stones      C. difficile diarrhea      S/P colon resection  2014      Status post appendectomy      H/O mastectomy, bilateral      H/O hernia repair      S/P ORIF (open reduction internal fixation) fracture  L lower extremity      H/O: hysterectomy          MEDICATIONS  (STANDING):  brimonidine 0.2% Ophthalmic Solution 1 Drop(s) Both EYES two times a day  dorzolamide 2%/timolol 0.5% Ophthalmic Solution 1 Drop(s) Both EYES two times a day  fentaNYL   Patch  12 MICROgram(s)/Hr 1 Patch Transdermal every 72 hours  influenza  Vaccine (HIGH DOSE) 0.7 milliLiter(s) IntraMuscular once  latanoprost 0.005% Ophthalmic Solution 1 Drop(s) Both EYES at bedtime  naloxone Injectable 0.4 milliGRAM(s) IV Push once  sodium chloride 0.9%. 1000 milliLiter(s) (75 mL/Hr) IV Continuous <Continuous>    MEDICATIONS  (PRN):  bisacodyl Suppository 10 milliGRAM(s) Rectal daily PRN Constipation  LORazepam    Concentrate 1 milliGRAM(s) SubLingual every 2 hours PRN Anxiety  morphine  - Injectable 4 milliGRAM(s) IV Push every 4 hours PRN Severe Pain (7 - 10)  morphine Concentrate 5 milliGRAM(s) SubLingual every 3 hours PRN Mild Pain (1 - 3)  morphine Concentrate 10 milliGRAM(s) SubLingual every 6 hours PRN Moderate Pain (4 - 6)  ondansetron Injectable 4 milliGRAM(s) IV Push every 8 hours PRN Nausea and/or Vomiting      Allergies    No Known Allergies    Intolerances        Vital Signs Last 24 Hrs  T(C): 37.4 (12 Oct 2023 05:20), Max: 37.4 (12 Oct 2023 05:20)  T(F): 99.3 (12 Oct 2023 05:20), Max: 99.3 (12 Oct 2023 05:20)  HR: 102 (12 Oct 2023 05:20) (100 - 113)  BP: 140/80 (12 Oct 2023 05:20) (105/75 - 140/80)  BP(mean): --  RR: 17 (12 Oct 2023 05:20) (17 - 17)  SpO2: 97% (12 Oct 2023 05:20) (92% - 97%)    Parameters below as of 12 Oct 2023 05:20  Patient On (Oxygen Delivery Method): room air        I&O's Summary    11 Oct 2023 07:01  -  12 Oct 2023 07:00  --------------------------------------------------------  IN: 0 mL / OUT: 800 mL / NET: -800 mL          PHYSICAL EXAM:  TELE: not on tele   Constitutional: NAD, awake and alert, obese   HEENT: Moist Mucous Membranes, Anicteric  Pulmonary: Non-labored, breath sounds are clear bilaterally, No wheezing, crackles or rhonchi  Cardiovascular: IRRegular, S1 and S2 nl, murmur   Gastrointestinal: Bowel Sounds present, soft, nontender.   Lymph: No lymphadenopathy. No peripheral edema.  Skin: No visible rashes or ulcers.  Psych:  Mood & affect appropriate    LABS: All Labs Reviewed:                        12.5   8.42  )-----------( 219      ( 11 Oct 2023 08:09 )             37.3                         12.8   8.27  )-----------( 225      ( 10 Oct 2023 06:15 )             38.4     11 Oct 2023 08:09    139    |  108    |  16     ----------------------------<  100    3.8     |  24     |  0.83   10 Oct 2023 06:15    139    |  107    |  14     ----------------------------<  87     4.0     |  24     |  0.82     Ca    8.9        11 Oct 2023 08:09  Ca    8.9        10 Oct 2023 06:15               EC Lead ECG:   Ventricular Rate 91 BPM    QRS Duration 82 ms    Q-T Interval 360 ms    QTC Calculation(Bazett) 442 ms    R Axis 11 degrees    T Axis 0 degrees    Diagnosis Line Atrial fibrillation  Abnormal ECG  When compared with ECG of 06-DEC-2021 18:15,  Criteria for Septal infarct are no longer present  Confirmed by LAURA PARRA ([id number]) on 10/7/2023 2:26:23 PM (10-06-23 @ 22:03)      TRANSTHORACIC ECHOCARDIOGRAM REPORT  ________________________________________________________________________________                                      _______       Pt. Name:       JACEK HERNDON Study Date:    10/9/2023  MRN:            [id number]       YOB: 1935  Accession #:    [id number]      Age:           88 years  Account#:       [id number]     Gender:        F  Heart Rate:                    Height:        61.81 in (157.00 cm)  Rhythm:                        Weight:  169.75 lb (77.00 kg)  Blood Pressure: 129/68 mmHg    BSA/BMI:       1.78 m² / 31.24 kg/m²  ________________________________________________________________________________________  Referring Physician:    Venita Kennedy  Interpreting Physician: Laura Parra  Primary Sonographer:    Zully Linton [facility]    CPT:               ECHO TTE WO CON COMP W DOPP - 75532.m  Indication(s):     Chest pain, unspecified - R07.9  Procedure:         Transthoracic echocardiogram with 2-D, M-mode and complete                   spectral and color flow Doppler.  Ordering Location: [facility]  Study Information: Image quality for this study is technically difficult.    _______________________________________________________________________________________     CONCLUSIONS:      1. Technically difficult image quality.   2. Left ventricular endocardium is not well visualized; however, the left ventricular systolic function appears mildly reduced.   3. Left ventricular systolic function is mildly decreased with an ejection fraction of 50 % by Drake's method of disks. Global left ventricular hypokinesis.   4. Normal right ventricular cavity size, wall thickness and systolic function.   5. The left atrium is severely dilated in size.   6. The right atrium is dilated in size.   7. There is calcification of the mitral valve annulus.   8. Mitral valve leaflets are diffusely calcified.   9. Symmetric mitral valve leaflet tethering.  10. Moderate mitral regurgitation.  11. Trileaflet aortic valve with visually mildly reduced systolic excursion.  12. Moderate aortic regurgitation.  13. Structurally normal tricuspid valve with normal leaflet excursion. Moderate tricuspid regurgitation.  14. Estimated pulmonary artery systolic pressure is 35 mmHg, consistent with normal pulmonary artery pressure.    ________________________________________________________________________________________  FINDINGS:     Left Ventricle:  Left ventricular systolic function is mildly decreased with a calculated ejection fractionof 50 % by the Drake's biplane method of disks. There is global left ventricular hypokinesis. Left ventricular endocardium is not well visualized; however, the left ventricular systolic function appears mildly reduced.     Right Ventricle:  The right ventricular cavity is normal in size, normal wall thickness and normal systolic function.     Left Atrium:  The left atrium is severely dilated in size.     Right Atrium:  The right atrium is dilated in size.     Aortic Valve:  The aortic valve appears trileaflet with reduced systolic excursion. There is no aortic valve stenosis. There is moderate aortic regurgitation. AI VMax is 3.55 m/s. AI pressure half time is 412 msec. AI slope is 2.50 m/s².     Mitral Valve:  There is calcification of themitral valve annulus. Mitral valve leaflets are diffusely calcified. There is symmetric leaflet tethering. There is no mitral valve stenosis. There is moderate mitral regurgitation.     Tricuspid Valve:  Structurally normal tricuspid valve with normal leaflet excursion. There is moderate tricuspid regurgitation. Estimated pulmonary artery systolic pressure is 35 mmHg, consistent with normal pulmonary artery pressure.     Pulmonic Valve:  Structurally normal pulmonic valve with normal leaflet excursion. There is mild pulmonic regurgitation.     Aorta:  The aortic root at the sinuses of Valsalva is normal in size, measuring 3.72 cm (indexed 2.09 cm/m²).     Pericardium:  No pericardial effusion seen.     Systemic Veins:  The inferior vena cava is normal in size measuring 2.10 cm in diameter, (normal <2.1cm) with normal inspiratory collapse (normal >50%) consistent with normal right atrial pressure (~3, range 0-5mmHg).  ____________________________________________________________________  Quantitative Data:  Left Ventricle Measurements: (Indexed to BSA)     IVSd (2D):   1.3 cm  LVPWd (2D):  1.3 cm  LVIDd (2D):  6.1 cm  LVIDs (2D):  4.0 cm  LV Mass:     369 g  207.4 g/m²  BiPlane LV EF%: 50 %     MV E Vmax:    1.07 m/s  e' lateral:   9.00cm/s  e' medial:    5.10 cm/s  E/e' lateral: 11.87  E/e' medial:  21.40  E/e' Average: 15.15  MV DT:        122 msec    Aorta Measurements: (normal range) (Indexed to BSA)     Sinuses of Valsalva: 3.72 cm (2.7 - 3.3 cm)       Left Atrium Measurements: (Indexed to BSA)  LA Diam 2D: 3.39 cm    Aortic Valve Measurements:  AV Vmax:          1.8 m/s  AV Peak Gradient: 12.4 mmHg    Mitral Valve Measurements:     MV E Vmax: 1.1 m/s         MR Vmax:          4.38 m/s                             MR Peak Gradient: 76.9 mmHg       Tricuspid Valve Measurements:     TR Vmax:          2.8 m/s  TR Peak Gradient: 31.9 mmHg  RA Pressure:      3 mmHg  PASP:             35 mmHg    ________________________________________________________________________________________  Electronically signed on 10/9/2023 at 10:31:21 PM by Laura Parra         *** Final ***      Radiology:

## 2023-10-12 NOTE — PROGRESS NOTE ADULT - SUBJECTIVE AND OBJECTIVE BOX
Date/Time Patient Seen:  		  Referring MD:   Data Reviewed	       Patient is a 88y old  Female who presents with a chief complaint of esophageal mass (11 Oct 2023 16:21)      Subjective/HPI     PAST MEDICAL & SURGICAL HISTORY:  S/P colon resection    Status post hysterectomy    Status post appendectomy    Diverticulitis    Glaucoma    PSVT (paroxysmal supraventricular tachycardia)    Atrial fibrillation    Breast cancer    Gall stones    C. difficile diarrhea    S/P colon resection  march 11 2014    Status post appendectomy    H/O mastectomy, bilateral    H/O hernia repair    S/P ORIF (open reduction internal fixation) fracture  L lower extremity    H/O: hysterectomy          Medication list         MEDICATIONS  (STANDING):  brimonidine 0.2% Ophthalmic Solution 1 Drop(s) Both EYES two times a day  dorzolamide 2%/timolol 0.5% Ophthalmic Solution 1 Drop(s) Both EYES two times a day  fentaNYL   Patch  12 MICROgram(s)/Hr 1 Patch Transdermal every 72 hours  influenza  Vaccine (HIGH DOSE) 0.7 milliLiter(s) IntraMuscular once  latanoprost 0.005% Ophthalmic Solution 1 Drop(s) Both EYES at bedtime  naloxone Injectable 0.4 milliGRAM(s) IV Push once  sodium chloride 0.9%. 1000 milliLiter(s) (75 mL/Hr) IV Continuous <Continuous>    MEDICATIONS  (PRN):  bisacodyl Suppository 10 milliGRAM(s) Rectal daily PRN Constipation  LORazepam    Concentrate 1 milliGRAM(s) SubLingual every 2 hours PRN Anxiety  morphine  - Injectable 4 milliGRAM(s) IV Push every 4 hours PRN Severe Pain (7 - 10)  morphine Concentrate 5 milliGRAM(s) SubLingual every 3 hours PRN Mild Pain (1 - 3)  morphine Concentrate 10 milliGRAM(s) SubLingual every 6 hours PRN Moderate Pain (4 - 6)  ondansetron Injectable 4 milliGRAM(s) IV Push every 8 hours PRN Nausea and/or Vomiting         Vitals log        ICU Vital Signs Last 24 Hrs  T(C): 36.7 (11 Oct 2023 21:21), Max: 37.2 (11 Oct 2023 12:30)  T(F): 98.1 (11 Oct 2023 21:21), Max: 98.9 (11 Oct 2023 12:30)  HR: 100 (11 Oct 2023 21:21) (100 - 113)  BP: 105/75 (11 Oct 2023 21:21) (105/75 - 126/81)  BP(mean): --  ABP: --  ABP(mean): --  RR: 17 (11 Oct 2023 21:21) (17 - 17)  SpO2: 92% (11 Oct 2023 21:21) (92% - 92%)    O2 Parameters below as of 11 Oct 2023 21:21  Patient On (Oxygen Delivery Method): room air                 Input and Output:  I&O's Detail    10 Oct 2023 07:01  -  11 Oct 2023 07:00  --------------------------------------------------------  IN:  Total IN: 0 mL    OUT:    Voided (mL): 400 mL  Total OUT: 400 mL    Total NET: -400 mL          Lab Data                        12.5   8.42  )-----------( 219      ( 11 Oct 2023 08:09 )             37.3     10-11    139  |  108  |  16  ----------------------------<  100<H>  3.8   |  24  |  0.83    Ca    8.9      11 Oct 2023 08:09              Review of Systems	      Objective     Physical Examination    heart s1s2  lung dc BS  head nc      Pertinent Lab findings & Imaging      Nilo:  NO   Adequate UO     I&O's Detail    10 Oct 2023 07:01  -  11 Oct 2023 07:00  --------------------------------------------------------  IN:  Total IN: 0 mL    OUT:    Voided (mL): 400 mL  Total OUT: 400 mL    Total NET: -400 mL               Discussed with:     Cultures:	        Radiology

## 2023-10-12 NOTE — SOCIAL WORK PROGRESS NOTE - NSSWPROGRESSNOTE_GEN_ALL_CORE
SW met with this pt and spoke with team- plan now for [facility]- MD provided number to do doc to doc. SW to follow.

## 2023-10-12 NOTE — PROGRESS NOTE ADULT - ASSESSMENT
88F with CAD, gallstones,  atrial fibrillation, glaucoma, diverticulitis presented for dysphagia and abdominal pain found to have esophageal mass with mediastinal LN and probable bone mets     Obstructive Esophageal mass   s/p EGD scope cannot be passed post obstruction   Pt declined esophageal stent placement and understands terminal diagnosis   Wishes to pursue home hospice    Hospice care  Pt requiring intravenous morphine for pain control which will be a deterrent to home hospice.   Social work and palliative RN to discuss possibility of hospice [facility] if pt agreeable   Continue fentanyl patch, morphine, bowel regimen, dvt ppx, and spiritual support   Palliative care on board                 88F with CAD, gallstones,  atrial fibrillation, glaucoma, diverticulitis presented for dysphagia and abdominal pain found to have esophageal mass with mediastinal LN and probable bone mets     Obstructive Esophageal mass   s/p EGD scope cannot be passed post obstruction   Pt declined esophageal stent placement and understands terminal diagnosis   Wishes to pursue home hospice    Hospice care  Pt requiring intravenous morphine for pain control which will be a deterrent to home hospice. Cannot tolerate SL morphine  Social work and palliative RN to discuss possibility of hospice inn if pt agreeable   Continue fentanyl patch, morphine, ativan, bowel regimen, dvt ppx, and spiritual support   Palliative care on board                 88F with CAD, gallstones,  atrial fibrillation, glaucoma, diverticulitis presented for dysphagia and abdominal pain found to have esophageal mass with mediastinal LN and probable bone mets     Obstructive Esophageal mass   s/p EGD scope cannot be passed post obstruction   Pt declined esophageal stent placement and understands terminal diagnosis   Wishes to pursue home hospice    Hospice care  Pt requiring intravenous morphine for pain control which will be a deterrent to home hospice. Cannot tolerate SL morphine  Social work and palliative RN to discuss possibility of hospice inn if pt agreeable   Increased fentanyl patch to 25mcg  Continue morphine, ativan, bowel regimen, dvt ppx, and spiritual support   Monitor for sedation, resp status  Palliative care on board

## 2023-10-13 PROCEDURE — 99232 SBSQ HOSP IP/OBS MODERATE 35: CPT

## 2023-10-13 RX ADMIN — DORZOLAMIDE HYDROCHLORIDE TIMOLOL MALEATE 1 DROP(S): 20; 5 SOLUTION/ DROPS OPHTHALMIC at 05:11

## 2023-10-13 RX ADMIN — MORPHINE SULFATE 4 MILLIGRAM(S): 50 CAPSULE, EXTENDED RELEASE ORAL at 12:20

## 2023-10-13 RX ADMIN — DORZOLAMIDE HYDROCHLORIDE TIMOLOL MALEATE 1 DROP(S): 20; 5 SOLUTION/ DROPS OPHTHALMIC at 21:35

## 2023-10-13 RX ADMIN — MORPHINE SULFATE 4 MILLIGRAM(S): 50 CAPSULE, EXTENDED RELEASE ORAL at 07:26

## 2023-10-13 RX ADMIN — FENTANYL CITRATE 1 PATCH: 50 INJECTION INTRAVENOUS at 20:08

## 2023-10-13 RX ADMIN — BRIMONIDINE TARTRATE 1 DROP(S): 2 SOLUTION/ DROPS OPHTHALMIC at 05:11

## 2023-10-13 RX ADMIN — LATANOPROST 1 DROP(S): 0.05 SOLUTION/ DROPS OPHTHALMIC; TOPICAL at 21:34

## 2023-10-13 RX ADMIN — SODIUM CHLORIDE 75 MILLILITER(S): 9 INJECTION INTRAMUSCULAR; INTRAVENOUS; SUBCUTANEOUS at 05:12

## 2023-10-13 RX ADMIN — SODIUM CHLORIDE 75 MILLILITER(S): 9 INJECTION INTRAMUSCULAR; INTRAVENOUS; SUBCUTANEOUS at 13:32

## 2023-10-13 RX ADMIN — MORPHINE SULFATE 4 MILLIGRAM(S): 50 CAPSULE, EXTENDED RELEASE ORAL at 06:58

## 2023-10-13 RX ADMIN — MORPHINE SULFATE 4 MILLIGRAM(S): 50 CAPSULE, EXTENDED RELEASE ORAL at 23:43

## 2023-10-13 RX ADMIN — MORPHINE SULFATE 4 MILLIGRAM(S): 50 CAPSULE, EXTENDED RELEASE ORAL at 12:45

## 2023-10-13 RX ADMIN — BRIMONIDINE TARTRATE 1 DROP(S): 2 SOLUTION/ DROPS OPHTHALMIC at 21:34

## 2023-10-13 NOTE — PATIENT CHOICE NOTE. - NSPTCHOICESTATE_GEN_ALL_CORE

## 2023-10-13 NOTE — SOCIAL WORK PROGRESS NOTE - NSSWPROGRESSNOTE_GEN_ALL_CORE
Pt refusing [facility] transfer today, but agrees to be transported over the weekend if bed is available. Weekend SW tasked to follow up. Clinicals faxed to [facility] for ambulnz auth. SW continues to follow.

## 2023-10-13 NOTE — PROGRESS NOTE ADULT - SUBJECTIVE AND OBJECTIVE BOX
Date/Time Patient Seen:  		  Referring MD:   Data Reviewed	       Patient is a 88y old  Female who presents with a chief complaint of esophageal mass (12 Oct 2023 12:57)      Subjective/HPI     PAST MEDICAL & SURGICAL HISTORY:  S/P colon resection    Status post hysterectomy    Status post appendectomy    Diverticulitis    Glaucoma    PSVT (paroxysmal supraventricular tachycardia)    Atrial fibrillation    Breast cancer    Gall stones    C. difficile diarrhea    S/P colon resection  march 11 2014    Status post appendectomy    H/O mastectomy, bilateral    H/O hernia repair    S/P ORIF (open reduction internal fixation) fracture  L lower extremity    H/O: hysterectomy          Medication list         MEDICATIONS  (STANDING):  brimonidine 0.2% Ophthalmic Solution 1 Drop(s) Both EYES two times a day  dorzolamide 2%/timolol 0.5% Ophthalmic Solution 1 Drop(s) Both EYES two times a day  fentaNYL   Patch  25 MICROgram(s)/Hr 1 Patch Transdermal every 72 hours  influenza  Vaccine (HIGH DOSE) 0.7 milliLiter(s) IntraMuscular once  latanoprost 0.005% Ophthalmic Solution 1 Drop(s) Both EYES at bedtime  naloxone Injectable 0.4 milliGRAM(s) IV Push once  sodium chloride 0.9%. 1000 milliLiter(s) (75 mL/Hr) IV Continuous <Continuous>    MEDICATIONS  (PRN):  bisacodyl Suppository 10 milliGRAM(s) Rectal daily PRN Constipation  LORazepam    Concentrate 1 milliGRAM(s) SubLingual every 2 hours PRN Anxiety  morphine  - Injectable 4 milliGRAM(s) IV Push every 4 hours PRN Severe Pain (7 - 10)  morphine Concentrate 5 milliGRAM(s) SubLingual every 3 hours PRN Mild Pain (1 - 3)  morphine Concentrate 10 milliGRAM(s) SubLingual every 6 hours PRN Moderate Pain (4 - 6)  ondansetron Injectable 4 milliGRAM(s) IV Push every 8 hours PRN Nausea and/or Vomiting         Vitals log        ICU Vital Signs Last 24 Hrs  T(C): 36.6 (13 Oct 2023 04:47), Max: 37.4 (12 Oct 2023 11:56)  T(F): 97.9 (13 Oct 2023 04:47), Max: 99.4 (12 Oct 2023 11:56)  HR: 100 (13 Oct 2023 04:47) (97 - 103)  BP: 120/76 (13 Oct 2023 04:47) (106/68 - 120/76)  BP(mean): --  ABP: --  ABP(mean): --  RR: 17 (13 Oct 2023 04:47) (17 - 17)  SpO2: 92% (13 Oct 2023 04:47) (91% - 92%)    O2 Parameters below as of 13 Oct 2023 04:47  Patient On (Oxygen Delivery Method): room air                 Input and Output:  I&O's Detail    11 Oct 2023 07:01  -  12 Oct 2023 07:00  --------------------------------------------------------  IN:  Total IN: 0 mL    OUT:    Voided (mL): 800 mL  Total OUT: 800 mL    Total NET: -800 mL      12 Oct 2023 07:01  -  13 Oct 2023 05:23  --------------------------------------------------------  IN:  Total IN: 0 mL    OUT:    Voided (mL): 600 mL  Total OUT: 600 mL    Total NET: -600 mL          Lab Data                        12.5   8.42  )-----------( 219      ( 11 Oct 2023 08:09 )             37.3     10-11    139  |  108  |  16  ----------------------------<  100<H>  3.8   |  24  |  0.83    Ca    8.9      11 Oct 2023 08:09              Review of Systems	      Objective     Physical Examination    heart s1s2  lung dc BS    Pertinent Lab findings & Imaging      Nilo:  NO   Adequate UO     I&O's Detail    11 Oct 2023 07:01  -  12 Oct 2023 07:00  --------------------------------------------------------  IN:  Total IN: 0 mL    OUT:    Voided (mL): 800 mL  Total OUT: 800 mL    Total NET: -800 mL      12 Oct 2023 07:01  -  13 Oct 2023 05:23  --------------------------------------------------------  IN:  Total IN: 0 mL    OUT:    Voided (mL): 600 mL  Total OUT: 600 mL    Total NET: -600 mL               Discussed with:     Cultures:	        Radiology

## 2023-10-13 NOTE — GOALS OF CARE CONVERSATION - ADVANCED CARE PLANNING - CONVERSATION DETAILS
Palliative care SW met with patient and her HCP, Fraa,  at bedside to discuss planning. Patient accepted to [facility] however refusing transfer to facility today. Patient agreeable to transfer over the weekend pending bed availability. HCP plans to look into assistance at home as well in the event patient can return to her home with home hospice services. All questions answered. PC team remains available.
Palliative care team met with patient at bedside to discuss GOC, assist with planning and provide supportive counseling. Discussed with patient [facility] for managing patient's pain. Initially patient was hesitant, stating she wants to go home, however after further discussion patient is agreeable to a referral being made to the [facility]. Unit SW and MD made aware. All questions answered. PC team remains available.
Palliative care team met with patient at bedside. Reviewed patient's medical and social history as well as events leading to patient's hospitalization. Writer discussed patient's current diagnosis (esophageal mass, Abdominal pain, HX of  CAD, gallstones, breast cancer (9 years ago), atrial fibrillation, glaucoma, diverticulitis ), medical condition and management,  prognosis, and life expectancy. Patient reports she does not have any advanced directives in place. She agreed to completing a HCP and named her friend Fara Gil ([phone]) as health care agent and cousin Sheryl Gonzalez ([phone]) as alternate agent. Inquired about patient's wishes regarding extent of medical care to be provided including escalation of medical care into the ICU and use of vasopressor support. In addition, the writer inquired about thoughts regarding cardiopulmonary resuscitation, artificial nutrition and hydration including use of feeding tubes and IVF, antibiotics, and further investigative studies such as blood draws and radiology. Patient showed insight into medical condition. She states that she does not want resuscitation/intubation. Writer recommended completion of MOLST. MOLST form completed with DNR/DNI orders and placed on chart along with HCP. Discussed with patient GI stent for esophageal mass and she states that she does not want stent placed. Patient does wish to extend her illness and understands outcome of refusing stent placement. All questions answered. Psychosocial support provided.

## 2023-10-13 NOTE — PROGRESS NOTE ADULT - ASSESSMENT
88F with CAD, gallstones,  atrial fibrillation, glaucoma, diverticulitis presented for dysphagia and abdominal pain found to have esophageal mass with mediastinal LN and probable bone mets. Pt wishes to pursue hospice    Hospice care  Pt prefers home hospice but is currently requiring intravenous morphine for pain control and has no reliable support at home  The pt was initially agreeable and [facility] but is now declining and wishes to be dismissed over the weekend if bed available  Continue pain control with fentanyl patch, SL and IV morphine, ativan, bowel regimen, dvt ppx, spiritual support, reassurance and encouragement   Monitor for sedation, resp status  Palliative care on board     Obstructive Esophageal mass   s/p EGD scope cannot be passed post obstruction   Pt declined esophageal stent placement and understands terminal diagnosis   Wishes to pursue home hospice

## 2023-10-13 NOTE — PROGRESS NOTE ADULT - ASSESSMENT
88-year-old female with PMH of CAD, gallstones, breast cancer (9 years ago status post radiation, reports never took oral medications because "she does not like to take pills"), atrial fibrillation, glaucoma, diverticulitis who presents to emergency room with bilateral flank pain, weakness, increased fatigue, dysphagia, and loss of appetite    pain  acute pain - chr pain  mets ca  esoph ca  breast ca  cad  obesity  weakness  dysphagia    poss plan for [facility] now - vs noted -   on opioids for pain  SW and GOC documentation noted    Roxanol SL - Fentanyl Patch low dose -   GOC documented  pt is DNR DNI  bowel rx regimen  IV Morphine for breakthrough severe pain  emotional support  assist with needs  oral hygiene  skin care  SW f/u noted  spoke with HCP - Mrs Gil -

## 2023-10-13 NOTE — PROGRESS NOTE ADULT - SUBJECTIVE AND OBJECTIVE BOX
88F with obstructive esophageal mass with probable bone mets wishing to pursue hospice    Pt declined to be discharged [facility] today and wishes to go over the weekend      PAST MEDICAL & SURGICAL HISTORY:  S/P colon resection      Diverticulitis      Glaucoma      PSVT (paroxysmal supraventricular tachycardia)      Atrial fibrillation      Breast cancer      Gall stones      C. difficile diarrhea      S/P colon resection  march 11 2014      Status post appendectomy      H/O mastectomy, bilateral      H/O hernia repair      S/P ORIF (open reduction internal fixation) fracture  L lower extremity      H/O: hysterectomy          FAMILY HISTORY:  Family history of heart valve abnormality        Home Medications:  brimonidine 0.2% ophthalmic solution: 1 drop(s) in each affected eye once a day (06 Oct 2023 22:15)  dorzolamide-timolol 2%-0.5% preservative-free ophthalmic solution: 1 drop(s) to each affected eye 2 times a day (06 Oct 2023 22:15)  latanoprost 0.005% ophthalmic solution: 1 drop(s) to each affected eye once a day (at bedtime) (06 Oct 2023 22:15)  Rhopressa 0.02% ophthalmic solution: 1 drop(s) in each eye once a day (06 Oct 2023 22:15)      Allergies    No Known Allergies    Intolerances        ----  MEDICATIONS  (STANDING):  brimonidine 0.2% Ophthalmic Solution 1 Drop(s) Both EYES two times a day  dorzolamide 2%/timolol 0.5% Ophthalmic Solution 1 Drop(s) Both EYES two times a day  influenza  Vaccine (HIGH DOSE) 0.7 milliLiter(s) IntraMuscular once  latanoprost 0.005% Ophthalmic Solution 1 Drop(s) Both EYES at bedtime  naloxone Injectable 0.4 milliGRAM(s) IV Push once  sodium chloride 0.9%. 1000 milliLiter(s) (75 mL/Hr) IV Continuous <Continuous>    MEDICATIONS  (PRN):  acetaminophen  Suppository .. 650 milliGRAM(s) Rectal every 6 hours PRN Temp greater or equal to 38C (100.4F), Mild Pain (1 - 3)  bisacodyl Suppository 10 milliGRAM(s) Rectal daily PRN Constipation  morphine  - Injectable 4 milliGRAM(s) IV Push every 4 hours PRN Severe Pain (7 - 10)  morphine  - Injectable 2 milliGRAM(s) IV Push every 4 hours PRN Moderate Pain (4 - 6)  ondansetron Injectable 4 milliGRAM(s) IV Push every 8 hours PRN Nausea and/or Vomiting      ----  REVIEW OF SYSTEMS:  Negative other than aforementioned     ----  T(C): 36.8 (10-10-23 @ 05:12), Max: 37.3 (10-09-23 @ 13:33)  HR: 94 (10-10-23 @ 05:12) (74 - 123)  BP: 139/74 (10-10-23 @ 05:12) (121/77 - 145/87)  RR: 17 (10-10-23 @ 05:12) (17 - 17)  SpO2: 91% (10-10-23 @ 05:12) (90% - 94%)  Wt(kg): --    Physical Exam:   GENERAL: well-groomed, well-developed, NAD  NECK: supple, no JVD  RESPIRATORY: CTA B/L, no wheezing, rales, rhonchi or rubs  CARDIOVASCULAR: S1&S2, RRR, no murmurs or gallops  ABDOMEN: soft, non-tender, non-distended, + Bowel sounds x4 quadrants, no guarding, rebound or rigidity  MUSCULOSKELETAL:  no clubbing, cyanosis or edema of all 4 extremities  VASCULAR: Radial pulses 2+ bilaterally, no varicose veins   SKIN: warm and dry, color normal  NEUROLOGIC: AA&O X3          ----  I&O's Summary    09 Oct 2023 07:01  -  10 Oct 2023 07:00  --------------------------------------------------------  IN: 0 mL / OUT: 500 mL / NET: -500 mL        LABS:                        12.8   8.27  )-----------( 225      ( 10 Oct 2023 06:15 )             38.4     10-10    139  |  107  |  14  ----------------------------<  87  4.0   |  24  |  0.82    Ca    8.9      10 Oct 2023 06:15        Urinalysis Basic - ( 10 Oct 2023 06:15 )    Color: x / Appearance: x / SG: x / pH: x  Gluc: 87 mg/dL / Ketone: x  / Bili: x / Urobili: x   Blood: x / Protein: x / Nitrite: x   Leuk Esterase: x / RBC: x / WBC x   Sq Epi: x / Non Sq Epi: x / Bacteria: x      CAPILLARY BLOOD GLUCOSE          10-06 @ 21:10   >=3 organisms. Probable collection contamination.  --  --

## 2023-10-13 NOTE — GOALS OF CARE CONVERSATION - ADVANCED CARE PLANNING - TREATMENT GUIDELINES
DNI/DNR Order
From: Carolina Mosqueda  To: Advice RN COVID-19  Sent: 1/20/2021 12:51 PM CST  Subject: Other    Hello there. Just feeling worse today with cough and fatigue ad day westd on. Im not on steroid anymore and feel like im going south today. Exhaustion.  
DNI/DNR Order
DNI/DNR Order

## 2023-10-13 NOTE — PROGRESS NOTE ADULT - SUBJECTIVE AND OBJECTIVE BOX
[facility] -- Batool Jeter, Fidel Richardson Savella, , Kiel Esquivel  Office # [phone]    Follow Up: CAA, Afib, Atypical CP      Subjective/Observations: Awake and alert, comfortable on Ra.  Still c/o atypical CP which she claims, she has had for years.  C/o migraine HA.  Denies any form of respiratory or cardiac discomfort      REVIEW OF SYSTEMS: All other review of systems is negative unless indicated above  PAST MEDICAL & SURGICAL HISTORY:  S/P colon resection  Diverticulitis  Glaucoma  PSVT (paroxysmal supraventricular tachycardia)  Atrial fibrillation  Breast cancer  Gall stones  C. difficile diarrhea  S/P colon resection  march 11 2014      Status post appendectomy      H/O mastectomy, bilateral      H/O hernia repair      S/P ORIF (open reduction internal fixation) fracture  L lower extremity      H/O: hysterectomy        MEDICATIONS  (STANDING):  brimonidine 0.2% Ophthalmic Solution 1 Drop(s) Both EYES two times a day  dorzolamide 2%/timolol 0.5% Ophthalmic Solution 1 Drop(s) Both EYES two times a day  fentaNYL   Patch  25 MICROgram(s)/Hr 1 Patch Transdermal every 72 hours  influenza  Vaccine (HIGH DOSE) 0.7 milliLiter(s) IntraMuscular once  latanoprost 0.005% Ophthalmic Solution 1 Drop(s) Both EYES at bedtime  naloxone Injectable 0.4 milliGRAM(s) IV Push once  sodium chloride 0.9%. 1000 milliLiter(s) (75 mL/Hr) IV Continuous <Continuous>    MEDICATIONS  (PRN):  bisacodyl Suppository 10 milliGRAM(s) Rectal daily PRN Constipation  LORazepam    Concentrate 1 milliGRAM(s) SubLingual every 2 hours PRN Anxiety  morphine  - Injectable 4 milliGRAM(s) IV Push every 4 hours PRN Severe Pain (7 - 10)  morphine Concentrate 5 milliGRAM(s) SubLingual every 3 hours PRN Mild Pain (1 - 3)  morphine Concentrate 10 milliGRAM(s) SubLingual every 6 hours PRN Moderate Pain (4 - 6)  ondansetron Injectable 4 milliGRAM(s) IV Push every 8 hours PRN Nausea and/or Vomiting    Allergies    No Known Allergies    Intolerances      Vital Signs Last 24 Hrs  T(C): 36.6 (13 Oct 2023 04:47), Max: 37.4 (12 Oct 2023 11:56)  T(F): 97.9 (13 Oct 2023 04:47), Max: 99.4 (12 Oct 2023 11:56)  HR: 100 (13 Oct 2023 04:47) (97 - 103)  BP: 120/76 (13 Oct 2023 04:47) (106/68 - 120/76)  BP(mean): --  RR: 17 (13 Oct 2023 04:47) (17 - 17)  SpO2: 92% (13 Oct 2023 04:47) (91% - 92%)    Parameters below as of 13 Oct 2023 04:47  Patient On (Oxygen Delivery Method): room air      I&O's Summary    12 Oct 2023 07:01  -  13 Oct 2023 07:00  --------------------------------------------------------  IN: 0 mL / OUT: 1150 mL / NET: -1150 mL        PHYSICAL EXAM  TELE: Not on tele  Constitutional: NAD, awake and alert, obese  HEENT: Moist Mucous Membranes, Anicteric  Pulmonary: Non-labored, breath sounds are clear bilaterally, No wheezing, rales or rhonchi  Cardiovascular: IRRR, S1 and S2, +murmurs, no rubs, gallops or clicks  Gastrointestinal: Bowel Sounds present, soft, nontender.   Lymph: No peripheral edema. No lymphadenopathy.  Skin: No visible rashes or ulcers.  Psych:  Mood & affect appropriate  LABS: All Labs Reviewed:                        12.5   8.42  )-----------( 219      ( 11 Oct 2023 08:09 )             37.3     11 Oct 2023 08:09    139    |  108    |  16     ----------------------------<  100    3.8     |  24     |  0.83     Ca    8.9        11 Oct 2023 08:09    12 Lead ECG:   Ventricular Rate 91 BPM    QRS Duration 82 ms    Q-T Interval 360 ms    QTC Calculation(Bazett) 442 ms    R Axis 11 degrees    T Axis 0 degrees    Diagnosis Line Atrial fibrillation  Abnormal ECG  When compared with ECG of 06-DEC-2021 18:15,  Criteria for Septal infarct are no longer present  Confirmed by LAURA PARRA ([id number]) on 10/7/2023 2:26:23 PM (10-06-23 @ 22:03)    TRANSTHORACIC ECHOCARDIOGRAM REPORT  ________________________________________________________________________________                                      ______  Pt. Name:       JACEK HERNDON Study Date:    10/9/2023  MRN:            [id number]       YOB: 1935  Accession #:    [id number]      Age:           88 years  Account#:       [id number]     Gender:        F  Heart Rate:                    Height:        61.81 in (157.00 cm)  Rhythm:                        Weight:  169.75 lb (77.00 kg)  Blood Pressure: 129/68 mmHg    BSA/BMI:       1.78 m² / 31.24 kg/m²  ________________________________________________________________________________________  Referring Physician:    Venita Kennedy  Interpreting Physician: Laura Parra  Primary Sonographer:    Zully Linton [facility]    CPT:               ECHO TTE WO CON COMP W DOPP - 45735.m  Indication(s):     Chest pain, unspecified - R07.9  Procedure:         Transthoracic echocardiogram with 2-D, M-mode and complete                   spectral and color flow Doppler.  Ordering Location: [facility]  Study Information: Image quality for this study is technically difficult.  _____________________________________________________________________________________     CONCLUSIONS:      1. Technically difficult image quality.   2. Left ventricular endocardium is not well visualized; however, the left ventricular systolic function appears mildly reduced.   3. Left ventricular systolic function is mildly decreased with an ejection fraction of 50 % by Drake's method of disks. Global left ventricular hypokinesis.   4. Normal right ventricular cavity size, wall thickness and systolic function.   5. The left atrium is severely dilated in size.   6. The right atrium is dilated in size.   7. There is calcification of the mitral valve annulus.   8. Mitral valve leaflets are diffusely calcified.   9. Symmetric mitral valve leaflet tethering.  10. Moderate mitral regurgitation.  11. Trileaflet aortic valve with visually mildly reduced systolic excursion.  12. Moderate aortic regurgitation.  13. Structurally normal tricuspid valve with normal leaflet excursion. Moderate tricuspid regurgitation.  14. Estimated pulmonary artery systolic pressure is 35 mmHg, consistent with normal pulmonary artery pressure.    ________________________________________________________________________________________  FINDINGS:     Left Ventricle:  Left ventricular systolic function is mildly decreased with a calculated ejection fractionof 50 % by the Drake's biplane method of disks. There is global left ventricular hypokinesis. Left ventricular endocardium is not well visualized; however, the left ventricular systolic function appears mildly reduced.     Right Ventricle:  The right ventricular cavity is normal in size, normal wall thickness and normal systolic function.     Left Atrium:  The left atrium is severely dilated in size.     Right Atrium:  The right atrium is dilated in size.     Aortic Valve:  The aortic valve appears trileaflet with reduced systolic excursion. There is no aortic valve stenosis. There is moderate aortic regurgitation. AI VMax is 3.55 m/s. AI pressure half time is 412 msec. AI slope is 2.50 m/s².     Mitral Valve:  There is calcification of themitral valve annulus. Mitral valve leaflets are diffusely calcified. There is symmetric leaflet tethering. There is no mitral valve stenosis. There is moderate mitral regurgitation.     Tricuspid Valve:  Structurally normal tricuspid valve with normal leaflet excursion. There is moderate tricuspid regurgitation. Estimated pulmonary artery systolic pressure is 35 mmHg, consistent with normal pulmonary artery pressure.     Pulmonic Valve:  Structurally normal pulmonic valve with normal leaflet excursion. There is mild pulmonic regurgitation.     Aorta:  The aortic root at the sinuses of Valsalva is normal in size, measuring 3.72 cm (indexed 2.09 cm/m²).     Pericardium:  No pericardial effusion seen.     Systemic Veins:  The inferior vena cava is normal in size measuring 2.10 cm in diameter, (normal <2.1cm) with normal inspiratory collapse (normal >50%) consistent with normal right atrial pressure (~3, range 0-5mmHg).  ____________________________________________________________________  Quantitative Data:  Left Ventricle Measurements: (Indexed to BSA)     IVSd (2D):   1.3 cm  LVPWd (2D):  1.3 cm  LVIDd (2D):  6.1 cm  LVIDs (2D):  4.0 cm  LV Mass:     369 g  207.4 g/m²  BiPlane LV EF%: 50 %     MV E Vmax:    1.07 m/s  e' lateral:   9.00cm/s  e' medial:    5.10 cm/s  E/e' lateral: 11.87  E/e' medial:  21.40  E/e' Average: 15.15  MV DT:        122 msec    Aorta Measurements: (normal range) (Indexed to BSA)     Sinuses of Valsalva: 3.72 cm (2.7 - 3.3 cm)       Left Atrium Measurements: (Indexed to BSA)  LA Diam 2D: 3.39 cm    Aortic Valve Measurements:  AV Vmax:          1.8 m/s  AV Peak Gradient: 12.4 mmHg    Mitral Valve Measurements:     MV E Vmax: 1.1 m/s         MR Vmax:          4.38 m/s                             MR Peak Gradient: 76.9 mmHg       Tricuspid Valve Measurements:     TR Vmax:          2.8 m/s  TR Peak Gradient: 31.9 mmHg  RA Pressure:      3 mmHg  PASP:             35 mmHg    ________________________________________________________________________________________  Electronically signed on 10/9/2023 at 10:31:21 PM by Laura Parra         *** Final ***

## 2023-10-13 NOTE — PROGRESS NOTE ADULT - ASSESSMENT
88-year-old female with PMH of CAD, gallstones, breast cancer (9 years ago status post radiation, reports never took oral medications because "she does not like to take pills"), atrial fibrillation no AC, glaucoma, diverticulitis who presents with abd pain and found to have a new esophageal mass.     CAD, Persistent Afib, Chest Pain  - Chest pain is atypical.  EKG is nonischemic  - Trop is neg, no need to trend, no real anginal complaints   - Echo: EF 50% with moderate MR/AR, mildly elevated pulmonary pressures, LAE, TANIA    - has hx afib.  Rate-controlled  - Has not wanted any AC for years and at this point with a large esophageal mass, would not start    - Seen by GI offered transfer for esophageal stent was refused by patient per notes   - Heme/onc following  - Palliative following.  For poss D/C to [facility]    - Monitor and replete lytes, keep K>4, Mg>2.  - Stable from cardiac standpoint    Carolin Milian DNP, NP-C, AGACNP-C  Cardiology   Call TEAMS

## 2023-10-14 PROCEDURE — 99232 SBSQ HOSP IP/OBS MODERATE 35: CPT

## 2023-10-14 RX ORDER — IPRATROPIUM/ALBUTEROL SULFATE 18-103MCG
3 AEROSOL WITH ADAPTER (GRAM) INHALATION EVERY 6 HOURS
Refills: 0 | Status: DISCONTINUED | OUTPATIENT
Start: 2023-10-14 | End: 2023-10-16

## 2023-10-14 RX ADMIN — DORZOLAMIDE HYDROCHLORIDE TIMOLOL MALEATE 1 DROP(S): 20; 5 SOLUTION/ DROPS OPHTHALMIC at 19:02

## 2023-10-14 RX ADMIN — MORPHINE SULFATE 10 MILLIGRAM(S): 50 CAPSULE, EXTENDED RELEASE ORAL at 09:11

## 2023-10-14 RX ADMIN — FENTANYL CITRATE 1 PATCH: 50 INJECTION INTRAVENOUS at 07:30

## 2023-10-14 RX ADMIN — FENTANYL CITRATE 1 PATCH: 50 INJECTION INTRAVENOUS at 07:00

## 2023-10-14 RX ADMIN — Medication 3 MILLILITER(S): at 16:10

## 2023-10-14 RX ADMIN — FENTANYL CITRATE 1 PATCH: 50 INJECTION INTRAVENOUS at 19:03

## 2023-10-14 RX ADMIN — LATANOPROST 1 DROP(S): 0.05 SOLUTION/ DROPS OPHTHALMIC; TOPICAL at 22:38

## 2023-10-14 RX ADMIN — DORZOLAMIDE HYDROCHLORIDE TIMOLOL MALEATE 1 DROP(S): 20; 5 SOLUTION/ DROPS OPHTHALMIC at 05:27

## 2023-10-14 RX ADMIN — BRIMONIDINE TARTRATE 1 DROP(S): 2 SOLUTION/ DROPS OPHTHALMIC at 05:27

## 2023-10-14 RX ADMIN — MORPHINE SULFATE 10 MILLIGRAM(S): 50 CAPSULE, EXTENDED RELEASE ORAL at 09:30

## 2023-10-14 RX ADMIN — MORPHINE SULFATE 10 MILLIGRAM(S): 50 CAPSULE, EXTENDED RELEASE ORAL at 19:06

## 2023-10-14 RX ADMIN — MORPHINE SULFATE 10 MILLIGRAM(S): 50 CAPSULE, EXTENDED RELEASE ORAL at 19:09

## 2023-10-14 RX ADMIN — MORPHINE SULFATE 4 MILLIGRAM(S): 50 CAPSULE, EXTENDED RELEASE ORAL at 00:13

## 2023-10-14 RX ADMIN — BRIMONIDINE TARTRATE 1 DROP(S): 2 SOLUTION/ DROPS OPHTHALMIC at 19:03

## 2023-10-14 RX ADMIN — SODIUM CHLORIDE 75 MILLILITER(S): 9 INJECTION INTRAMUSCULAR; INTRAVENOUS; SUBCUTANEOUS at 05:28

## 2023-10-14 NOTE — PROGRESS NOTE ADULT - SUBJECTIVE AND OBJECTIVE BOX
[facility] -- Batool Jeter, Fidel Richardson Savella, , Kiel Esquivel  Office # [phone]    Follow Up:      Subjective/Observations:     REVIEW OF SYSTEMS: All other review of systems is negative unless indicated above  PAST MEDICAL & SURGICAL HISTORY:  S/P colon resection      Diverticulitis      Glaucoma      PSVT (paroxysmal supraventricular tachycardia)      Atrial fibrillation      Breast cancer      Gall stones      C. difficile diarrhea      S/P colon resection  march 11 2014      Status post appendectomy      H/O mastectomy, bilateral      H/O hernia repair      S/P ORIF (open reduction internal fixation) fracture  L lower extremity      H/O: hysterectomy        MEDICATIONS  (STANDING):  brimonidine 0.2% Ophthalmic Solution 1 Drop(s) Both EYES two times a day  dorzolamide 2%/timolol 0.5% Ophthalmic Solution 1 Drop(s) Both EYES two times a day  fentaNYL   Patch  25 MICROgram(s)/Hr 1 Patch Transdermal every 72 hours  influenza  Vaccine (HIGH DOSE) 0.7 milliLiter(s) IntraMuscular once  latanoprost 0.005% Ophthalmic Solution 1 Drop(s) Both EYES at bedtime  naloxone Injectable 0.4 milliGRAM(s) IV Push once  sodium chloride 0.9%. 1000 milliLiter(s) (75 mL/Hr) IV Continuous <Continuous>    MEDICATIONS  (PRN):  bisacodyl Suppository 10 milliGRAM(s) Rectal daily PRN Constipation  LORazepam    Concentrate 1 milliGRAM(s) SubLingual every 2 hours PRN Anxiety  morphine Concentrate 5 milliGRAM(s) SubLingual every 3 hours PRN Mild Pain (1 - 3)  morphine Concentrate 10 milliGRAM(s) SubLingual every 6 hours PRN Moderate Pain (4 - 6)  ondansetron Injectable 4 milliGRAM(s) IV Push every 8 hours PRN Nausea and/or Vomiting    Allergies    No Known Allergies    Intolerances      Vital Signs Last 24 Hrs  T(C): 36.7 (14 Oct 2023 04:42), Max: 36.8 (13 Oct 2023 20:50)  T(F): 98 (14 Oct 2023 04:42), Max: 98.2 (13 Oct 2023 20:50)  HR: 96 (14 Oct 2023 04:42) (96 - 100)  BP: 124/77 (14 Oct 2023 04:42) (124/77 - 147/68)  BP(mean): --  RR: 17 (14 Oct 2023 04:42) (17 - 17)  SpO2: 90% (14 Oct 2023 04:42) (90% - 94%)    Parameters below as of 14 Oct 2023 04:42  Patient On (Oxygen Delivery Method): room air      I&O's Summary    12 Oct 2023 07:01  -  13 Oct 2023 07:00  --------------------------------------------------------  IN: 0 mL / OUT: 1150 mL / NET: -1150 mL    13 Oct 2023 07:01  -  14 Oct 2023 06:27  --------------------------------------------------------  IN: 0 mL / OUT: 1300 mL / NET: -1300 mL        PHYSICAL EXAM:  TELE:   Constitutional: NAD, awake and alert, well-developed  HEENT: Moist Mucous Membranes, Anicteric  Pulmonary: Non-labored, breath sounds are clear bilaterally, No wheezing, rales or rhonchi  Cardiovascular: Regular, S1 and S2, No murmurs, rubs, gallops or clicks  Gastrointestinal: Bowel Sounds present, soft, nontender.   Lymph: No peripheral edema. No lymphadenopathy.  Skin: No visible rashes or ulcers.  Psych:  Mood & affect appropriate  LABS: All Labs Reviewed:                        12.5   8.42  )-----------( 219      ( 11 Oct 2023 08:09 )             37.3     11 Oct 2023 08:09    139    |  108    |  16     ----------------------------<  100    3.8     |  24     |  0.83     Ca    8.9        11 Oct 2023 08:09            12 Lead ECG:   Ventricular Rate 91 BPM    QRS Duration 82 ms    Q-T Interval 360 ms    QTC Calculation(Bazett) 442 ms    R Axis 11 degrees    T Axis 0 degrees    Diagnosis Line Atrial fibrillation  Abnormal ECG  When compared with ECG of 06-DEC-2021 18:15,  Criteria for Septal infarct are no longer present  Confirmed by LAURA PARRA ([id number]) on 10/7/2023 2:26:23 PM (10-06-23 @ 22:03)      TRANSTHORACIC ECHOCARDIOGRAM REPORT  ________________________________________________________________________________                                      _______       Pt. Name:       JACEK HERNDON Study Date:    10/9/2023  MRN:            [id number]       YOB: 1935  Accession #:    [id number]      Age:           88 years  Account#:       [id number]     Gender:        F  Heart Rate:                    Height:        61.81 in (157.00 cm)  Rhythm:                        Weight:  169.75 lb (77.00 kg)  Blood Pressure: 129/68 mmHg    BSA/BMI:       1.78 m² / 31.24 kg/m²  ________________________________________________________________________________________  Referring Physician:    Venita Kennedy  Interpreting Physician: Laura Parra  Primary Sonographer:    Zully Linton [facility]    CPT:               ECHO TTE WO CON COMP W DOPP - 88368.m  Indication(s):     Chest pain, unspecified - R07.9  Procedure:         Transthoracic echocardiogram with 2-D, M-mode and complete                   spectral and color flow Doppler.  Ordering Location: [facility]  Study Information: Image quality for this study is technically difficult.    _______________________________________________________________________________________     CONCLUSIONS:      1. Technically difficult image quality.   2. Left ventricular endocardium is not well visualized; however, the left ventricular systolic function appears mildly reduced.   3. Left ventricular systolic function is mildly decreased with an ejection fraction of 50 % by Drake's method of disks. Global left ventricular hypokinesis.   4. Normal right ventricular cavity size, wall thickness and systolic function.   5. The left atrium is severely dilated in size.   6. The right atrium is dilated in size.   7. There is calcification of the mitral valve annulus.   8. Mitral valve leaflets are diffusely calcified.   9. Symmetric mitral valve leaflet tethering.  10. Moderate mitral regurgitation.  11. Trileaflet aortic valve with visually mildly reduced systolic excursion.  12. Moderate aortic regurgitation.  13. Structurally normal tricuspid valve with normal leaflet excursion. Moderate tricuspid regurgitation.  14. Estimated pulmonary artery systolic pressure is 35 mmHg, consistent with normal pulmonary artery pressure.    ________________________________________________________________________________________  FINDINGS:     Left Ventricle:  Left ventricular systolic function is mildly decreased with a calculated ejection fractionof 50 % by the Drake's biplane method of disks. There is global left ventricular hypokinesis. Left ventricular endocardium is not well visualized; however, the left ventricular systolic function appears mildly reduced.     Right Ventricle:  The right ventricular cavity is normal in size, normal wall thickness and normal systolic function.     Left Atrium:  The left atrium is severely dilated in size.     Right Atrium:  The right atrium is dilated in size.     Aortic Valve:  The aortic valve appears trileaflet with reduced systolic excursion. There is no aortic valve stenosis. There is moderate aortic regurgitation. AI VMax is 3.55 m/s. AI pressure half time is 412 msec. AI slope is 2.50 m/s².     Mitral Valve:  There is calcification of themitral valve annulus. Mitral valve leaflets are diffusely calcified. There is symmetric leaflet tethering. There is no mitral valve stenosis. There is moderate mitral regurgitation.     Tricuspid Valve:  Structurally normal tricuspid valve with normal leaflet excursion. There is moderate tricuspid regurgitation. Estimated pulmonary artery systolic pressure is 35 mmHg, consistent with normal pulmonary artery pressure.     Pulmonic Valve:  Structurally normal pulmonic valve with normal leaflet excursion. There is mild pulmonic regurgitation.     Aorta:  The aortic root at the sinuses of Valsalva is normal in size, measuring 3.72 cm (indexed 2.09 cm/m²).     Pericardium:  No pericardial effusion seen.     Systemic Veins:  The inferior vena cava is normal in size measuring 2.10 cm in diameter, (normal <2.1cm) with normal inspiratory collapse (normal >50%) consistent with normal right atrial pressure (~3, range 0-5mmHg).  ____________________________________________________________________  Quantitative Data:  Left Ventricle Measurements: (Indexed to BSA)     IVSd (2D):   1.3 cm  LVPWd (2D):  1.3 cm  LVIDd (2D):  6.1 cm  LVIDs (2D):  4.0 cm  LV Mass:     369 g  207.4 g/m²  BiPlane LV EF%: 50 %     MV E Vmax:    1.07 m/s  e' lateral:   9.00cm/s  e' medial:    5.10 cm/s  E/e' lateral: 11.87  E/e' medial:  21.40  E/e' Average: 15.15  MV DT:        122 msec    Aorta Measurements: (normal range) (Indexed to BSA)     Sinuses of Valsalva: 3.72 cm (2.7 - 3.3 cm)       Left Atrium Measurements: (Indexed to BSA)  LA Diam 2D: 3.39 cm    Aortic Valve Measurements:  AV Vmax:          1.8 m/s  AV Peak Gradient: 12.4 mmHg    Mitral Valve Measurements:     MV E Vmax: 1.1 m/s         MR Vmax:          4.38 m/s                             MR Peak Gradient: 76.9 mmHg       Tricuspid Valve Measurements:     TR Vmax:          2.8 m/s  TR Peak Gradient: 31.9 mmHg  RA Pressure:      3 mmHg  PASP:             35 mmHg    ________________________________________________________________________________________  Electronically signed on 10/9/2023 at 10:31:21 PM by Laura Parra         *** Final ***      [facility] -- Batool Jeter, Fidel Richardson Savella, , Kiel Esquivel  Office # [phone]    Follow Up: CAD, Afib, Atypical CP     Subjective/Observations: Asleep ut arousable, remains comfortable on Ra.  Denies any form of respiratory or cardiac discomfort.  No overnight events    REVIEW OF SYSTEMS: All other review of systems is negative unless indicated above  PAST MEDICAL & SURGICAL HISTORY:  S/P colon resection  Diverticulitis  Glaucoma  PSVT (paroxysmal supraventricular tachycardia)  Atrial fibrillation  Breast cancer  Gall stones  C. difficile diarrhea  S/P colon resection  march 11 2014  Status post appendectomy  H/O mastectomy, bilateral  H/O hernia repair  S/P ORIF (open reduction internal fixation) fracture  L lower extremity  H/O: hysterectomy    MEDICATIONS  (STANDING):  brimonidine 0.2% Ophthalmic Solution 1 Drop(s) Both EYES two times a day  dorzolamide 2%/timolol 0.5% Ophthalmic Solution 1 Drop(s) Both EYES two times a day  fentaNYL   Patch  25 MICROgram(s)/Hr 1 Patch Transdermal every 72 hours  influenza  Vaccine (HIGH DOSE) 0.7 milliLiter(s) IntraMuscular once  latanoprost 0.005% Ophthalmic Solution 1 Drop(s) Both EYES at bedtime  naloxone Injectable 0.4 milliGRAM(s) IV Push once  sodium chloride 0.9%. 1000 milliLiter(s) (75 mL/Hr) IV Continuous <Continuous>    MEDICATIONS  (PRN):  bisacodyl Suppository 10 milliGRAM(s) Rectal daily PRN Constipation  LORazepam    Concentrate 1 milliGRAM(s) SubLingual every 2 hours PRN Anxiety  morphine Concentrate 5 milliGRAM(s) SubLingual every 3 hours PRN Mild Pain (1 - 3)  morphine Concentrate 10 milliGRAM(s) SubLingual every 6 hours PRN Moderate Pain (4 - 6)  ondansetron Injectable 4 milliGRAM(s) IV Push every 8 hours PRN Nausea and/or Vomiting    Allergies    No Known Allergies    Intolerances      Vital Signs Last 24 Hrs  T(C): 36.7 (14 Oct 2023 04:42), Max: 36.8 (13 Oct 2023 20:50)  T(F): 98 (14 Oct 2023 04:42), Max: 98.2 (13 Oct 2023 20:50)  HR: 96 (14 Oct 2023 04:42) (96 - 100)  BP: 124/77 (14 Oct 2023 04:42) (124/77 - 147/68)  BP(mean): --  RR: 17 (14 Oct 2023 04:42) (17 - 17)  SpO2: 90% (14 Oct 2023 04:42) (90% - 94%)    Parameters below as of 14 Oct 2023 04:42  Patient On (Oxygen Delivery Method): room air      I&O's Summary    12 Oct 2023 07:01  -  13 Oct 2023 07:00  --------------------------------------------------------  IN: 0 mL / OUT: 1150 mL / NET: -1150 mL    13 Oct 2023 07:01  -  14 Oct 2023 06:27  --------------------------------------------------------  IN: 0 mL / OUT: 1300 mL / NET: -1300 mL    PHYSICAL EXAM  TELE: Not on tele  Constitutional: NAD, awake and alert, obese  HEENT: Moist Mucous Membranes, Anicteric  Pulmonary: Non-labored, breath sounds are clear bilaterally, No wheezing, rales or rhonchi  Cardiovascular: IRRR, S1 and S2, +murmurs, no rubs, gallops or clicks  Gastrointestinal: Bowel Sounds present, soft, nontender.   Lymph: No peripheral edema. No lymphadenopathy.  Skin: No visible rashes or ulcers.  Psych:  Mood & affect appropriate                 12.5   8.42  )-----------( 219      ( 11 Oct 2023 08:09 )             37.3     11 Oct 2023 08:09    139    |  108    |  16     ----------------------------<  100    3.8     |  24     |  0.83     Ca    8.9        11 Oct 2023 08:09    12 Lead ECG:   Ventricular Rate 91 BPM    QRS Duration 82 ms    Q-T Interval 360 ms    QTC Calculation(Bazett) 442 ms    R Axis 11 degrees    T Axis 0 degrees    Diagnosis Line Atrial fibrillation  Abnormal ECG  When compared with ECG of 06-DEC-2021 18:15,  Criteria for Septal infarct are no longer present  Confirmed by LAURA PARRA ([id number]) on 10/7/2023 2:26:23 PM (10-06-23 @ 22:03)      TRANSTHORACIC ECHOCARDIOGRAM REPORT  ________________________________________________________________________________                                      _______       Pt. Name:       JACEK HERNDON Study Date:    10/9/2023  MRN:            [id number]       YOB: 1935  Accession #:    [id number]      Age:           88 years  Account#:       [id number]     Gender:        F  Heart Rate:                    Height:        61.81 in (157.00 cm)  Rhythm:                        Weight:  169.75 lb (77.00 kg)  Blood Pressure: 129/68 mmHg    BSA/BMI:       1.78 m² / 31.24 kg/m²  ________________________________________________________________________________________  Referring Physician:    Venita Kennedy  Interpreting Physician: Laura Parra  Primary Sonographer:    Zully Linton [facility]    CPT:               ECHO TTE WO CON COMP W DOPP - 72261.m  Indication(s):     Chest pain, unspecified - R07.9  Procedure:         Transthoracic echocardiogram with 2-D, M-mode and complete                   spectral and color flow Doppler.  Ordering Location: [facility]  Study Information: Image quality for this study is technically difficult.    _______________________________________________________________________________________     CONCLUSIONS:      1. Technically difficult image quality.   2. Left ventricular endocardium is not well visualized; however, the left ventricular systolic function appears mildly reduced.   3. Left ventricular systolic function is mildly decreased with an ejection fraction of 50 % by Drake's method of disks. Global left ventricular hypokinesis.   4. Normal right ventricular cavity size, wall thickness and systolic function.   5. The left atrium is severely dilated in size.   6. The right atrium is dilated in size.   7. There is calcification of the mitral valve annulus.   8. Mitral valve leaflets are diffusely calcified.   9. Symmetric mitral valve leaflet tethering.  10. Moderate mitral regurgitation.  11. Trileaflet aortic valve with visually mildly reduced systolic excursion.  12. Moderate aortic regurgitation.  13. Structurally normal tricuspid valve with normal leaflet excursion. Moderate tricuspid regurgitation.  14. Estimated pulmonary artery systolic pressure is 35 mmHg, consistent with normal pulmonary artery pressure.    ________________________________________________________________________________________  FINDINGS:     Left Ventricle:  Left ventricular systolic function is mildly decreased with a calculated ejection fractionof 50 % by the Drake's biplane method of disks. There is global left ventricular hypokinesis. Left ventricular endocardium is not well visualized; however, the left ventricular systolic function appears mildly reduced.     Right Ventricle:  The right ventricular cavity is normal in size, normal wall thickness and normal systolic function.     Left Atrium:  The left atrium is severely dilated in size.     Right Atrium:  The right atrium is dilated in size.     Aortic Valve:  The aortic valve appears trileaflet with reduced systolic excursion. There is no aortic valve stenosis. There is moderate aortic regurgitation. AI VMax is 3.55 m/s. AI pressure half time is 412 msec. AI slope is 2.50 m/s².     Mitral Valve:  There is calcification of themitral valve annulus. Mitral valve leaflets are diffusely calcified. There is symmetric leaflet tethering. There is no mitral valve stenosis. There is moderate mitral regurgitation.     Tricuspid Valve:  Structurally normal tricuspid valve with normal leaflet excursion. There is moderate tricuspid regurgitation. Estimated pulmonary artery systolic pressure is 35 mmHg, consistent with normal pulmonary artery pressure.     Pulmonic Valve:  Structurally normal pulmonic valve with normal leaflet excursion. There is mild pulmonic regurgitation.     Aorta:  The aortic root at the sinuses of Valsalva is normal in size, measuring 3.72 cm (indexed 2.09 cm/m²).     Pericardium:  No pericardial effusion seen.     Systemic Veins:  The inferior vena cava is normal in size measuring 2.10 cm in diameter, (normal <2.1cm) with normal inspiratory collapse (normal >50%) consistent with normal right atrial pressure (~3, range 0-5mmHg).  ____________________________________________________________________  Quantitative Data:  Left Ventricle Measurements: (Indexed to BSA)     IVSd (2D):   1.3 cm  LVPWd (2D):  1.3 cm  LVIDd (2D):  6.1 cm  LVIDs (2D):  4.0 cm  LV Mass:     369 g  207.4 g/m²  BiPlane LV EF%: 50 %     MV E Vmax:    1.07 m/s  e' lateral:   9.00cm/s  e' medial:    5.10 cm/s  E/e' lateral: 11.87  E/e' medial:  21.40  E/e' Average: 15.15  MV DT:        122 msec    Aorta Measurements: (normal range) (Indexed to BSA)     Sinuses of Valsalva: 3.72 cm (2.7 - 3.3 cm)       Left Atrium Measurements: (Indexed to BSA)  LA Diam 2D: 3.39 cm    Aortic Valve Measurements:  AV Vmax:          1.8 m/s  AV Peak Gradient: 12.4 mmHg    Mitral Valve Measurements:     MV E Vmax: 1.1 m/s         MR Vmax:          4.38 m/s                             MR Peak Gradient: 76.9 mmHg       Tricuspid Valve Measurements:     TR Vmax:          2.8 m/s  TR Peak Gradient: 31.9 mmHg  RA Pressure:      3 mmHg  PASP:             35 mmHg    ________________________________________________________________________________________  Electronically signed on 10/9/2023 at 10:31:21 PM by Laura Parra         *** Final ***

## 2023-10-14 NOTE — PROGRESS NOTE ADULT - ASSESSMENT
88-year-old female with PMH of CAD, gallstones, breast cancer (9 years ago status post radiation, reports never took oral medications because "she does not like to take pills"), atrial fibrillation no AC, glaucoma, diverticulitis who presents with abd pain and found to have a new esophageal mass.     CAD, Persistent Afib, Chest Pain  - Chest pain is atypical.  EKG is nonischemic  - Trop is neg, no need to trend.  No real anginal complaints     - Echo: EF 50% with moderate MR/AR, mildly elevated pulmonary pressures, LAE, TANIA  - No evidence of volume overload    - Rate-controlled Afib.  Not on home AVN blocker.   - Has not wanted any AC for years and at this point with a large esophageal mass, would not start    - Seen by GI; offered transfer for esophageal stent but patient refused  - Heme/onc following  - Palliative following.  For poss D/C to [facility]    - Monitor and replete lytes, keep K>4, Mg>2.  - Stable from cardiac standpoint    Carolin Milian DNP, NP-C, AGACNP-C  Cardiology   Call TEAMS

## 2023-10-14 NOTE — PROGRESS NOTE ADULT - SUBJECTIVE AND OBJECTIVE BOX
Patient is a 88y old  Female who presents with a chief complaint of esophageal mass (14 Oct 2023 06:26)      INTERVAL HPI/OVERNIGHT EVENTS: Patient seen and examined at bedside. No overnight events. Patient aware no bed available at hospice today.     MEDICATIONS  (STANDING):  brimonidine 0.2% Ophthalmic Solution 1 Drop(s) Both EYES two times a day  dorzolamide 2%/timolol 0.5% Ophthalmic Solution 1 Drop(s) Both EYES two times a day  fentaNYL   Patch  25 MICROgram(s)/Hr 1 Patch Transdermal every 72 hours  influenza  Vaccine (HIGH DOSE) 0.7 milliLiter(s) IntraMuscular once  latanoprost 0.005% Ophthalmic Solution 1 Drop(s) Both EYES at bedtime  naloxone Injectable 0.4 milliGRAM(s) IV Push once    MEDICATIONS  (PRN):  albuterol/ipratropium for Nebulization 3 milliLiter(s) Nebulizer every 6 hours PRN Shortness of Breath and/or Wheezing  bisacodyl Suppository 10 milliGRAM(s) Rectal daily PRN Constipation  LORazepam    Concentrate 1 milliGRAM(s) SubLingual every 2 hours PRN Anxiety  morphine Concentrate 5 milliGRAM(s) SubLingual every 3 hours PRN Mild Pain (1 - 3)  morphine Concentrate 10 milliGRAM(s) SubLingual every 6 hours PRN Moderate Pain (4 - 6)  ondansetron Injectable 4 milliGRAM(s) IV Push every 8 hours PRN Nausea and/or Vomiting      Allergies    No Known Allergies    Intolerances        REVIEW OF SYSTEMS:  CONSTITUTIONAL: No fever or chills  HEENT:  No headache, no sore throat  RESPIRATORY: No cough, wheezing, or shortness of breath  CARDIOVASCULAR: No chest pain, palpitations  GASTROINTESTINAL: No abd pain, nausea, vomiting, or diarrhea  GENITOURINARY: No dysuria, frequency, or hematuria  NEUROLOGICAL: no focal weakness or dizziness  MUSCULOSKELETAL: no myalgias     Vital Signs Last 24 Hrs  T(C): 37.2 (14 Oct 2023 11:46), Max: 37.2 (14 Oct 2023 11:46)  T(F): 98.9 (14 Oct 2023 11:46), Max: 98.9 (14 Oct 2023 11:46)  HR: 98 (14 Oct 2023 16:25) (96 - 100)  BP: 135/73 (14 Oct 2023 11:46) (124/77 - 137/83)  BP(mean): --  RR: 18 (14 Oct 2023 11:46) (17 - 18)  SpO2: 92% (14 Oct 2023 16:25) (90% - 94%)    Parameters below as of 14 Oct 2023 16:25  Patient On (Oxygen Delivery Method): room air        PHYSICAL EXAM:  GENERAL: NAD  HEENT:  anicteric, moist mucous membranes  CHEST/LUNG:  CTA b/l, no rales, wheezes, or rhonchi  HEART:  RRR, S1, S2  ABDOMEN:  BS+, soft, nontender, nondistended  EXTREMITIES: no edema, cyanosis, or calf tenderness  NERVOUS SYSTEM: answers questions and follows commands appropriately    LABS:                CAPILLARY BLOOD GLUCOSE              RADIOLOGY & ADDITIONAL TESTS:    Personally reviewed.     Consultant(s) Notes Reviewed:  [x] YES  [ ] NO

## 2023-10-14 NOTE — SOCIAL WORK PROGRESS NOTE - NSSWPROGRESSNOTE_GEN_ALL_CORE
[facility] bed [facility] today. sw to check back in am for bed availability. [phone]. Pt remains on 3w

## 2023-10-14 NOTE — PROGRESS NOTE ADULT - SUBJECTIVE AND OBJECTIVE BOX
Date/Time Patient Seen:  		  Referring MD:   Data Reviewed	       Patient is a 88y old  Female who presents with a chief complaint of esophageal mass (13 Oct 2023 13:16)      Subjective/HPI     PAST MEDICAL & SURGICAL HISTORY:  S/P colon resection    Status post hysterectomy    Status post appendectomy    Diverticulitis    Glaucoma    PSVT (paroxysmal supraventricular tachycardia)    Atrial fibrillation    Breast cancer    Gall stones    C. difficile diarrhea    S/P colon resection  march 11 2014    Status post appendectomy    H/O mastectomy, bilateral    H/O hernia repair    S/P ORIF (open reduction internal fixation) fracture  L lower extremity    H/O: hysterectomy          Medication list         MEDICATIONS  (STANDING):  brimonidine 0.2% Ophthalmic Solution 1 Drop(s) Both EYES two times a day  dorzolamide 2%/timolol 0.5% Ophthalmic Solution 1 Drop(s) Both EYES two times a day  fentaNYL   Patch  25 MICROgram(s)/Hr 1 Patch Transdermal every 72 hours  influenza  Vaccine (HIGH DOSE) 0.7 milliLiter(s) IntraMuscular once  latanoprost 0.005% Ophthalmic Solution 1 Drop(s) Both EYES at bedtime  naloxone Injectable 0.4 milliGRAM(s) IV Push once  sodium chloride 0.9%. 1000 milliLiter(s) (75 mL/Hr) IV Continuous <Continuous>    MEDICATIONS  (PRN):  bisacodyl Suppository 10 milliGRAM(s) Rectal daily PRN Constipation  LORazepam    Concentrate 1 milliGRAM(s) SubLingual every 2 hours PRN Anxiety  morphine Concentrate 5 milliGRAM(s) SubLingual every 3 hours PRN Mild Pain (1 - 3)  morphine Concentrate 10 milliGRAM(s) SubLingual every 6 hours PRN Moderate Pain (4 - 6)  ondansetron Injectable 4 milliGRAM(s) IV Push every 8 hours PRN Nausea and/or Vomiting         Vitals log        ICU Vital Signs Last 24 Hrs  T(C): 36.7 (14 Oct 2023 04:42), Max: 36.8 (13 Oct 2023 20:50)  T(F): 98 (14 Oct 2023 04:42), Max: 98.2 (13 Oct 2023 20:50)  HR: 96 (14 Oct 2023 04:42) (96 - 100)  BP: 124/77 (14 Oct 2023 04:42) (124/77 - 147/68)  BP(mean): --  ABP: --  ABP(mean): --  RR: 17 (14 Oct 2023 04:42) (17 - 17)  SpO2: 90% (14 Oct 2023 04:42) (90% - 94%)    O2 Parameters below as of 14 Oct 2023 04:42  Patient On (Oxygen Delivery Method): room air                 Input and Output:  I&O's Detail    12 Oct 2023 07:01  -  13 Oct 2023 07:00  --------------------------------------------------------  IN:  Total IN: 0 mL    OUT:    Voided (mL): 1150 mL  Total OUT: 1150 mL    Total NET: -1150 mL      13 Oct 2023 07:01  -  14 Oct 2023 05:21  --------------------------------------------------------  IN:  Total IN: 0 mL    OUT:    Voided (mL): 1300 mL  Total OUT: 1300 mL    Total NET: -1300 mL          Lab Data                  Review of Systems	      Objective     Physical Examination    heart s1s2  lung dc bS      Pertinent Lab findings & Imaging      Nilo:  NO   Adequate UO     I&O's Detail    12 Oct 2023 07:01  -  13 Oct 2023 07:00  --------------------------------------------------------  IN:  Total IN: 0 mL    OUT:    Voided (mL): 1150 mL  Total OUT: 1150 mL    Total NET: -1150 mL      13 Oct 2023 07:01  -  14 Oct 2023 05:21  --------------------------------------------------------  IN:  Total IN: 0 mL    OUT:    Voided (mL): 1300 mL  Total OUT: 1300 mL    Total NET: -1300 mL               Discussed with:     Cultures:	        Radiology

## 2023-10-15 PROCEDURE — 99232 SBSQ HOSP IP/OBS MODERATE 35: CPT

## 2023-10-15 RX ORDER — MORPHINE SULFATE 50 MG/1
2 CAPSULE, EXTENDED RELEASE ORAL ONCE
Refills: 0 | Status: DISCONTINUED | OUTPATIENT
Start: 2023-10-15 | End: 2023-10-15

## 2023-10-15 RX ADMIN — DORZOLAMIDE HYDROCHLORIDE TIMOLOL MALEATE 1 DROP(S): 20; 5 SOLUTION/ DROPS OPHTHALMIC at 18:05

## 2023-10-15 RX ADMIN — BRIMONIDINE TARTRATE 1 DROP(S): 2 SOLUTION/ DROPS OPHTHALMIC at 18:05

## 2023-10-15 RX ADMIN — MORPHINE SULFATE 2 MILLIGRAM(S): 50 CAPSULE, EXTENDED RELEASE ORAL at 16:13

## 2023-10-15 RX ADMIN — Medication 3 MILLILITER(S): at 08:11

## 2023-10-15 RX ADMIN — LATANOPROST 1 DROP(S): 0.05 SOLUTION/ DROPS OPHTHALMIC; TOPICAL at 22:45

## 2023-10-15 RX ADMIN — FENTANYL CITRATE 1 PATCH: 50 INJECTION INTRAVENOUS at 07:30

## 2023-10-15 RX ADMIN — MORPHINE SULFATE 2 MILLIGRAM(S): 50 CAPSULE, EXTENDED RELEASE ORAL at 17:00

## 2023-10-15 RX ADMIN — FENTANYL CITRATE 1 PATCH: 50 INJECTION INTRAVENOUS at 18:05

## 2023-10-15 RX ADMIN — MORPHINE SULFATE 2 MILLIGRAM(S): 50 CAPSULE, EXTENDED RELEASE ORAL at 00:56

## 2023-10-15 RX ADMIN — BRIMONIDINE TARTRATE 1 DROP(S): 2 SOLUTION/ DROPS OPHTHALMIC at 06:43

## 2023-10-15 RX ADMIN — MORPHINE SULFATE 2 MILLIGRAM(S): 50 CAPSULE, EXTENDED RELEASE ORAL at 01:01

## 2023-10-15 RX ADMIN — FENTANYL CITRATE 1 PATCH: 50 INJECTION INTRAVENOUS at 19:45

## 2023-10-15 RX ADMIN — FENTANYL CITRATE 1 PATCH: 50 INJECTION INTRAVENOUS at 18:10

## 2023-10-15 RX ADMIN — DORZOLAMIDE HYDROCHLORIDE TIMOLOL MALEATE 1 DROP(S): 20; 5 SOLUTION/ DROPS OPHTHALMIC at 06:43

## 2023-10-15 RX ADMIN — MORPHINE SULFATE 2 MILLIGRAM(S): 50 CAPSULE, EXTENDED RELEASE ORAL at 23:37

## 2023-10-15 NOTE — CHART NOTE - NSCHARTNOTEFT_GEN_A_CORE
Assessment: 89 y/o female adm with esophageal mass. PMH CAD, gallstones, breast ca, afib, glaucoma, diverticulitis.   Pt visited at bedside this morning. Pt reports that she is taking a little clear liquids, or ensure clear, however is spitting up a lot of it. Pt to be d/c'd home on home hospice.     Factors impacting intake: [ ] none [ ] nausea  [ ] vomiting [ ] diarrhea [ ] constipation  [ ]chewing problems [x ] swallowing issues  [ ] other:     Diet Presciption: Diet, Clear Liquid:   Supplement Feeding Modality:  Oral  Ensure Clear Cans or Servings Per Day:  1       Frequency:  Three Times a day (10-08-23 @ 10:54)    Intake: 0-50%    Current Weight: 10/6 169.6# 10/11 179.6#  no edema    % Weight Change    Pertinent Medications: MEDICATIONS  (STANDING):  brimonidine 0.2% Ophthalmic Solution 1 Drop(s) Both EYES two times a day  dorzolamide 2%/timolol 0.5% Ophthalmic Solution 1 Drop(s) Both EYES two times a day  fentaNYL   Patch  12 MICROgram(s)/Hr 1 Patch Transdermal every 72 hours  influenza  Vaccine (HIGH DOSE) 0.7 milliLiter(s) IntraMuscular once  latanoprost 0.005% Ophthalmic Solution 1 Drop(s) Both EYES at bedtime  naloxone Injectable 0.4 milliGRAM(s) IV Push once  sodium chloride 0.9%. 1000 milliLiter(s) (75 mL/Hr) IV Continuous <Continuous>    MEDICATIONS  (PRN):  bisacodyl Suppository 10 milliGRAM(s) Rectal daily PRN Constipation  LORazepam    Concentrate 1 milliGRAM(s) SubLingual every 2 hours PRN Anxiety  morphine  - Injectable 4 milliGRAM(s) IV Push every 4 hours PRN Severe Pain (7 - 10)  morphine Concentrate 5 milliGRAM(s) SubLingual every 3 hours PRN Mild Pain (1 - 3)  morphine Concentrate 10 milliGRAM(s) SubLingual every 6 hours PRN Moderate Pain (4 - 6)  ondansetron Injectable 4 milliGRAM(s) IV Push every 8 hours PRN Nausea and/or Vomiting    Pertinent Labs: 10-11 Na139 mmol/L Glu 100 mg/dL<H> K+ 3.8 mmol/L Cr  0.83 mg/dL BUN 16 mg/dL 10-07 Alb 3.4 g/dL 10-07 Chol 135 mg/dL LDL --    HDL 43 mg/dL<L> Trig 96 mg/dL     CAPILLARY BLOOD GLUCOSE        Skin: no pressure ulcers     Estimated Needs:   [x ] no change since previous assessment  [ ] recalculated:     Previous Nutrition Diagnosis:   [ ] Inadequate Energy Intake [ ]Inadequate Oral Intake [ ] Excessive Energy Intake   [ ] Underweight [ ] Increased Nutrient Needs [ ] Overweight/Obesity   [ ] Altered GI Function [ ] Unintended Weight Loss [ ] Food & Nutrition Related Knowledge Deficit [x ] Malnutrition     Nutrition Diagnosis is [ x] ongoing  [ ] resolved [ ] not applicable       New Nutrition Diagnosis: [ ] not applicable   [x] swallowing difficulty related to esophageal mass as evidenced by difficulty with most food     Interventions:   Recommend  [ ] Change Diet To:  [ ] Nutrition Supplement  [ ] Nutrition Support  [x ] Other: continue current diet order with ensure clear; pt taking small amounts     Monitoring and Evaluation:   [ x] PO intake [ x ] Tolerance to diet prescription [ x ] weights [ x ] labs[ x ] follow up per protocol  [ ] other:
Assessment: patient seen for malnutrition follow up   88y old  Female who presents with  Imaging with esophageal mass with mediastinal CELESTINA, probable bone metastasis (obstructing esophageal mass status post EGD)   patient states took soup and apple juice this AM  [facility] D[facility]  patient requesting chocolate CIB currently on clear liquids not on formulary will recommend ensure plus HP as diet advanced to maybe full liquids   denies N/V        Factors impacting intake: [ ] none [ ] nausea  [ ] vomiting [ ] diarrhea [ ] constipation  [ ]chewing problems [x ] swallowing issues  [ ] other: Esophageal mass    Diet Prescription: Diet, Clear Liquid:   Supplement Feeding Modality:  Oral  Ensure Clear Cans or Servings Per Day:  1       Frequency:  Three Times a day (10-08-23 @ 10:54)    Intake: some clear liquids taken     Current Weight: Weight 10/11 179,6#      Pertinent Medications: MEDICATIONS  (STANDING):  brimonidine 0.2% Ophthalmic Solution 1 Drop(s) Both EYES two times a day  dorzolamide 2%/timolol 0.5% Ophthalmic Solution 1 Drop(s) Both EYES two times a day  fentaNYL   Patch  25 MICROgram(s)/Hr 1 Patch Transdermal every 72 hours  influenza  Vaccine (HIGH DOSE) 0.7 milliLiter(s) IntraMuscular once  latanoprost 0.005% Ophthalmic Solution 1 Drop(s) Both EYES at bedtime  naloxone Injectable 0.4 milliGRAM(s) IV Push once  sodium chloride 0.9%. 1000 milliLiter(s) (75 mL/Hr) IV Continuous <Continuous>    MEDICATIONS  (PRN):  bisacodyl Suppository 10 milliGRAM(s) Rectal daily PRN Constipation  LORazepam    Concentrate 1 milliGRAM(s) SubLingual every 2 hours PRN Anxiety  morphine  - Injectable 4 milliGRAM(s) IV Push every 4 hours PRN Severe Pain (7 - 10)  morphine Concentrate 5 milliGRAM(s) SubLingual every 3 hours PRN Mild Pain (1 - 3)  morphine Concentrate 10 milliGRAM(s) SubLingual every 6 hours PRN Moderate Pain (4 - 6)  ondansetron Injectable 4 milliGRAM(s) IV Push every 8 hours PRN Nausea and/or Vomiting    Skin: ecchymosis    Estimated Needs:   [x ] no change since previous assessment based on IBW upper range 54.8kg 121# 30-35kcals/kg 1644-1918kcals and 1.2-1.4gm protein /kg 65-76gms protein   [ ] recalculated:     Previous Nutrition Diagnosis:   [ ] Inadequate Energy Intake [ ]Inadequate Oral Intake [ ] Excessive Energy Intake   [ ] Underweight [ ] Increased Nutrient Needs [ ] Overweight/Obesity   [ ] Altered GI Function [ ] Unintended Weight Loss [ ] Food & Nutrition Related Knowledge Deficit [x ] Malnutrition chronic mod malnutrition     Nutrition Diagnosis is [x ] ongoing  [ ] resolved [ ] not applicable     New Nutrition Diagnosis: [ ] not applicable       Interventions:   Recommend  [ ] Change Diet To:  [ ] Nutrition Supplement  [ ] Nutrition Support  [ x] Other: continue diet as ordered as able advance to full fluids with ensure plus HP BID , follow for Plan of Care    Monitoring and Evaluation:   [x ] PO intake [ x ] Tolerance to diet prescription [ x ] weights [ x ] labs[ x ] follow up per protocol  [ ] other:
Overnight RN called for patient refusing sublingual opioids and complaining for severe pain. Patient states she has 12/10 neck pain. Palliative care attending recommends IV morphine for breakthrough pain. Morphine 2mg IV ordered.

## 2023-10-15 NOTE — PROGRESS NOTE ADULT - NUTRITIONAL ASSESSMENT
This patient has been assessed with a concern for Malnutrition and has been determined to have a diagnosis/diagnoses of Moderate protein-calorie malnutrition.    This patient is being managed with:   Diet Clear Liquid-  Supplement Feeding Modality:  Oral  Ensure Clear Cans or Servings Per Day:  1       Frequency:  Three Times a day  Entered: Oct  8 2023 10:54AM  
This patient has been assessed with a concern for Malnutrition and has been determined to have a diagnosis/diagnoses of Moderate protein-calorie malnutrition.    This patient is being managed with:   Diet NPO after Midnight-     NPO Start Date: 09-Oct-2023   NPO Start Time: 23:59  Entered: Oct  9 2023  7:00PM    Diet Clear Liquid-  Supplement Feeding Modality:  Oral  Ensure Clear Cans or Servings Per Day:  1       Frequency:  Three Times a day  Entered: Oct  8 2023 10:54AM

## 2023-10-15 NOTE — PROGRESS NOTE ADULT - ASSESSMENT
88-year-old female with PMH of CAD, gallstones, breast cancer (9 years ago status post radiation, reports never took oral medications because "she does not like to take pills"), atrial fibrillation, glaucoma, diverticulitis who presents to emergency room with bilateral flank pain, weakness, increased fatigue, dysphagia, and loss of appetite    pain  acute pain - chr pain  mets ca  esoph ca  breast ca  cad  obesity  weakness  dysphagia    [facility] - vs noted - awaiting a bed  on opioids for pain  SW and GOC documentation noted    Roxanol SL - Fentanyl Patch low dose -   GOC documented  pt is DNR DNI  bowel rx regimen  IV Morphine for breakthrough severe pain  emotional support  assist with needs  oral hygiene  skin care  SW f/u noted  spoke with HCP - Mrs Gil -

## 2023-10-15 NOTE — SOCIAL WORK PROGRESS NOTE - NSSWPROGRESSNOTE_GEN_ALL_CORE
LENA spoke with Carmen at [facility] regarding hospice [facility] bed availability for today. Per Carmen a bed is available  for today however pt's HCP is not feeling well and not able to make it to [facility] to sign consents. She informed [facility] that she can come in tomorrow sign consents and meet pt there. Carmen reports [facility] will follow up tomorrow. LENA remains available.

## 2023-10-15 NOTE — PROGRESS NOTE ADULT - ASSESSMENT
88-year-old female with PMH of CAD, gallstones, breast cancer (9 years ago status post radiation, reports never took oral medications because "she does not like to take pills"), atrial fibrillation no AC, glaucoma, diverticulitis who presents with abd pain and found to have a new esophageal mass.     CAD, Persistent Afib, Chest Pain  - Chest pain is atypical.  EKG is nonischemic  - Trop is neg, no need to trend.  No real anginal complaints     - Echo: EF 50% with moderate MR/AR, mildly elevated pulmonary pressures, LAE, TANIA  - No evidence of volume overload    - Rate-controlled Afib.  Not on home AVN blocker.   - Has not wanted any AC for years and at this point with a large esophageal mass, would not start    - Seen by GI; offered transfer for esophageal stent but patient refused  - Heme/onc following  - Palliative following.  For poss D[facility]C [facility]    - Monitor and replete lytes, keep K>4, Mg>2.  - Stable from cardiac standpoint

## 2023-10-15 NOTE — PROGRESS NOTE ADULT - SUBJECTIVE AND OBJECTIVE BOX
[facility] - Batool Jeter, Debra, Fidel, Tana, Alvin Dyson  Office Number:  [phone]    Patient resting comfortably in bed in NAD.  Laying flat with no respiratory distress.  No complaints of chest pain, dyspnea, palpitations, PND, or orthopnea.    F/U for:  AF         MEDICATIONS  (STANDING):  brimonidine 0.2% Ophthalmic Solution 1 Drop(s) Both EYES two times a day  dorzolamide 2%/timolol 0.5% Ophthalmic Solution 1 Drop(s) Both EYES two times a day  fentaNYL   Patch  25 MICROgram(s)/Hr 1 Patch Transdermal every 72 hours  influenza  Vaccine (HIGH DOSE) 0.7 milliLiter(s) IntraMuscular once  latanoprost 0.005% Ophthalmic Solution 1 Drop(s) Both EYES at bedtime  naloxone Injectable 0.4 milliGRAM(s) IV Push once    MEDICATIONS  (PRN):  albuterol/ipratropium for Nebulization 3 milliLiter(s) Nebulizer every 6 hours PRN Shortness of Breath and/or Wheezing  bisacodyl Suppository 10 milliGRAM(s) Rectal daily PRN Constipation  LORazepam    Concentrate 1 milliGRAM(s) SubLingual every 2 hours PRN Anxiety  morphine Concentrate 5 milliGRAM(s) SubLingual every 3 hours PRN Mild Pain (1 - 3)  morphine Concentrate 10 milliGRAM(s) SubLingual every 6 hours PRN Moderate Pain (4 - 6)  ondansetron Injectable 4 milliGRAM(s) IV Push every 8 hours PRN Nausea and/or Vomiting      Allergies    No Known Allergies    Intolerances        Vital Signs Last 24 Hrs  T(C): 36.7 (15 Oct 2023 05:07), Max: 37.2 (14 Oct 2023 11:46)  T(F): 98.1 (15 Oct 2023 05:07), Max: 98.9 (14 Oct 2023 11:46)  HR: 100 (15 Oct 2023 05:07) (95 - 100)  BP: 134/87 (15 Oct 2023 05:07) (126/77 - 135/73)  BP(mean): --  RR: 17 (15 Oct 2023 05:07) (17 - 18)  SpO2: 98% (15 Oct 2023 05:07) (92% - 98%)    Parameters below as of 15 Oct 2023 05:07  Patient On (Oxygen Delivery Method): room air        I&O's Summary    14 Oct 2023 07:01  -  15 Oct 2023 07:00  --------------------------------------------------------  IN: 0 mL / OUT: 801 mL / NET: -801 mL        ON EXAM:    Constitutional: NAD, awake and alert, obese  HEENT: Moist Mucous Membranes, Anicteric  Pulmonary: Non-labored, breath sounds are clear bilaterally, No wheezing, rales or rhonchi  Cardiovascular: IRRR, S1 and S2, +murmurs, no rubs, gallops or clicks  Gastrointestinal: Bowel Sounds present, soft, nontender.   Lymph: No peripheral edema. No lymphadenopathy.  Skin: No visible rashes or ulcers.  Psych:  Mood & affect appropriate    LABS: All Labs Reviewed:

## 2023-10-15 NOTE — PROGRESS NOTE ADULT - SUBJECTIVE AND OBJECTIVE BOX
Date/Time Patient Seen:  		  Referring MD:   Data Reviewed	       Patient is a 88y old  Female who presents with a chief complaint of esophageal mass (14 Oct 2023 17:50)      Subjective/HPI     PAST MEDICAL & SURGICAL HISTORY:  S/P colon resection    Status post hysterectomy    Status post appendectomy    Diverticulitis    Glaucoma    PSVT (paroxysmal supraventricular tachycardia)    Atrial fibrillation    Breast cancer    Gall stones    C. difficile diarrhea    S/P colon resection  march 11 2014    Status post appendectomy    H/O mastectomy, bilateral    H/O hernia repair    S/P ORIF (open reduction internal fixation) fracture  L lower extremity    H/O: hysterectomy          Medication list         MEDICATIONS  (STANDING):  brimonidine 0.2% Ophthalmic Solution 1 Drop(s) Both EYES two times a day  dorzolamide 2%/timolol 0.5% Ophthalmic Solution 1 Drop(s) Both EYES two times a day  fentaNYL   Patch  25 MICROgram(s)/Hr 1 Patch Transdermal every 72 hours  influenza  Vaccine (HIGH DOSE) 0.7 milliLiter(s) IntraMuscular once  latanoprost 0.005% Ophthalmic Solution 1 Drop(s) Both EYES at bedtime  naloxone Injectable 0.4 milliGRAM(s) IV Push once    MEDICATIONS  (PRN):  albuterol/ipratropium for Nebulization 3 milliLiter(s) Nebulizer every 6 hours PRN Shortness of Breath and/or Wheezing  bisacodyl Suppository 10 milliGRAM(s) Rectal daily PRN Constipation  LORazepam    Concentrate 1 milliGRAM(s) SubLingual every 2 hours PRN Anxiety  morphine Concentrate 10 milliGRAM(s) SubLingual every 6 hours PRN Moderate Pain (4 - 6)  morphine Concentrate 5 milliGRAM(s) SubLingual every 3 hours PRN Mild Pain (1 - 3)  ondansetron Injectable 4 milliGRAM(s) IV Push every 8 hours PRN Nausea and/or Vomiting         Vitals log        ICU Vital Signs Last 24 Hrs  T(C): 36.7 (15 Oct 2023 05:07), Max: 37.2 (14 Oct 2023 11:46)  T(F): 98.1 (15 Oct 2023 05:07), Max: 98.9 (14 Oct 2023 11:46)  HR: 100 (15 Oct 2023 05:07) (95 - 100)  BP: 134/87 (15 Oct 2023 05:07) (126/77 - 135/73)  BP(mean): --  ABP: --  ABP(mean): --  RR: 17 (15 Oct 2023 05:07) (17 - 18)  SpO2: 98% (15 Oct 2023 05:07) (92% - 98%)    O2 Parameters below as of 15 Oct 2023 05:07  Patient On (Oxygen Delivery Method): room air                 Input and Output:  I&O's Detail    13 Oct 2023 07:01  -  14 Oct 2023 07:00  --------------------------------------------------------  IN:  Total IN: 0 mL    OUT:    Voided (mL): 1900 mL  Total OUT: 1900 mL    Total NET: -1900 mL      14 Oct 2023 07:01  -  15 Oct 2023 05:24  --------------------------------------------------------  IN:  Total IN: 0 mL    OUT:    Blood Loss (mL): 1 mL    Voided (mL): 800 mL  Total OUT: 801 mL    Total NET: -801 mL          Lab Data                  Review of Systems	      Objective     Physical Examination  heart s1s2  lung dc BS  head nc        Pertinent Lab findings & Imaging      Nilo:  NO   Adequate UO     I&O's Detail    13 Oct 2023 07:01  -  14 Oct 2023 07:00  --------------------------------------------------------  IN:  Total IN: 0 mL    OUT:    Voided (mL): 1900 mL  Total OUT: 1900 mL    Total NET: -1900 mL      14 Oct 2023 07:01  -  15 Oct 2023 05:24  --------------------------------------------------------  IN:  Total IN: 0 mL    OUT:    Blood Loss (mL): 1 mL    Voided (mL): 800 mL  Total OUT: 801 mL    Total NET: -801 mL               Discussed with:     Cultures:	        Radiology

## 2023-10-15 NOTE — PROGRESS NOTE ADULT - PROBLEM SELECTOR PROBLEM 1
Esophageal mass

## 2023-10-15 NOTE — PROGRESS NOTE ADULT - SUBJECTIVE AND OBJECTIVE BOX
Patient is a 88y old  Female who presents with a chief complaint of esophageal mass (15 Oct 2023 10:30)      INTERVAL HPI/OVERNIGHT EVENTS: Patient seen and examined at bedside. No overnight events. Tolerating diet. Denies fever, chills, chest pain, shortness of breath, abdominal pain, nausea/vomiting, headache.    MEDICATIONS  (STANDING):  brimonidine 0.2% Ophthalmic Solution 1 Drop(s) Both EYES two times a day  dorzolamide 2%/timolol 0.5% Ophthalmic Solution 1 Drop(s) Both EYES two times a day  fentaNYL   Patch  25 MICROgram(s)/Hr 1 Patch Transdermal every 72 hours  influenza  Vaccine (HIGH DOSE) 0.7 milliLiter(s) IntraMuscular once  latanoprost 0.005% Ophthalmic Solution 1 Drop(s) Both EYES at bedtime  naloxone Injectable 0.4 milliGRAM(s) IV Push once    MEDICATIONS  (PRN):  albuterol/ipratropium for Nebulization 3 milliLiter(s) Nebulizer every 6 hours PRN Shortness of Breath and/or Wheezing  bisacodyl Suppository 10 milliGRAM(s) Rectal daily PRN Constipation  LORazepam    Concentrate 1 milliGRAM(s) SubLingual every 2 hours PRN Anxiety  morphine Concentrate 5 milliGRAM(s) SubLingual every 3 hours PRN Mild Pain (1 - 3)  morphine Concentrate 10 milliGRAM(s) SubLingual every 6 hours PRN Moderate Pain (4 - 6)  ondansetron Injectable 4 milliGRAM(s) IV Push every 8 hours PRN Nausea and/or Vomiting      Allergies    No Known Allergies    Intolerances        REVIEW OF SYSTEMS:  CONSTITUTIONAL: No fever or chills  HEENT:  No headache, no sore throat  RESPIRATORY: No cough, wheezing, or shortness of breath  CARDIOVASCULAR: No chest pain, palpitations  GASTROINTESTINAL: No abd pain, nausea, vomiting, or diarrhea  GENITOURINARY: No dysuria, frequency, or hematuria  NEUROLOGICAL: no focal weakness or dizziness  MUSCULOSKELETAL: no myalgias     Vital Signs Last 24 Hrs  T(C): 36.7 (15 Oct 2023 05:07), Max: 36.9 (14 Oct 2023 20:19)  T(F): 98.1 (15 Oct 2023 05:07), Max: 98.4 (14 Oct 2023 20:19)  HR: 85 (15 Oct 2023 08:11) (85 - 100)  BP: 134/87 (15 Oct 2023 05:07) (126/77 - 134/87)  BP(mean): --  RR: 17 (15 Oct 2023 05:07) (17 - 17)  SpO2: 92% (15 Oct 2023 08:11) (92% - 98%)    Parameters below as of 15 Oct 2023 08:11  Patient On (Oxygen Delivery Method): room air        PHYSICAL EXAM:  GENERAL: NAD  HEENT:  anicteric, moist mucous membranes  CHEST/LUNG:  CTA b/l, no rales, wheezes, or rhonchi  HEART:  RRR, S1, S2  ABDOMEN:  BS+, soft, nontender, nondistended  EXTREMITIES: no edema, cyanosis, or calf tenderness  NERVOUS SYSTEM: answers questions and follows commands appropriately    LABS:                CAPILLARY BLOOD GLUCOSE              RADIOLOGY & ADDITIONAL TESTS:    Personally reviewed.     Consultant(s) Notes Reviewed:  [x] YES  [ ] NO

## 2023-10-16 ENCOUNTER — TRANSCRIPTION ENCOUNTER (OUTPATIENT)
Age: 88
End: 2023-10-16

## 2023-10-16 VITALS
TEMPERATURE: 98 F | DIASTOLIC BLOOD PRESSURE: 80 MMHG | HEART RATE: 90 BPM | SYSTOLIC BLOOD PRESSURE: 134 MMHG | OXYGEN SATURATION: 95 % | RESPIRATION RATE: 18 BRPM

## 2023-10-16 PROCEDURE — 93005 ELECTROCARDIOGRAM TRACING: CPT

## 2023-10-16 PROCEDURE — 96374 THER/PROPH/DIAG INJ IV PUSH: CPT

## 2023-10-16 PROCEDURE — 81001 URINALYSIS AUTO W/SCOPE: CPT

## 2023-10-16 PROCEDURE — 83036 HEMOGLOBIN GLYCOSYLATED A1C: CPT

## 2023-10-16 PROCEDURE — 74176 CT ABD & PELVIS W/O CONTRAST: CPT | Mod: MA

## 2023-10-16 PROCEDURE — 96375 TX/PRO/DX INJ NEW DRUG ADDON: CPT

## 2023-10-16 PROCEDURE — 88305 TISSUE EXAM BY PATHOLOGIST: CPT

## 2023-10-16 PROCEDURE — 80053 COMPREHEN METABOLIC PANEL: CPT

## 2023-10-16 PROCEDURE — 74177 CT ABD & PELVIS W/CONTRAST: CPT

## 2023-10-16 PROCEDURE — 99239 HOSP IP/OBS DSCHRG MGMT >30: CPT

## 2023-10-16 PROCEDURE — 94640 AIRWAY INHALATION TREATMENT: CPT

## 2023-10-16 PROCEDURE — 93306 TTE W/DOPPLER COMPLETE: CPT

## 2023-10-16 PROCEDURE — 80048 BASIC METABOLIC PNL TOTAL CA: CPT

## 2023-10-16 PROCEDURE — 83690 ASSAY OF LIPASE: CPT

## 2023-10-16 PROCEDURE — 36415 COLL VENOUS BLD VENIPUNCTURE: CPT

## 2023-10-16 PROCEDURE — 85025 COMPLETE CBC W/AUTO DIFF WBC: CPT

## 2023-10-16 PROCEDURE — 71260 CT THORAX DX C+: CPT

## 2023-10-16 PROCEDURE — 80061 LIPID PANEL: CPT

## 2023-10-16 PROCEDURE — 71250 CT THORAX DX C-: CPT | Mod: MA

## 2023-10-16 PROCEDURE — 84484 ASSAY OF TROPONIN QUANT: CPT

## 2023-10-16 PROCEDURE — 99232 SBSQ HOSP IP/OBS MODERATE 35: CPT

## 2023-10-16 PROCEDURE — 94760 N-INVAS EAR/PLS OXIMETRY 1: CPT

## 2023-10-16 PROCEDURE — 87086 URINE CULTURE/COLONY COUNT: CPT

## 2023-10-16 PROCEDURE — 88313 SPECIAL STAINS GROUP 2: CPT

## 2023-10-16 PROCEDURE — 85027 COMPLETE CBC AUTOMATED: CPT

## 2023-10-16 PROCEDURE — 99285 EMERGENCY DEPT VISIT HI MDM: CPT

## 2023-10-16 RX ORDER — HYDROMORPHONE HYDROCHLORIDE 2 MG/ML
0.5 INJECTION INTRAMUSCULAR; INTRAVENOUS; SUBCUTANEOUS
Refills: 0 | Status: DISCONTINUED | OUTPATIENT
Start: 2023-10-16 | End: 2023-10-16

## 2023-10-16 RX ORDER — HYDROMORPHONE HYDROCHLORIDE 2 MG/ML
1 INJECTION INTRAMUSCULAR; INTRAVENOUS; SUBCUTANEOUS
Refills: 0 | Status: DISCONTINUED | OUTPATIENT
Start: 2023-10-16 | End: 2023-10-16

## 2023-10-16 RX ORDER — HYDROMORPHONE HYDROCHLORIDE 2 MG/ML
0.5 INJECTION INTRAMUSCULAR; INTRAVENOUS; SUBCUTANEOUS
Qty: 0 | Refills: 0 | DISCHARGE
Start: 2023-10-16

## 2023-10-16 RX ORDER — ONDANSETRON 8 MG/1
4 TABLET, FILM COATED ORAL
Qty: 0 | Refills: 0 | DISCHARGE
Start: 2023-10-16

## 2023-10-16 RX ORDER — NALOXONE HYDROCHLORIDE 4 MG/.1ML
0.4 SPRAY NASAL
Qty: 0 | Refills: 0 | DISCHARGE
Start: 2023-10-16

## 2023-10-16 RX ORDER — HYDROMORPHONE HYDROCHLORIDE 2 MG/ML
1 INJECTION INTRAMUSCULAR; INTRAVENOUS; SUBCUTANEOUS
Qty: 0 | Refills: 0 | DISCHARGE
Start: 2023-10-16

## 2023-10-16 RX ORDER — FENTANYL CITRATE 50 UG/ML
1 INJECTION INTRAVENOUS
Qty: 0 | Refills: 0 | DISCHARGE
Start: 2023-10-16

## 2023-10-16 RX ORDER — NETARSUDIL 0.2 MG/ML
1 SOLUTION/ DROPS OPHTHALMIC; TOPICAL
Refills: 0 | DISCHARGE

## 2023-10-16 RX ORDER — IPRATROPIUM/ALBUTEROL SULFATE 18-103MCG
3 AEROSOL WITH ADAPTER (GRAM) INHALATION
Qty: 0 | Refills: 0 | DISCHARGE
Start: 2023-10-16

## 2023-10-16 RX ORDER — BRIMONIDINE TARTRATE 2 MG/MG
1 SOLUTION/ DROPS OPHTHALMIC
Qty: 0 | Refills: 0 | DISCHARGE

## 2023-10-16 RX ADMIN — FENTANYL CITRATE 1 PATCH: 50 INJECTION INTRAVENOUS at 07:44

## 2023-10-16 RX ADMIN — Medication 3 MILLILITER(S): at 04:56

## 2023-10-16 RX ADMIN — HYDROMORPHONE HYDROCHLORIDE 0.5 MILLIGRAM(S): 2 INJECTION INTRAMUSCULAR; INTRAVENOUS; SUBCUTANEOUS at 05:40

## 2023-10-16 RX ADMIN — HYDROMORPHONE HYDROCHLORIDE 0.5 MILLIGRAM(S): 2 INJECTION INTRAMUSCULAR; INTRAVENOUS; SUBCUTANEOUS at 06:30

## 2023-10-16 RX ADMIN — BRIMONIDINE TARTRATE 1 DROP(S): 2 SOLUTION/ DROPS OPHTHALMIC at 05:42

## 2023-10-16 RX ADMIN — MORPHINE SULFATE 2 MILLIGRAM(S): 50 CAPSULE, EXTENDED RELEASE ORAL at 00:27

## 2023-10-16 RX ADMIN — DORZOLAMIDE HYDROCHLORIDE TIMOLOL MALEATE 1 DROP(S): 20; 5 SOLUTION/ DROPS OPHTHALMIC at 05:41

## 2023-10-16 NOTE — DISCHARGE NOTE NURSING/CASE MANAGEMENT/SOCIAL WORK - PATIENT PORTAL LINK FT
You can access the FollowMyHealth Patient Portal offered by [facility] by registering at the following website: [email or web address]. By joining [facility]’s FollowMyHealth portal, you will also be able to view your health information using other applications (apps) compatible with our system.

## 2023-10-16 NOTE — PROGRESS NOTE ADULT - NS ATTEND AMEND GEN_ALL_CORE FT
88-year-old female with PMH of CAD, gallstones, breast cancer (9 years ago status post radiation, reports never took oral medications because "she does not like to take pills"), atrial fibrillation no AC, glaucoma, diverticulitis who presents with abd pain and found to have a new esophageal mass.     - ekg is nonischemic  - trop is neg    - appears to have persistent AF  - Rate controlled of AVNs  - has not wanted any AC for years and at this point with a large esophageal mass I would be reluctant to start it.     - GI eval- plan for EGD  - Pt has no active ischemia, decompensated heart failure, unstable arrythmia, or severe stenotic valvular disease. Patient is optimized from cardiovascular standpoint to proceed with planned procedure with routine hemodynamic monitoring.
88-year-old female with PMH of CAD, gallstones, breast cancer (9 years ago status post radiation, reports never took oral medications because "she does not like to take pills"), atrial fibrillation no AC, glaucoma, diverticulitis who presents with abd pain and found to have a new esophageal mass.     - ekg is nonischemic  - trop is neg    - appears to have persistent AF, rate controlled   - has not wanted any AC for years and at this point with a large esophageal mass I would be reluctant to start it.   - s/p EGD, refusing stent for esoph mass given overall poor prognosis     -dc planning per primary team to home hospice.
88-year-old female with PMH of CAD, gallstones, breast cancer (9 years ago status post radiation, reports never took oral medications because "she does not like to take pills"), atrial fibrillation no AC, glaucoma, diverticulitis who presents with abd pain and found to have a new esophageal mass.     - ekg is nonischemic  - trop is neg    - appears to have persistent AF  - Rate controlled of AVNs  - has not wanted any AC for years and at this point with a large esophageal mass I would be reluctant to start it.     - s/p EGD now plan for esophageal stent pending transfer. though latest is pt is refusing the procedure.   - Pt has no active ischemia, decompensated heart failure, unstable arrythmia, or severe stenotic valvular disease. Patient is optimized from cardiovascular standpoint to proceed with planned necessary esophageal stenting with routine hemodynamic monitoring.
88-year-old female with PMH of CAD, gallstones, breast cancer (9 years ago status post radiation, reports never took oral medications because "she does not like to take pills"), atrial fibrillation no AC, glaucoma, diverticulitis who presents with abd pain and found to have a new esophageal mass.     - ekg is nonischemic  - trop is neg    - appears to have persistent AF, rate controlled   - has not wanted any AC for years and at this point with a large esophageal mass I would be reluctant to start it.   - s/p EGD, refusing stent for esoph mass given overall poor prognosis     -dc planning per primary team hospice.
88-year-old female with PMH of CAD, gallstones, breast cancer (9 years ago status post radiation, reports never took oral medications because "she does not like to take pills"), atrial fibrillation no AC, glaucoma, diverticulitis who presents with abd pain and found to have a new esophageal mass.     - ekg is nonischemic  - trop is neg    - appears to have persistent AF, rate controlled   - has not wanted any AC for years and at this point with a large esophageal mass I would be reluctant to start it.   - s/p EGD, refusing stent for esoph mass given overall poor prognosis     -dc planning per primary team to home hospice.
88-year-old female with PMH of CAD, gallstones, breast cancer (9 years ago status post radiation, reports never took oral medications because "she does not like to take pills"), atrial fibrillation no AC, glaucoma, diverticulitis who presents with abd pain and found to have a new esophageal mass.     - ekg is nonischemic  - trop is neg  - check echo as routine    - appears to have persistent AF  - Rate controlled of AVNs  - has not wanted any AC for years and at this point with a large esophageal mass I would be reluctant to start it.     - GI eval- plan for EGD  - Pt has no active ischemia, decompensated heart failure, unstable arrythmia, or severe stenotic valvular disease. Patient is optimized from cardiovascular standpoint to proceed with planned procedure with routine hemodynamic monitoring.
88-year-old female with PMH of CAD, gallstones, breast cancer (9 years ago status post radiation, reports never took oral medications because "she does not like to take pills"), atrial fibrillation no AC, glaucoma, diverticulitis who presents with abd pain and found to have a new esophageal mass.     - ekg is nonischemic  - trop is neg    - appears to have persistent AF  - Rate controlled of AVNs  - has not wanted any AC for years and at this point with a large esophageal mass I would be reluctant to start it.     - s/p EGD, refusing stent for esoph mass given overall poor prognosis     -dc planning per primary team
88-year-old female with PMH of CAD, gallstones, breast cancer (9 years ago status post radiation, reports never took oral medications because "she does not like to take pills"), atrial fibrillation no AC, glaucoma, diverticulitis who presents with abd pain and found to have a new esophageal mass.     - ekg is nonischemic  - trop is neg    - appears to have persistent AF, rate controlled   - has not wanted any AC for years and at this point with a large esophageal mass I would be reluctant to start it.   - s/p EGD, refusing stent for esoph mass given overall poor prognosis     -dc planning per primary team to home hospice.

## 2023-10-16 NOTE — SOCIAL WORK PROGRESS NOTE - NSSWPROGRESSNOTE_GEN_ALL_CORE
SW spoke with this pt at bedside and spoke with pts HCP Fara- plan for DC today to [facility], they have a bed available today. RN provided number to give report to [facility], Malou arranged for 1130AM. Clinicals were faxed to [facility] for auth, awaiting auth. Pt, family, team in agreement with dc. SW remains available.

## 2023-10-16 NOTE — PROGRESS NOTE ADULT - SUBJECTIVE AND OBJECTIVE BOX
Date/Time Patient Seen:  		  Referring MD:   Data Reviewed	       Patient is a 88y old  Female who presents with a chief complaint of esophageal mass (15 Oct 2023 12:33)      Subjective/HPI     PAST MEDICAL & SURGICAL HISTORY:  S/P colon resection    Status post hysterectomy    Status post appendectomy    Diverticulitis    Glaucoma    PSVT (paroxysmal supraventricular tachycardia)    Atrial fibrillation    Breast cancer    Gall stones    C. difficile diarrhea    S/P colon resection  march 11 2014    Status post appendectomy    H/O mastectomy, bilateral    H/O hernia repair    S/P ORIF (open reduction internal fixation) fracture  L lower extremity    H/O: hysterectomy          Medication list         MEDICATIONS  (STANDING):  brimonidine 0.2% Ophthalmic Solution 1 Drop(s) Both EYES two times a day  dorzolamide 2%/timolol 0.5% Ophthalmic Solution 1 Drop(s) Both EYES two times a day  fentaNYL   Patch  25 MICROgram(s)/Hr 1 Patch Transdermal every 72 hours  influenza  Vaccine (HIGH DOSE) 0.7 milliLiter(s) IntraMuscular once  latanoprost 0.005% Ophthalmic Solution 1 Drop(s) Both EYES at bedtime  naloxone Injectable 0.4 milliGRAM(s) IV Push once    MEDICATIONS  (PRN):  albuterol/ipratropium for Nebulization 3 milliLiter(s) Nebulizer every 6 hours PRN Shortness of Breath and/or Wheezing  bisacodyl Suppository 10 milliGRAM(s) Rectal daily PRN Constipation  LORazepam    Concentrate 1 milliGRAM(s) SubLingual every 2 hours PRN Anxiety  morphine Concentrate 5 milliGRAM(s) SubLingual every 3 hours PRN Mild Pain (1 - 3)  morphine Concentrate 10 milliGRAM(s) SubLingual every 6 hours PRN Moderate Pain (4 - 6)  ondansetron Injectable 4 milliGRAM(s) IV Push every 8 hours PRN Nausea and/or Vomiting         Vitals log        ICU Vital Signs Last 24 Hrs  T(C): 36.4 (15 Oct 2023 20:50), Max: 36.4 (15 Oct 2023 20:50)  T(F): 97.6 (15 Oct 2023 20:50), Max: 97.6 (15 Oct 2023 20:50)  HR: 100 (15 Oct 2023 20:50) (85 - 100)  BP: 128/86 (15 Oct 2023 20:50) (128/86 - 128/86)  BP(mean): --  ABP: --  ABP(mean): --  RR: 17 (15 Oct 2023 20:50) (17 - 17)  SpO2: 91% (15 Oct 2023 20:50) (91% - 92%)    O2 Parameters below as of 15 Oct 2023 20:50  Patient On (Oxygen Delivery Method): room air                 Input and Output:  I&O's Detail    14 Oct 2023 07:01  -  15 Oct 2023 07:00  --------------------------------------------------------  IN:  Total IN: 0 mL    OUT:    Blood Loss (mL): 1 mL    Voided (mL): 800 mL  Total OUT: 801 mL    Total NET: -801 mL      15 Oct 2023 07:01  -  16 Oct 2023 05:23  --------------------------------------------------------  IN:  Total IN: 0 mL    OUT:    Voided (mL): 300 mL  Total OUT: 300 mL    Total NET: -300 mL          Lab Data                  Review of Systems	      Objective     Physical Examination    heart s1s2  lung dc BS      Pertinent Lab findings & Imaging      Nilo:  NO   Adequate UO     I&O's Detail    14 Oct 2023 07:01  -  15 Oct 2023 07:00  --------------------------------------------------------  IN:  Total IN: 0 mL    OUT:    Blood Loss (mL): 1 mL    Voided (mL): 800 mL  Total OUT: 801 mL    Total NET: -801 mL      15 Oct 2023 07:01  -  16 Oct 2023 05:23  --------------------------------------------------------  IN:  Total IN: 0 mL    OUT:    Voided (mL): 300 mL  Total OUT: 300 mL    Total NET: -300 mL               Discussed with:     Cultures:	        Radiology

## 2023-10-16 NOTE — PROGRESS NOTE ADULT - SUBJECTIVE AND OBJECTIVE BOX
[facility] -- Batool Jeter, Fidel Richardson Savella, Goodger, Cohen: Office # [phone]    Follow Up:  AF     Subjective/Observations: Patient seen and examined. Patient awake, alert, resting in bed. No complaints of chest pain, dyspnea, palpitations or dizziness. No signs of orthopnea or PND. Tolerating room air. Reports she is in pain, but better than before and the leg swelling is better also.     REVIEW OF SYSTEMS: All other review of systems are negative unless indicated above    PAST MEDICAL & SURGICAL HISTORY:  S/P colon resection      Diverticulitis      Glaucoma      PSVT (paroxysmal supraventricular tachycardia)      Atrial fibrillation      Breast cancer      Gall stones      C. difficile diarrhea      S/P colon resection  march 11 2014      Status post appendectomy      H/O mastectomy, bilateral      H/O hernia repair      S/P ORIF (open reduction internal fixation) fracture  L lower extremity      H/O: hysterectomy          MEDICATIONS  (STANDING):  brimonidine 0.2% Ophthalmic Solution 1 Drop(s) Both EYES two times a day  dorzolamide 2%/timolol 0.5% Ophthalmic Solution 1 Drop(s) Both EYES two times a day  fentaNYL   Patch  25 MICROgram(s)/Hr 1 Patch Transdermal every 72 hours  influenza  Vaccine (HIGH DOSE) 0.7 milliLiter(s) IntraMuscular once  latanoprost 0.005% Ophthalmic Solution 1 Drop(s) Both EYES at bedtime  naloxone Injectable 0.4 milliGRAM(s) IV Push once    MEDICATIONS  (PRN):  albuterol/ipratropium for Nebulization 3 milliLiter(s) Nebulizer every 6 hours PRN Shortness of Breath and/or Wheezing  bisacodyl Suppository 10 milliGRAM(s) Rectal daily PRN Constipation  HYDROmorphone  Injectable 0.5 milliGRAM(s) IV Push every 90 minutes PRN Moderate Pain (4 - 6)  HYDROmorphone  Injectable 1 milliGRAM(s) IV Push every 15 minutes PRN Severe Pain (7 - 10)  ondansetron Injectable 4 milliGRAM(s) IV Push every 8 hours PRN Nausea and/or Vomiting    Allergies    No Known Allergies    Intolerances      Vital Signs Last 24 Hrs  T(C): 36.6 (16 Oct 2023 04:42), Max: 36.6 (16 Oct 2023 04:42)  T(F): 97.9 (16 Oct 2023 04:42), Max: 97.9 (16 Oct 2023 04:42)  HR: 100 (16 Oct 2023 04:56) (96 - 102)  BP: 127/82 (16 Oct 2023 04:42) (127/82 - 128/86)  BP(mean): --  RR: 18 (16 Oct 2023 04:42) (17 - 18)  SpO2: 94% (16 Oct 2023 04:56) (91% - 94%)    Parameters below as of 16 Oct 2023 04:56  Patient On (Oxygen Delivery Method): room air      I&O's Summary    15 Oct 2023 07:01  -  16 Oct 2023 07:00  --------------------------------------------------------  IN: 0 mL / OUT: 300 mL / NET: -300 mL          TELE: Not on telemetry   PHYSICAL EXAM:  Constitutional: NAD, awake and alert  HEENT: Moist Mucous Membranes, Anicteric  Pulmonary: Non-labored, breath sounds are clear bilaterally, No wheezing, rales or rhonchi  Cardiovascular: Regular, S1 and S2, + murmurs, No rubs, gallops or clicks  Gastrointestinal:  soft, nontender, nondistended   Lymph: trace peripheral edema. No lymphadenopathy.   Skin: No visible rashes or ulcers.  Psych:  Mood & affect appropriate      LABS: All Labs Reviewed:           Troponin I, High Sensitivity Result: 18.4 ng/L (10-06-23 @ 18:00)  Cholesterol: 135 mg/dL (10-07-23 @ 05:38)  HDL Cholesterol: 43 mg/dL (10-07-23 @ 05:38)  Triglycerides, Serum: 96 mg/dL (10-07-23 @ 05:38)    12 Lead ECG:   Ventricular Rate 91 BPM    QRS Duration 82 ms    Q-T Interval 360 ms    QTC Calculation(Bazett) 442 ms    R Axis 11 degrees    T Axis 0 degrees    Diagnosis Line Atrial fibrillation  Abnormal ECG  When compared with ECG of 06-DEC-2021 18:15,  Criteria for Septal infarct are no longer present  Confirmed by LAURA PARRA ([id number]) on 10/7/2023 2:26:23 PM (10-06-23 @ 22:03)      TRANSTHORACIC ECHOCARDIOGRAM REPORT  ________________________________________________________________________________                                      _______       Pt. Name:       JACEK HERNDON Study Date:    10/9/2023  MRN:            [id number]       YOB: 1935  Accession #:    [id number]      Age:           88 years  Account#:       [id number]     Gender:        F  Heart Rate:                    Height:        61.81 in (157.00 cm)  Rhythm:                        Weight:  169.75 lb (77.00 kg)  Blood Pressure: 129/68 mmHg    BSA/BMI:       1.78 m² / 31.24 kg/m²  ________________________________________________________________________________________  Referring Physician:    Venita Kennedy  Interpreting Physician: Laura Parra  Primary Sonographer:    Zully Linton RDCS    CPT:               ECHO TTE WO CON COMP W DOPP - 14193.m  Indication(s):     Chest pain, unspecified - R07.9  Procedure:         Transthoracic echocardiogram with 2-D, M-mode and complete                   spectral and color flow Doppler.  Ordering Location: [facility]  Study Information: Image quality for this study is technically difficult.    _______________________________________________________________________________________     CONCLUSIONS:      1. Technically difficult image quality.   2. Left ventricular endocardium is not well visualized; however, the left ventricular systolic function appears mildly reduced.   3. Left ventricular systolic function is mildly decreased with an ejection fraction of 50 % by Drake's method of disks. Global left ventricular hypokinesis.   4. Normal right ventricular cavity size, wall thickness and systolic function.   5. The left atrium is severely dilated in size.   6. The right atrium is dilated in size.   7. There is calcification of the mitral valve annulus.   8. Mitral valve leaflets are diffusely calcified.   9. Symmetric mitral valve leaflet tethering.  10. Moderate mitral regurgitation.  11. Trileaflet aortic valve with visually mildly reduced systolic excursion.  12. Moderate aortic regurgitation.  13. Structurally normal tricuspid valve with normal leaflet excursion. Moderate tricuspid regurgitation.  14. Estimated pulmonary artery systolic pressure is 35 mmHg, consistent with normal pulmonary artery pressure.    ________________________________________________________________________________________  FINDINGS:     Left Ventricle:  Left ventricular systolic function is mildly decreased with a calculated ejection fractionof 50 % by the Drake's biplane method of disks. There is global left ventricular hypokinesis. Left ventricular endocardium is not well visualized; however, the left ventricular systolic function appears mildly reduced.     Right Ventricle:  The right ventricular cavity is normal in size, normal wall thickness and normal systolic function.     Left Atrium:  The left atrium is severely dilated in size.     Right Atrium:  The right atrium is dilated in size.     Aortic Valve:  The aortic valve appears trileaflet with reduced systolic excursion. There is no aortic valve stenosis. There is moderate aortic regurgitation. AI VMax is 3.55 m/s. AI pressure half time is 412 msec. AI slope is 2.50 m/s².     Mitral Valve:  There is calcification of themitral valve annulus. Mitral valve leaflets are diffusely calcified. There is symmetric leaflet tethering. There is no mitral valve stenosis. There is moderate mitral regurgitation.     Tricuspid Valve:  Structurally normal tricuspid valve with normal leaflet excursion. There is moderate tricuspid regurgitation. Estimated pulmonary artery systolic pressure is 35 mmHg, consistent with normal pulmonary artery pressure.     Pulmonic Valve:  Structurally normal pulmonic valve with normal leaflet excursion. There is mild pulmonic regurgitation.     Aorta:  The aortic root at the sinuses of Valsalva is normal in size, measuring 3.72 cm (indexed 2.09 cm/m²).     Pericardium:  No pericardial effusion seen.     Systemic Veins:  The inferior vena cava is normal in size measuring 2.10 cm in diameter, (normal <2.1cm) with normal inspiratory collapse (normal >50%) consistent with normal right atrial pressure (~3, range 0-5mmHg).  ____________________________________________________________________  Quantitative Data:  Left Ventricle Measurements: (Indexed to BSA)     IVSd (2D):   1.3 cm  LVPWd (2D):  1.3 cm  LVIDd (2D):  6.1 cm  LVIDs (2D):  4.0 cm  LV Mass:     369 g  207.4 g/m²  BiPlane LV EF%: 50 %     MV E Vmax:    1.07 m/s  e' lateral:   9.00cm/s  e' medial:    5.10 cm/s  E/e' lateral: 11.87  E/e' medial:  21.40  E/e' Average: 15.15  MV DT:        122 msec    Aorta Measurements: (normal range) (Indexed to BSA)     Sinuses of Valsalva: 3.72 cm (2.7 - 3.3 cm)       Left Atrium Measurements: (Indexed to BSA)  LA Diam 2D: 3.39 cm    Aortic Valve Measurements:  AV Vmax:          1.8 m/s  AV Peak Gradient: 12.4 mmHg    Mitral Valve Measurements:     MV E Vmax: 1.1 m/s         MR Vmax:          4.38 m/s                             MR Peak Gradient: 76.9 mmHg       Tricuspid Valve Measurements:     TR Vmax:          2.8 m/s  TR Peak Gradient: 31.9 mmHg  RA Pressure:      3 mmHg  PASP:             35 mmHg    ________________________________________________________________________________________  Electronically signed on 10/9/2023 at 10:31:21 PM by Laura Parra         *** Final ***

## 2023-10-16 NOTE — PROGRESS NOTE ADULT - ASSESSMENT
88-year-old female with PMH of CAD, gallstones, breast cancer (9 years ago status post radiation, reports never took oral medications because "she does not like to take pills"), atrial fibrillation, glaucoma, diverticulitis who presents to emergency room with bilateral flank pain, weakness, increased fatigue, dysphagia, and loss of appetite    pain  acute pain - chr pain  mets ca  esoph ca  breast ca  cad  obesity  weakness  dysphagia    poss plan for [facility] now - vs noted - awaiting a bed  will dc SL opioids and resume PO and IV     GOC documented  pt is DNR DNI  bowel rx regimen  IV Morphine for breakthrough severe pain  emotional support  assist with needs  oral hygiene  skin care  SW f/u noted  spoke with HCP - Mrs Clarkier -

## 2023-10-16 NOTE — PROGRESS NOTE ADULT - REASON FOR ADMISSION
esophageal mass

## 2023-10-16 NOTE — PROGRESS NOTE ADULT - PROVIDER SPECIALTY LIST ADULT
Cardiology
Gastroenterology
Palliative Care
Cardiology
Cardiology
Heme/Onc
Heme/Onc
Cardiology
Gastroenterology
Heme/Onc
Palliative Care
Cardiology
Gastroenterology
Palliative Care
Hospitalist
Internal Medicine
Internal Medicine
Hospitalist
Internal Medicine
Hospitalist
Hospitalist

## 2023-10-16 NOTE — PROGRESS NOTE ADULT - ASSESSMENT
88-year-old female with PMH of CAD, gallstones, breast cancer (9 years ago status post radiation, reports never took oral medications because "she does not like to take pills"), atrial fibrillation no AC, glaucoma, diverticulitis who presents with abd pain and found to have a new esophageal mass.     CAD, Persistent Afib, Chest Pain  - Chest pain is atypical, likley in the setting of esophageal mass,   - EKG is nonischemic  - Trop is neg, no need to trend.  No real anginal complaints     - Echo: EF 50% with moderate MR/AR, mildly elevated pulmonary pressures, LAE, TANIA  - No evidence of volume overload    - Rate-controlled Afib.  Not on home AVN blocker.   - Has not wanted any AC for years and at this point with a large esophageal mass, would not start    - Seen by GI; offered transfer for esophageal stent but patient refused  - Heme/onc following  - Palliative following.  For poss D/C to [facility]    - D/c planning per primary team.   - Monitor and replete lytes, keep K>4, Mg>2.  - Will continue to follow.    Mary Ann Chi, MS FNP, AGACNP  Nurse Practitioner- Cardiology   Please call on TEAMS

## 2023-10-25 NOTE — ED ADULT NURSE NOTE - ED CARDIAC RATE
We will call with any positive results of COVID, flu, RSV, strep, syphilis testing. We have provided information above for [facility]. Call the number above to speak to an . They will assist you with establishing care with a primary care provider. If you have any significant worsening of symptoms, return to the emergency department. tachycardic

## 2024-01-23 NOTE — CONSULT NOTE ADULT - CONSULT REQUESTED BY NAME
Pt brought to PACU. Report obtained from OR RN and anesthesia. Pt placed on monitor and O2 at  15L NRB    Dr Lezama

## 2024-07-25 NOTE — ED ADULT NURSE NOTE - HIV OFFER
Alert-The patient is alert, awake and responds to voice. The patient is oriented to time, place, and person. The triage nurse is able to obtain subjective information.
Previously Declined (within the last year)

## 2025-01-24 NOTE — ED PROVIDER NOTE - CONSULTATIONS
[facility] History and Physical:  Primary Care Provider:  Hong Stern MD    Chief Complaint:  left foot pain and numbness     HOSPITAL PROBLEM LIST:   Left lower extremity ischemia             - critical limb ischemia present on admission             - Thrombotic Occlusion of SFA/Popliteal Artery    - s/p aniogram, left leg lysis catheter placement into popliteal artery   CKD 2  HTN  HLD    ASSESSMENT AND PLAN    Neurology:  - headache. Migraines  - oxycodone and fentanyl prn;  tylenol for pain control   - pta gabapentin and cymbalta    Cardiology:  - lysis in progress  - check PTT  - monitor for left leg hematoma/bleeding  - right leg neurovascular checks    Pulmonary:  - wean O2    Renal:  - I/Os  - replace lytes  - monitor for contrast induced nephropathy   - LR infusion    Gastroenterology:  - regular diet, npo at midnight     Endocrine:  - synthroid     Hematology:  - lysis in progress   - check PTT and hgb now and am labs    Infectious Disease:  - no acute infections     BEST PRACTICES:  - VTE (venous thromboembolism) prophylaxis:  lysis in progress  - SUP:  not indicated   - Glycemic control:  intrinsic   - LDA:  right femoral artery infusion catheter and sheath, PIV   - Nutrition:  regular diet, npo at mn  - Therapy/mobilization:  pt/ot once activity restriction lifted and lysis is complete     ================================================================  History of present illness:  Emi Luther is a 68 year old female presenting with 1 day of left foot pain and numbness. CT scan shows popliteal artery occlusion. She underwent right femoral access, aortogram, and left leg catheter directed lysis into the popliteal artery. She comes to the ICU for monitoring given the possibility of life threatening bleeding or limb threatening ischemia during this process.     +headache. No chest pain or SOB. No nausea or vomiting. Still has left foot numbness.     Past Medical History:    Diagnosis Date    Arthritis     generalized    Carpal tunnel syndrome, bilateral     Depression     Diabetes mellitus  (CMD) 05/2014    Failed moderate sedation during procedure     woke up during colonoscopy    FH: migraines     Hyperlipidemia     Hypertension     Hypothyroidism     DANA (obstructive sleep apnea) 04/14/2022 4/14/2022  [facility] 5-15 cwp  [facility]    Personal history of traumatic fracture 02/2014    left ankle    Pneumonia 01/2014    Unspecified sinusitis (chronic)     Wears glasses       Past Surgical History:   Procedure Laterality Date    Appendectomy  1973    Cardiac catherization  06/13/2012    Carpal tunnel release  11/20/2020    Colonoscopy  08/27/2018    Vishnu-5 year recall-Polyp and Family Hx Colon cancer    Esophagogastroduodenoscopy (egd)  06/27/2019    Gynecologic cryosurgery  1980's    laparoscopic for right ovary and fallopian tube    Hernia repair  06/2014    ventral    Lipoma resection  1979    brain    Service to gastroenterology  2011    colonoscopy, removed 5 polyps    Total abdominal hysterectomy w/ bilateral salpingoophorectomy Bilateral 05/30/2014    Dr. Quezada    Varicose vein surgery  2002    bilateral     Medications Prior to Admission   Medication Sig Dispense Refill    losartan (COZAAR) 100 MG tablet TAKE 1 TABLET BY MOUTH DAILY 100 tablet 1    topiramate (TOPAMAX) 50 MG tablet Take 1.5 tablets by mouth in the morning and 1.5 tablets in the evening. 270 tablet 1    naproxen (NAPROSYN) 500 MG tablet TAKE 1 TABLET BY MOUTH IN THE  MORNING AND 1 TABLET BY MOUTH IN THE EVENING TAKE WITH MEALS 200 tablet 0    gabapentin (NEURONTIN) 300 MG capsule Take 1 capsule by mouth in the morning and 1 capsule at noon and 1 capsule in the evening. 90 capsule 0    HYDROcodone-acetaminophen (Norco) 5-325 MG per tablet Take 1 tablet by mouth every 12 hours as needed for Pain. Maximum of 2/24 HOUR. REDUCTION IN NUMBER DUE TO STATE REGS. 60 tablet 0    TEMazepam (RESTORIL) 15 MG capsule Take  1 capsule by mouth at bedtime as needed for insomnia 30 capsule 0    DULoxetine (CYMBALTA) 60 MG capsule TAKE 1 CAPSULE BY MOUTH DAILY 100 capsule 0    lidocaine (LIDODERM) 5 % patch Place 1 patch onto the skin every 24 hours. Remove patch 12 hours after applying 90 patch 1    naLOXone (NARCAN) 4 MG/0.1ML nasal liquid Spray the content of 1 device into 1 nostril. Call 911. May repeat with 2nd device in alternate nostril if no response in 2-3 minutes. 2 each 1    Semaglutide (Rybelsus) 14 MG Tab Take 1 tablet by mouth daily (with breakfast). Indications: Type 2 Diabetes 90 tablet 1    levothyroxine 125 MCG tablet TAKE 1 TABLET BY MOUTH DAILY 90 tablet 3    simvastatin (ZOCOR) 40 MG tablet TAKE 1 TABLET BY MOUTH EVERY  NIGHT 100 tablet 1    metoPROLOL succinate (TOPROL-XL) 100 MG 24 hr tablet TAKE 1 TABLET BY MOUTH IN THE  MORNING AND 1 TABLET BY MOUTH IN THE EVENING 200 tablet 1    metformin (GLUCOPHAGE) 1000 MG tablet TAKE 1 TABLET BY MOUTH IN THE  MORNING AND 1 TABLET IN THE  EVENING WITH MEALS 200 tablet 1    zolmitriptan (ZOMIG) 5 MG tablet Take 1 tablet by mouth at onset of migraine. May repeat after 2 hours if needed. 6 tablet 1    ondansetron (ZOFRAN ODT) 4 MG disintegrating tablet PLACE 1 TABLET ONTO THE TONGUE AND ALLOW TO DISSOLVE EVERY 8 HOURS AS NEEDED FOR NAUSEA. 20 tablet 3    fluticasone (FLONASE) 50 MCG/ACT nasal spray SPRAY 2 SPRAYS INTO EACH NOSTRIL EVERY DAY 48 mL 4    naLOXone (NARCAN) 4 MG/0.1ML nasal spray Spray the content of 1 device into 1 nostril. Call 911. May repeat with 2nd device in alternate nostril if no response in 2-3 minutes. 2 each 1     ALLERGIES:   Allergen Reactions    Flurosyn ANAPHYLAXIS     Stopped breathing      Social History     Tobacco Use    Smoking status: Every Day     Current packs/day: 0.00     Types: Cigarettes     Last attempt to quit: 2003     Years since quittin.0    Smokeless tobacco: Never    Tobacco comments:     about 5 cigarettes a day   Substance  Use Topics    Alcohol use: Yes     Comment: 0-1/week      Family History   Problem Relation Age of Onset    Anemia Mother     Osteoarthritis Mother     Cancer Mother         Colon Polyps and colon cancer    Diabetes Mother     Liver Disease Mother     Stroke/TIA Mother     Stroke Mother     Anemia Father     Osteoarthritis Father     Cancer Father         \"Blood cancer\"    Heart disease Father         abnormal heart rate, heart failure, MI    Hyperlipidemia Father     Osteoarthritis Brother     Diabetes Brother     Liver Disease Brother     Alcohol Abuse Brother         ================================================================    No intake/output data recorded.  No intake/output data recorded.      Vital Last Value 24 Hour Range   Temperature 97.6 °F (36.4 °C) (01/23/25 2030) Temp  Min: 97.5 °F (36.4 °C)  Max: 97.6 °F (36.4 °C)   Pulse 67 (01/23/25 2002) Pulse  Min: 67  Max: 83   Respiratory (!) 10 (01/23/25 2002) Resp  Min: 9  Max: 22   Non-Invasive  Blood Pressure (!) 145/79 (01/23/25 2002) BP  Min: 99/60  Max: 159/86   Pulse Oximetry 98 % (01/23/25 2002) SpO2  Min: 90 %  Max: 100 %   Arterial   Blood Pressure   No data recorded      Telemetry:  Sinus  Gen: NAD, AOX3  Neuro intact except some numbness to left lower ext  CV RRR  Chest: CTAB  Abd soft nd nt  Right groin access site: no hematoma.   Left lower ext: +DP signal. No lower ext pain, some numbness but has gross sensation.   Right leg +PT signal  No lower ext edema  Skin warm    Pertinent Reviewed:  Allergies, Medications, Labs, Imaging, and Physician and Nursing Notes    ACCS Attestation    This patient is critically ill as documented above.  I evaluated the patient and reviewed imaging and laboratory data.  Critical care services I provided [id number]:  45 minutes not including time allocated for procedures.    Liliana Glez MD  Acute Care Surgery/Critical Care  [facility] night phone: [phone]       additional...

## (undated) DEVICE — TUBING IV SET GRAVITY 3Y 100" MACRO

## (undated) DEVICE — CATH ELCTR GLIDE PRB 7FR

## (undated) DEVICE — TUBING IV SET SECOND 34" W/O LOK-BLUNT

## (undated) DEVICE — SET IV PUMP BLOOD 1VALVE 180FILTER NON-DEHP

## (undated) DEVICE — TUBE O2 SUPL CRUSH RESIS CONN SOUTHSIDE ONLY

## (undated) DEVICE — SOL IRR BAG H2O 1000ML

## (undated) DEVICE — SNARE LRG

## (undated) DEVICE — SOL IRR POUR H2O 1000ML

## (undated) DEVICE — SOL IRR POUR NS 0.9% 1000ML

## (undated) DEVICE — SYR IV POSIFLUSH NS 3ML 30/TY

## (undated) DEVICE — SYR LUER LOK 30CC

## (undated) DEVICE — CATH ELECHMSTAT  INJ 7FR 210CM

## (undated) DEVICE — SOL IRR NS 0.9% 250ML

## (undated) DEVICE — SENSOR O2 FINGER XL ADULT 24/BX 6BX/CA

## (undated) DEVICE — CATH IV SAFE BC 20G X 1.16" (PINK)

## (undated) DEVICE — ELCTR GROUNDING PAD ADULT COVIDIEN

## (undated) DEVICE — BRUSH COLONOSCOPY CYTOLOGY

## (undated) DEVICE — Device

## (undated) DEVICE — CATH IV SAFE BC 22G X 1" (BLUE)

## (undated) DEVICE — GLV 8 PROTEXIS (WHITE)

## (undated) DEVICE — TUBING SUCTION CONN 6FT STERILE

## (undated) DEVICE — SUCTION YANKAUER TAPERED BULBOUS NO VENT

## (undated) DEVICE — SOL IRR POUR H2O 500ML

## (undated) DEVICE — NDL INJ SCLERO INTERJECT 23G

## (undated) DEVICE — FORMALIN CUPS 10% BUFFERED

## (undated) DEVICE — FORCEP RADIAL JAW 4 W NDL 2.2MM 2.8MM 160CM YELLOW DISP

## (undated) DEVICE — MARKER ENDO SPOT EX

## (undated) DEVICE — TUBING CANNULA SALTER LABS NASAL ADULT 7FT

## (undated) DEVICE — BITE BLOCK MAXI RUBBER STAMP

## (undated) DEVICE — BRUSH CYTO ENDO

## (undated) DEVICE — STERIS DEFENDO 3-PIECE KIT (AIR/WATER, SUCTION & BIOPSY VALVES)

## (undated) DEVICE — DRSG CURITY GAUZE SPONGE 4 X 4" 12-PLY